# Patient Record
Sex: MALE | Race: WHITE | NOT HISPANIC OR LATINO | Employment: OTHER | URBAN - METROPOLITAN AREA
[De-identification: names, ages, dates, MRNs, and addresses within clinical notes are randomized per-mention and may not be internally consistent; named-entity substitution may affect disease eponyms.]

---

## 2017-07-20 ENCOUNTER — TRANSCRIBE ORDERS (OUTPATIENT)
Dept: ADMINISTRATIVE | Facility: HOSPITAL | Age: 62
End: 2017-07-20

## 2017-07-20 DIAGNOSIS — M54.5 LOW BACK PAIN, UNSPECIFIED BACK PAIN LATERALITY, UNSPECIFIED CHRONICITY, WITH SCIATICA PRESENCE UNSPECIFIED: Primary | ICD-10-CM

## 2017-07-21 ENCOUNTER — HOSPITAL ENCOUNTER (OUTPATIENT)
Dept: RADIOLOGY | Age: 62
Discharge: HOME | End: 2017-07-21
Payer: MEDICARE

## 2017-07-21 DIAGNOSIS — M54.5 LOW BACK PAIN, UNSPECIFIED BACK PAIN LATERALITY, UNSPECIFIED CHRONICITY, WITH SCIATICA PRESENCE UNSPECIFIED: ICD-10-CM

## 2017-07-21 PROCEDURE — 72148 MRI LUMBAR SPINE W/O DYE: CPT

## 2017-08-25 ENCOUNTER — APPOINTMENT (OUTPATIENT)
Dept: LAB | Age: 62
End: 2017-08-25
Payer: MEDICARE

## 2017-08-25 ENCOUNTER — TRANSCRIBE ORDERS (OUTPATIENT)
Dept: ADMINISTRATIVE | Age: 62
End: 2017-08-25

## 2017-08-25 DIAGNOSIS — I10 ESSENTIAL HYPERTENSION, MALIGNANT: ICD-10-CM

## 2017-08-25 DIAGNOSIS — I25.119 ATHEROSCLEROSIS OF NATIVE CORONARY ARTERY WITH ANGINA PECTORIS, UNSPECIFIED WHETHER NATIVE OR TRANSPLANTED HEART (HCC): Primary | ICD-10-CM

## 2017-08-25 DIAGNOSIS — E78.2 MIXED HYPERLIPIDEMIA: ICD-10-CM

## 2017-08-25 DIAGNOSIS — I73.9 PERIPHERAL VASCULAR DISEASE, UNSPECIFIED (HCC): ICD-10-CM

## 2017-08-25 DIAGNOSIS — I25.119 ATHEROSCLEROSIS OF NATIVE CORONARY ARTERY WITH ANGINA PECTORIS, UNSPECIFIED WHETHER NATIVE OR TRANSPLANTED HEART (HCC): ICD-10-CM

## 2017-08-25 LAB
CHOLEST SERPL-MCNC: 152 MG/DL (ref 50–200)
HDLC SERPL-MCNC: 49 MG/DL (ref 40–60)
LDLC SERPL CALC-MCNC: 88 MG/DL (ref 0–100)
TRIGL SERPL-MCNC: 77 MG/DL

## 2017-08-25 PROCEDURE — 36415 COLL VENOUS BLD VENIPUNCTURE: CPT

## 2017-08-25 PROCEDURE — 80061 LIPID PANEL: CPT

## 2017-10-05 LAB — HCV AB SER-ACNC: NON REACTIVE

## 2018-03-01 ENCOUNTER — APPOINTMENT (OUTPATIENT)
Dept: LAB | Age: 63
End: 2018-03-01
Payer: MEDICARE

## 2018-03-01 ENCOUNTER — TRANSCRIBE ORDERS (OUTPATIENT)
Dept: ADMINISTRATIVE | Age: 63
End: 2018-03-01

## 2018-03-01 DIAGNOSIS — E03.9 MYXEDEMA HEART DISEASE: ICD-10-CM

## 2018-03-01 DIAGNOSIS — I51.9 MYXEDEMA HEART DISEASE: ICD-10-CM

## 2018-03-01 DIAGNOSIS — E78.5 HYPERLIPIDEMIA, UNSPECIFIED HYPERLIPIDEMIA TYPE: Primary | ICD-10-CM

## 2018-03-01 DIAGNOSIS — E78.5 HYPERLIPIDEMIA, UNSPECIFIED HYPERLIPIDEMIA TYPE: ICD-10-CM

## 2018-03-01 LAB
ALBUMIN SERPL BCP-MCNC: 3.9 G/DL (ref 3.5–5)
ALP SERPL-CCNC: 102 U/L (ref 46–116)
ALT SERPL W P-5'-P-CCNC: 20 U/L (ref 12–78)
ANION GAP SERPL CALCULATED.3IONS-SCNC: 9 MMOL/L (ref 4–13)
AST SERPL W P-5'-P-CCNC: 18 U/L (ref 5–45)
BILIRUB SERPL-MCNC: 0.64 MG/DL (ref 0.2–1)
BUN SERPL-MCNC: 16 MG/DL (ref 5–25)
CALCIUM SERPL-MCNC: 9.2 MG/DL (ref 8.3–10.1)
CHLORIDE SERPL-SCNC: 104 MMOL/L (ref 100–108)
CHOLEST SERPL-MCNC: 142 MG/DL (ref 50–200)
CO2 SERPL-SCNC: 27 MMOL/L (ref 21–32)
CREAT SERPL-MCNC: 1.14 MG/DL (ref 0.6–1.3)
GFR SERPL CREATININE-BSD FRML MDRD: 70 ML/MIN/1.73SQ M
GLUCOSE P FAST SERPL-MCNC: 108 MG/DL (ref 65–99)
HDLC SERPL-MCNC: 42 MG/DL (ref 40–60)
LDLC SERPL CALC-MCNC: 69 MG/DL (ref 0–100)
POTASSIUM SERPL-SCNC: 4.1 MMOL/L (ref 3.5–5.3)
PROT SERPL-MCNC: 7.7 G/DL (ref 6.4–8.2)
SODIUM SERPL-SCNC: 140 MMOL/L (ref 136–145)
TRIGL SERPL-MCNC: 157 MG/DL
TSH SERPL DL<=0.05 MIU/L-ACNC: 3.53 UIU/ML (ref 0.36–3.74)

## 2018-03-01 PROCEDURE — 36415 COLL VENOUS BLD VENIPUNCTURE: CPT

## 2018-03-01 PROCEDURE — 80053 COMPREHEN METABOLIC PANEL: CPT

## 2018-03-01 PROCEDURE — 80061 LIPID PANEL: CPT

## 2018-03-01 PROCEDURE — 84443 ASSAY THYROID STIM HORMONE: CPT

## 2018-10-21 PROBLEM — R07.9 CHEST PAIN: Status: ACTIVE | Noted: 2018-07-17

## 2018-10-21 PROBLEM — M79.18 MYOFASCIAL PAIN SYNDROME: Status: ACTIVE | Noted: 2017-08-01

## 2018-10-21 PROBLEM — R60.0 BILATERAL LOWER EXTREMITY EDEMA: Status: ACTIVE | Noted: 2018-05-10

## 2018-10-21 PROBLEM — I73.9 CLAUDICATION (HCC): Status: ACTIVE | Noted: 2017-08-21

## 2018-10-21 PROBLEM — M54.16 RADICULOPATHY, LUMBAR REGION: Status: ACTIVE | Noted: 2017-08-01

## 2018-10-21 PROBLEM — E78.2 MIXED HYPERLIPIDEMIA: Status: ACTIVE | Noted: 2017-08-21

## 2018-10-21 PROBLEM — I25.10 CAD (CORONARY ARTERY DISEASE): Status: ACTIVE | Noted: 2018-07-17

## 2018-10-21 NOTE — PROGRESS NOTES
Consultation - Cardiology Office  [facility]  Russell Todd 66 y o  male MRN: [id number]  : 1955  Unit/Bed#:  Encounter: [id number]      Assessment:     1  Coronary artery disease involving native coronary artery of native heart with angina pectoris ([address] )    2  Essential hypertension    3  Chest pain, unspecified type    4  Mixed hyperlipidemia    5  Bilateral lower extremity edema    6  Palpitation    7  History of CVA (cerebrovascular accident)        Discussion summary and Plan:  1  Coronary artery disease with multiple vessel stenting  Patient still have lot of nonspecific complaints  Patient is overall very unhappy with care with multiple physicians  Patient's cardiac catheterization report reviewed  He has stents done in RCA LAD circumflex  Last stenting was in 8823 and it was complicated with possible air embolism and patient developing transit hypotension  He had a normal stress test in 2017 and cardiac catheterization report reviewed  He continues to have nonspecific complaints  2   Benign essential hypertension  Blood pressure is acceptable    3  Dyslipidemia continue to her was statin cholesterol profile acceptable    4  Palpitations  Check Holter monitor  EKG shows no evidence of significant arrhythmias    5  Bilateral lower extremity edema  Currently no edema is noted  Patient already stop Lasix and potassium supplementation    6  History of anxiety and sleep problem advised to see PMD    7  History of CVA  Patient already on statins and aspirin and Plavix  Plan  Continue current Rx  If continues to have symptoms of nonspecific chest pain may need nuclear stress test  Holter monitor  Continue same medication  Patient's cath report, echo and Doppler study is reviewed with him  Counseling :  A description of the counseling  Goals and Barriers    Patient's ability to self care: Yes  Medication side effect reviewed with patient in detail and all their questions answered to their satisfaction  Physician Requesting Consult: Geneva Simon MD    Reason for Consult / Principal Problem:   Extensive cardiac history    Consults    HPI :     Singh Dorantes is a 66y o  year old male who was referred by primary care doctor for his coronary artery disease  Patient just had a last cardiac catheterization done in July of 2018 at [facility]   He was electively admitted for cardiac catheterization  Has past medical history significant for with coronary artery disease status post multivessel stenting in calcified arteries, chronic low back pain, hypertension, history of CVA, hypothyroidism  As per the note patient has numerous stent and PCI and he was offered CABG however he opted for multivessel PCI  In 2017 he had PCI of his LAD with drug-eluting stent and PCI of circumflex and OM in April 2017  In July 2017 he was electively brought in for cardiac catheterization and was found to have normal left main, lad 40% post D2, 99% stenosis distally and circumflex X 60% proximally in stent stenosis and RCA normal with mid and distal stents  Patient had PCI of diagonal which was 99% stenosis and a drug-eluting stent was placed  Course was complicated by ruptured balloon and air emboli where patient were resuscitated  Patient has longstanding history of multiple stents  There may be a stent placed by me in 2007  Most of his stent but paced by Dr Libra Quach in in 2017  In 2017 Dr Libra Quach also did a rotablator of an artery  We do not have the details of it    In 2018 he should take his care to [facility] and he was seen by Dr Vinnie Pascual and Dr Maria M Ruano did his last cardiac catheterization whose report is in the chart  Review of Systems   Constitutional: Positive for fatigue  Cardiovascular: Positive for leg swelling          Minimal  History of multivessel stenting   Neurological:        History of stroke Psychiatric/Behavioral: Positive for sleep disturbance  The patient is nervous/anxious  All other systems reviewed and are negative  Historical Information   Past Medical History:   Diagnosis Date    CAD (coronary artery disease)     Heart disease     Hypertension     Hypothyroidism     Stroke [facility])      Past Surgical History:   Procedure Laterality Date    CORONARY STENT PLACEMENT      HERNIA REPAIR       History   Alcohol Use No     History   Drug Use No     History   Smoking Status    Never Smoker   Smokeless Tobacco    Never Used     Family History:   Family History   Problem Relation Age of Onset    Cancer Mother     Heart attack Father     Heart attack Sister     Cancer Brother        Meds/Allergies     No Known Allergies    Current Outpatient Prescriptions:     aspirin 81 MG tablet, Take 81 mg by mouth, Disp: , Rfl:     atorvastatin (LIPITOR) 40 mg tablet, Take 80 mg by mouth, Disp: , Rfl:     Cholecalciferol 1000 units capsule, Take 1,000 Units by mouth, Disp: , Rfl:     clonazePAM (KlonoPIN) 2 mg tablet, Take 4 mg by mouth, Disp: , Rfl:     clopidogrel (PLAVIX) 75 mg tablet, Take 75 mg by mouth, Disp: , Rfl:     levothyroxine 100 mcg tablet, Take 100 mcg by mouth, Disp: , Rfl:     metoprolol succinate (TOPROL-XL) 100 mg 24 hr tablet, Take 100 mg by mouth, Disp: , Rfl:     TiZANidine (ZANAFLEX) 2 MG capsule, Take 2 mg by mouth Three times a day, Disp: , Rfl:     zolpidem (AMBIEN) 10 mg tablet, Take 20 mg by mouth, Disp: , Rfl:     Vitals: Blood pressure 140/88, pulse 68, height 5' 10" (1 778 m), weight 108 kg (238 lb), SpO2 96 %  Body mass index is 34 15 kg/m²    Vitals:    10/25/18 1110   Weight: 108 kg (238 lb)     BP Readings from Last 3 Encounters:   10/25/18 140/88   08/22/12 (!) 160/102         Physical Exam    Neurologic:  Alert & oriented x 3, no new focal deficits, Not in any acute distress,  Constitutional:  Well developed, well nourished, non-toxic appearance Eyes:  Pupil equal and reacting to light, conjunctiva normal, No JVP, No LNP   HENT:  Atraumatic, oropharynx moist, Neck- normal range of motion, no tenderness,  Neck supple   Respiratory:  Bilateral air entry, mostly clear to auscultation  Cardiovascular: S1-S2 regular with a 2/6 ejection systolic murmur and S4 is present  GI:  Soft, nondistended, normal bowel sounds, nontender, no hepatosplenomegaly appreciated  Musculoskeletal:  No edema, no tenderness, no deformities  Skin:  Well hydrated, no rash   Lymphatic:  No lymphadenopathy noted   Extremities:  No edema and distal pulses are present    Diagnostic Studies Review Cardio:      Echo Doppler  Echo Doppler done at [facility] in May of 2018 shows normal LV systolic function  No significant valvular disease  It is reported in Care everywhere  Echo 5/15/18  Normal left ventricular systolic function  Normal regional wall motion  Moderate concentric left ventricular hypertrophy  Mildly dilated ascending aorta  Trace tricuspid regurgitation  Mild diastolic dysfunction with normal filling pressures  Visually Estimated LV Ejection Fraction is:60%     LE Ultrasound 8/31/17  There is no hemodynamically stenosis of the bilateral lower extremities  Normal doppler waveforms of the bilateral lower extremities           Cath 7/17/18  Left Main: Normal  LAD: 40% post D2, 99% proximal D2  CLX: 60% proximal in stent  RCA: Normal mid and distal stents  Vessel intervened on was the diagonal D2  Diagonal 2 Pre stenosis = 99%, Post stenosis = 0%, Stents 2 0X8 [place]    Lab Review   Lab Results   Component Value Date    07/24/2018   K 3 9 07/24/2018    07/24/2018   CO2 28 07/24/2018   BUN 16 07/24/2018   CREATININE 1 21 07/24/2018   GLUCOSE 150 (H) 07/24/2018   CALCIUM 8 8 07/24/2018   INR 1 0 07/17/2018     Lab Results   Component Value Date   CHOL 152 08/25/2017   TRIG 77 08/25/2017   HDL 49 08/25/2017      Nuclear stress test   Nuclear stress test done in August of 2017 shows no ischemia EF was 63%  Study done at [facility]     PERFUSION:     SPECT images demonstrate homogeneous tracer distribution throughout                           the myocardium                             Resting images showed no change in the pattern of perfusion                           compared to stress                            Resting images showed no change in the pattern of perfusion                           compared to stress                            Stress and rest SPECT images are normal                            No abnormalities                             No evidence of ischemia        FUNCTIONAL RESULTS (calculated via Gated SPECT)     Post Stress Image LV EF:    71     %      Stress EDV: 107   ml         EDVI:  63    ml/m²      TID:  1 05      Stress ESV: 31    ml         ESVI:  18    ml/m²    Normal left ventricular size and ejection fraction      Normal left ventricular wall motion and thickness       Last cardiac catheterization report from [facility]:  Operative Procedures Performed  Cardiac catheterization 7/17/18  Coronary Angiography  Left Main: Normal  LAD: 40% post D2, 99% proximal D2  CLX: 60% proximal in stent  RCA: Normal mid and distal stents  Patient has multiple stents in his RCA, mid LAD, and proximal circumflex      Coronary Intervention:   Vessel intervened on was the diagonal 2  Diagonal 2 Pre stenosis = 99%, Post stenosis = 0%, Stents 2 0X 8 [place]      EKG:  Twelve lead EKG done in our office on 10/25/2018 shows normal sinus rhythm heart rate 61 beats per minute         Lab Review   Lab Results   Component Value Date    WBC 6 22 10/10/2016    HGB 14 9 10/10/2016    HCT 43 9 10/10/2016    MCV 96 10/10/2016     10/10/2016     Lab Results   Component Value Date     03/01/2018    K 4 1 03/01/2018     03/01/2018    CO2 27 03/01/2018    BUN 16 03/01/2018    CREATININE 1 14 03/01/2018    GLUF 108 (H) 03/01/2018    CALCIUM 9 2 03/01/2018    AST 18 03/01/2018    ALT 20 03/01/2018    ALKPHOS 102 03/01/2018    EGFR 70 03/01/2018     Lab Results   Component Value Date    CALCIUM 9 2 03/01/2018     03/01/2018    K 4 1 03/01/2018    CO2 27 03/01/2018     03/01/2018    BUN 16 03/01/2018    CREATININE 1 14 03/01/2018       Lipid Profile:   Lab Results   Component Value Date    CHOLESTEROL 142 03/01/2018    HDL 42 03/01/2018    LDLCALC 69 03/01/2018    TRIG 157 (H) 03/01/2018     Lab Results   Component Value Date    CHOLESTEROL 142 03/01/2018    CHOLESTEROL 152 08/25/2017     Dr Tana Vásquez MD [facility] - Echo      "This note has been constructed using a voice recognition system  Therefore there may be syntax, spelling, and/or grammatical errors   Please call if you have any questions  "

## 2018-10-25 ENCOUNTER — OFFICE VISIT (OUTPATIENT)
Dept: CARDIOLOGY CLINIC | Facility: CLINIC | Age: 63
End: 2018-10-25
Payer: MEDICARE

## 2018-10-25 VITALS
BODY MASS INDEX: 34.07 KG/M2 | SYSTOLIC BLOOD PRESSURE: 140 MMHG | HEIGHT: 70 IN | OXYGEN SATURATION: 96 % | WEIGHT: 238 LBS | HEART RATE: 68 BPM | DIASTOLIC BLOOD PRESSURE: 88 MMHG

## 2018-10-25 DIAGNOSIS — I10 ESSENTIAL HYPERTENSION: ICD-10-CM

## 2018-10-25 DIAGNOSIS — I25.119 CORONARY ARTERY DISEASE INVOLVING NATIVE CORONARY ARTERY OF NATIVE HEART WITH ANGINA PECTORIS (HCC): ICD-10-CM

## 2018-10-25 DIAGNOSIS — Z86.73 HISTORY OF CVA (CEREBROVASCULAR ACCIDENT): ICD-10-CM

## 2018-10-25 DIAGNOSIS — R07.9 CHEST PAIN, UNSPECIFIED TYPE: ICD-10-CM

## 2018-10-25 DIAGNOSIS — R00.2 PALPITATION: ICD-10-CM

## 2018-10-25 DIAGNOSIS — E78.2 MIXED HYPERLIPIDEMIA: ICD-10-CM

## 2018-10-25 DIAGNOSIS — R60.0 BILATERAL LOWER EXTREMITY EDEMA: ICD-10-CM

## 2018-10-25 PROCEDURE — 93000 ELECTROCARDIOGRAM COMPLETE: CPT | Performed by: INTERNAL MEDICINE

## 2018-10-25 PROCEDURE — 99205 OFFICE O/P NEW HI 60 MIN: CPT | Performed by: INTERNAL MEDICINE

## 2018-10-25 RX ORDER — TIZANIDINE HYDROCHLORIDE 2 MG/1
2 CAPSULE, GELATIN COATED ORAL 3 TIMES DAILY
COMMUNITY
End: 2020-11-05 | Stop reason: SDUPTHER

## 2018-10-25 RX ORDER — ZOLPIDEM TARTRATE 10 MG/1
20 TABLET ORAL
COMMUNITY
End: 2020-04-20 | Stop reason: SDUPTHER

## 2018-10-25 RX ORDER — FUROSEMIDE 20 MG/1
20 TABLET ORAL
COMMUNITY
Start: 2018-07-27 | End: 2018-10-25

## 2018-10-25 RX ORDER — ISOSORBIDE MONONITRATE 30 MG/1
30 TABLET, EXTENDED RELEASE ORAL
COMMUNITY
Start: 2018-07-13 | End: 2018-10-25

## 2018-10-25 RX ORDER — TIZANIDINE 2 MG/1
TABLET ORAL
COMMUNITY
Start: 2018-08-27 | End: 2018-10-25

## 2018-10-25 RX ORDER — CLONAZEPAM 2 MG/1
4 TABLET ORAL
COMMUNITY
End: 2020-04-20 | Stop reason: SDUPTHER

## 2018-10-25 RX ORDER — CLOPIDOGREL BISULFATE 75 MG/1
75 TABLET ORAL
COMMUNITY
Start: 2018-06-25 | End: 2020-07-01

## 2018-10-25 RX ORDER — LEVOTHYROXINE SODIUM 0.1 MG/1
100 TABLET ORAL
COMMUNITY
End: 2020-07-27

## 2018-10-25 RX ORDER — ATORVASTATIN CALCIUM 40 MG/1
80 TABLET, FILM COATED ORAL
COMMUNITY
Start: 2018-07-19 | End: 2020-07-18

## 2018-10-25 RX ORDER — POTASSIUM CHLORIDE 750 MG/1
10 TABLET, EXTENDED RELEASE ORAL
COMMUNITY
Start: 2018-07-27 | End: 2018-10-25

## 2018-10-25 RX ORDER — METOPROLOL SUCCINATE 100 MG/1
100 TABLET, EXTENDED RELEASE ORAL
COMMUNITY
End: 2020-07-27 | Stop reason: SDUPTHER

## 2018-11-16 ENCOUNTER — HOSPITAL ENCOUNTER (OUTPATIENT)
Dept: NON INVASIVE DIAGNOSTICS | Facility: HOSPITAL | Age: 63
Discharge: HOME | End: 2018-11-16
Attending: INTERNAL MEDICINE
Payer: MEDICARE

## 2018-11-16 ENCOUNTER — TRANSCRIBE ORDERS (OUTPATIENT)
Dept: ADMINISTRATIVE | Facility: HOSPITAL | Age: 63
End: 2018-11-16

## 2018-11-16 ENCOUNTER — TELEPHONE (OUTPATIENT)
Dept: CARDIOLOGY CLINIC | Facility: CLINIC | Age: 63
End: 2018-11-16

## 2018-11-16 ENCOUNTER — LAB (OUTPATIENT)
Dept: LAB | Facility: HOSPITAL | Age: 63
End: 2018-11-16
Payer: MEDICARE

## 2018-11-16 DIAGNOSIS — E78.5 HYPERLIPIDEMIA, UNSPECIFIED HYPERLIPIDEMIA TYPE: ICD-10-CM

## 2018-11-16 DIAGNOSIS — R00.2 PALPITATION: ICD-10-CM

## 2018-11-16 DIAGNOSIS — I25.119 CORONARY ARTERY DISEASE INVOLVING NATIVE CORONARY ARTERY OF NATIVE HEART WITH ANGINA PECTORIS (HCC): ICD-10-CM

## 2018-11-16 DIAGNOSIS — E78.5 HYPERLIPIDEMIA, UNSPECIFIED HYPERLIPIDEMIA TYPE: Primary | ICD-10-CM

## 2018-11-16 DIAGNOSIS — E03.9 HYPOTHYROIDISM, UNSPECIFIED TYPE: ICD-10-CM

## 2018-11-16 LAB
ALBUMIN SERPL BCP-MCNC: 3.8 G/DL (ref 3.5–5)
ALP SERPL-CCNC: 110 U/L (ref 46–116)
ALT SERPL W P-5'-P-CCNC: 23 U/L (ref 12–78)
ANION GAP SERPL CALCULATED.3IONS-SCNC: 7 MMOL/L (ref 4–13)
AST SERPL W P-5'-P-CCNC: 16 U/L (ref 5–45)
BILIRUB SERPL-MCNC: 1.1 MG/DL (ref 0.2–1)
BUN SERPL-MCNC: 12 MG/DL (ref 5–25)
CALCIUM SERPL-MCNC: 9.5 MG/DL (ref 8.3–10.1)
CHLORIDE SERPL-SCNC: 102 MMOL/L (ref 100–108)
CHOLEST SERPL-MCNC: 130 MG/DL (ref 50–200)
CO2 SERPL-SCNC: 30 MMOL/L (ref 21–32)
CREAT SERPL-MCNC: 1.2 MG/DL (ref 0.6–1.3)
GFR SERPL CREATININE-BSD FRML MDRD: 65 ML/MIN/1.73SQ M
GLUCOSE P FAST SERPL-MCNC: 98 MG/DL (ref 65–99)
HDLC SERPL-MCNC: 38 MG/DL (ref 40–60)
LDLC SERPL CALC-MCNC: 72 MG/DL (ref 0–100)
NONHDLC SERPL-MCNC: 92 MG/DL
POTASSIUM SERPL-SCNC: 3.8 MMOL/L (ref 3.5–5.3)
PROT SERPL-MCNC: 7.5 G/DL (ref 6.4–8.2)
SODIUM SERPL-SCNC: 139 MMOL/L (ref 136–145)
T4 FREE SERPL-MCNC: 1.01 NG/DL (ref 0.76–1.46)
TRIGL SERPL-MCNC: 99 MG/DL
TSH SERPL DL<=0.05 MIU/L-ACNC: 1.03 UIU/ML (ref 0.36–3.74)

## 2018-11-16 PROCEDURE — 80061 LIPID PANEL: CPT

## 2018-11-16 PROCEDURE — 93226 XTRNL ECG REC<48 HR SCAN A/R: CPT

## 2018-11-16 PROCEDURE — 93225 XTRNL ECG REC<48 HRS REC: CPT

## 2018-11-16 PROCEDURE — 36415 COLL VENOUS BLD VENIPUNCTURE: CPT

## 2018-11-16 PROCEDURE — 84439 ASSAY OF FREE THYROXINE: CPT

## 2018-11-16 PROCEDURE — 84443 ASSAY THYROID STIM HORMONE: CPT

## 2018-11-16 PROCEDURE — 80053 COMPREHEN METABOLIC PANEL: CPT

## 2018-11-16 NOTE — TELEPHONE ENCOUNTER
----- Message from Gerardo Hillman MD sent at 11/16/2018  1:48 PM EST -----  Patient labs are acceptable  Continue same medications  Patient is advised to follow up  appointment regularly  Please call patient about the  about results

## 2018-11-20 ENCOUNTER — TELEPHONE (OUTPATIENT)
Dept: CARDIOLOGY CLINIC | Facility: CLINIC | Age: 63
End: 2018-11-20

## 2018-11-20 NOTE — TELEPHONE ENCOUNTER
Dr Alex Mathis is reading the holters Mallory Skipper He may be aware  Please ask him when you see him in the office  I will also try to find out

## 2018-11-21 PROCEDURE — 93227 XTRNL ECG REC<48 HR R&I: CPT | Performed by: INTERNAL MEDICINE

## 2019-06-19 ENCOUNTER — OFFICE VISIT (OUTPATIENT)
Dept: CARDIOLOGY CLINIC | Facility: CLINIC | Age: 64
End: 2019-06-19
Payer: MEDICARE

## 2019-06-19 VITALS
HEART RATE: 70 BPM | BODY MASS INDEX: 33.64 KG/M2 | HEIGHT: 70 IN | DIASTOLIC BLOOD PRESSURE: 80 MMHG | SYSTOLIC BLOOD PRESSURE: 122 MMHG | WEIGHT: 235 LBS | OXYGEN SATURATION: 97 %

## 2019-06-19 DIAGNOSIS — I25.119 CORONARY ARTERY DISEASE INVOLVING NATIVE CORONARY ARTERY OF NATIVE HEART WITH ANGINA PECTORIS (HCC): ICD-10-CM

## 2019-06-19 DIAGNOSIS — E78.2 MIXED HYPERLIPIDEMIA: ICD-10-CM

## 2019-06-19 DIAGNOSIS — Z86.73 HISTORY OF CVA (CEREBROVASCULAR ACCIDENT): ICD-10-CM

## 2019-06-19 DIAGNOSIS — Z98.61 HISTORY OF PERCUTANEOUS CORONARY INTERVENTION: ICD-10-CM

## 2019-06-19 DIAGNOSIS — R07.9 CHEST PAIN, UNSPECIFIED TYPE: ICD-10-CM

## 2019-06-19 DIAGNOSIS — R60.0 BILATERAL LOWER EXTREMITY EDEMA: ICD-10-CM

## 2019-06-19 DIAGNOSIS — M54.16 RADICULOPATHY, LUMBAR REGION: ICD-10-CM

## 2019-06-19 DIAGNOSIS — R00.2 PALPITATION: ICD-10-CM

## 2019-06-19 DIAGNOSIS — I10 ESSENTIAL HYPERTENSION: ICD-10-CM

## 2019-06-19 PROCEDURE — 99214 OFFICE O/P EST MOD 30 MIN: CPT | Performed by: INTERNAL MEDICINE

## 2019-06-19 PROCEDURE — 93000 ELECTROCARDIOGRAM COMPLETE: CPT | Performed by: INTERNAL MEDICINE

## 2019-07-03 ENCOUNTER — HOSPITAL ENCOUNTER (OUTPATIENT)
Dept: NON INVASIVE DIAGNOSTICS | Facility: CLINIC | Age: 64
Discharge: HOME | End: 2019-07-03
Payer: MEDICARE

## 2019-07-03 DIAGNOSIS — I10 ESSENTIAL HYPERTENSION: ICD-10-CM

## 2019-07-03 DIAGNOSIS — R07.9 CHEST PAIN, UNSPECIFIED TYPE: ICD-10-CM

## 2019-07-03 DIAGNOSIS — I25.119 CORONARY ARTERY DISEASE INVOLVING NATIVE CORONARY ARTERY OF NATIVE HEART WITH ANGINA PECTORIS (HCC): ICD-10-CM

## 2019-07-03 PROCEDURE — 93016 CV STRESS TEST SUPVJ ONLY: CPT | Performed by: INTERNAL MEDICINE

## 2019-07-03 PROCEDURE — 78452 HT MUSCLE IMAGE SPECT MULT: CPT | Performed by: INTERNAL MEDICINE

## 2019-07-03 PROCEDURE — 78452 HT MUSCLE IMAGE SPECT MULT: CPT

## 2019-07-03 PROCEDURE — 93018 CV STRESS TEST I&R ONLY: CPT | Performed by: INTERNAL MEDICINE

## 2019-07-03 PROCEDURE — 93017 CV STRESS TEST TRACING ONLY: CPT

## 2019-07-03 PROCEDURE — A9502 TC99M TETROFOSMIN: HCPCS

## 2019-07-03 RX ADMIN — REGADENOSON 0.4 MG: 0.08 INJECTION, SOLUTION INTRAVENOUS at 14:01

## 2019-07-05 ENCOUNTER — TELEPHONE (OUTPATIENT)
Dept: CARDIOLOGY CLINIC | Facility: CLINIC | Age: 64
End: 2019-07-05

## 2019-07-05 LAB
CHEST PAIN STATEMENT: NORMAL
MAX DIASTOLIC BP: 90 MMHG
MAX HEART RATE: 87 BPM
MAX PREDICTED HEART RATE: 158 BPM
MAX. SYSTOLIC BP: 150 MMHG
PROTOCOL NAME: NORMAL
REASON FOR TERMINATION: NORMAL
TARGET HR FORMULA: NORMAL
TEST INDICATION: NORMAL
TIME IN EXERCISE PHASE: NORMAL

## 2019-07-05 NOTE — TELEPHONE ENCOUNTER
He called in regards to his test results  He asked that he gets a call back today even if the results aren't read yet

## 2019-07-05 NOTE — TELEPHONE ENCOUNTER
Please let him know it was normal   Dr Arabella Quiroz can speak to him further when he gets back if he would like

## 2019-07-10 ENCOUNTER — TELEPHONE (OUTPATIENT)
Dept: CARDIOLOGY CLINIC | Facility: CLINIC | Age: 64
End: 2019-07-10

## 2019-07-10 NOTE — TELEPHONE ENCOUNTER
----- Message from Ever Howe MD sent at 7/10/2019  8:06 AM EDT -----  Pt's Patient's stress test is normal    Patient can keep regular appointment  Please call patient with the result

## 2019-11-29 ENCOUNTER — APPOINTMENT (OUTPATIENT)
Dept: LAB | Facility: CLINIC | Age: 64
End: 2019-11-29
Payer: MEDICARE

## 2019-11-29 ENCOUNTER — TRANSCRIBE ORDERS (OUTPATIENT)
Dept: LAB | Facility: CLINIC | Age: 64
End: 2019-11-29

## 2019-11-29 DIAGNOSIS — Z12.5 SPECIAL SCREENING FOR MALIGNANT NEOPLASM OF PROSTATE: ICD-10-CM

## 2019-11-29 DIAGNOSIS — Z13.220 SCREENING FOR LIPOID DISORDERS: ICD-10-CM

## 2019-11-29 DIAGNOSIS — R53.83 OTHER FATIGUE: Primary | ICD-10-CM

## 2019-11-29 DIAGNOSIS — R53.83 OTHER FATIGUE: ICD-10-CM

## 2019-11-29 LAB
ALBUMIN SERPL BCP-MCNC: 3.8 G/DL (ref 3.5–5)
ALP SERPL-CCNC: 98 U/L (ref 46–116)
ALT SERPL W P-5'-P-CCNC: 24 U/L (ref 12–78)
ANION GAP SERPL CALCULATED.3IONS-SCNC: 2 MMOL/L (ref 4–13)
AST SERPL W P-5'-P-CCNC: 20 U/L (ref 5–45)
BASOPHILS # BLD AUTO: 0.04 THOUSANDS/ΜL (ref 0–0.1)
BASOPHILS NFR BLD AUTO: 1 % (ref 0–1)
BILIRUB SERPL-MCNC: 1.23 MG/DL (ref 0.2–1)
BILIRUB UR QL STRIP: NEGATIVE
BUN SERPL-MCNC: 13 MG/DL (ref 5–25)
CALCIUM SERPL-MCNC: 9.4 MG/DL (ref 8.3–10.1)
CHLORIDE SERPL-SCNC: 105 MMOL/L (ref 100–108)
CHOLEST SERPL-MCNC: 138 MG/DL (ref 50–200)
CLARITY UR: CLEAR
CO2 SERPL-SCNC: 30 MMOL/L (ref 21–32)
COLOR UR: YELLOW
CREAT SERPL-MCNC: 1.27 MG/DL (ref 0.6–1.3)
EOSINOPHIL # BLD AUTO: 0.14 THOUSAND/ΜL (ref 0–0.61)
EOSINOPHIL NFR BLD AUTO: 2 % (ref 0–6)
ERYTHROCYTE [DISTWIDTH] IN BLOOD BY AUTOMATED COUNT: 13.4 % (ref 11.6–15.1)
GFR SERPL CREATININE-BSD FRML MDRD: 60 ML/MIN/1.73SQ M
GLUCOSE P FAST SERPL-MCNC: 110 MG/DL (ref 65–99)
GLUCOSE UR STRIP-MCNC: NEGATIVE MG/DL
HCT VFR BLD AUTO: 44.9 % (ref 36.5–49.3)
HDLC SERPL-MCNC: 40 MG/DL
HGB BLD-MCNC: 14.7 G/DL (ref 12–17)
HGB UR QL STRIP.AUTO: NEGATIVE
IMM GRANULOCYTES # BLD AUTO: 0.02 THOUSAND/UL (ref 0–0.2)
IMM GRANULOCYTES NFR BLD AUTO: 0 % (ref 0–2)
KETONES UR STRIP-MCNC: NEGATIVE MG/DL
LDLC SERPL CALC-MCNC: 74 MG/DL (ref 0–100)
LEUKOCYTE ESTERASE UR QL STRIP: NEGATIVE
LYMPHOCYTES # BLD AUTO: 0.95 THOUSANDS/ΜL (ref 0.6–4.47)
LYMPHOCYTES NFR BLD AUTO: 15 % (ref 14–44)
MAGNESIUM SERPL-MCNC: 2 MG/DL (ref 1.6–2.6)
MCH RBC QN AUTO: 33 PG (ref 26.8–34.3)
MCHC RBC AUTO-ENTMCNC: 32.7 G/DL (ref 31.4–37.4)
MCV RBC AUTO: 101 FL (ref 82–98)
MONOCYTES # BLD AUTO: 0.52 THOUSAND/ΜL (ref 0.17–1.22)
MONOCYTES NFR BLD AUTO: 8 % (ref 4–12)
NEUTROPHILS # BLD AUTO: 4.59 THOUSANDS/ΜL (ref 1.85–7.62)
NEUTS SEG NFR BLD AUTO: 74 % (ref 43–75)
NITRITE UR QL STRIP: NEGATIVE
NONHDLC SERPL-MCNC: 98 MG/DL
NRBC BLD AUTO-RTO: 0 /100 WBCS
PH UR STRIP.AUTO: 6 [PH]
PLATELET # BLD AUTO: 167 THOUSANDS/UL (ref 149–390)
PMV BLD AUTO: 10 FL (ref 8.9–12.7)
POTASSIUM SERPL-SCNC: 4.2 MMOL/L (ref 3.5–5.3)
PROT SERPL-MCNC: 7.6 G/DL (ref 6.4–8.2)
PROT UR STRIP-MCNC: NEGATIVE MG/DL
PSA SERPL-MCNC: 0.5 NG/ML (ref 0–4)
RBC # BLD AUTO: 4.46 MILLION/UL (ref 3.88–5.62)
SODIUM SERPL-SCNC: 137 MMOL/L (ref 136–145)
SP GR UR STRIP.AUTO: 1.02 (ref 1–1.03)
T4 FREE SERPL-MCNC: 1.03 NG/DL (ref 0.76–1.46)
TRIGL SERPL-MCNC: 121 MG/DL
TSH SERPL DL<=0.05 MIU/L-ACNC: 2.12 UIU/ML (ref 0.36–3.74)
URATE SERPL-MCNC: 6.5 MG/DL (ref 4.2–8)
UROBILINOGEN UR QL STRIP.AUTO: 0.2 E.U./DL
WBC # BLD AUTO: 6.26 THOUSAND/UL (ref 4.31–10.16)

## 2019-11-29 PROCEDURE — 36415 COLL VENOUS BLD VENIPUNCTURE: CPT

## 2019-11-29 PROCEDURE — G0103 PSA SCREENING: HCPCS

## 2019-11-29 PROCEDURE — 84443 ASSAY THYROID STIM HORMONE: CPT

## 2019-11-29 PROCEDURE — 83735 ASSAY OF MAGNESIUM: CPT

## 2019-11-29 PROCEDURE — 84439 ASSAY OF FREE THYROXINE: CPT

## 2019-11-29 PROCEDURE — 84550 ASSAY OF BLOOD/URIC ACID: CPT

## 2019-11-29 PROCEDURE — 80053 COMPREHEN METABOLIC PANEL: CPT

## 2019-11-29 PROCEDURE — 85025 COMPLETE CBC W/AUTO DIFF WBC: CPT

## 2019-11-29 PROCEDURE — 80061 LIPID PANEL: CPT

## 2019-11-29 PROCEDURE — 81003 URINALYSIS AUTO W/O SCOPE: CPT

## 2020-04-13 DIAGNOSIS — K04.7 DENTAL INFECTION: Primary | ICD-10-CM

## 2020-04-13 RX ORDER — PENICILLIN V POTASSIUM 500 MG/1
500 TABLET ORAL EVERY 6 HOURS SCHEDULED
Qty: 40 TABLET | Refills: 0 | Status: SHIPPED | OUTPATIENT
Start: 2020-04-13 | End: 2020-04-23

## 2020-04-13 RX ORDER — PENICILLIN V POTASSIUM 500 MG/1
500 TABLET ORAL EVERY 6 HOURS SCHEDULED
COMMUNITY
End: 2020-04-13 | Stop reason: SDUPTHER

## 2020-04-16 DIAGNOSIS — I10 ESSENTIAL HYPERTENSION: Primary | ICD-10-CM

## 2020-04-16 RX ORDER — METOPROLOL SUCCINATE 25 MG/1
25 TABLET, EXTENDED RELEASE ORAL DAILY
Qty: 90 TABLET | Refills: 3 | Status: SHIPPED | OUTPATIENT
Start: 2020-04-16 | End: 2021-02-05 | Stop reason: DRUGHIGH

## 2020-04-20 DIAGNOSIS — G47.00 INSOMNIA, UNSPECIFIED TYPE: Primary | ICD-10-CM

## 2020-04-20 DIAGNOSIS — F41.9 ANXIETY: ICD-10-CM

## 2020-04-20 RX ORDER — CLONAZEPAM 2 MG/1
4 TABLET ORAL 2 TIMES DAILY PRN
Qty: 180 TABLET | Refills: 1 | Status: SHIPPED | OUTPATIENT
Start: 2020-04-20 | End: 2020-07-30 | Stop reason: SDUPTHER

## 2020-04-20 RX ORDER — ZOLPIDEM TARTRATE 10 MG/1
TABLET ORAL
Qty: 180 TABLET | Refills: 1 | Status: SHIPPED | OUTPATIENT
Start: 2020-04-20 | End: 2020-09-03 | Stop reason: SDUPTHER

## 2020-05-11 ENCOUNTER — TELEPHONE (OUTPATIENT)
Dept: FAMILY MEDICINE CLINIC | Facility: CLINIC | Age: 65
End: 2020-05-11

## 2020-05-11 DIAGNOSIS — N52.9 ERECTILE DYSFUNCTION, UNSPECIFIED ERECTILE DYSFUNCTION TYPE: Primary | ICD-10-CM

## 2020-05-11 RX ORDER — SILDENAFIL 100 MG/1
100 TABLET, FILM COATED ORAL DAILY PRN
Qty: 90 TABLET | Refills: 3 | Status: SHIPPED | OUTPATIENT
Start: 2020-05-11 | End: 2020-10-05 | Stop reason: SDUPTHER

## 2020-06-11 DIAGNOSIS — M54.50 LOW BACK PAIN WITHOUT SCIATICA, UNSPECIFIED BACK PAIN LATERALITY, UNSPECIFIED CHRONICITY: Primary | ICD-10-CM

## 2020-06-12 RX ORDER — OXYCODONE HYDROCHLORIDE 5 MG/1
5 TABLET ORAL EVERY 4 HOURS PRN
Qty: 60 TABLET | Refills: 0 | Status: SHIPPED | OUTPATIENT
Start: 2020-06-12 | End: 2020-09-08

## 2020-06-17 DIAGNOSIS — M54.50 LOW BACK PAIN WITHOUT SCIATICA, UNSPECIFIED BACK PAIN LATERALITY, UNSPECIFIED CHRONICITY: Primary | ICD-10-CM

## 2020-06-17 RX ORDER — TIZANIDINE 2 MG/1
TABLET ORAL
Qty: 180 TABLET | Refills: 1 | Status: SHIPPED | OUTPATIENT
Start: 2020-06-17 | End: 2020-08-28 | Stop reason: SDUPTHER

## 2020-07-01 DIAGNOSIS — I63.9 CEREBROVASCULAR ACCIDENT (CVA), UNSPECIFIED MECHANISM (HCC): Primary | ICD-10-CM

## 2020-07-01 RX ORDER — CLOPIDOGREL BISULFATE 75 MG/1
75 TABLET ORAL DAILY
Qty: 1 TABLET | Refills: 0 | Status: CANCELLED | OUTPATIENT
Start: 2020-07-01

## 2020-07-01 RX ORDER — CLOPIDOGREL BISULFATE 75 MG/1
TABLET ORAL
Qty: 90 TABLET | Refills: 1 | Status: SHIPPED | OUTPATIENT
Start: 2020-07-01 | End: 2021-01-08

## 2020-07-17 DIAGNOSIS — E78.2 MIXED HYPERLIPIDEMIA: Primary | ICD-10-CM

## 2020-07-18 RX ORDER — ATORVASTATIN CALCIUM 40 MG/1
TABLET, FILM COATED ORAL
Qty: 180 TABLET | Refills: 1 | Status: SHIPPED | OUTPATIENT
Start: 2020-07-18 | End: 2021-01-22

## 2020-07-21 ENCOUNTER — TELEPHONE (OUTPATIENT)
Dept: FAMILY MEDICINE CLINIC | Facility: CLINIC | Age: 65
End: 2020-07-21

## 2020-07-21 NOTE — TELEPHONE ENCOUNTER
Patient called wanted to know if you could order lab script to test his testosterone levels and anything else that he may be due for at this time   Patient wanted to know if this can be done and he would like call once in system so he can go and have them drawn   Thank you [address]

## 2020-07-23 DIAGNOSIS — Z12.5 SCREENING FOR MALIGNANT NEOPLASM OF PROSTATE: ICD-10-CM

## 2020-07-23 DIAGNOSIS — E29.1 HYPOGONADISM IN MALE: Primary | ICD-10-CM

## 2020-07-23 DIAGNOSIS — R53.83 OTHER FATIGUE: ICD-10-CM

## 2020-07-23 DIAGNOSIS — N40.1 BENIGN PROSTATIC HYPERPLASIA WITH LOWER URINARY TRACT SYMPTOMS, SYMPTOM DETAILS UNSPECIFIED: ICD-10-CM

## 2020-07-23 DIAGNOSIS — Z13.220 SCREENING CHOLESTEROL LEVEL: ICD-10-CM

## 2020-07-23 NOTE — TELEPHONE ENCOUNTER
Called pt he will go to [facility]--no need to mail script per my conversation with him advised 10-12 hr fast [address]

## 2020-07-24 ENCOUNTER — APPOINTMENT (OUTPATIENT)
Dept: LAB | Facility: MEDICAL CENTER | Age: 65
End: 2020-07-24
Payer: MEDICARE

## 2020-07-24 DIAGNOSIS — Z13.220 SCREENING CHOLESTEROL LEVEL: ICD-10-CM

## 2020-07-24 DIAGNOSIS — E29.1 HYPOGONADISM IN MALE: ICD-10-CM

## 2020-07-24 DIAGNOSIS — R53.83 OTHER FATIGUE: ICD-10-CM

## 2020-07-24 DIAGNOSIS — N40.1 BENIGN PROSTATIC HYPERPLASIA WITH LOWER URINARY TRACT SYMPTOMS, SYMPTOM DETAILS UNSPECIFIED: ICD-10-CM

## 2020-07-24 DIAGNOSIS — Z12.5 SCREENING FOR MALIGNANT NEOPLASM OF PROSTATE: ICD-10-CM

## 2020-07-24 LAB
ALBUMIN SERPL BCP-MCNC: 3.8 G/DL (ref 3.5–5)
ALP SERPL-CCNC: 95 U/L (ref 46–116)
ALT SERPL W P-5'-P-CCNC: 21 U/L (ref 12–78)
ANION GAP SERPL CALCULATED.3IONS-SCNC: 6 MMOL/L (ref 4–13)
AST SERPL W P-5'-P-CCNC: 18 U/L (ref 5–45)
BASOPHILS # BLD AUTO: 0.03 THOUSANDS/ΜL (ref 0–0.1)
BASOPHILS NFR BLD AUTO: 1 % (ref 0–1)
BILIRUB SERPL-MCNC: 0.75 MG/DL (ref 0.2–1)
BUN SERPL-MCNC: 13 MG/DL (ref 5–25)
CALCIUM SERPL-MCNC: 9.7 MG/DL (ref 8.3–10.1)
CHLORIDE SERPL-SCNC: 106 MMOL/L (ref 100–108)
CHOLEST SERPL-MCNC: 131 MG/DL (ref 50–200)
CO2 SERPL-SCNC: 29 MMOL/L (ref 21–32)
CREAT SERPL-MCNC: 1.2 MG/DL (ref 0.6–1.3)
EOSINOPHIL # BLD AUTO: 0.16 THOUSAND/ΜL (ref 0–0.61)
EOSINOPHIL NFR BLD AUTO: 3 % (ref 0–6)
ERYTHROCYTE [DISTWIDTH] IN BLOOD BY AUTOMATED COUNT: 13.2 % (ref 11.6–15.1)
GFR SERPL CREATININE-BSD FRML MDRD: 64 ML/MIN/1.73SQ M
GLUCOSE P FAST SERPL-MCNC: 107 MG/DL (ref 65–99)
HCT VFR BLD AUTO: 43.3 % (ref 36.5–49.3)
HDLC SERPL-MCNC: 39 MG/DL
HGB BLD-MCNC: 14.5 G/DL (ref 12–17)
IMM GRANULOCYTES # BLD AUTO: 0.02 THOUSAND/UL (ref 0–0.2)
IMM GRANULOCYTES NFR BLD AUTO: 0 % (ref 0–2)
LDLC SERPL CALC-MCNC: 69 MG/DL (ref 0–100)
LYMPHOCYTES # BLD AUTO: 1.37 THOUSANDS/ΜL (ref 0.6–4.47)
LYMPHOCYTES NFR BLD AUTO: 23 % (ref 14–44)
MAGNESIUM SERPL-MCNC: 2.2 MG/DL (ref 1.6–2.6)
MCH RBC QN AUTO: 34 PG (ref 26.8–34.3)
MCHC RBC AUTO-ENTMCNC: 33.5 G/DL (ref 31.4–37.4)
MCV RBC AUTO: 102 FL (ref 82–98)
MONOCYTES # BLD AUTO: 0.48 THOUSAND/ΜL (ref 0.17–1.22)
MONOCYTES NFR BLD AUTO: 8 % (ref 4–12)
NEUTROPHILS # BLD AUTO: 3.86 THOUSANDS/ΜL (ref 1.85–7.62)
NEUTS SEG NFR BLD AUTO: 65 % (ref 43–75)
NRBC BLD AUTO-RTO: 0 /100 WBCS
PLATELET # BLD AUTO: 179 THOUSANDS/UL (ref 149–390)
PMV BLD AUTO: 10 FL (ref 8.9–12.7)
POTASSIUM SERPL-SCNC: 3.8 MMOL/L (ref 3.5–5.3)
PROT SERPL-MCNC: 7.3 G/DL (ref 6.4–8.2)
PSA SERPL-MCNC: 1 NG/ML (ref 0–4)
RBC # BLD AUTO: 4.26 MILLION/UL (ref 3.88–5.62)
SODIUM SERPL-SCNC: 141 MMOL/L (ref 136–145)
TRIGL SERPL-MCNC: 116 MG/DL
TSH SERPL DL<=0.05 MIU/L-ACNC: 0.95 UIU/ML (ref 0.36–3.74)
URATE SERPL-MCNC: 6.2 MG/DL (ref 4.2–8)
WBC # BLD AUTO: 5.92 THOUSAND/UL (ref 4.31–10.16)

## 2020-07-24 PROCEDURE — 85025 COMPLETE CBC W/AUTO DIFF WBC: CPT

## 2020-07-24 PROCEDURE — 36415 COLL VENOUS BLD VENIPUNCTURE: CPT

## 2020-07-24 PROCEDURE — 80053 COMPREHEN METABOLIC PANEL: CPT

## 2020-07-24 PROCEDURE — 84550 ASSAY OF BLOOD/URIC ACID: CPT

## 2020-07-24 PROCEDURE — 83735 ASSAY OF MAGNESIUM: CPT

## 2020-07-24 PROCEDURE — 80061 LIPID PANEL: CPT

## 2020-07-24 PROCEDURE — 84403 ASSAY OF TOTAL TESTOSTERONE: CPT

## 2020-07-24 PROCEDURE — 84443 ASSAY THYROID STIM HORMONE: CPT

## 2020-07-24 PROCEDURE — 84153 ASSAY OF PSA TOTAL: CPT

## 2020-07-24 PROCEDURE — 84402 ASSAY OF FREE TESTOSTERONE: CPT

## 2020-07-26 LAB
TESTOST FREE SERPL-MCNC: 8.7 PG/ML (ref 6.6–18.1)
TESTOST SERPL-MCNC: 508 NG/DL (ref 264–916)

## 2020-07-27 DIAGNOSIS — E03.9 HYPOTHYROIDISM, UNSPECIFIED TYPE: Primary | ICD-10-CM

## 2020-07-27 DIAGNOSIS — R00.2 PALPITATION: ICD-10-CM

## 2020-07-27 DIAGNOSIS — I10 ESSENTIAL HYPERTENSION: Primary | ICD-10-CM

## 2020-07-27 RX ORDER — METOPROLOL SUCCINATE 100 MG/1
100 TABLET, EXTENDED RELEASE ORAL DAILY
Qty: 90 TABLET | Refills: 0 | Status: SHIPPED | OUTPATIENT
Start: 2020-07-27 | End: 2020-10-21

## 2020-07-27 RX ORDER — LEVOTHYROXINE SODIUM 0.1 MG/1
TABLET ORAL
Qty: 90 TABLET | Refills: 1 | Status: SHIPPED | OUTPATIENT
Start: 2020-07-27 | End: 2021-01-22

## 2020-07-28 ENCOUNTER — TELEPHONE (OUTPATIENT)
Dept: CARDIOLOGY CLINIC | Facility: CLINIC | Age: 65
End: 2020-07-28

## 2020-07-30 DIAGNOSIS — F41.9 ANXIETY: ICD-10-CM

## 2020-07-30 RX ORDER — CLONAZEPAM 2 MG/1
4 TABLET ORAL 2 TIMES DAILY PRN
Qty: 180 TABLET | Refills: 1 | Status: SHIPPED | OUTPATIENT
Start: 2020-07-30 | End: 2020-11-18 | Stop reason: SDUPTHER

## 2020-08-19 ENCOUNTER — TELEPHONE (OUTPATIENT)
Dept: FAMILY MEDICINE CLINIC | Facility: CLINIC | Age: 65
End: 2020-08-19

## 2020-08-19 NOTE — TELEPHONE ENCOUNTER
Patient had his top teeth removed  [facility] told him to take tylenol or motrin  He states that is not helping and asking if you could send oxycodone to [facility] mail order pharmacy for him  I advised that this may not be possible as he has not been seen since 12/4/19

## 2020-08-20 DIAGNOSIS — G89.4 CHRONIC PAIN SYNDROME: Primary | ICD-10-CM

## 2020-08-20 DIAGNOSIS — M54.50 LOW BACK PAIN WITHOUT SCIATICA, UNSPECIFIED BACK PAIN LATERALITY, UNSPECIFIED CHRONICITY: ICD-10-CM

## 2020-08-20 RX ORDER — OXYCODONE HYDROCHLORIDE AND ACETAMINOPHEN 5; 325 MG/1; MG/1
1 TABLET ORAL EVERY 8 HOURS PRN
Qty: 90 TABLET | Refills: 0 | Status: ON HOLD | OUTPATIENT
Start: 2020-08-20 | End: 2021-03-28 | Stop reason: SDUPTHER

## 2020-08-20 NOTE — TELEPHONE ENCOUNTER
Call pt and ask him which pharmacy I should use  [facility] would take a few days and a local pharmacy would be quicker  By the time [facility] the med to him the pain should be resolved

## 2020-08-21 ENCOUNTER — TELEPHONE (OUTPATIENT)
Dept: FAMILY MEDICINE CLINIC | Facility: CLINIC | Age: 65
End: 2020-08-21

## 2020-08-21 NOTE — TELEPHONE ENCOUNTER
[phone] called, they want to know if it's ok for Yoel Or to get the Percocet since he is also on Clonazapam? [facility] [phone]), I will let them know decision

## 2020-08-28 ENCOUNTER — OFFICE VISIT (OUTPATIENT)
Dept: FAMILY MEDICINE CLINIC | Facility: CLINIC | Age: 65
End: 2020-08-28
Payer: MEDICARE

## 2020-08-28 VITALS
OXYGEN SATURATION: 96 % | WEIGHT: 238 LBS | TEMPERATURE: 98.1 F | RESPIRATION RATE: 18 BRPM | HEIGHT: 69 IN | HEART RATE: 71 BPM | DIASTOLIC BLOOD PRESSURE: 70 MMHG | BODY MASS INDEX: 35.25 KG/M2 | SYSTOLIC BLOOD PRESSURE: 116 MMHG

## 2020-08-28 DIAGNOSIS — E03.9 HYPOTHYROIDISM, UNSPECIFIED TYPE: Primary | ICD-10-CM

## 2020-08-28 DIAGNOSIS — I25.119 CORONARY ARTERY DISEASE INVOLVING NATIVE CORONARY ARTERY OF NATIVE HEART WITH ANGINA PECTORIS (HCC): ICD-10-CM

## 2020-08-28 DIAGNOSIS — E78.2 MIXED HYPERLIPIDEMIA: ICD-10-CM

## 2020-08-28 DIAGNOSIS — I73.9 CLAUDICATION (HCC): ICD-10-CM

## 2020-08-28 DIAGNOSIS — I50.33 ACUTE ON CHRONIC DIASTOLIC HEART FAILURE (HCC): ICD-10-CM

## 2020-08-28 PROCEDURE — 1036F TOBACCO NON-USER: CPT | Performed by: FAMILY MEDICINE

## 2020-08-28 PROCEDURE — 99213 OFFICE O/P EST LOW 20 MIN: CPT | Performed by: FAMILY MEDICINE

## 2020-08-28 PROCEDURE — 3078F DIAST BP <80 MM HG: CPT | Performed by: FAMILY MEDICINE

## 2020-08-28 PROCEDURE — 3074F SYST BP LT 130 MM HG: CPT | Performed by: FAMILY MEDICINE

## 2020-08-28 PROCEDURE — 3008F BODY MASS INDEX DOCD: CPT | Performed by: FAMILY MEDICINE

## 2020-08-28 NOTE — PROGRESS NOTES
BMI Counseling: Body mass index is 35 15 kg/m²  The BMI is above normal  Nutrition recommendations include decreasing portion sizes, encouraging healthy choices of fruits and vegetables, limiting drinks that contain sugar, moderation in carbohydrate intake, increasing intake of lean protein, reducing intake of saturated and trans fat and reducing intake of cholesterol  Exercise recommendations include exercising 3-5 times per week  No pharmacotherapy was ordered  Patient referred to PCP due to patient being overweight  Assessment/Plan:      There are no diagnoses linked to this encounter  Subjective:     Patient ID: Corie Valencia is a 63 y o  male  Pt here for checkup on CVA, HTN, CAD, insomnia, ED and  Is doing well  Pt recently had  Dental work and will need need an uper plate  Soon   Review of Systems   Constitutional: Positive for fever  Negative for activity change, appetite change, chills, fatigue and unexpected weight change  HENT: Positive for dental problem  Negative for congestion, ear pain, hearing loss, mouth sores, postnasal drip, sinus pressure, sinus pain, sneezing and sore throat  Eyes: Negative for pain, discharge, redness and itching  Respiratory: Negative for apnea, cough, shortness of breath and wheezing  Cardiovascular: Negative for chest pain, palpitations and leg swelling  Gastrointestinal: Negative for abdominal pain, constipation, diarrhea, nausea and vomiting  Endocrine: Negative for cold intolerance and heat intolerance  Genitourinary: Negative for dysuria, enuresis, flank pain and frequency  Musculoskeletal: Positive for gait problem  Negative for arthralgias, back pain and neck pain  Skin: Negative for rash  Allergic/Immunologic: Negative for environmental allergies  Neurological: Negative for dizziness, weakness and numbness  Hematological: Does not bruise/bleed easily     Psychiatric/Behavioral: Negative for agitation, behavioral problems, confusion, hallucinations and sleep disturbance  The patient is not nervous/anxious  Objective:     Physical Exam  Vitals signs and nursing note reviewed  Constitutional:       Appearance: Normal appearance  He is well-developed  HENT:      Head: Normocephalic and atraumatic  Right Ear: Tympanic membrane normal       Left Ear: Tympanic membrane normal       Nose: Nose normal       Mouth/Throat:      Mouth: Mucous membranes are moist       Comments: No upper teeth  Eyes:      General: No scleral icterus  Conjunctiva/sclera: Conjunctivae normal       Pupils: Pupils are equal, round, and reactive to light  Neck:      Musculoskeletal: Normal range of motion and neck supple  Thyroid: No thyromegaly  Cardiovascular:      Rate and Rhythm: Normal rate and regular rhythm  Heart sounds: Normal heart sounds  Pulmonary:      Effort: Pulmonary effort is normal  No respiratory distress  Breath sounds: Normal breath sounds  No wheezing  Abdominal:      General: Bowel sounds are normal       Palpations: Abdomen is soft  Tenderness: There is no abdominal tenderness  There is no guarding or rebound  Musculoskeletal: Normal range of motion  Skin:     General: Skin is warm and dry  Findings: No rash  Neurological:      Mental Status: He is alert and oriented to person, place, and time  Sensory: No sensory deficit  Motor: Weakness present        Coordination: Coordination normal       Gait: Gait abnormal    Psychiatric:         Behavior: Behavior normal

## 2020-09-03 DIAGNOSIS — G47.00 INSOMNIA, UNSPECIFIED TYPE: ICD-10-CM

## 2020-09-04 RX ORDER — ZOLPIDEM TARTRATE 10 MG/1
TABLET ORAL
Qty: 180 TABLET | Refills: 0 | Status: SHIPPED | OUTPATIENT
Start: 2020-09-04 | End: 2020-11-12 | Stop reason: SDUPTHER

## 2020-09-06 PROBLEM — I50.32 CHRONIC DIASTOLIC HEART FAILURE (HCC): Status: ACTIVE | Noted: 2020-08-28

## 2020-09-06 NOTE — PROGRESS NOTES
Progress Note - Cardiology Office   [facility]  Corie Valencia 63 y o  male MRN: [id number]  : 1955  Unit/Bed#:  Encounter: [id number]      Assessment:     1  Coronary artery disease involving native coronary artery of native heart with angina pectoris ([facility] [address] )    2  Essential hypertension    3  Hypothyroidism, unspecified type    4  Bilateral lower extremity edema    5  History of CVA (cerebrovascular accident)    6  Mixed hyperlipidemia    7  Palpitation    8  Encounter for screening colonoscopy        Discussion summary and Plan:  1  Coronary artery disease with multiple vessel stenting  Patient still have lot of nonspecific complaints  Patient is overall very unhappy with care with multiple physicians  Patient's cardiac catheterization report reviewed  He has stents done in RCA LAD circumflex  Last stenting was in 7778 and it was complicated with possible air embolism and patient developing transit hypotension  Patient keeps mentioning about complications at [facility]   Patient has a nonischemic nuclear stress test done in 2019  He continues to have nonspecific symptoms which are not changed  2   Benign essential hypertension  Blood pressure is acceptable  Patient blood pressure is acceptable with metoprolol XL  His blood pressure is acceptable    3  Dyslipidemia   Continue statins  He is on high intensity statin labs done  reviewed there were acceptable  4   Palpitations  Palpitations are much better since metoprolol increase is not complaining about it  5   Lumbar radiculopathy  Patient has multiple nonspecific pain complaint this pain is constant and radicular  Management as per medical team   Better than before    6  History of anxiety and sleep problem advised to see PMD   On clonazepam management as per medical team    7  History of CVA  Patient already on statins and aspirin and Plavix    Electrolyte labs are acceptable  Continue current Rx  Advised him to be compliant with medication and follow ups  Counseling :  A description of the counseling  Goals and Barriers  Patient's ability to self care: Yes  Medication side effect reviewed with patient in detail and all their questions answered to their satisfaction  Primary Care Physician : Juancarlos Patel MD      HPI :     Jd Aiken is a 63y o  year old male who was referred by primary care doctor for his coronary artery disease  Patient just had a last cardiac catheterization done in July of 2018 at   [facility]  He was electively admitted for cardiac catheterization  Has past medical history significant for with coronary artery disease status post multivessel stenting in calcified arteries, chronic low back pain, hypertension, history of CVA, hypothyroidism  As per the note patient has numerous stent and PCI and he was offered CABG however he opted for multivessel PCI  In 2017 he had PCI of his LAD with drug-eluting stent and PCI of circumflex and OM in April 2017  In July 2017 he was electively brought in for cardiac catheterization and was found to have normal left main, lad 40% post D2, 99% stenosis distally and circumflex X 60% proximally in stent stenosis and RCA normal with mid and distal stents  Patient had PCI of diagonal which was 99% stenosis and a drug-eluting stent was placed  Course was complicated by ruptured balloon and air emboli where patient were resuscitated  Patient has longstanding history of multiple stents  There may be a stent placed by me in 2007  Most of his stent but paced by Dr Susi Craft in in 2017  In 2017 Dr Susi Craft also did a rotablator of an artery  We do not have the details of it    In 2018 he should take his care to [facility] and he was seen by Dr Melba Herrera and Dr Janny Kumar did his last cardiac catheterization whose report is in the chart  09/08/2020  Above reviewed    Patient came for follow-up after about 10 months  He still have some nonspecific complaints particularly he was very anxious with COVID-19 situation he had history of coronary artery disease with multiple stents  He had a previous history of stroke  He says he is not as active as he used to be  His blood pressure is acceptable  He has a catheterization done at [facility] which was complicated by air embolism due to bruising rupture and he had a cardiac arrest   He had multiple stents and he was recommended coronary artery bypass surgery but he refused and he is on medical Rx  He had a nuclear which was done last year in July was nonischemic he has normal LV systolic function  Vitals has been stable so no other significant issues  Review of Systems   Constitutional: Negative for activity change, chills, diaphoresis, fever and unexpected weight change  HENT: Negative for congestion  Eyes: Negative for discharge and redness  Respiratory: Positive for shortness of breath  Negative for cough, chest tightness and wheezing  Chronic not changed   Cardiovascular: Negative  Negative for chest pain, palpitations and leg swelling  Gastrointestinal: Negative for abdominal pain, diarrhea and nausea  Endocrine: Negative  Genitourinary: Negative for decreased urine volume and urgency  Musculoskeletal: Positive for arthralgias  Negative for back pain and gait problem  Skin: Negative for rash and wound  Allergic/Immunologic: Negative  Neurological: Negative for dizziness, seizures, syncope, weakness, light-headedness and headaches  Hematological: Negative  Psychiatric/Behavioral: Negative for agitation and confusion  The patient is nervous/anxious          Historical Information   Past Medical History:   Diagnosis Date    Anxiety     CAD (coronary artery disease)     Depression     Dizziness     Eye problems     Heart disease     Hypertension     Hypothyroidism     Hypothyroidism     Migraines     Stroke ([facility] [address] )     Tremors of nervous system      Past Surgical History:   Procedure Laterality Date    COLONOSCOPY  09/01/2017    CORONARY STENT PLACEMENT      DENTAL SURGERY      entire top teeth removal    HERNIA REPAIR       Social History     Substance and Sexual Activity   Alcohol Use No     Social History     Substance and Sexual Activity   Drug Use No     Social History     Tobacco Use   Smoking Status Never Smoker   Smokeless Tobacco Never Used     Family History:   Family History   Problem Relation Age of Onset    Cancer Mother     Heart attack Father     Heart attack Sister     Cancer Brother        Meds/Allergies     Allergies   Allergen Reactions    Medical Tape      Other reaction(s): BURNS       Current Outpatient Medications:     aspirin 81 MG tablet, Take 81 mg by mouth, Disp: , Rfl:     atorvastatin (LIPITOR) 40 mg tablet, TAKE 2 TABLETS EVERY MORNING, Disp: 180 tablet, Rfl: 1    Cholecalciferol 1000 units capsule, Take 1,000 Units by mouth, Disp: , Rfl:     clonazePAM (KlonoPIN) 2 mg tablet, Take 2 tablets (4 mg total) by mouth 2 (two) times a day as needed for seizures, Disp: 180 tablet, Rfl: 1    clopidogrel (PLAVIX) 75 mg tablet, TAKE 1 TABLET EVERY DAY, Disp: 90 tablet, Rfl: 1    levothyroxine 100 mcg tablet, TAKE 1 TABLET EVERY DAY, Disp: 90 tablet, Rfl: 1    metoprolol succinate (TOPROL-XL) 100 mg 24 hr tablet, Take 1 tablet (100 mg total) by mouth daily Take 100 mg + 25 mg tablet (125 mg) total by mouth once daily  , Disp: 90 tablet, Rfl: 0    metoprolol succinate (TOPROL-XL) 25 mg 24 hr tablet, Take 1 tablet (25 mg total) by mouth daily, Disp: 90 tablet, Rfl: 3    sildenafil (VIAGRA) 100 mg tablet, Take 1 tablet (100 mg total) by mouth daily as needed for erectile dysfunction, Disp: 90 tablet, Rfl: 3    TiZANidine (ZANAFLEX) 2 MG capsule, Take 2 mg by mouth Three times a day, Disp: , Rfl:     zolpidem (AMBIEN) 10 mg tablet, 2 po  q hs, Disp: 180 tablet, Rfl: 0    oxyCODONE-acetaminophen (PERCOCET) 5-325 mg per tablet, Take 1 tablet by mouth every 8 (eight) hours as needed for moderate painMax Daily Amount: 3 tablets (Patient not taking: Reported on 8/28/2020), Disp: 90 tablet, Rfl: 0    Vitals: Blood pressure 120/70, pulse 73, temperature 98 2 °F (36 8 °C), temperature source Temporal, height 5' 9" (1 753 m), weight 102 kg (225 lb), SpO2 97 %  Body mass index is 33 23 kg/m²  Vitals:    09/08/20 0944   Weight: 102 kg (225 lb)     BP Readings from Last 3 Encounters:   09/08/20 120/70   08/28/20 116/70   06/19/19 122/80         Physical Exam   Constitutional: He is oriented to person, place, and time  He appears well-developed and well-nourished  No distress  HENT:   Head: Normocephalic and atraumatic  Eyes: Pupils are equal, round, and reactive to light  Neck: Neck supple  No JVD present  No tracheal deviation present  No thyromegaly present  Cardiovascular: Normal rate, regular rhythm, S1 normal, S2 normal and intact distal pulses  Exam reveals no gallop, no S3, no S4, no distant heart sounds and no friction rub  Murmur heard  Systolic (ejection) murmur is present with a grade of 2/6  S1-S2 regular with a 2/6 systolic murmur   Pulmonary/Chest: Effort normal and breath sounds normal  No respiratory distress  He has no wheezes  He has no rales  He exhibits no tenderness  Abdominal: Soft  Bowel sounds are normal  He exhibits no distension  There is no abdominal tenderness  Musculoskeletal:         General: No deformity or edema  Neurological: He is alert and oriented to person, place, and time  Skin: Skin is warm and dry  No rash noted  He is not diaphoretic  No pallor  Psychiatric: He has a normal mood and affect  His behavior is normal  Judgment normal            Diagnostic Studies Review Cardio:       Nuclear stress test   Nuclear stress test done July 2019 was nonischemic with normal LV systolic function        Echo Doppler  Echo Doppler done at [facility] in May of 2018 shows normal LV systolic function  No significant valvular disease  It is reported in Care everywhere  Echo 5/15/18  Normal left ventricular systolic function  Normal regional wall motion  Moderate concentric left ventricular hypertrophy  Mildly dilated ascending aorta  Trace tricuspid regurgitation  Mild diastolic dysfunction with normal filling pressures  Visually Estimated LV Ejection Fraction is:60%     LE Ultrasound 8/31/17  There is no hemodynamically stenosis of the bilateral lower extremities  Normal doppler waveforms of the bilateral lower extremities  Cath 7/17/18  Left Main: Normal  LAD: 40% post D2, 99% proximal D2  CLX: 60% proximal in stent  RCA: Normal mid and distal stents  Vessel intervened on was the diagonal D2  Diagonal 2 Pre stenosis = 99%, Post stenosis = 0%, Stents 2 0X8 Pritesh    Lab Review   Lab Results   Component Value Date    07/24/2018   K 3 9 07/24/2018    07/24/2018   CO2 28 07/24/2018   BUN 16 07/24/2018   CREATININE 1 21 07/24/2018   GLUCOSE 150 (H) 07/24/2018   CALCIUM 8 8 07/24/2018   INR 1 0 07/17/2018     Lab Results   Component Value Date   CHOL 152 08/25/2017   TRIG 77 08/25/2017   HDL 49 08/25/2017      Nuclear stress test   Nuclear stress test done in August of 2017 shows no ischemia EF was 63%  Study done at [facility]     PERFUSION:     SPECT images demonstrate homogeneous tracer distribution throughout                           the myocardium                             Resting images showed no change in the pattern of perfusion                           compared to stress                            Resting images showed no change in the pattern of perfusion                           compared to stress                            Stress and rest SPECT images are normal                            No abnormalities                             No evidence of ischemia        FUNCTIONAL RESULTS (calculated via Gated SPECT)     Post Stress Image LV EF:    71     %      Stress EDV: 107   ml         EDVI:  63    ml/m²      TID:  1 05      Stress ESV: 31    ml         ESVI:  18    ml/m²    Normal left ventricular size and ejection fraction      Normal left ventricular wall motion and thickness       Last cardiac catheterization report from [facility]:  Operative Procedures Performed  Cardiac catheterization 7/17/18  Coronary Angiography  Left Main: Normal  LAD: 40% post D2, 99% proximal D2  CLX: 60% proximal in stent  RCA: Normal mid and distal stents  Patient has multiple stents in his RCA, mid LAD, and proximal circumflex      Coronary Intervention:   Vessel intervened on was the diagonal 2  Diagonal 2 Pre stenosis = 99%, Post stenosis = 0%, Stents 2 0X 8 Pritesh      EKG:  Twelve lead EKG done in our office on 10/25/2018 shows normal sinus rhythm heart rate 61 beats per minute  Twelve lead EKG done 09/08/2020 shows normal sinus rhythm heart rate 73 beats per minute no significant ST changes no change from old EKG         Lab Review   Lab Results   Component Value Date    WBC 5 92 07/24/2020    HGB 14 5 07/24/2020    HCT 43 3 07/24/2020     (H) 07/24/2020     07/24/2020     Lab Results   Component Value Date    K 3 8 07/24/2020     07/24/2020    CO2 29 07/24/2020    BUN 13 07/24/2020    CREATININE 1 20 07/24/2020    GLUF 107 (H) 07/24/2020    CALCIUM 9 7 07/24/2020    AST 18 07/24/2020    ALT 21 07/24/2020    ALKPHOS 95 07/24/2020    EGFR 64 07/24/2020     Lab Results   Component Value Date    CALCIUM 9 7 07/24/2020    K 3 8 07/24/2020    CO2 29 07/24/2020     07/24/2020    BUN 13 07/24/2020    CREATININE 1 20 07/24/2020       Lipid Profile:   Lab Results   Component Value Date    CHOLESTEROL 131 07/24/2020    HDL 39 (L) 07/24/2020    LDLCALC 69 07/24/2020    TRIG 116 07/24/2020     Lab Results   Component Value Date    CHOLESTEROL 131 07/24/2020    CHOLESTEROL 138 11/29/2019     Dr Fausto De Guzman MD [facility]      "This note has been constructed using a voice recognition system  Therefore there may be syntax, spelling, and/or grammatical errors   Please call if you have any questions  "

## 2020-09-08 ENCOUNTER — OFFICE VISIT (OUTPATIENT)
Dept: CARDIOLOGY CLINIC | Facility: CLINIC | Age: 65
End: 2020-09-08
Payer: MEDICARE

## 2020-09-08 ENCOUNTER — TELEPHONE (OUTPATIENT)
Dept: FAMILY MEDICINE CLINIC | Facility: CLINIC | Age: 65
End: 2020-09-08

## 2020-09-08 VITALS
OXYGEN SATURATION: 97 % | BODY MASS INDEX: 33.33 KG/M2 | HEIGHT: 69 IN | WEIGHT: 225 LBS | DIASTOLIC BLOOD PRESSURE: 70 MMHG | SYSTOLIC BLOOD PRESSURE: 120 MMHG | TEMPERATURE: 98.2 F | HEART RATE: 73 BPM

## 2020-09-08 DIAGNOSIS — E78.2 MIXED HYPERLIPIDEMIA: ICD-10-CM

## 2020-09-08 DIAGNOSIS — E03.9 HYPOTHYROIDISM, UNSPECIFIED TYPE: ICD-10-CM

## 2020-09-08 DIAGNOSIS — Z12.11 ENCOUNTER FOR SCREENING COLONOSCOPY: ICD-10-CM

## 2020-09-08 DIAGNOSIS — R60.0 BILATERAL LOWER EXTREMITY EDEMA: ICD-10-CM

## 2020-09-08 DIAGNOSIS — I25.119 CORONARY ARTERY DISEASE INVOLVING NATIVE CORONARY ARTERY OF NATIVE HEART WITH ANGINA PECTORIS (HCC): ICD-10-CM

## 2020-09-08 DIAGNOSIS — Z86.73 HISTORY OF CVA (CEREBROVASCULAR ACCIDENT): ICD-10-CM

## 2020-09-08 DIAGNOSIS — I10 ESSENTIAL HYPERTENSION: ICD-10-CM

## 2020-09-08 DIAGNOSIS — R00.2 PALPITATION: ICD-10-CM

## 2020-09-08 PROCEDURE — 93000 ELECTROCARDIOGRAM COMPLETE: CPT | Performed by: INTERNAL MEDICINE

## 2020-09-08 PROCEDURE — 99214 OFFICE O/P EST MOD 30 MIN: CPT | Performed by: INTERNAL MEDICINE

## 2020-09-08 NOTE — TELEPHONE ENCOUNTER
Pt called back again, again and states that he received the information in the mail but the letter was not signed and the form for pfizer needs to be faxed to [phone]

## 2020-09-08 NOTE — TELEPHONE ENCOUNTER
Pt called back today looking for the status of the letter that you are working on for him and that you are supposed to fax it along with the forms to [employer] and states that there is an urgency to get this done due to with window closing

## 2020-09-28 ENCOUNTER — TELEPHONE (OUTPATIENT)
Dept: FAMILY MEDICINE CLINIC | Facility: CLINIC | Age: 65
End: 2020-09-28

## 2020-10-05 ENCOUNTER — TELEPHONE (OUTPATIENT)
Dept: FAMILY MEDICINE CLINIC | Facility: CLINIC | Age: 65
End: 2020-10-05

## 2020-10-05 DIAGNOSIS — N52.9 ERECTILE DYSFUNCTION, UNSPECIFIED ERECTILE DYSFUNCTION TYPE: ICD-10-CM

## 2020-10-05 RX ORDER — SILDENAFIL 100 MG/1
100 TABLET, FILM COATED ORAL DAILY PRN
Qty: 30 TABLET | Refills: 3 | Status: SHIPPED | OUTPATIENT
Start: 2020-10-05 | End: 2020-10-05 | Stop reason: SDUPTHER

## 2020-10-05 RX ORDER — SILDENAFIL 100 MG/1
100 TABLET, FILM COATED ORAL DAILY PRN
Qty: 30 TABLET | Refills: 3 | Status: SHIPPED | OUTPATIENT
Start: 2020-10-05 | End: 2021-03-28 | Stop reason: HOSPADM

## 2020-10-21 DIAGNOSIS — R00.2 PALPITATION: ICD-10-CM

## 2020-10-21 DIAGNOSIS — I10 ESSENTIAL HYPERTENSION: ICD-10-CM

## 2020-10-21 RX ORDER — METOPROLOL SUCCINATE 100 MG/1
TABLET, EXTENDED RELEASE ORAL
Qty: 90 TABLET | Refills: 0 | Status: SHIPPED | OUTPATIENT
Start: 2020-10-21 | End: 2021-01-26 | Stop reason: SDUPTHER

## 2020-11-05 ENCOUNTER — TELEPHONE (OUTPATIENT)
Dept: FAMILY MEDICINE CLINIC | Facility: CLINIC | Age: 65
End: 2020-11-05

## 2020-11-05 DIAGNOSIS — K08.89 TOOTH PAIN: Primary | ICD-10-CM

## 2020-11-05 DIAGNOSIS — M54.50 LOW BACK PAIN WITHOUT SCIATICA, UNSPECIFIED BACK PAIN LATERALITY, UNSPECIFIED CHRONICITY: Primary | ICD-10-CM

## 2020-11-05 RX ORDER — TIZANIDINE HYDROCHLORIDE 2 MG/1
2 CAPSULE, GELATIN COATED ORAL 3 TIMES DAILY
Qty: 270 CAPSULE | Refills: 1 | Status: SHIPPED | OUTPATIENT
Start: 2020-11-05 | End: 2020-11-09

## 2020-11-09 DIAGNOSIS — M54.50 LOW BACK PAIN WITHOUT SCIATICA, UNSPECIFIED BACK PAIN LATERALITY, UNSPECIFIED CHRONICITY: ICD-10-CM

## 2020-11-09 RX ORDER — TIZANIDINE 2 MG/1
2 TABLET ORAL EVERY 8 HOURS PRN
Qty: 270 TABLET | Refills: 1 | Status: SHIPPED | OUTPATIENT
Start: 2020-11-09 | End: 2021-06-11 | Stop reason: SDUPTHER

## 2020-11-12 DIAGNOSIS — G47.00 INSOMNIA, UNSPECIFIED TYPE: ICD-10-CM

## 2020-11-12 RX ORDER — ZOLPIDEM TARTRATE 10 MG/1
TABLET ORAL
Qty: 180 TABLET | Refills: 0 | Status: SHIPPED | OUTPATIENT
Start: 2020-11-12 | End: 2021-02-05 | Stop reason: SDUPTHER

## 2020-11-18 DIAGNOSIS — F41.9 ANXIETY: ICD-10-CM

## 2020-11-19 RX ORDER — CLONAZEPAM 2 MG/1
4 TABLET ORAL 2 TIMES DAILY PRN
Qty: 180 TABLET | Refills: 0 | Status: SHIPPED | OUTPATIENT
Start: 2020-11-19 | End: 2021-01-26 | Stop reason: SDUPTHER

## 2020-12-18 ENCOUNTER — TELEPHONE (OUTPATIENT)
Dept: FAMILY MEDICINE CLINIC | Facility: CLINIC | Age: 65
End: 2020-12-18

## 2020-12-18 NOTE — TELEPHONE ENCOUNTER
Lidia called to let you know that patient has received duplicate prescriptions  His order for zolpidem and clonazepam were originally lost in transit so they shipped out another order  But the original order ended up arriving and patient has chosen to keep both of them  They wanted to let you know

## 2021-01-08 ENCOUNTER — LAB (OUTPATIENT)
Dept: LAB | Facility: CLINIC | Age: 66
End: 2021-01-08
Payer: MEDICARE

## 2021-01-08 DIAGNOSIS — E78.2 MIXED HYPERLIPIDEMIA: ICD-10-CM

## 2021-01-08 DIAGNOSIS — I63.9 CEREBROVASCULAR ACCIDENT (CVA), UNSPECIFIED MECHANISM (HCC): ICD-10-CM

## 2021-01-08 LAB
ALBUMIN SERPL BCP-MCNC: 4 G/DL (ref 3.5–5)
ALP SERPL-CCNC: 112 U/L (ref 46–116)
ALT SERPL W P-5'-P-CCNC: 28 U/L (ref 12–78)
ANION GAP SERPL CALCULATED.3IONS-SCNC: 2 MMOL/L (ref 4–13)
AST SERPL W P-5'-P-CCNC: 27 U/L (ref 5–45)
BILIRUB SERPL-MCNC: 0.61 MG/DL (ref 0.2–1)
BILIRUB UR QL STRIP: NEGATIVE
BUN SERPL-MCNC: 10 MG/DL (ref 5–25)
CALCIUM SERPL-MCNC: 9.4 MG/DL (ref 8.3–10.1)
CHLORIDE SERPL-SCNC: 102 MMOL/L (ref 100–108)
CHOLEST SERPL-MCNC: 138 MG/DL (ref 50–200)
CLARITY UR: CLEAR
CO2 SERPL-SCNC: 33 MMOL/L (ref 21–32)
COLOR UR: YELLOW
CREAT SERPL-MCNC: 1.2 MG/DL (ref 0.6–1.3)
GFR SERPL CREATININE-BSD FRML MDRD: 64 ML/MIN/1.73SQ M
GLUCOSE P FAST SERPL-MCNC: 95 MG/DL (ref 65–99)
GLUCOSE UR STRIP-MCNC: NEGATIVE MG/DL
HDLC SERPL-MCNC: 42 MG/DL
HGB UR QL STRIP.AUTO: NEGATIVE
KETONES UR STRIP-MCNC: NEGATIVE MG/DL
LDLC SERPL CALC-MCNC: 77 MG/DL (ref 0–100)
LEUKOCYTE ESTERASE UR QL STRIP: NEGATIVE
NITRITE UR QL STRIP: NEGATIVE
PH UR STRIP.AUTO: 6 [PH]
POTASSIUM SERPL-SCNC: 3.8 MMOL/L (ref 3.5–5.3)
PROT SERPL-MCNC: 7.8 G/DL (ref 6.4–8.2)
PROT UR STRIP-MCNC: NEGATIVE MG/DL
SODIUM SERPL-SCNC: 137 MMOL/L (ref 136–145)
SP GR UR STRIP.AUTO: 1.02 (ref 1–1.03)
TRIGL SERPL-MCNC: 93 MG/DL
UROBILINOGEN UR QL STRIP.AUTO: 1 E.U./DL

## 2021-01-08 PROCEDURE — 80053 COMPREHEN METABOLIC PANEL: CPT

## 2021-01-08 PROCEDURE — 36415 COLL VENOUS BLD VENIPUNCTURE: CPT

## 2021-01-08 PROCEDURE — 81003 URINALYSIS AUTO W/O SCOPE: CPT

## 2021-01-08 PROCEDURE — 80061 LIPID PANEL: CPT

## 2021-01-08 RX ORDER — CLOPIDOGREL BISULFATE 75 MG/1
TABLET ORAL
Qty: 90 TABLET | Refills: 1 | Status: SHIPPED | OUTPATIENT
Start: 2021-01-08 | End: 2021-03-08 | Stop reason: HOSPADM

## 2021-01-15 ENCOUNTER — OFFICE VISIT (OUTPATIENT)
Dept: FAMILY MEDICINE CLINIC | Facility: CLINIC | Age: 66
End: 2021-01-15
Payer: MEDICARE

## 2021-01-15 VITALS
DIASTOLIC BLOOD PRESSURE: 82 MMHG | WEIGHT: 230 LBS | TEMPERATURE: 97.7 F | OXYGEN SATURATION: 95 % | BODY MASS INDEX: 34.07 KG/M2 | SYSTOLIC BLOOD PRESSURE: 128 MMHG | HEART RATE: 60 BPM | HEIGHT: 69 IN

## 2021-01-15 DIAGNOSIS — E03.9 HYPOTHYROIDISM, UNSPECIFIED TYPE: ICD-10-CM

## 2021-01-15 DIAGNOSIS — Z00.00 WELL ADULT EXAM: Primary | ICD-10-CM

## 2021-01-15 DIAGNOSIS — I50.33 ACUTE ON CHRONIC DIASTOLIC HEART FAILURE (HCC): ICD-10-CM

## 2021-01-15 DIAGNOSIS — E78.2 MIXED HYPERLIPIDEMIA: ICD-10-CM

## 2021-01-15 DIAGNOSIS — I73.9 CLAUDICATION (HCC): ICD-10-CM

## 2021-01-15 DIAGNOSIS — Z11.4 SCREENING FOR HIV (HUMAN IMMUNODEFICIENCY VIRUS): ICD-10-CM

## 2021-01-15 DIAGNOSIS — I25.119 CORONARY ARTERY DISEASE INVOLVING NATIVE CORONARY ARTERY OF NATIVE HEART WITH ANGINA PECTORIS (HCC): ICD-10-CM

## 2021-01-15 DIAGNOSIS — Z98.61 HISTORY OF PERCUTANEOUS CORONARY INTERVENTION: ICD-10-CM

## 2021-01-15 DIAGNOSIS — I10 ESSENTIAL HYPERTENSION: ICD-10-CM

## 2021-01-15 DIAGNOSIS — Z23 ENCOUNTER FOR IMMUNIZATION: ICD-10-CM

## 2021-01-15 DIAGNOSIS — Z86.73 HISTORY OF CVA (CEREBROVASCULAR ACCIDENT): ICD-10-CM

## 2021-01-15 PROCEDURE — G0439 PPPS, SUBSEQ VISIT: HCPCS | Performed by: FAMILY MEDICINE

## 2021-01-15 PROCEDURE — 99213 OFFICE O/P EST LOW 20 MIN: CPT | Performed by: FAMILY MEDICINE

## 2021-01-15 NOTE — PROGRESS NOTES
BMI Counseling: Body mass index is 33 97 kg/m²  The BMI is above normal  Nutrition recommendations include decreasing portion sizes, encouraging healthy choices of fruits and vegetables, consuming healthier snacks, limiting drinks that contain sugar, moderation in carbohydrate intake, increasing intake of lean protein, reducing intake of saturated and trans fat and reducing intake of cholesterol  Exercise recommendations include exercising 3-5 times per week  No pharmacotherapy was ordered  Patient referred to PCP due to patient being overweight  Assessment/Plan:         Problem List Items Addressed This Visit        Cardiovascular and Mediastinum    CAD (coronary artery disease)    Essential hypertension       Other    History of CVA (cerebrovascular accident)    History of percutaneous coronary intervention      Other Visit Diagnoses     Well adult exam    -  Primary    Encounter for immunization        Screening for HIV (human immunodeficiency virus)                Subjective:      Patient ID: Jaylen Cristina is a 63 y o  male  Pt here for checkup on HTN, insomnia, CAD lipids  And  Hypothyroidism  Pt also with ED and  Has used viagra for this  The following portions of the patient's history were reviewed and updated as appropriate:   Past Medical History:  He has a past medical history of Anxiety, CAD (coronary artery disease), Depression, Dizziness, Eye problems, Heart disease, Hypertension, Hypothyroidism, Hypothyroidism, Migraines, Stroke ([address] ), and Tremors of nervous system  ,  _______________________________________________________________________  Medical Problems:  does not have any pertinent problems on file ,  _______________________________________________________________________  Past Surgical History:   has a past surgical history that includes Coronary stent placement;  Hernia repair; Colonoscopy (09/01/2017); and Dental surgery  ,  _______________________________________________________________________  Family History:  family history includes Cancer in his brother and mother; Heart attack in his father and sister  ,  _______________________________________________________________________  Social History:   reports that he has never smoked  He has never used smokeless tobacco  He reports that he does not drink alcohol or use drugs  ,  _______________________________________________________________________  Allergies:  is allergic to medical tape     _______________________________________________________________________  Current Outpatient Medications   Medication Sig Dispense Refill    aspirin 81 MG tablet Take 81 mg by mouth      atorvastatin (LIPITOR) 40 mg tablet TAKE 2 TABLETS EVERY MORNING 180 tablet 1    clonazePAM (KlonoPIN) 2 mg tablet Take 2 tablets (4 mg total) by mouth 2 (two) times a day as needed for seizures 180 tablet 0    clopidogrel (PLAVIX) 75 mg tablet TAKE 1 TABLET EVERY DAY 90 tablet 1    levothyroxine 100 mcg tablet TAKE 1 TABLET EVERY DAY 90 tablet 1    metoprolol succinate (TOPROL-XL) 100 mg 24 hr tablet TAKE 1 TABLET EVERY DAY  ALONG  WITH  25MG  TABLET 90 tablet 0    sildenafil (VIAGRA) 100 mg tablet Take 1 tablet (100 mg total) by mouth daily as needed for erectile dysfunction Pt request generic brand [employer] to offset costs 30 tablet 3    tiZANidine (ZANAFLEX) 2 mg tablet Take 1 tablet (2 mg total) by mouth every 8 (eight) hours as needed for muscle spasms 270 tablet 1    zolpidem (AMBIEN) 10 mg tablet 2 po  q hs 180 tablet 0    Cholecalciferol 1000 units capsule Take 1,000 Units by mouth      metoprolol succinate (TOPROL-XL) 25 mg 24 hr tablet Take 1 tablet (25 mg total) by mouth daily 90 tablet 3    oxyCODONE-acetaminophen (PERCOCET) 5-325 mg per tablet Take 1 tablet by mouth every 8 (eight) hours as needed for moderate painMax Daily Amount: 3 tablets (Patient not taking: Reported on 8/28/2020) 90 tablet 0     No current facility-administered medications for this visit       _______________________________________________________________________  Review of Systems   Constitutional: Negative for activity change, appetite change, chills, fatigue, fever and unexpected weight change  HENT: Negative for congestion, ear pain, hearing loss, mouth sores, postnasal drip, sinus pressure, sinus pain, sneezing and sore throat  Respiratory: Negative for apnea, cough, shortness of breath and wheezing  Cardiovascular: Negative for chest pain, palpitations and leg swelling  Gastrointestinal: Negative for abdominal pain, constipation, diarrhea, nausea and vomiting  Endocrine: Negative for cold intolerance and heat intolerance  Genitourinary: Negative for dysuria, frequency and hematuria  Musculoskeletal: Negative for arthralgias, back pain, gait problem, joint swelling and neck pain  Skin: Negative for rash  Neurological: Negative for dizziness, weakness and numbness  Hematological: Does not bruise/bleed easily  Psychiatric/Behavioral: Negative for agitation, behavioral problems, confusion, hallucinations and sleep disturbance  The patient is not nervous/anxious  Objective:  Vitals:    01/15/21 1357   BP: 128/82   BP Location: Left arm   Patient Position: Sitting   Cuff Size: Standard   Pulse: 60   Temp: 97 7 °F (36 5 °C)   TempSrc: Temporal   SpO2: 95%   Weight: 104 kg (230 lb)   Height: 5' 9" (1 753 m)     Body mass index is 33 97 kg/m²  Physical Exam  Vitals signs and nursing note reviewed  Constitutional:       Appearance: He is well-developed  HENT:      Head: Normocephalic and atraumatic  Nose: Nose normal    Eyes:      General: No scleral icterus  Conjunctiva/sclera: Conjunctivae normal       Pupils: Pupils are equal, round, and reactive to light  Neck:      Musculoskeletal: Normal range of motion and neck supple  Thyroid: No thyromegaly  Cardiovascular:      Rate and Rhythm: Normal rate and regular rhythm  Heart sounds: Normal heart sounds  Pulmonary:      Effort: Pulmonary effort is normal  No respiratory distress  Breath sounds: Normal breath sounds  No wheezing  Abdominal:      General: Bowel sounds are normal       Palpations: Abdomen is soft  Tenderness: There is no abdominal tenderness  There is no guarding or rebound  Musculoskeletal: Normal range of motion  Skin:     General: Skin is warm and dry  Findings: No rash  Neurological:      Mental Status: He is alert and oriented to person, place, and time  Motor: Weakness present  Gait: Gait abnormal    Psychiatric:         Mood and Affect: Mood normal          Behavior: Behavior normal          Thought Content:  Thought content normal          Judgment: Judgment normal

## 2021-01-15 NOTE — PATIENT INSTRUCTIONS

## 2021-01-15 NOTE — PROGRESS NOTES
Assessment and Plan:     Problem List Items Addressed This Visit     None      Visit Diagnoses     Encounter for immunization        Screening for HIV (human immunodeficiency virus)               Preventive health issues were discussed with patient, and age appropriate screening tests were ordered as noted in patient's After Visit Summary  Personalized health advice and appropriate referrals for health education or preventive services given if needed, as noted in patient's After Visit Summary       History of Present Illness:     Patient presents for Medicare Annual Wellness visit    Patient Care Team:  Nestor Dutta MD as PCP - General (Family Medicine)     Problem List:     Patient Active Problem List   Diagnosis    CAD (coronary artery disease)    Chest pain    Claudication [facility])    Essential hypertension    Mixed hyperlipidemia    Hypothyroidism    Myofascial pain syndrome    Radiculopathy, lumbar region    Bilateral lower extremity edema    Palpitation    History of CVA (cerebrovascular accident)    History of percutaneous coronary intervention    [facility]    Encounter for screening colonoscopy      Past Medical and Surgical History:     Past Medical History:   Diagnosis Date    Anxiety     CAD (coronary artery disease)     Depression     Dizziness     Eye problems     Heart disease     Hypertension     Hypothyroidism     Hypothyroidism     Migraines     St[facility] )     Tremors of nervous system      Past Surgical History:   Procedure Laterality Date    COLONOSCOPY  09/01/2017    CORONARY STENT PLACEMENT      DENTAL SURGERY      entire top teeth removal    HERNIA REPAIR        Family History:     Family History   Problem Relation Age of Onset    Cancer Mother     Heart attack Father     Heart attack Sister     Cancer Brother       Social History:     E-Cigarette/Vaping    E-Cigarette Use Never User      E-Cigarette/Vaping Substances    Nicotine No     THC No     CBD No     Flavoring No     Other No     Unknown No      Social History     Socioeconomic History    Marital status: /Civil Union     Spouse name: Not on file    Number of children: Not on file    Years of education: Not on file    Highest education level: Not on file   Occupational History    Not on file   Social Needs    Financial resource strain: Not on file    Food insecurity     Worry: Not on file     Inability: Not on file   [employer] needs     Medical: Not on file     Non-medical: Not on file   Tobacco Use    Smoking status: Never Smoker    Smokeless tobacco: Never Used   Substance and Sexual Activity    Alcohol use: No    Drug use: No    Sexual activity: Not on file   Lifestyle    Physical activity     Days per week: Not on file     Minutes per session: Not on file    Stress: Not on file   Relationships    Social connections     Talks on phone: Not on file     Gets together: Not on file     Attends Jewish service: Not on file     Active member of club or organization: Not on file     Attends meetings of clubs or organizations: Not on file     Relationship status: Not on file    Intimate partner violence     Fear of current or ex partner: Not on file     Emotionally abused: Not on file     Physically abused: Not on file     Forced sexual activity: Not on file   Other Topics Concern    Not on file   Social History Narrative    Most recent tobacco use screenin2019      Do you currently or have you served in the [employer]:   No      Were you activated, into active duty, as a member of the [employer] or as a Reservist:   No      Occupation:   retired      Education:   15      Marital status:         Sexual orientation:   Heterosexual      Exercise level:   Occasional      Diet:   Regular      General stress level:   Medium      Alcohol intake:   Occasional      Caffeine intake:   Occasional      Chewing tobacco:   none Illicit drugs:   none      Guns present in home:   No      Seat belts used routinely:   Yes      Smoke alarm in home:   Yes      Advance directive:   No      Live alone or with others:   with others      Are there stairs in your home:   Yes  only in front of house     Pets:   No       Medications and Allergies:     Current Outpatient Medications   Medication Sig Dispense Refill    aspirin 81 MG tablet Take 81 mg by mouth      atorvastatin (LIPITOR) 40 mg tablet TAKE 2 TABLETS EVERY MORNING 180 tablet 1    Cholecalciferol 1000 units capsule Take 1,000 Units by mouth      clonazePAM (KlonoPIN) 2 mg tablet Take 2 tablets (4 mg total) by mouth 2 (two) times a day as needed for seizures 180 tablet 0    clopidogrel (PLAVIX) 75 mg tablet TAKE 1 TABLET EVERY DAY 90 tablet 1    levothyroxine 100 mcg tablet TAKE 1 TABLET EVERY DAY 90 tablet 1    metoprolol succinate (TOPROL-XL) 100 mg 24 hr tablet TAKE 1 TABLET EVERY DAY  ALONG  WITH  25MG  TABLET 90 tablet 0    metoprolol succinate (TOPROL-XL) 25 mg 24 hr tablet Take 1 tablet (25 mg total) by mouth daily 90 tablet 3    oxyCODONE-acetaminophen (PERCOCET) 5-325 mg per tablet Take 1 tablet by mouth every 8 (eight) hours as needed for moderate painMax Daily Amount: 3 tablets (Patient not taking: Reported on 8/28/2020) 90 tablet 0    sildenafil (VIAGRA) 100 mg tablet Take 1 tablet (100 mg total) by mouth daily as needed for erectile dysfunction Pt request generic brand Tania to offset costs 30 tablet 3    tiZANidine (ZANAFLEX) 2 mg tablet Take 1 tablet (2 mg total) by mouth every 8 (eight) hours as needed for muscle spasms 270 tablet 1    zolpidem (AMBIEN) 10 mg tablet 2 po  q hs 180 tablet 0     No current facility-administered medications for this visit        Allergies   Allergen Reactions    Medical Tape      Other reaction(s): BURNS      Immunizations:     Immunization History   Administered Date(s) Administered    Tuberculin Skin Test-PPD Intradermal 01/04/2001      Health Maintenance:         Topic Date Due    Hepatitis C Screening  1955    HIV Screening  06/30/1972    Colorectal Cancer Screening  01/15/2031         Topic Date Due    Pneumococcal Vaccine: Pediatrics (0 to 5 Years) and At-Risk Patients (6 to 59 Years) (1 of 1 - PPSV23) 06/30/1963      Medicare Health Risk Assessment:     Ht 5' 9" (1 753 m)   Wt 104 kg (230 lb)   BMI 33 97 kg/m²      Cas Bob is here for his Subsequent Wellness visit  Health Risk Assessment:   Patient rates overall health as fair  Patient feels that their physical health rating is slightly worse  Eyesight was rated as slightly worse  Hearing was rated as same  Patient feels that their emotional and mental health rating is slightly worse  Pain experienced in the last 7 days has been none  Patient states that he has experienced weight loss or gain in last 6 months  Weight is fluctuating`    Depression Screening:   PHQ-2 Score: 2      Fall Risk Screening: In the past year, patient has experienced: history of falling in past year    Number of falls: 2 or more  Injured during fall?: No    Feels unsteady when standing or walking?: Yes    Worried about falling?: Yes      Home Safety:  Patient has trouble with stairs inside or outside of their home  Patient has working smoke alarms and has working carbon monoxide detector  Home safety hazards include: none  Nutrition:   Current diet is Regular  Medications:   Patient is not currently taking any over-the-counter supplements  Patient is able to manage medications  Activities of Daily Living (ADLs)/Instrumental Activities of Daily Living (IADLs):   Walk and transfer into and out of bed and chair?: No  Dress and groom yourself?: Yes    Bathe or shower yourself?: Yes    Feed yourself?  Yes  Do your laundry/housekeeping?: Yes  Manage your money, pay your bills and track your expenses?: Yes  Make your own meals?: Yes    Do your own shopping?: Yes    Previous Hospitalizations:   Any hospitalizations or ED visits within the last 12 months?: No      Advance Care Planning:   Living will: Yes    Durable POA for healthcare:  Yes    Advanced directive: Yes      Cognitive Screening:   Provider or family/friend/caregiver concerned regarding cognition?: No    PREVENTIVE SCREENINGS      Cardiovascular Screening:    General: Screening Not Indicated and History Lipid Disorder      Diabetes Screening:     General: Screening Current      Colorectal Cancer Screening:     General: Screening Current      Prostate Cancer Screening:    General: Screening Current      Lung Cancer Screening:     General: Screening Not Indicated      Sarai Encarnacion MD

## 2021-01-21 DIAGNOSIS — E78.2 MIXED HYPERLIPIDEMIA: ICD-10-CM

## 2021-01-21 DIAGNOSIS — E03.9 HYPOTHYROIDISM, UNSPECIFIED TYPE: ICD-10-CM

## 2021-01-22 RX ORDER — ATORVASTATIN CALCIUM 40 MG/1
TABLET, FILM COATED ORAL
Qty: 180 TABLET | Refills: 1 | Status: SHIPPED | OUTPATIENT
Start: 2021-01-22 | End: 2021-03-28 | Stop reason: HOSPADM

## 2021-01-22 RX ORDER — LEVOTHYROXINE SODIUM 0.1 MG/1
TABLET ORAL
Qty: 90 TABLET | Refills: 1 | Status: SHIPPED | OUTPATIENT
Start: 2021-01-22 | End: 2021-08-21 | Stop reason: SDUPTHER

## 2021-01-24 DIAGNOSIS — I10 ESSENTIAL HYPERTENSION: ICD-10-CM

## 2021-01-24 DIAGNOSIS — R00.2 PALPITATION: ICD-10-CM

## 2021-01-26 DIAGNOSIS — F41.9 ANXIETY: ICD-10-CM

## 2021-01-26 DIAGNOSIS — I10 ESSENTIAL HYPERTENSION: ICD-10-CM

## 2021-01-26 DIAGNOSIS — R00.2 PALPITATION: ICD-10-CM

## 2021-01-26 RX ORDER — METOPROLOL SUCCINATE 100 MG/1
100 TABLET, EXTENDED RELEASE ORAL DAILY
Qty: 90 TABLET | Refills: 1 | Status: SHIPPED | OUTPATIENT
Start: 2021-01-26 | End: 2021-03-28 | Stop reason: HOSPADM

## 2021-01-26 RX ORDER — CLONAZEPAM 2 MG/1
4 TABLET ORAL 2 TIMES DAILY PRN
Qty: 180 TABLET | Refills: 1 | Status: SHIPPED | OUTPATIENT
Start: 2021-01-26 | End: 2021-04-24 | Stop reason: SDUPTHER

## 2021-01-26 RX ORDER — METOPROLOL SUCCINATE 100 MG/1
100 TABLET, EXTENDED RELEASE ORAL DAILY
Qty: 90 TABLET | Refills: 3 | Status: SHIPPED | OUTPATIENT
Start: 2021-01-26 | End: 2021-02-05 | Stop reason: SDUPTHER

## 2021-02-05 DIAGNOSIS — R00.2 PALPITATION: ICD-10-CM

## 2021-02-05 DIAGNOSIS — G47.00 INSOMNIA, UNSPECIFIED TYPE: ICD-10-CM

## 2021-02-05 DIAGNOSIS — I10 ESSENTIAL HYPERTENSION: ICD-10-CM

## 2021-02-07 RX ORDER — ZOLPIDEM TARTRATE 10 MG/1
TABLET ORAL
Qty: 180 TABLET | Refills: 0 | Status: SHIPPED | OUTPATIENT
Start: 2021-02-07 | End: 2021-05-07 | Stop reason: SDUPTHER

## 2021-02-07 RX ORDER — METOPROLOL SUCCINATE 100 MG/1
100 TABLET, EXTENDED RELEASE ORAL DAILY
Qty: 90 TABLET | Refills: 3 | Status: ON HOLD | OUTPATIENT
Start: 2021-02-07 | End: 2021-03-24 | Stop reason: SDUPTHER

## 2021-02-22 ENCOUNTER — TELEPHONE (OUTPATIENT)
Dept: FAMILY MEDICINE CLINIC | Facility: CLINIC | Age: 66
End: 2021-02-22

## 2021-02-22 NOTE — TELEPHONE ENCOUNTER
Received a call from [address] asking for permission to reship his zolpidem (AMBIEN) 10 mg tablet  They originally shipped to patient on 2/13 and has not arrived for the patient and the post office has not provided an update with the tracking since 2/13  They do not need a new script just permission to reship  They can be reached at [phone] X [phone]

## 2021-02-26 ENCOUNTER — OFFICE VISIT (OUTPATIENT)
Dept: CARDIOLOGY CLINIC | Facility: CLINIC | Age: 66
End: 2021-02-26
Payer: MEDICARE

## 2021-02-26 ENCOUNTER — TELEPHONE (OUTPATIENT)
Dept: FAMILY MEDICINE CLINIC | Facility: CLINIC | Age: 66
End: 2021-02-26

## 2021-02-26 VITALS
BODY MASS INDEX: 34.8 KG/M2 | SYSTOLIC BLOOD PRESSURE: 186 MMHG | WEIGHT: 235 LBS | HEIGHT: 69 IN | HEART RATE: 60 BPM | DIASTOLIC BLOOD PRESSURE: 82 MMHG

## 2021-02-26 DIAGNOSIS — I10 ESSENTIAL HYPERTENSION: ICD-10-CM

## 2021-02-26 DIAGNOSIS — R60.9 EDEMA, UNSPECIFIED TYPE: ICD-10-CM

## 2021-02-26 DIAGNOSIS — I25.119 CORONARY ARTERY DISEASE INVOLVING NATIVE CORONARY ARTERY OF NATIVE HEART WITH ANGINA PECTORIS (HCC): Primary | ICD-10-CM

## 2021-02-26 PROCEDURE — 99214 OFFICE O/P EST MOD 30 MIN: CPT | Performed by: INTERNAL MEDICINE

## 2021-02-26 NOTE — PROGRESS NOTES
Cardiology Consultation      Paul Yunapril  1955  [id number]  [facility] [address]  [facility]  [address] [phone]    1  Coronary artery disease involving native coronary artery of native heart with angina pectoris [facility])  Cardiac catheterization   2  Essential hypertension     3  Edema, unspecified type  Cardiac catheterization          Discussion/Summary    1  Longstanding history of coronary artery disease come back to 2004,  Multiple stent procedures [facility], patient overall unhappy with the last  several years of care, per wife continues with heavy  Breathing, no specific chest pain, patient does ambulate with a cane     plan: Will plan on right  left heart catheterization  Patient deconditioned due to his musculoskeletal issues walks with a cane  Will monitor blood pressure and assess blood pressure during catheterization as well  May need to improve antihypertensive regimen by adding  Valsartan/amlodipine    History:      57-year-old male here for 2nd opinion consultation     2004 index PCI he is in hospital   Subsequent to that has had multiple stent procedures spanning the last 16+ years    Overall unhappy with his care last several years  Unsure why as 11 stents  States has problems with anesthesia  Nonetheless, patient ambulates with a cane  He  Has back issues  He  Denies any specific chest discomfort  Noted blood pressure increased today on intake  States was talking on the phone and was aggravated earlier about blood work        Patient Active Problem List   Diagnosis    CAD (coronary artery disease)    Chest pain    Claudication [facility])    Essential hypertension    Mixed hyperlipidemia    Hypothyroidism    Myofascial pain syndrome    Radiculopathy, lumbar region    Bilateral lower extremity edema    Palpitation    History of CVA (cerebrovascular accident)  History of percutaneous coronary intervention    [facility] failure ([facility] )    Encounter for screening colonoscopy     Past Medical History:   Diagnosis Date    Anxiety     CAD (coronary artery disease)     Depression     Dizziness     Eye problems     Heart disease     Hypertension     Hypothyroidism     Hypothyroidism     Migraines     Stroke ([facility] )     Tremors of nervous system      Social History     Socioeconomic History    Marital status: /Civil Union     Spouse name: Not on file    Number of children: Not on file    Years of education: Not on file    Highest education level: Not on file   Occupational History    Not on file   Social Needs    Financial resource strain: Not on file    Food insecurity     Worry: Not on file     Inability: Not on file   [employer] needs     Medical: Not on file     Non-medical: Not on file   Tobacco Use    Smoking status: Never Smoker    Smokeless tobacco: Never Used   Substance and Sexual Activity    Alcohol use: No    Drug use: No    Sexual activity: Not on file   Lifestyle    Physical activity     Days per week: Not on file     Minutes per session: Not on file    Stress: Not on file   Relationships    Social connections     Talks on phone: Not on file     Gets together: Not on file     Attends Judaism service: Not on file     Active member of club or organization: Not on file     Attends meetings of clubs or organizations: Not on file     Relationship status: Not on file    Intimate partner violence     Fear of current or ex partner: Not on file     Emotionally abused: Not on file     Physically abused: Not on file     Forced sexual activity: Not on file   Other Topics Concern    Not on file   Social History Narrative    Most recent tobacco use screenin2019      Do you currently or have you served in the [employer]:   No      Were you activated, into active duty, as a member of the [employer] or as a Reservist:   No      Occupation:   retired      Education:   15      Marital status:         Sexual orientation:   Heterosexual      Exercise level:   Occasional      Diet:   Regular      General stress level:   Medium      Alcohol intake:   Occasional      Caffeine intake:   Occasional      Chewing tobacco:   none      Illicit drugs:   none      Guns present in home:   No      Seat belts used routinely:   Yes      Smoke alarm in home:   Yes      Advance directive:   No      Live alone or with others:   with others      Are there stairs in your home:   Yes  only in front of house     Pets:   No       Family History   Problem Relation Age of Onset    Cancer Mother     Heart attack Father     Heart attack Sister     Cancer Brother      Past Surgical History:   Procedure Laterality Date    COLONOSCOPY  09/01/2017    CORONARY STENT PLACEMENT      DENTAL SURGERY      entire top teeth removal    HERNIA REPAIR         Current Outpatient Medications:     aspirin 81 MG tablet, Take 81 mg by mouth, Disp: , Rfl:     atorvastatin (LIPITOR) 40 mg tablet, TAKE 2 TABLETS EVERY MORNING, Disp: 180 tablet, Rfl: 1    clonazePAM (KlonoPIN) 2 mg tablet, Take 2 tablets (4 mg total) by mouth 2 (two) times a day as needed for seizures, Disp: 180 tablet, Rfl: 1    clopidogrel (PLAVIX) 75 mg tablet, TAKE 1 TABLET EVERY DAY, Disp: 90 tablet, Rfl: 1    levothyroxine 100 mcg tablet, TAKE 1 TABLET EVERY DAY, Disp: 90 tablet, Rfl: 1    metoprolol succinate (TOPROL-XL) 100 mg 24 hr tablet, Take 1 tablet (100 mg total) by mouth daily, Disp: 90 tablet, Rfl: 3    sildenafil (VIAGRA) 100 mg tablet, Take 1 tablet (100 mg total) by mouth daily as needed for erectile dysfunction Pt request generic brand Tania to offset costs, Disp: 30 tablet, Rfl: 3    tiZANidine (ZANAFLEX) 2 mg tablet, Take 1 tablet (2 mg total) by mouth every 8 (eight) hours as needed for muscle spasms, Disp: 270 tablet, Rfl: 1    zolpidem (AMBIEN) 10 mg tablet, 2 po  q hs, Disp: 180 tablet, Rfl: 0    metoprolol succinate (TOPROL-XL) 100 mg 24 hr tablet, Take 1 tablet (100 mg total) by mouth daily, Disp: 90 tablet, Rfl: 1    oxyCODONE-acetaminophen (PERCOCET) 5-325 mg per tablet, Take 1 tablet by mouth every 8 (eight) hours as needed for moderate painMax Daily Amount: 3 tablets (Patient not taking: Reported on 8/28/2020), Disp: 90 tablet, Rfl: 0  Allergies   Allergen Reactions    Medical Tape      Other reaction(s): BURNS       Social, Family and medication history as listed, reviewed and updated as necessary    Labs:   Lab Results   Component Value Date    K 3 8 01/08/2021     01/08/2021    CO2 33 (H) 01/08/2021    BUN 10 01/08/2021    CREATININE 1 20 01/08/2021    CREATININE 1 20 07/24/2020    CALCIUM 9 4 01/08/2021       Lab Results   Component Value Date    WBC 5 92 07/24/2020    HGB 14 5 07/24/2020    HGB 14 7 11/29/2019    HCT 43 3 07/24/2020    HCT 44 9 11/29/2019     07/24/2020     11/29/2019       No results found for: CHOL  Lab Results   Component Value Date    HDL 42 01/08/2021    HDL 39 (L) 07/24/2020     Lab Results   Component Value Date    LDLCALC 77 01/08/2021    LDLCALC 69 07/24/2020     Lab Results   Component Value Date    TRIG 93 01/08/2021    TRIG 116 07/24/2020     No results found for: LDLDIRECT    Lab Results   Component Value Date    ALT 28 01/08/2021    ALT 21 07/24/2020    AST 27 01/08/2021    AST 18 07/24/2020             No results found for: NTBNP    No results found for: HGBA1C    Imaging: Reviewed in epic      Review of Systems:  14 systems reviewed and negative with exception of the above       PHYSICAL EXAM:        Vitals:    02/26/21 1543   BP: (!) 186/82   Pulse: 60     Body mass index is 34 7 kg/m²    Weight (last 2 days)     Date/Time   Weight    02/26/21 1543   107 (235)                 Gen: No acute distress  HEENT: anicteric, mucous membranes moist  Neck: supple, no jugular venous distention, or carotid bruit  Heart: regular, normal s1 and s2, no murmur/rub or gallop  Lungs :clear to auscultation bilaterally, no rales/rhonchi or wheeze  Abdomen: soft nontender, normoactive bowel sounds, no organomegaly  Ext: warm and perfused, normal femoral pulses, no edema, or clubbing  Skin: warm, no rashes  Neuro: AAO x 3, no focal findings  Psychiatric: normal affect  Musculoskeletal: no obvious joint deformities

## 2021-02-26 NOTE — H&P (VIEW-ONLY)
Cardiology Consultation      Gerry Plant  1955  [id number]  [facility] [address]  [address]  [address]  [facility] [address]    1  Coronary artery disease involving native coronary artery of native heart with angina pectoris [facility])  Cardiac catheterization   2  Essential hypertension     3  Edema, unspecified type  Cardiac catheterization          Discussion/Summary    1  Longstanding history of coronary artery disease come back to 2004,  Multiple stent procedures [facility], patient overall unhappy with the last  several years of care, per wife continues with heavy  Breathing, no specific chest pain, patient does ambulate with a cane     plan: Will plan on right  left heart catheterization  Patient deconditioned due to his musculoskeletal issues walks with a cane  Will monitor blood pressure and assess blood pressure during catheterization as well  May need to improve antihypertensive regimen by adding  Valsartan/amlodipine    History:      61-year-old male here for 2nd opinion consultation     2004 index PCI he is in hospital   Subsequent to that has had multiple stent procedures spanning the last 16+ years    Overall unhappy with his care last several years  Unsure why as 11 stents  States has problems with anesthesia  Nonetheless, patient ambulates with a cane  He  Has back issues  He  Denies any specific chest discomfort  Noted blood pressure increased today on intake  States was talking on the phone and was aggravated earlier about blood work        Patient Active Problem List   Diagnosis    CAD (coronary artery disease)    Chest pain    Claudication [facility]    Essential hypertension    Mixed hyperlipidemia    Hypothyroidism    Myofascial pain syndrome    Radiculopathy, lumbar region    Bilateral lower extremity edema    Palpitation    History of CVA (cerebrovascular accident)  History of percutaneous coronary intervention    [facility] diastolic heart failure ([facility] )    Encounter for screening colonoscopy     Past Medical History:   Diagnosis Date    Anxiety     CAD (coronary artery disease)     Depression     Dizziness     Eye problems     Heart disease     Hypertension     Hypothyroidism     Hypothyroidism     Migraines     Stroke ([facility] )     Tremors of nervous system      Social History     Socioeconomic History    Marital status: /Civil Union     Spouse name: Not on file    Number of children: Not on file    Years of education: Not on file    Highest education level: Not on file   Occupational History    Not on file   Social Needs    Financial resource strain: Not on file    Food insecurity     Worry: Not on file     Inability: Not on file   [employer] needs     Medical: Not on file     Non-medical: Not on file   Tobacco Use    Smoking status: Never Smoker    Smokeless tobacco: Never Used   Substance and Sexual Activity    Alcohol use: No    Drug use: No    Sexual activity: Not on file   Lifestyle    Physical activity     Days per week: Not on file     Minutes per session: Not on file    Stress: Not on file   Relationships    Social connections     Talks on phone: Not on file     Gets together: Not on file     Attends Mosque service: Not on file     Active member of club or organization: Not on file     Attends meetings of clubs or organizations: Not on file     Relationship status: Not on file    Intimate partner violence     Fear of current or ex partner: Not on file     Emotionally abused: Not on file     Physically abused: Not on file     Forced sexual activity: Not on file   Other Topics Concern    Not on file   Social History Narrative    Most recent tobacco use screenin2019      Do you currently or have you served in the [facility] [employer]:   No      Were you activated, into active duty, as a member of the [employer] or as a Reservist:   No      Occupation:   retired      Education:   15      Marital status:         Sexual orientation:   Heterosexual      Exercise level:   Occasional      Diet:   Regular      General stress level:   Medium      Alcohol intake:   Occasional      Caffeine intake:   Occasional      Chewing tobacco:   none      Illicit drugs:   none      Guns present in home:   No      Seat belts used routinely:   Yes      Smoke alarm in home:   Yes      Advance directive:   No      Live alone or with others:   with others      Are there stairs in your home:   Yes  only in front of house     Pets:   No       Family History   Problem Relation Age of Onset    Cancer Mother     Heart attack Father     Heart attack Sister     Cancer Brother      Past Surgical History:   Procedure Laterality Date    COLONOSCOPY  09/01/2017    CORONARY STENT PLACEMENT      DENTAL SURGERY      entire top teeth removal    HERNIA REPAIR         Current Outpatient Medications:     aspirin 81 MG tablet, Take 81 mg by mouth, Disp: , Rfl:     atorvastatin (LIPITOR) 40 mg tablet, TAKE 2 TABLETS EVERY MORNING, Disp: 180 tablet, Rfl: 1    clonazePAM (KlonoPIN) 2 mg tablet, Take 2 tablets (4 mg total) by mouth 2 (two) times a day as needed for seizures, Disp: 180 tablet, Rfl: 1    clopidogrel (PLAVIX) 75 mg tablet, TAKE 1 TABLET EVERY DAY, Disp: 90 tablet, Rfl: 1    levothyroxine 100 mcg tablet, TAKE 1 TABLET EVERY DAY, Disp: 90 tablet, Rfl: 1    metoprolol succinate (TOPROL-XL) 100 mg 24 hr tablet, Take 1 tablet (100 mg total) by mouth daily, Disp: 90 tablet, Rfl: 3    sildenafil (VIAGRA) 100 mg tablet, Take 1 tablet (100 mg total) by mouth daily as needed for erectile dysfunction Pt request generic brand Tania to offset costs, Disp: 30 tablet, Rfl: 3    tiZANidine (ZANAFLEX) 2 mg tablet, Take 1 tablet (2 mg total) by mouth every 8 (eight) hours as needed for muscle spasms, Disp: 270 tablet, Rfl: 1    zolpidem (AMBIEN) 10 mg tablet, 2 po  q hs, Disp: 180 tablet, Rfl: 0    metoprolol succinate (TOPROL-XL) 100 mg 24 hr tablet, Take 1 tablet (100 mg total) by mouth daily, Disp: 90 tablet, Rfl: 1    oxyCODONE-acetaminophen (PERCOCET) 5-325 mg per tablet, Take 1 tablet by mouth every 8 (eight) hours as needed for moderate painMax Daily Amount: 3 tablets (Patient not taking: Reported on 8/28/2020), Disp: 90 tablet, Rfl: 0  Allergies   Allergen Reactions    Medical Tape      Other reaction(s): BURNS       Social, Family and medication history as listed, reviewed and updated as necessary    Labs:   Lab Results   Component Value Date    K 3 8 01/08/2021     01/08/2021    CO2 33 (H) 01/08/2021    BUN 10 01/08/2021    CREATININE 1 20 01/08/2021    CREATININE 1 20 07/24/2020    CALCIUM 9 4 01/08/2021       Lab Results   Component Value Date    WBC 5 92 07/24/2020    HGB 14 5 07/24/2020    HGB 14 7 11/29/2019    HCT 43 3 07/24/2020    HCT 44 9 11/29/2019     07/24/2020     11/29/2019       No results found for: CHOL  Lab Results   Component Value Date    HDL 42 01/08/2021    HDL 39 (L) 07/24/2020     Lab Results   Component Value Date    LDLCALC 77 01/08/2021    LDLCALC 69 07/24/2020     Lab Results   Component Value Date    TRIG 93 01/08/2021    TRIG 116 07/24/2020     No results found for: LDLDIRECT    Lab Results   Component Value Date    ALT 28 01/08/2021    ALT 21 07/24/2020    AST 27 01/08/2021    AST 18 07/24/2020             No results found for: NTBNP    No results found for: HGBA1C    Imaging: Reviewed in epic      Review of Systems:  14 systems reviewed and negative with exception of the above       PHYSICAL EXAM:        Vitals:    02/26/21 1543   BP: (!) 186/82   Pulse: 60     Body mass index is 34 7 kg/m²    Weight (last 2 days)     Date/Time   Weight    02/26/21 1543   107 (235)                 Gen: No acute distress  HEENT: anicteric, mucous membranes moist  Neck: supple, no jugular venous distention, or carotid bruit  Heart: regular, normal s1 and s2, no murmur/rub or gallop  Lungs :clear to auscultation bilaterally, no rales/rhonchi or wheeze  Abdomen: soft nontender, normoactive bowel sounds, no organomegaly  Ext: warm and perfused, normal femoral pulses, no edema, or clubbing  Skin: warm, no rashes  Neuro: AAO x 3, no focal findings  Psychiatric: normal affect  Musculoskeletal: no obvious joint deformities

## 2021-02-26 NOTE — TELEPHONE ENCOUNTER
Mr Graves Lennymaria r called complaining that he was billed for his Medicare Annual Wellness visit back on Jan 15th of this year  He states that the original appointment with you had been scheduled for December but his appointment was cancelled by us and rescheduled into the new year  You billed him for a [phone] along with his wellness visit and now he got a bill for his deductible  He feels that the charge should be dropped  I told him that I would discuss it with you and get back to him on it  Thanks!

## 2021-03-01 ENCOUNTER — TELEPHONE (OUTPATIENT)
Dept: CARDIOLOGY CLINIC | Facility: CLINIC | Age: 66
End: 2021-03-01

## 2021-03-01 DIAGNOSIS — I25.119 CORONARY ARTERY DISEASE INVOLVING NATIVE CORONARY ARTERY OF NATIVE HEART WITH ANGINA PECTORIS (HCC): Primary | ICD-10-CM

## 2021-03-01 NOTE — TELEPHONE ENCOUNTER
COVID Pre-Visit Screening     1  Is this a family member screening? Yes  2  Have you traveled outside of your state in the past 2 weeks? No  3  Do you presently have a fever or flu-like symptoms? No  4  Do you have symptoms of an upper respiratory infection like runny nose, sore throat, or cough? No  5  Are you suffering from new headache that you have not had in the past?  No  6  Do you have/have you experienced any new shortness of breath recently? No  7  Do you have any new diarrhea, nausea or vomiting? No  8  Have you been in contact with anyone who has been sick or diagnosed with COVID-19? No  9  Do you have any new loss of taste or smell? No  10  Are you able to wear a mask without a valve for the entire visit? Yes     Patient schedule for R+LC at [facility] on 3/8/21 with DR Chappell  Patient aware of general instructions and blood test require  patient cover under medicare

## 2021-03-02 ENCOUNTER — LAB (OUTPATIENT)
Dept: LAB | Facility: CLINIC | Age: 66
End: 2021-03-02
Payer: MEDICARE

## 2021-03-02 LAB
ALBUMIN SERPL BCP-MCNC: 3.9 G/DL (ref 3.5–5)
ALP SERPL-CCNC: 87 U/L (ref 46–116)
ALT SERPL W P-5'-P-CCNC: 25 U/L (ref 12–78)
ANION GAP SERPL CALCULATED.3IONS-SCNC: 2 MMOL/L (ref 4–13)
AST SERPL W P-5'-P-CCNC: 20 U/L (ref 5–45)
BASOPHILS # BLD AUTO: 0.05 THOUSANDS/ΜL (ref 0–0.1)
BASOPHILS NFR BLD AUTO: 1 % (ref 0–1)
BILIRUB SERPL-MCNC: 0.58 MG/DL (ref 0.2–1)
BUN SERPL-MCNC: 16 MG/DL (ref 5–25)
CALCIUM SERPL-MCNC: 9.3 MG/DL (ref 8.3–10.1)
CHLORIDE SERPL-SCNC: 103 MMOL/L (ref 100–108)
CO2 SERPL-SCNC: 34 MMOL/L (ref 21–32)
CREAT SERPL-MCNC: 1.27 MG/DL (ref 0.6–1.3)
EOSINOPHIL # BLD AUTO: 0.14 THOUSAND/ΜL (ref 0–0.61)
EOSINOPHIL NFR BLD AUTO: 2 % (ref 0–6)
ERYTHROCYTE [DISTWIDTH] IN BLOOD BY AUTOMATED COUNT: 14 % (ref 11.6–15.1)
GFR SERPL CREATININE-BSD FRML MDRD: 60 ML/MIN/1.73SQ M
GLUCOSE P FAST SERPL-MCNC: 102 MG/DL (ref 65–99)
HCT VFR BLD AUTO: 44.7 % (ref 36.5–49.3)
HGB BLD-MCNC: 15.2 G/DL (ref 12–17)
IMM GRANULOCYTES # BLD AUTO: 0.03 THOUSAND/UL (ref 0–0.2)
IMM GRANULOCYTES NFR BLD AUTO: 1 % (ref 0–2)
LYMPHOCYTES # BLD AUTO: 1.61 THOUSANDS/ΜL (ref 0.6–4.47)
LYMPHOCYTES NFR BLD AUTO: 24 % (ref 14–44)
MCH RBC QN AUTO: 33.7 PG (ref 26.8–34.3)
MCHC RBC AUTO-ENTMCNC: 34 G/DL (ref 31.4–37.4)
MCV RBC AUTO: 99 FL (ref 82–98)
MONOCYTES # BLD AUTO: 0.62 THOUSAND/ΜL (ref 0.17–1.22)
MONOCYTES NFR BLD AUTO: 9 % (ref 4–12)
NEUTROPHILS # BLD AUTO: 4.18 THOUSANDS/ΜL (ref 1.85–7.62)
NEUTS SEG NFR BLD AUTO: 63 % (ref 43–75)
NRBC BLD AUTO-RTO: 0 /100 WBCS
PLATELET # BLD AUTO: 178 THOUSANDS/UL (ref 149–390)
PMV BLD AUTO: 9.7 FL (ref 8.9–12.7)
POTASSIUM SERPL-SCNC: 4.1 MMOL/L (ref 3.5–5.3)
PROT SERPL-MCNC: 7.5 G/DL (ref 6.4–8.2)
RBC # BLD AUTO: 4.51 MILLION/UL (ref 3.88–5.62)
SODIUM SERPL-SCNC: 139 MMOL/L (ref 136–145)
WBC # BLD AUTO: 6.63 THOUSAND/UL (ref 4.31–10.16)

## 2021-03-02 PROCEDURE — 85025 COMPLETE CBC W/AUTO DIFF WBC: CPT

## 2021-03-02 PROCEDURE — 80053 COMPREHEN METABOLIC PANEL: CPT

## 2021-03-02 PROCEDURE — 36415 COLL VENOUS BLD VENIPUNCTURE: CPT

## 2021-03-05 ENCOUNTER — TELEPHONE (OUTPATIENT)
Dept: SURGERY | Facility: HOSPITAL | Age: 66
End: 2021-03-05

## 2021-03-05 RX ORDER — SODIUM CHLORIDE 9 MG/ML
125 INJECTION, SOLUTION INTRAVENOUS CONTINUOUS
Status: CANCELLED | OUTPATIENT
Start: 2021-03-05

## 2021-03-05 RX ORDER — ASPIRIN 81 MG/1
324 TABLET, CHEWABLE ORAL ONCE
Status: CANCELLED | OUTPATIENT
Start: 2021-03-05 | End: 2021-03-05

## 2021-03-08 ENCOUNTER — HOSPITAL ENCOUNTER (OUTPATIENT)
Dept: NON INVASIVE DIAGNOSTICS | Facility: HOSPITAL | Age: 66
Discharge: HOME | End: 2021-03-08
Attending: INTERNAL MEDICINE | Admitting: INTERNAL MEDICINE
Payer: MEDICARE

## 2021-03-08 VITALS
OXYGEN SATURATION: 95 % | DIASTOLIC BLOOD PRESSURE: 82 MMHG | WEIGHT: 235 LBS | TEMPERATURE: 97.7 F | HEIGHT: 69 IN | HEART RATE: 59 BPM | BODY MASS INDEX: 34.8 KG/M2 | SYSTOLIC BLOOD PRESSURE: 160 MMHG | RESPIRATION RATE: 16 BRPM

## 2021-03-08 DIAGNOSIS — R60.9 EDEMA, UNSPECIFIED TYPE: ICD-10-CM

## 2021-03-08 DIAGNOSIS — I25.119 CORONARY ARTERY DISEASE INVOLVING NATIVE CORONARY ARTERY OF NATIVE HEART WITH ANGINA PECTORIS (HCC): ICD-10-CM

## 2021-03-08 LAB
ATRIAL RATE: 65 BPM
P AXIS: 57 DEGREES
PR INTERVAL: 178 MS
QRS AXIS: -4 DEGREES
QRSD INTERVAL: 110 MS
QT INTERVAL: 406 MS
QTC INTERVAL: 422 MS
T WAVE AXIS: 45 DEGREES
VENTRICULAR RATE: 65 BPM

## 2021-03-08 PROCEDURE — C1769 GUIDE WIRE: HCPCS | Performed by: INTERNAL MEDICINE

## 2021-03-08 PROCEDURE — 93010 ELECTROCARDIOGRAM REPORT: CPT | Performed by: INTERNAL MEDICINE

## 2021-03-08 PROCEDURE — 93459 L HRT ART/GRFT ANGIO: CPT | Performed by: INTERNAL MEDICINE

## 2021-03-08 PROCEDURE — 93460 R&L HRT ART/VENTRICLE ANGIO: CPT | Performed by: INTERNAL MEDICINE

## 2021-03-08 PROCEDURE — C1894 INTRO/SHEATH, NON-LASER: HCPCS | Performed by: INTERNAL MEDICINE

## 2021-03-08 PROCEDURE — 82810 BLOOD GASES O2 SAT ONLY: CPT | Performed by: INTERNAL MEDICINE

## 2021-03-08 PROCEDURE — 93005 ELECTROCARDIOGRAM TRACING: CPT

## 2021-03-08 PROCEDURE — 99152 MOD SED SAME PHYS/QHP 5/>YRS: CPT | Performed by: INTERNAL MEDICINE

## 2021-03-08 PROCEDURE — C1760 CLOSURE DEV, VASC: HCPCS | Performed by: INTERNAL MEDICINE

## 2021-03-08 RX ORDER — ONDANSETRON 2 MG/ML
4 INJECTION INTRAMUSCULAR; INTRAVENOUS EVERY 6 HOURS PRN
Status: DISCONTINUED | OUTPATIENT
Start: 2021-03-08 | End: 2021-03-08 | Stop reason: HOSPADM

## 2021-03-08 RX ORDER — MIDAZOLAM HYDROCHLORIDE 2 MG/2ML
INJECTION, SOLUTION INTRAMUSCULAR; INTRAVENOUS CODE/TRAUMA/SEDATION MEDICATION
Status: COMPLETED | OUTPATIENT
Start: 2021-03-08 | End: 2021-03-08

## 2021-03-08 RX ORDER — LIDOCAINE HYDROCHLORIDE 10 MG/ML
INJECTION, SOLUTION EPIDURAL; INFILTRATION; INTRACAUDAL; PERINEURAL CODE/TRAUMA/SEDATION MEDICATION
Status: COMPLETED | OUTPATIENT
Start: 2021-03-08 | End: 2021-03-08

## 2021-03-08 RX ORDER — ASPIRIN 81 MG/1
324 TABLET, CHEWABLE ORAL ONCE
Status: COMPLETED | OUTPATIENT
Start: 2021-03-08 | End: 2021-03-08

## 2021-03-08 RX ORDER — CLOPIDOGREL BISULFATE 75 MG/1
TABLET ORAL CODE/TRAUMA/SEDATION MEDICATION
Status: COMPLETED | OUTPATIENT
Start: 2021-03-08 | End: 2021-03-08

## 2021-03-08 RX ORDER — SODIUM CHLORIDE 9 MG/ML
150 INJECTION, SOLUTION INTRAVENOUS CONTINUOUS
Status: DISCONTINUED | OUTPATIENT
Start: 2021-03-08 | End: 2021-03-08 | Stop reason: HOSPADM

## 2021-03-08 RX ORDER — ACETAMINOPHEN 325 MG/1
650 TABLET ORAL EVERY 4 HOURS PRN
Status: DISCONTINUED | OUTPATIENT
Start: 2021-03-08 | End: 2021-03-08 | Stop reason: HOSPADM

## 2021-03-08 RX ORDER — SODIUM CHLORIDE 9 MG/ML
125 INJECTION, SOLUTION INTRAVENOUS CONTINUOUS
Status: DISCONTINUED | OUTPATIENT
Start: 2021-03-08 | End: 2021-03-08

## 2021-03-08 RX ORDER — FENTANYL CITRATE 50 UG/ML
INJECTION, SOLUTION INTRAMUSCULAR; INTRAVENOUS CODE/TRAUMA/SEDATION MEDICATION
Status: COMPLETED | OUTPATIENT
Start: 2021-03-08 | End: 2021-03-08

## 2021-03-08 RX ADMIN — FENTANYL CITRATE 25 MCG: 50 INJECTION INTRAMUSCULAR; INTRAVENOUS at 08:39

## 2021-03-08 RX ADMIN — ASPIRIN 324 MG: 81 TABLET, CHEWABLE ORAL at 07:27

## 2021-03-08 RX ADMIN — MIDAZOLAM 1 MG: 1 INJECTION INTRAMUSCULAR; INTRAVENOUS at 08:39

## 2021-03-08 RX ADMIN — SODIUM CHLORIDE 125 ML/HR: 0.9 INJECTION, SOLUTION INTRAVENOUS at 07:20

## 2021-03-08 RX ADMIN — MIDAZOLAM 2 MG: 1 INJECTION INTRAMUSCULAR; INTRAVENOUS at 08:32

## 2021-03-08 RX ADMIN — LIDOCAINE HYDROCHLORIDE 10 ML: 10 INJECTION, SOLUTION EPIDURAL; INFILTRATION; INTRACAUDAL; PERINEURAL at 08:42

## 2021-03-08 RX ADMIN — IOHEXOL 96 ML: 350 INJECTION, SOLUTION INTRAVENOUS at 09:06

## 2021-03-08 RX ADMIN — FENTANYL CITRATE 50 MCG: 50 INJECTION INTRAMUSCULAR; INTRAVENOUS at 08:31

## 2021-03-08 RX ADMIN — CLOPIDOGREL BISULFATE 75 MG: 75 TABLET ORAL at 08:02

## 2021-03-08 NOTE — NURSING NOTE
Unable to complete EKG prior to procedure in [facility], no machine available, Arthur in Cath lab made aware by Brendon Harris

## 2021-03-08 NOTE — QUICK NOTE
Dressing changed at RN request due to a small amount of blood under tegaderm  Right femoral site cleaned, 4x4 and tegaderm applied to site  RN made aware of dressing change and that there was no active bleeding or hematoma at site

## 2021-03-08 NOTE — DISCHARGE INSTRUCTIONS
1  Please see the post cardiac catheterization dishcarge instructions  No heavy lifting, greater than 10 lbs  or strenuous  activity for 48 hrs  2 Remove band aid tomorrow  Shower and wash area- groin gently with soap and water- beginning tomorrow  Rinse and pat dry  Apply new water seal band aid  Repeat this process for 5 days  No powders, creams lotions or antibiotic ointments  for 5 days  No tub baths, hot tubs or swimming for 5 days  3  Please call our office ([phone]) if you have any fever, redness, swelling, discharge from your groin access site  4 No driving for 1 day    5  Perclose Booklet     ================================================================================================================================================================    Left Heart Catheterization   WHAT YOU NEED TO KNOW:   A left heart catheterization is a procedure to look at your heart and its arteries  You may need this procedure if you have chest pain, heart disease, or your heart is not working as it should  DISCHARGE INSTRUCTIONS:   Call 911 for any of the following:   · You have any of the following signs of a heart attack:      ? Squeezing, pressure, or pain in your chest     ? and  any of the following:     ? Discomfort or pain in your back, neck, jaw, stomach, or arm     ? Shortness of breath    ? Nausea or vomiting    ? Lightheadedness or a sudden cold sweat    · You have any of the following signs of a stroke:      ? Numbness or drooping on one side of your face     ? Weakness in an arm or leg    ? Confusion or difficulty speaking    ? Dizziness, a severe headache, or vision loss    Seek care immediately if:   · Your arm or leg feels warm, tender, and painful  It may look swollen and red  · The leg or arm used for your angiogram is numb, painful, or changes color  · The bruise at your catheter site gets bigger or becomes swollen       · Your wound does not stop bleeding even after you apply firm pressure for 15 minutes  · You have weakness in an arm or leg  · You become confused or have difficulty speaking  · You have dizziness, a severe headache, or vision loss  Contact your healthcare provider if:   · You have a fever  · Your catheter site is red, leaks pus, or smells bad  · You have increasing pain at your catheter site  · You have questions or concerns about your condition or care  Limit activity as directed:   · Avoid unnecessary stair climbing for 48 hours, if a catheter was put in your groin  · Do not place pressure on your arm, hand, or wrist, if the catheter was placed in your wrist  Avoid pushing, pulling, or heavy lifting with that arm  · If you need to cough, support the area where the catheter was inserted with your hand  · Ask your healthcare provider how long you need to limit movement and avoid certain activities  · You may feel like resting more after your procedure  Slowly start to do more each day  Rest when you feel it is needed  Keep your bandage clean and dry:  Ask your healthcare provider when you can bathe and shower  Do not take baths or go in pools or hot tubs  Check your site every day for signs of infection such as swelling, redness, or pus  Drink liquids as directed:  Liquids help flush the dye used for your procedure out of your body  Ask your healthcare provider how much liquid to drink each day, and which liquids to drink  Some foods, such as soup and fruit, also provide liquid  Limit alcohol:  Do not drink alcohol for 24 hours after your procedure  Then limit alcohol  Women should limit alcohol to 1 drink a day  Men should limit alcohol to 2 drinks a day  A drink of alcohol is 12 ounces of beer, 5 ounces of wine, or 1½ ounces of liquor  Do not smoke:  Nicotine and other chemicals in cigarettes and cigars can damage your blood vessels   Ask your healthcare provider for information if you currently smoke and need help to quit  E-cigarettes or smokeless tobacco still contain nicotine  Talk to your healthcare provider before you use these products  Follow up with your healthcare provider as directed:  Write down your questions so you remember to ask them during your visits  © Copyright [employer] 2018 Information is for End User's use only and may not be sold, redistributed or otherwise used for commercial purposes  All illustrations and images included in CareNotes® are the copyrighted property of A [employer] A M , Inc  or [facility] Rafi Seymour   The above information is an  only  It is not intended as medical advice for individual conditions or treatments  Talk to your doctor, nurse or pharmacist before following any medical regimen to see if it is safe and effective for you

## 2021-03-08 NOTE — INTERVAL H&P NOTE
H&P reviewed  After examining the patient, I find no changed to the H&P since it had been written  BP (!) 177/93 (BP Location: Right arm)   Pulse 63   Temp 97 7 °F (36 5 °C) (Oral)   Resp 18   Ht 5' 9" (1 753 m)   Wt 107 kg (235 lb)   SpO2 97%   BMI 34 70 kg/m²     Patient re-evaluated   Accept as history and physical     Hira Lua MD

## 2021-03-10 DIAGNOSIS — Z23 ENCOUNTER FOR IMMUNIZATION: ICD-10-CM

## 2021-03-12 ENCOUNTER — OFFICE VISIT (OUTPATIENT)
Dept: CARDIAC SURGERY | Facility: CLINIC | Age: 66
End: 2021-03-12
Payer: MEDICARE

## 2021-03-12 VITALS
BODY MASS INDEX: 34.93 KG/M2 | TEMPERATURE: 97.2 F | OXYGEN SATURATION: 97 % | WEIGHT: 235.8 LBS | RESPIRATION RATE: 18 BRPM | HEART RATE: 70 BPM | DIASTOLIC BLOOD PRESSURE: 100 MMHG | HEIGHT: 69 IN | SYSTOLIC BLOOD PRESSURE: 160 MMHG

## 2021-03-12 DIAGNOSIS — Z11.59 SCREENING FOR VIRAL DISEASE: ICD-10-CM

## 2021-03-12 DIAGNOSIS — Z01.89 ENCOUNTER FOR IMAGING OF BILATERAL GREATER SAPHENOUS VEINS: ICD-10-CM

## 2021-03-12 DIAGNOSIS — Z01.810 PRE-OPERATIVE CARDIOVASCULAR EXAMINATION: ICD-10-CM

## 2021-03-12 DIAGNOSIS — I25.119 CORONARY ARTERY DISEASE INVOLVING NATIVE CORONARY ARTERY OF NATIVE HEART WITH ANGINA PECTORIS (HCC): Primary | ICD-10-CM

## 2021-03-12 DIAGNOSIS — I21.9 ACUTE MYOCARDIAL INFARCTION, UNSPECIFIED MI TYPE, UNSPECIFIED ARTERY (HCC): ICD-10-CM

## 2021-03-12 DIAGNOSIS — I73.9 CLAUDICATION (HCC): ICD-10-CM

## 2021-03-12 DIAGNOSIS — I63.10 CEREBROVASCULAR ACCIDENT (CVA) DUE TO EMBOLISM OF PRECEREBRAL ARTERY (HCC): ICD-10-CM

## 2021-03-12 PROCEDURE — 99205 OFFICE O/P NEW HI 60 MIN: CPT | Performed by: PHYSICIAN ASSISTANT

## 2021-03-12 RX ORDER — CEFAZOLIN SODIUM 2 G/50ML
2000 SOLUTION INTRAVENOUS ONCE
Status: CANCELLED | OUTPATIENT
Start: 2021-03-12 | End: 2021-03-12

## 2021-03-12 RX ORDER — CHLORHEXIDINE GLUCONATE 0.12 MG/ML
15 RINSE ORAL EVERY 12 HOURS SCHEDULED
Status: CANCELLED | OUTPATIENT
Start: 2021-03-12

## 2021-03-12 NOTE — H&P (VIEW-ONLY)
Consultation - Cardiothoracic Surgery   Dwaine Carnes 63 y o  male MRN: [id number]    Physician Requesting Consult: Gardenia Bullock    Reason for Consult / Principal Problem: Coronary artery disease    History of Present Illness: Dwaine Carnes is a 63y o  year old male with a complex cardiovascular medical history significant for obstructive coronary artery disease  This diagnosis was 1st established approximately 16 years ago  At that time he underwent his initial cardiac catheterization  Since that time has had multiple cardiac catheterization during which time multiple stents have been implanted  By report the coronary stent total is standing at 11 currently  More recently he presented to Dr Gardenia Bullock for 2nd opinion of his coronary disease  Workup included repeat cardiac catheterization performed earlier this week  At that time severe 3 vessel coronary disease was identified  He has no subsequently referred to our office for surgical discussion  During interview today dated admits to exertional angina, palpitations, exertional dyspnea, and proggressive fatiguability  His past medical history is otherwise significant for coronary artery disease, hypertension, claudication, hyperlipidemia, hypothyroidism, lumbar radiculopathy, history of ischemic CVA in the mikhail distribution in 2014 with residual right-sided weakness, chronic diastolic heart failure, myofascial pain syndrome, anxiety, depression, and chronic lower extremity edema  The history portion of this consultation also obtained a significant information from Dr Burleigh Seip office consultation on February 26  In addition, his wife was able to confirm details of his past medical history and provide further clarification regarding his symptomatology      Past Medical History:  Past Medical History:   Diagnosis Date    Anxiety     CAD (coronary artery disease)     Depression     Dizziness     Eye problems     Heart disease     Hypertension     Hypothyroidism     Hypothyroidism     Migraines     Stroke ([facility] [address] )     Tremors of nervous system        Past Surgical History:   Past Surgical History:   Procedure Laterality Date    COLONOSCOPY  09/01/2017    CORONARY STENT PLACEMENT      DENTAL SURGERY      entire top teeth removal    HERNIA REPAIR         Family History:  Family History   Problem Relation Age of Onset    Cancer Mother     Heart attack Father     Heart attack Sister     Cancer Brother        Social History:    He is lifelong nonsmoker  He denies significant alcohol consumption  Is  and lives with his wife  She presents for his consultation today  Social History     Substance and Sexual Activity   Alcohol Use No     Social History     Substance and Sexual Activity   Drug Use No     Social History     Tobacco Use   Smoking Status Never Smoker   Smokeless Tobacco Never Used       Home Medications:   Prior to Admission medications    Medication Sig Start Date End Date Taking?  Authorizing Provider   aspirin 81 MG tablet Take 81 mg by mouth   Yes Historical Provider, MD   atorvastatin (LIPITOR) 40 mg tablet TAKE 2 TABLETS EVERY MORNING 1/22/21  Yes Ras Villeda MD   clonazePAM (KlonoPIN) 2 mg tablet Take 2 tablets (4 mg total) by mouth 2 (two) times a day as needed for seizures 1/26/21  Yes Ras Villeda MD   levothyroxine 100 mcg tablet TAKE 1 TABLET EVERY DAY 1/22/21  Yes Ras Villeda MD   metoprolol succinate (TOPROL-XL) 100 mg 24 hr tablet Take 1 tablet (100 mg total) by mouth daily 1/26/21  Yes Ras Villeda MD   metoprolol succinate (TOPROL-XL) 100 mg 24 hr tablet Take 1 tablet (100 mg total) by mouth daily 2/7/21  Yes Ras Villeda MD   sildenafil (VIAGRA) 100 mg tablet Take 1 tablet (100 mg total) by mouth daily as needed for erectile dysfunction Pt request generic brand Tania to offset costs 10/5/20  Yes Ras Villeda MD   tiZANidine (ZANAFLEX) 2 mg tablet Take 1 tablet (2 mg total) by mouth every 8 (eight) hours as needed for muscle spasms 11/9/20  Yes Christ Rojo MD   zolpidem (AMBIEN) 10 mg tablet 2 po  q hs 2/7/21  Yes Christ Rojo MD   [facility]) 2 % ointment Apply 1 application topically 2 (two) times a day for 5 days Apply to each nostril twice daily for five days before your operation  3/12/21 3/17/21  Skyler Reaves PA-C   oxyCODONE-acetaminophen (PERCOCET) 5-325 mg per tablet Take 1 tablet by mouth every 8 (eight) hours as needed for moderate painMax Daily Amount: 3 tablets  Patient not taking: Reported on 8/28/2020 8/20/20   Christ Rojo MD       Allergies: Allergies   Allergen Reactions    Medical Tape      Other reaction(s): BURNS       Review of Systems:     Review of Systems   Constitutional: Positive for fatigue  HENT: Negative  Eyes: Negative  Respiratory: Positive for chest tightness and shortness of breath  Cardiovascular: Positive for chest pain, palpitations and leg swelling  Endocrine: Negative  Genitourinary: Negative  Musculoskeletal: Positive for arthralgias  Skin: Negative  Neurological: Positive for weakness  Psychiatric/Behavioral: Negative  Vital Signs:     Vitals:    03/12/21 1425 03/12/21 1431   BP: 150/90 160/100   BP Location: Left arm Right arm   Patient Position: Sitting Sitting   Cuff Size: Adult Adult   Pulse: 70    Resp: 18    Temp: (!) 97 2 °F (36 2 °C)    TempSrc: Tympanic    SpO2: 97%    Weight: 107 kg (235 lb 12 8 oz)    Height: 5' 9" (1 753 m)        Physical Exam:     Physical Exam  Constitutional:       Appearance: Normal appearance  He is well-developed  HENT:      Head: Normocephalic and atraumatic  Eyes:      Conjunctiva/sclera: Conjunctivae normal    Neck:      Musculoskeletal: Full passive range of motion without pain and normal range of motion  Thyroid: No thyromegaly  Vascular: No carotid bruit or JVD  Trachea: No tracheal deviation     Cardiovascular:      Rate and Rhythm: Normal rate and regular rhythm  Pulses:           Carotid pulses are 2+ on the right side and 2+ on the left side  Dorsalis pedis pulses are 2+ on the right side and 2+ on the left side  Posterior tibial pulses are 2+ on the right side and 2+ on the left side  Heart sounds: S1 normal and S2 normal    Pulmonary:      Effort: No accessory muscle usage or respiratory distress  Breath sounds: No wheezing or rales  Chest:      Chest wall: No tenderness  Abdominal:      General: Bowel sounds are normal       Palpations: Abdomen is soft  Tenderness: There is no abdominal tenderness  Musculoskeletal: Normal range of motion  Skin:     General: Skin is warm and dry  Neurological:      Mental Status: He is alert and oriented to person, place, and time  Cranial Nerves: No cranial nerve deficit  Sensory: No sensory deficit  Psychiatric:         Speech: Speech normal          Behavior: Behavior normal          Lab Results:               Invalid input(s): LABGLOM      No results found for: HGBA1C  No results found for: CKTOTAL, CKMB, CKMBINDEX, TROPONINI    Imaging Studies:     Cardiac Catheterization:  Mid LAD 80% stenosis  Proximal circumflex 85% stenosis  Ostial ramus 70% stenosis  Proximal RCA 60% stenosis  I have personally reviewed pertinent reports     and I have personally reviewed pertinent films in PACS    Assessment:  Patient Active Problem List    Diagnosis Date Noted    Encounter for screening colonoscopy 09/08/2020    Chronic diastolic heart failure ([address] ) 08/28/2020    History of percutaneous coronary intervention 06/19/2019    Palpitation 10/25/2018    History of CVA (cerebrovascular accident) 10/25/2018    CAD (coronary artery disease) 07/17/2018    Chest pain 07/17/2018    Bilateral lower extremity edema 05/10/2018    Claudication ([address] ) 08/21/2017    Mixed hyperlipidemia 08/21/2017    Myofascial pain syndrome 08/01/2017    Radiculopathy, lumbar region 08/01/2017    Essential hypertension 04/26/2011    Hypothyroidism 04/26/2011     Severe coronary artery disease; Ongoing CABG workup    Plan:  Risks and benefits of coronary artery bypass grafting were discussed in detail today with the patient  They understand and wish to proceed with further workup and ultimately surgical intervention  We have ordered routine preoperative laboratory and vascular studies  Pending the results of these tests, they will be scheduled for surgery  with LARISSA Baze was comfortable with our recommendations, and their questions were answered to their satisfaction  Thank you for allowing us to participate in the care of this patient  SIGNATURE: Marquita Barton PA-C  DATE: March 12, 2021  TIME: 3:11 PM    * This note was completed in part utilizing [employer] direct voice recognition software  Grammatical errors, random word insertion, spelling mistakes, and incomplete sentences may be an occasional consequence of the system secondary to software limitations, ambient noise and hardware issues  At the time of dictation, efforts were made to edit, clarify and /or correct errors  Please read the chart carefully and recognize, using context, where substitutions have occurred  If you have any questions or concerns about the context, text or information contained within the body of this dictation, please contact myself, the provider, for further clarification

## 2021-03-12 NOTE — PATIENT INSTRUCTIONS
Preoperative instructions:  1  You will receive a phone call from the hospital between 2:00 PM and 8:00 PM the day prior to surgery to confirm arrival time and location  For surgery on Mondays, you will receive a call on Friday  2  Do not drink or eat anything after midnight the night before surgery  That includes no water, candy, gum, lozenges, Lifesavers, etc  We recommend you not eat any "junk" food, consume alcohol or smoke the night before surgery  3  Continue taking aspirin but only 81 mg daily  4  If you are diabetic, do not take any of your diabetic pills the morning of surgery  If you take Lantus insulin, you may take it at your regularly scheduled time the day before surgery  Do not take any other insulins the morning of surgery  5  The 2 nights before surgery, take a shower each night using the special antiseptic soap or soap sponges you received from the office or hospital  Tania Rosanna your hair with regular shampoo and rinse completely before using the antiseptic sponges  Use the sponge to wash from your neck down, with special attention to the armpits and groin area  Do not use any other soap or cleanser on your skin  Do not use lotions, powder, deodorant or perfume of any kind on your skin after you shower  Use clean bed linens and wear clean pajamas after your shower  6  You will be prescribed Mupirocin nasal ointment  Apply to both nostrils twice a day for 5 days prior to surgery  7  Do not take a shower the morning of her surgery; you'll be given a special" bath" at the hospital   8  Notify the CT surgery office if you develop a cold, sore throat, cough, fever or other health issues before your surgery    9  Other medication changes included the following:  Plavix for 5 days

## 2021-03-12 NOTE — H&P
History and Physical- Cardiothoracic Surgery   Nguyễn Mcgovern 63 y o  male MRN: [id number]    Physician Requesting Consult: Liliana Padilla    Reason for Consult / Principal Problem: Coronary artery disease    History of Present Illness: Nguyễn Mcgovern is a 63y o  year old male with a complex cardiovascular medical history significant for obstructive coronary artery disease  This diagnosis was 1st established approximately 16 years ago  At that time he underwent his initial cardiac catheterization  Since that time has had multiple cardiac catheterization during which time multiple stents have been implanted  By report the coronary stent total is standing at 11 currently  More recently he presented to Dr Liliana Padilla for 2nd opinion of his coronary disease  Workup included repeat cardiac catheterization performed earlier this week  At that time severe 3 vessel coronary disease was identified  He has no subsequently referred to our office for surgical discussion  During interview today dated admits to exertional angina, palpitations, exertional dyspnea, and proggressive fatiguability  His past medical history is otherwise significant for coronary artery disease, hypertension, claudication, hyperlipidemia, hypothyroidism, lumbar radiculopathy, history of ischemic CVA in the mikhail distribution in 2014 with residual right-sided weakness, chronic diastolic heart failure, myofascial pain syndrome, anxiety, depression, and chronic lower extremity edema  The history portion of this consultation also obtained a significant information from Dr Nereida Mora office consultation on February 26  In addition, his wife was able to confirm details of his past medical history and provide further clarification regarding his symptomatology      Past Medical History:  Past Medical History:   Diagnosis Date    Anxiety     CAD (coronary artery disease)     Depression     Dizziness     Eye problems     Heart disease     Hypertension     Hypothyroidism     Hypothyroidism     Migraines     Stroke ([facility] [address] )     Tremors of nervous system        Past Surgical History:   Past Surgical History:   Procedure Laterality Date    COLONOSCOPY  09/01/2017    CORONARY STENT PLACEMENT      DENTAL SURGERY      entire top teeth removal    HERNIA REPAIR         Family History:  Family History   Problem Relation Age of Onset    Cancer Mother     Heart attack Father     Heart attack Sister     Cancer Brother        Social History:    He is lifelong nonsmoker  He denies significant alcohol consumption  Is  and lives with his wife  She presents for his consultation today  Social History     Substance and Sexual Activity   Alcohol Use No     Social History     Substance and Sexual Activity   Drug Use No     Social History     Tobacco Use   Smoking Status Never Smoker   Smokeless Tobacco Never Used       Home Medications:   Prior to Admission medications    Medication Sig Start Date End Date Taking?  Authorizing Provider   aspirin 81 MG tablet Take 81 mg by mouth   Yes Historical Provider, MD   atorvastatin (LIPITOR) 40 mg tablet TAKE 2 TABLETS EVERY MORNING 1/22/21  Yes Alexandr Turcios MD   clonazePAM (KlonoPIN) 2 mg tablet Take 2 tablets (4 mg total) by mouth 2 (two) times a day as needed for seizures 1/26/21  Yes Alexandr Turcios MD   levothyroxine 100 mcg tablet TAKE 1 TABLET EVERY DAY 1/22/21  Yes Alexandr Turcios MD   metoprolol succinate (TOPROL-XL) 100 mg 24 hr tablet Take 1 tablet (100 mg total) by mouth daily 1/26/21  Yes Alexandr Turcios MD   metoprolol succinate (TOPROL-XL) 100 mg 24 hr tablet Take 1 tablet (100 mg total) by mouth daily 2/7/21  Yes Alexandr Turcios MD   sildenafil (VIAGRA) 100 mg tablet Take 1 tablet (100 mg total) by mouth daily as needed for erectile dysfunction Pt request generic brand Tania to offset costs 10/5/20  Yes Alexandr Turcios MD   tiZANidine (ZANAFLEX) 2 mg tablet Take 1 tablet (2 mg total) by mouth every 8 (eight) hours as needed for muscle spasms 11/9/20  Yes Stephanie Patrick MD   zolpidem (AMBIEN) 10 mg tablet 2 po  q hs 2/7/21  Yes Stephanie Patrick MD   mupirocin [facility]) 2 % ointment Apply 1 application topically 2 (two) times a day for 5 days Apply to each nostril twice daily for five days before your operation  3/12/21 3/17/21  Santo Hansen PA-C   oxyCODONE-acetaminophen (PERCOCET) 5-325 mg per tablet Take 1 tablet by mouth every 8 (eight) hours as needed for moderate painMax Daily Amount: 3 tablets  Patient not taking: Reported on 8/28/2020 8/20/20   Stephanie Patrick MD       Allergies: Allergies   Allergen Reactions    Medical Tape      Other reaction(s): BURNS       Review of Systems:     Review of Systems   Constitutional: Positive for fatigue  HENT: Negative  Eyes: Negative  Respiratory: Positive for chest tightness and shortness of breath  Cardiovascular: Positive for chest pain, palpitations and leg swelling  Endocrine: Negative  Genitourinary: Negative  Musculoskeletal: Positive for arthralgias  Skin: Negative  Neurological: Positive for weakness  Psychiatric/Behavioral: Negative  Vital Signs:     Vitals:    03/12/21 1425 03/12/21 1431   BP: 150/90 160/100   BP Location: Left arm Right arm   Patient Position: Sitting Sitting   Cuff Size: Adult Adult   Pulse: 70    Resp: 18    Temp: (!) 97 2 °F (36 2 °C)    TempSrc: Tympanic    SpO2: 97%    Weight: 107 kg (235 lb 12 8 oz)    Height: 5' 9" (1 753 m)        Physical Exam:     Physical Exam  Constitutional:       Appearance: Normal appearance  He is well-developed  HENT:      Head: Normocephalic and atraumatic  Eyes:      Conjunctiva/sclera: Conjunctivae normal    Neck:      Musculoskeletal: Full passive range of motion without pain and normal range of motion  Thyroid: No thyromegaly  Vascular: No carotid bruit or JVD  Trachea: No tracheal deviation     Cardiovascular:      Rate and Rhythm: Normal rate and regular rhythm  Pulses:           Carotid pulses are 2+ on the right side and 2+ on the left side  Dorsalis pedis pulses are 2+ on the right side and 2+ on the left side  Posterior tibial pulses are 2+ on the right side and 2+ on the left side  Heart sounds: S1 normal and S2 normal    Pulmonary:      Effort: No accessory muscle usage or respiratory distress  Breath sounds: No wheezing or rales  Chest:      Chest wall: No tenderness  Abdominal:      General: Bowel sounds are normal       Palpations: Abdomen is soft  Tenderness: There is no abdominal tenderness  Musculoskeletal: Normal range of motion  Skin:     General: Skin is warm and dry  Neurological:      Mental Status: He is alert and oriented to person, place, and time  Cranial Nerves: No cranial nerve deficit  Sensory: No sensory deficit  Psychiatric:         Speech: Speech normal          Behavior: Behavior normal          Lab Results:               Invalid input(s): LABGLOM      No results found for: HGBA1C  No results found for: CKTOTAL, CKMB, CKMBINDEX, TROPONINI    Imaging Studies:     Cardiac Catheterization:  Mid LAD 80% stenosis  Proximal circumflex 85% stenosis  Ostial ramus 70% stenosis  Proximal RCA 60% stenosis  I have personally reviewed pertinent reports     and I have personally reviewed pertinent films in PACS    Assessment:  Patient Active Problem List    Diagnosis Date Noted    Encounter for screening colonoscopy 09/08/2020    Chronic diastolic heart failure ([address] ) 08/28/2020    History of percutaneous coronary intervention 06/19/2019    Palpitation 10/25/2018    History of CVA (cerebrovascular accident) 10/25/2018    CAD (coronary artery disease) 07/17/2018    Chest pain 07/17/2018    Bilateral lower extremity edema 05/10/2018    Claudication ([address] ) 08/21/2017    Mixed hyperlipidemia 08/21/2017    Myofascial pain syndrome 08/01/2017    Radiculopathy, lumbar region 08/01/2017    Essential hypertension 04/26/2011    Hypothyroidism 04/26/2011     Severe coronary artery disease; Ongoing CABG workup    Plan:  Risks and benefits of coronary artery bypass grafting were discussed in detail today with the patient  They understand and wish to proceed with further workup and ultimately surgical intervention  We have ordered routine preoperative laboratory and vascular studies  Pending the results of these tests, they will be scheduled for surgery  with LARISSA Watsonemelia Cook was comfortable with our recommendations, and their questions were answered to their satisfaction  Thank you for allowing us to participate in the care of this patient  SIGNATURE: Nandini Kahn PA-C  DATE: March 12, 2021  TIME: 3:11 PM    * This note was completed in part utilizing [employer] direct voice recognition software  Grammatical errors, random word insertion, spelling mistakes, and incomplete sentences may be an occasional consequence of the system secondary to software limitations, ambient noise and hardware issues  At the time of dictation, efforts were made to edit, clarify and /or correct errors  Please read the chart carefully and recognize, using context, where substitutions have occurred  If you have any questions or concerns about the context, text or information contained within the body of this dictation, please contact myself, the provider, for further clarification

## 2021-03-12 NOTE — LETTER
March 12, 2021     Yara OrdazJohnson [address]  [address]    Patient: Charmaine Ruiz   YOB: 1955   Date of Visit: 3/12/2021       Dear Dr Jess Regalado:    Thank you for referring Kath Blandon to me for evaluation  Below are my notes for this consultation  If you have questions, please do not hesitate to call me  I look forward to following your patient along with you  Sincerely,        Codie Morelos MD        CC: MD Joo Benitez PA-C  3/12/2021  3:24 PM  Attested  Consultation - Cardiothoracic Surgery   Charmaine Ruiz 63 y o  male MRN: [id number]    Physician Requesting Consult: Jess Regalado    Reason for Consult / Principal Problem: Coronary artery disease    History of Present Illness: Charmaine Ruiz is a 63y o  year old male with a complex cardiovascular medical history significant for obstructive coronary artery disease  This diagnosis was 1st established approximately 16 years ago  At that time he underwent his initial cardiac catheterization  Since that time has had multiple cardiac catheterization during which time multiple stents have been implanted  By report the coronary stent total is standing at 11 currently  More recently he presented to Dr Jess Regalado for 2nd opinion of his coronary disease  Workup included repeat cardiac catheterization performed earlier this week  At that time severe 3 vessel coronary disease was identified  He has no subsequently referred to our office for surgical discussion  During interview today dated admits to exertional angina, palpitations, exertional dyspnea, and proggressive fatiguability        His past medical history is otherwise significant for coronary artery disease, hypertension, claudication, hyperlipidemia, hypothyroidism, lumbar radiculopathy, history of ischemic CVA in the mikhail distribution in 2014 with residual right-sided weakness, chronic diastolic heart failure, myofascial pain syndrome, anxiety, depression, and chronic lower extremity edema  The history portion of this consultation also obtained a significant information from Dr Cherelle Britton office consultation on February 26  In addition, his wife was able to confirm details of his past medical history and provide further clarification regarding his symptomatology  Past Medical History:  Past Medical History:   Diagnosis Date    Anxiety     CAD (coronary artery disease)     Depression     Dizziness     Eye problems     Heart disease     Hypertension     Hypothyroidism     Hypothyroidism     Migraines     Stroke ([address] )     Tremors of nervous system        Past Surgical History:   Past Surgical History:   Procedure Laterality Date    COLONOSCOPY  09/01/2017    CORONARY STENT PLACEMENT      DENTAL SURGERY      entire top teeth removal    HERNIA REPAIR         Family History:  Family History   Problem Relation Age of Onset    Cancer Mother     Heart attack Father     Heart attack Sister     Cancer Brother        Social History:    He is lifelong nonsmoker  He denies significant alcohol consumption  Is  and lives with his wife  She presents for his consultation today  Social History     Substance and Sexual Activity   Alcohol Use No     Social History     Substance and Sexual Activity   Drug Use No     Social History     Tobacco Use   Smoking Status Never Smoker   Smokeless Tobacco Never Used       Home Medications:   Prior to Admission medications    Medication Sig Start Date End Date Taking?  Authorizing Provider   aspirin 81 MG tablet Take 81 mg by mouth   Yes Historical Provider, MD   atorvastatin (LIPITOR) 40 mg tablet TAKE 2 TABLETS EVERY MORNING 1/22/21  Yes Christ Rojo MD   clonazePAM (KlonoPIN) 2 mg tablet Take 2 tablets (4 mg total) by mouth 2 (two) times a day as needed for seizures 1/26/21  Yes Christ Rojo MD   levothyroxine 100 mcg tablet TAKE 1 TABLET EVERY DAY 1/22/21  Yes Popeye Morrison MD Petra   metoprolol succinate (TOPROL-XL) 100 mg 24 hr tablet Take 1 tablet (100 mg total) by mouth daily 1/26/21  Yes Anjali Smith MD   metoprolol succinate (TOPROL-XL) 100 mg 24 hr tablet Take 1 tablet (100 mg total) by mouth daily 2/7/21  Yes Anjali Smith MD   sildenafil (VIAGRA) 100 mg tablet Take 1 tablet (100 mg total) by mouth daily as needed for erectile dysfunction Pt request generic brand Tania to offset costs 10/5/20  Yes Anjlai Smith MD   tiZANidine (ZANAFLEX) 2 mg tablet Take 1 tablet (2 mg total) by mouth every 8 (eight) hours as needed for muscle spasms 11/9/20  Yes Anjali Smith MD   zolpidem (AMBIEN) 10 mg tablet 2 po  q hs 2/7/21  Yes Anjali Smith MD   mupirocin (BACTROBAN) 2 % ointment Apply 1 application topically 2 (two) times a day for 5 days Apply to each nostril twice daily for five days before your operation  3/12/21 3/17/21  Rachelle Mosqueda PA-C   oxyCODONE-acetaminophen (PERCOCET) 5-325 mg per tablet Take 1 tablet by mouth every 8 (eight) hours as needed for moderate painMax Daily Amount: 3 tablets  Patient not taking: Reported on 8/28/2020 8/20/20   Anjali Smith MD       Allergies: Allergies   Allergen Reactions    Medical Tape      Other reaction(s): BURNS       Review of Systems:     Review of Systems   Constitutional: Positive for fatigue  HENT: Negative  Eyes: Negative  Respiratory: Positive for chest tightness and shortness of breath  Cardiovascular: Positive for chest pain, palpitations and leg swelling  Endocrine: Negative  Genitourinary: Negative  Musculoskeletal: Positive for arthralgias  Skin: Negative  Neurological: Positive for weakness  Psychiatric/Behavioral: Negative          Vital Signs:     Vitals:    03/12/21 1425 03/12/21 1431   BP: 150/90 160/100   BP Location: Left arm Right arm   Patient Position: Sitting Sitting   Cuff Size: Adult Adult   Pulse: 70    Resp: 18    Temp: (!) 97 2 °F (36 2 °C)    TempSrc: Tympanic SpO2: 97%    Weight: 107 kg (235 lb 12 8 oz)    Height: 5' 9" (1 753 m)        Physical Exam:     Physical Exam  Constitutional:       Appearance: Normal appearance  He is well-developed  HENT:      Head: Normocephalic and atraumatic  Eyes:      Conjunctiva/sclera: Conjunctivae normal    Neck:      Musculoskeletal: Full passive range of motion without pain and normal range of motion  Thyroid: No thyromegaly  Vascular: No carotid bruit or JVD  Trachea: No tracheal deviation  Cardiovascular:      Rate and Rhythm: Normal rate and regular rhythm  Pulses:           Carotid pulses are 2+ on the right side and 2+ on the left side  Dorsalis pedis pulses are 2+ on the right side and 2+ on the left side  Posterior tibial pulses are 2+ on the right side and 2+ on the left side  Heart sounds: S1 normal and S2 normal    Pulmonary:      Effort: No accessory muscle usage or respiratory distress  Breath sounds: No wheezing or rales  Chest:      Chest wall: No tenderness  Abdominal:      General: Bowel sounds are normal       Palpations: Abdomen is soft  Tenderness: There is no abdominal tenderness  Musculoskeletal: Normal range of motion  Skin:     General: Skin is warm and dry  Neurological:      Mental Status: He is alert and oriented to person, place, and time  Cranial Nerves: No cranial nerve deficit  Sensory: No sensory deficit  Psychiatric:         Speech: Speech normal          Behavior: Behavior normal          Lab Results:               Invalid input(s): LABGLOM      No results found for: HGBA1C  No results found for: CKTOTAL, CKMB, CKMBINDEX, TROPONINI    Imaging Studies:     Cardiac Catheterization:  Mid LAD 80% stenosis  Proximal circumflex 85% stenosis  Ostial ramus 70% stenosis  Proximal RCA 60% stenosis  I have personally reviewed pertinent reports     and I have personally reviewed pertinent films in PACS    Assessment:  Patient Active Problem List    Diagnosis Date Noted    Encounter for screening colonoscopy 09/08/2020    Chronic diastolic heart failure ([facility] ) 08/28/2020    History of percutaneous coronary intervention 06/19/2019    Palpitation 10/25/2018    History of CVA (cerebrovascular accident) 10/25/2018    CAD (coronary artery disease) 07/17/2018    Chest pain 07/17/2018    Bilateral lower extremity edema 05/10/2018    Claudication ([facility] ) 08/21/2017    Mixed hyperlipidemia 08/21/2017    Myofascial pain syndrome 08/01/2017    Radiculopathy, lumbar region 08/01/2017    Essential hypertension 04/26/2011    Hypothyroidism 04/26/2011     Severe coronary artery disease; Ongoing CABG workup    Plan:  Risks and benefits of coronary artery bypass grafting were discussed in detail today with the patient  They understand and wish to proceed with further workup and ultimately surgical intervention  We have ordered routine preoperative laboratory and vascular studies  Pending the results of these tests, they will be scheduled for surgery  with LARISSA Arnold was comfortable with our recommendations, and their questions were answered to their satisfaction  Thank you for allowing us to participate in the care of this patient  SIGNATURE: Devonte Shafer PA-C  DATE: March 12, 2021  TIME: 3:11 PM    * This note was completed in part utilizing [employer] fluency direct voice recognition software  Grammatical errors, random word insertion, spelling mistakes, and incomplete sentences may be an occasional consequence of the system secondary to software limitations, ambient noise and hardware issues  At the time of dictation, efforts were made to edit, clarify and /or correct errors  Please read the chart carefully and recognize, using context, where substitutions have occurred    If you have any questions or concerns about the context, text or information contained within the body of this dictation, please contact myself, the provider, for further clarification  Attestation signed by Ruben Nina MD at 3/12/2021  3:30 PM:  Pt seen and examined with PA  I agree with the above assessment and plan  It was my pleasure to evaluate Cory Avina today for Coronary artery disease  He is referred for consideration of coronary artery bypass grafting  I reviewed all of the available testing and I had a lengthy discussion with the patient and wife and have recommended coronary artery bypass grafting  Routine preoperative testing has been ordered to include carotid duplex, vein mapping, transthoracic echocardiography, CXR, laboratory studies, and lower extremity arterial studies  Informed consent was obtained  Surgery is scheduled for 3/24        Orlando Kong MD  DATE: March 12, 2021  TIME: 3:29 PM

## 2021-03-15 ENCOUNTER — HOSPITAL ENCOUNTER (OUTPATIENT)
Dept: NON INVASIVE DIAGNOSTICS | Facility: CLINIC | Age: 66
Discharge: HOME | End: 2021-03-15
Payer: MEDICARE

## 2021-03-15 DIAGNOSIS — Z01.810 PRE-OPERATIVE CARDIOVASCULAR EXAMINATION: ICD-10-CM

## 2021-03-15 DIAGNOSIS — I25.119 CORONARY ARTERY DISEASE INVOLVING NATIVE CORONARY ARTERY OF NATIVE HEART WITH ANGINA PECTORIS (HCC): ICD-10-CM

## 2021-03-15 DIAGNOSIS — I63.10 CEREBROVASCULAR ACCIDENT (CVA) DUE TO EMBOLISM OF PRECEREBRAL ARTERY (HCC): ICD-10-CM

## 2021-03-15 PROCEDURE — 93880 EXTRACRANIAL BILAT STUDY: CPT | Performed by: SURGERY

## 2021-03-15 PROCEDURE — 93880 EXTRACRANIAL BILAT STUDY: CPT

## 2021-03-18 ENCOUNTER — HOSPITAL ENCOUNTER (OUTPATIENT)
Dept: NON INVASIVE DIAGNOSTICS | Facility: MEDICAL CENTER | Age: 66
Discharge: HOME | End: 2021-03-18
Payer: MEDICARE

## 2021-03-18 DIAGNOSIS — Z11.59 SCREENING FOR VIRAL DISEASE: ICD-10-CM

## 2021-03-18 DIAGNOSIS — I25.119 CORONARY ARTERY DISEASE INVOLVING NATIVE CORONARY ARTERY OF NATIVE HEART WITH ANGINA PECTORIS (HCC): ICD-10-CM

## 2021-03-18 DIAGNOSIS — Z01.810 PRE-OPERATIVE CARDIOVASCULAR EXAMINATION: ICD-10-CM

## 2021-03-18 PROCEDURE — 93306 TTE W/DOPPLER COMPLETE: CPT | Performed by: INTERNAL MEDICINE

## 2021-03-18 PROCEDURE — 93306 TTE W/DOPPLER COMPLETE: CPT

## 2021-03-18 PROCEDURE — U0005 INFEC AGEN DETEC AMPLI PROBE: HCPCS | Performed by: PHYSICIAN ASSISTANT

## 2021-03-18 PROCEDURE — U0003 INFECTIOUS AGENT DETECTION BY NUCLEIC ACID (DNA OR RNA); SEVERE ACUTE RESPIRATORY SYNDROME CORONAVIRUS 2 (SARS-COV-2) (CORONAVIRUS DISEASE [COVID-19]), AMPLIFIED PROBE TECHNIQUE, MAKING USE OF HIGH THROUGHPUT TECHNOLOGIES AS DESCRIBED BY CMS-2020-01-R: HCPCS | Performed by: PHYSICIAN ASSISTANT

## 2021-03-19 ENCOUNTER — HOSPITAL ENCOUNTER (OUTPATIENT)
Dept: VASCULAR ULTRASOUND | Facility: HOSPITAL | Age: 66
Discharge: HOME | End: 2021-03-19
Payer: MEDICARE

## 2021-03-19 ENCOUNTER — HOSPITAL ENCOUNTER (OUTPATIENT)
Dept: RADIOLOGY | Facility: HOSPITAL | Age: 66
Discharge: HOME | End: 2021-03-19
Payer: MEDICARE

## 2021-03-19 ENCOUNTER — TRANSCRIBE ORDERS (OUTPATIENT)
Dept: ADMINISTRATIVE | Facility: HOSPITAL | Age: 66
End: 2021-03-19

## 2021-03-19 ENCOUNTER — LAB REQUISITION (OUTPATIENT)
Dept: LAB | Facility: HOSPITAL | Age: 66
End: 2021-03-19
Payer: MEDICARE

## 2021-03-19 ENCOUNTER — APPOINTMENT (OUTPATIENT)
Dept: LAB | Facility: HOSPITAL | Age: 66
End: 2021-03-19
Payer: MEDICARE

## 2021-03-19 DIAGNOSIS — Z01.89 ENCOUNTER FOR IMAGING OF BILATERAL GREATER SAPHENOUS VEINS: ICD-10-CM

## 2021-03-19 DIAGNOSIS — Z01.810 PRE-OPERATIVE CARDIOVASCULAR EXAMINATION: ICD-10-CM

## 2021-03-19 DIAGNOSIS — I73.9 CLAUDICATION (HCC): ICD-10-CM

## 2021-03-19 DIAGNOSIS — I25.119 ATHEROSCLEROSIS OF NATIVE CORONARY ARTERY WITH ANGINA PECTORIS, UNSPECIFIED WHETHER NATIVE OR TRANSPLANTED HEART (HCC): Primary | ICD-10-CM

## 2021-03-19 DIAGNOSIS — I25.119 CORONARY ARTERY DISEASE INVOLVING NATIVE CORONARY ARTERY OF NATIVE HEART WITH ANGINA PECTORIS (HCC): ICD-10-CM

## 2021-03-19 DIAGNOSIS — I25.119 ATHEROSCLEROTIC HEART DISEASE OF NATIVE CORONARY ARTERY WITH UNSPECIFIED ANGINA PECTORIS (HCC): ICD-10-CM

## 2021-03-19 DIAGNOSIS — I21.9 ACUTE MYOCARDIAL INFARCTION, UNSPECIFIED MI TYPE, UNSPECIFIED ARTERY (HCC): ICD-10-CM

## 2021-03-19 DIAGNOSIS — I25.119 ATHEROSCLEROSIS OF NATIVE CORONARY ARTERY WITH ANGINA PECTORIS, UNSPECIFIED WHETHER NATIVE OR TRANSPLANTED HEART (HCC): ICD-10-CM

## 2021-03-19 LAB
BILIRUB UR QL STRIP: NEGATIVE
CLARITY UR: CLEAR
COLOR UR: YELLOW
EST. AVERAGE GLUCOSE BLD GHB EST-MCNC: 117 MG/DL
GLUCOSE UR STRIP-MCNC: NEGATIVE MG/DL
HBA1C MFR BLD: 5.7 %
HGB UR QL STRIP.AUTO: NEGATIVE
INR PPP: 0.96 (ref 0.9–1.1)
KETONES UR STRIP-MCNC: NEGATIVE MG/DL
LEUKOCYTE ESTERASE UR QL STRIP: NEGATIVE
NITRITE UR QL STRIP: NEGATIVE
PH UR STRIP.AUTO: 6 [PH]
PROT UR STRIP-MCNC: NEGATIVE MG/DL
PROTHROMBIN TIME: 10.9 SECONDS (ref 9.5–12.1)
SARS-COV-2 RNA RESP QL NAA+PROBE: NEGATIVE
SP GR UR STRIP.AUTO: 1.02 (ref 1–1.03)
UROBILINOGEN UR QL STRIP.AUTO: 0.2 E.U./DL

## 2021-03-19 PROCEDURE — 93923 UPR/LXTR ART STDY 3+ LVLS: CPT

## 2021-03-19 PROCEDURE — 81003 URINALYSIS AUTO W/O SCOPE: CPT

## 2021-03-19 PROCEDURE — 71046 X-RAY EXAM CHEST 2 VIEWS: CPT

## 2021-03-19 PROCEDURE — 93925 LOWER EXTREMITY STUDY: CPT

## 2021-03-19 PROCEDURE — 36415 COLL VENOUS BLD VENIPUNCTURE: CPT

## 2021-03-19 PROCEDURE — 93922 UPR/L XTREMITY ART 2 LEVELS: CPT | Performed by: SURGERY

## 2021-03-19 PROCEDURE — 85610 PROTHROMBIN TIME: CPT

## 2021-03-19 PROCEDURE — 93925 LOWER EXTREMITY STUDY: CPT | Performed by: SURGERY

## 2021-03-19 PROCEDURE — 93971 EXTREMITY STUDY: CPT

## 2021-03-19 PROCEDURE — 86900 BLOOD TYPING SEROLOGIC ABO: CPT | Performed by: PHYSICIAN ASSISTANT

## 2021-03-19 PROCEDURE — 86850 RBC ANTIBODY SCREEN: CPT | Performed by: PHYSICIAN ASSISTANT

## 2021-03-19 PROCEDURE — 93971 EXTREMITY STUDY: CPT | Performed by: SURGERY

## 2021-03-19 PROCEDURE — 86901 BLOOD TYPING SEROLOGIC RH(D): CPT | Performed by: PHYSICIAN ASSISTANT

## 2021-03-19 PROCEDURE — 83036 HEMOGLOBIN GLYCOSYLATED A1C: CPT

## 2021-03-22 LAB
ABO GROUP BLD: NORMAL
BLD GP AB SCN SERPL QL: NEGATIVE
RH BLD: POSITIVE
SPECIMEN EXPIRATION DATE: NORMAL

## 2021-03-22 RX ORDER — CLOPIDOGREL BISULFATE 75 MG/1
75 TABLET ORAL DAILY
Status: ON HOLD | COMMUNITY
End: 2021-03-28 | Stop reason: SDUPTHER

## 2021-03-22 NOTE — PRE-PROCEDURE INSTRUCTIONS
Pre-Surgery Instructions:   Medication Instructions    aspirin 81 MG tablet Patient was instructed by Physician and understands   atorvastatin (LIPITOR) 40 mg tablet Patient was instructed by Physician and understands   clonazePAM (KlonoPIN) 2 mg tablet Patient was instructed by Physician and understands   clopidogrel (PLAVIX) 75 mg tablet Patient was instructed by Physician and understands   levothyroxine 100 mcg tablet Patient was instructed by Physician and understands   metoprolol succinate (TOPROL-XL) 100 mg 24 hr tablet Patient was instructed by Physician and understands   mupirocin (BACTROBAN) 2 % ointment Patient was instructed by Physician and understands   oxyCODONE-acetaminophen (PERCOCET) 5-325 mg per tablet Patient was instructed by Physician and understands   sildenafil (VIAGRA) 100 mg tablet Patient was instructed by Physician and understands   tiZANidine (ZANAFLEX) 2 mg tablet Patient was instructed by Physician and understands   zolpidem (AMBIEN) 10 mg tablet Patient was instructed by Physician and understands  Pre procedure instructions reviewed  Verbalizes understanding  Wife present for phone assessment  Bathing reviewed  Pt instructed to follow all medication and bathing instruction from office  NPO after MN  No NSAIDS

## 2021-03-23 ENCOUNTER — ANESTHESIA EVENT (INPATIENT)
Dept: PERIOP | Facility: HOSPITAL | Age: 66
DRG: 235 | End: 2021-03-23
Payer: MEDICARE

## 2021-03-23 RX ORDER — HEPARIN SODIUM 1000 [USP'U]/ML
10000 INJECTION, SOLUTION INTRAVENOUS; SUBCUTANEOUS ONCE
Status: CANCELLED | OUTPATIENT
Start: 2021-03-24

## 2021-03-23 RX ORDER — HEPARIN SODIUM 1000 [USP'U]/ML
400 INJECTION, SOLUTION INTRAVENOUS; SUBCUTANEOUS ONCE
Status: CANCELLED | OUTPATIENT
Start: 2021-03-24

## 2021-03-24 ENCOUNTER — ANESTHESIA (INPATIENT)
Dept: PERIOP | Facility: HOSPITAL | Age: 66
DRG: 235 | End: 2021-03-24
Payer: MEDICARE

## 2021-03-24 ENCOUNTER — APPOINTMENT (INPATIENT)
Dept: RADIOLOGY | Facility: HOSPITAL | Age: 66
DRG: 235 | End: 2021-03-24
Payer: MEDICARE

## 2021-03-24 ENCOUNTER — APPOINTMENT (INPATIENT)
Dept: NON INVASIVE DIAGNOSTICS | Facility: HOSPITAL | Age: 66
DRG: 235 | End: 2021-03-24
Payer: MEDICARE

## 2021-03-24 ENCOUNTER — HOSPITAL ENCOUNTER (INPATIENT)
Facility: HOSPITAL | Age: 66
LOS: 4 days | Discharge: HOME HEALTH | DRG: 235 | End: 2021-03-28
Attending: THORACIC SURGERY (CARDIOTHORACIC VASCULAR SURGERY) | Admitting: THORACIC SURGERY (CARDIOTHORACIC VASCULAR SURGERY)
Payer: MEDICARE

## 2021-03-24 DIAGNOSIS — Z95.1 S/P CABG (CORONARY ARTERY BYPASS GRAFT): Primary | ICD-10-CM

## 2021-03-24 DIAGNOSIS — G89.4 CHRONIC PAIN SYNDROME: ICD-10-CM

## 2021-03-24 DIAGNOSIS — I25.10 CAD IN NATIVE ARTERY: ICD-10-CM

## 2021-03-24 PROBLEM — D69.6 THROMBOCYTOPENIA (HCC): Status: ACTIVE | Noted: 2021-03-24

## 2021-03-24 PROBLEM — E83.51 HYPOCALCEMIA: Status: ACTIVE | Noted: 2021-03-24

## 2021-03-24 PROBLEM — E87.8 HYPERCHLOREMIA: Status: ACTIVE | Noted: 2021-03-24

## 2021-03-24 LAB
ANION GAP SERPL CALCULATED.3IONS-SCNC: 8 MMOL/L (ref 4–13)
ATRIAL RATE: 75 BPM
BASE EXCESS BLDA CALC-SCNC: -1 MMOL/L (ref -2–3)
BASE EXCESS BLDA CALC-SCNC: 0 MMOL/L (ref -2–3)
BASE EXCESS BLDA CALC-SCNC: 2 MMOL/L (ref -2–3)
BASE EXCESS BLDA CALC-SCNC: 4 MMOL/L (ref -2–3)
BUN SERPL-MCNC: 11 MG/DL (ref 5–25)
CA-I BLD-SCNC: 1 MMOL/L (ref 1.12–1.32)
CA-I BLD-SCNC: 1.02 MMOL/L (ref 1.12–1.32)
CA-I BLD-SCNC: 1.06 MMOL/L (ref 1.12–1.32)
CA-I BLD-SCNC: 1.16 MMOL/L (ref 1.12–1.32)
CA-I BLD-SCNC: 1.22 MMOL/L (ref 1.12–1.32)
CA-I BLD-SCNC: 1.27 MMOL/L (ref 1.12–1.32)
CALCIUM SERPL-MCNC: 8.2 MG/DL (ref 8.3–10.1)
CHLORIDE SERPL-SCNC: 109 MMOL/L (ref 100–108)
CO2 SERPL-SCNC: 24 MMOL/L (ref 21–32)
CREAT SERPL-MCNC: 1.15 MG/DL (ref 0.6–1.3)
DS:DELIVERY SYSTEM: ABNORMAL
FIO2 GAS DIL.REBREATH: 80 L
GFR SERPL CREATININE-BSD FRML MDRD: 67 ML/MIN/1.73SQ M
GLUCOSE SERPL-MCNC: 117 MG/DL (ref 65–140)
GLUCOSE SERPL-MCNC: 119 MG/DL (ref 65–140)
GLUCOSE SERPL-MCNC: 125 MG/DL (ref 65–140)
GLUCOSE SERPL-MCNC: 132 MG/DL (ref 65–140)
GLUCOSE SERPL-MCNC: 134 MG/DL (ref 65–140)
GLUCOSE SERPL-MCNC: 135 MG/DL (ref 65–140)
GLUCOSE SERPL-MCNC: 137 MG/DL (ref 65–140)
GLUCOSE SERPL-MCNC: 137 MG/DL (ref 65–140)
GLUCOSE SERPL-MCNC: 138 MG/DL (ref 65–140)
GLUCOSE SERPL-MCNC: 147 MG/DL (ref 65–140)
GLUCOSE SERPL-MCNC: 170 MG/DL (ref 65–140)
GLUCOSE SERPL-MCNC: 175 MG/DL (ref 65–140)
GLUCOSE SERPL-MCNC: 206 MG/DL (ref 65–140)
GLUCOSE SERPL-MCNC: 94 MG/DL (ref 65–140)
HCO3 BLDA-SCNC: 23.2 MMOL/L (ref 22–28)
HCO3 BLDA-SCNC: 25.2 MMOL/L (ref 22–28)
HCO3 BLDA-SCNC: 27 MMOL/L (ref 24–30)
HCO3 BLDA-SCNC: 27.6 MMOL/L (ref 24–30)
HCO3 BLDA-SCNC: 28 MMOL/L (ref 22–28)
HCO3 BLDA-SCNC: 28.1 MMOL/L (ref 22–28)
HCO3 BLDA-SCNC: 28.4 MMOL/L (ref 22–28)
HCO3 BLDA-SCNC: 28.8 MMOL/L (ref 22–28)
HCT VFR BLD AUTO: 34.9 % (ref 36.5–49.3)
HCT VFR BLD AUTO: 38.6 % (ref 36.5–49.3)
HCT VFR BLD CALC: 26 % (ref 36.5–49.3)
HCT VFR BLD CALC: 27 % (ref 36.5–49.3)
HCT VFR BLD CALC: 28 % (ref 36.5–49.3)
HCT VFR BLD CALC: 31 % (ref 36.5–49.3)
HCT VFR BLD CALC: 35 % (ref 36.5–49.3)
HCT VFR BLD CALC: 37 % (ref 36.5–49.3)
HGB BLD-MCNC: 12 G/DL (ref 12–17)
HGB BLD-MCNC: 12.8 G/DL (ref 12–17)
HGB BLDA-MCNC: 10.5 G/DL (ref 12–17)
HGB BLDA-MCNC: 11.9 G/DL (ref 12–17)
HGB BLDA-MCNC: 12.6 G/DL (ref 12–17)
HGB BLDA-MCNC: 8.8 G/DL (ref 12–17)
HGB BLDA-MCNC: 9.2 G/DL (ref 12–17)
HGB BLDA-MCNC: 9.5 G/DL (ref 12–17)
KCT BLD-ACNC: 104 SEC (ref 89–137)
KCT BLD-ACNC: 110 SEC (ref 89–137)
KCT BLD-ACNC: 416 SEC (ref 89–137)
KCT BLD-ACNC: 520 SEC (ref 89–137)
KCT BLD-ACNC: 531 SEC (ref 89–137)
KCT BLD-ACNC: 531 SEC (ref 89–137)
KCT BLD-ACNC: 537 SEC (ref 89–137)
KCT BLD-ACNC: 571 SEC (ref 89–137)
KCT BLD-ACNC: 588 SEC (ref 89–137)
KCT BLD-ACNC: 594 SEC (ref 89–137)
KCT BLD-ACNC: 732 SEC (ref 89–137)
P AXIS: 67 DEGREES
PCO2 BLD: 24 MMOL/L (ref 21–32)
PCO2 BLD: 26 MMOL/L (ref 21–32)
PCO2 BLD: 28 MMOL/L (ref 21–32)
PCO2 BLD: 29 MMOL/L (ref 21–32)
PCO2 BLD: 30 MMOL/L (ref 21–32)
PCO2 BLD: 30 MMOL/L (ref 21–32)
PCO2 BLD: 36.2 MM HG (ref 36–44)
PCO2 BLD: 40.7 MM HG (ref 36–44)
PCO2 BLD: 40.8 MM HG (ref 36–44)
PCO2 BLD: 41.3 MM HG (ref 42–50)
PCO2 BLD: 41.6 MM HG (ref 36–44)
PCO2 BLD: 43.2 MM HG (ref 36–44)
PCO2 BLD: 47.3 MM HG (ref 36–44)
PCO2 BLD: 47.3 MM HG (ref 42–50)
PH BLD: 7.38 [PH] (ref 7.35–7.45)
PH BLD: 7.38 [PH] (ref 7.3–7.4)
PH BLD: 7.4 [PH] (ref 7.35–7.45)
PH BLD: 7.41 [PH] (ref 7.35–7.45)
PH BLD: 7.42 [PH] (ref 7.3–7.4)
PH BLD: 7.43 [PH] (ref 7.35–7.45)
PH BLD: 7.44 [PH] (ref 7.35–7.45)
PH BLD: 7.45 [PH] (ref 7.35–7.45)
PLATELET # BLD AUTO: 119 THOUSANDS/UL (ref 149–390)
PLATELET # BLD AUTO: 146 THOUSANDS/UL (ref 149–390)
PMV BLD AUTO: 10 FL (ref 8.9–12.7)
PMV BLD AUTO: 9.8 FL (ref 8.9–12.7)
PO2 BLD: 243 MM HG (ref 75–129)
PO2 BLD: 319 MM HG (ref 75–129)
PO2 BLD: 333 MM HG (ref 75–129)
PO2 BLD: 337 MM HG (ref 75–129)
PO2 BLD: 42 MM HG (ref 35–45)
PO2 BLD: 49 MM HG (ref 35–45)
PO2 BLD: 79 MM HG (ref 75–129)
PO2 BLD: >400 MM HG (ref 75–129)
POTASSIUM BLD-SCNC: 3.9 MMOL/L (ref 3.5–5.3)
POTASSIUM BLD-SCNC: 4 MMOL/L (ref 3.5–5.3)
POTASSIUM BLD-SCNC: 4.2 MMOL/L (ref 3.5–5.3)
POTASSIUM BLD-SCNC: 4.4 MMOL/L (ref 3.5–5.3)
POTASSIUM BLD-SCNC: 5.1 MMOL/L (ref 3.5–5.3)
POTASSIUM BLD-SCNC: 5.2 MMOL/L (ref 3.5–5.3)
POTASSIUM BLD-SCNC: 5.2 MMOL/L (ref 3.5–5.3)
POTASSIUM BLD-SCNC: 5.3 MMOL/L (ref 3.5–5.3)
POTASSIUM SERPL-SCNC: 4 MMOL/L (ref 3.5–5.3)
POTASSIUM SERPL-SCNC: 4.2 MMOL/L (ref 3.5–5.3)
POTASSIUM SERPL-SCNC: 4.3 MMOL/L (ref 3.5–5.3)
PR INTERVAL: 200 MS
QRS AXIS: -42 DEGREES
QRSD INTERVAL: 117 MS
QT INTERVAL: 408 MS
QTC INTERVAL: 456 MS
SAO2 % BLD FROM PO2: 100 % (ref 60–85)
SAO2 % BLD FROM PO2: 76 % (ref 60–85)
SAO2 % BLD FROM PO2: 85 % (ref 60–85)
SAO2 % BLD FROM PO2: 96 % (ref 60–85)
SODIUM BLD-SCNC: 134 MMOL/L (ref 136–145)
SODIUM BLD-SCNC: 134 MMOL/L (ref 136–145)
SODIUM BLD-SCNC: 135 MMOL/L (ref 136–145)
SODIUM BLD-SCNC: 135 MMOL/L (ref 136–145)
SODIUM BLD-SCNC: 136 MMOL/L (ref 136–145)
SODIUM BLD-SCNC: 137 MMOL/L (ref 136–145)
SODIUM BLD-SCNC: 139 MMOL/L (ref 136–145)
SODIUM BLD-SCNC: 140 MMOL/L (ref 136–145)
SODIUM SERPL-SCNC: 141 MMOL/L (ref 136–145)
SPECIMEN SOURCE: ABNORMAL
SPECIMEN SOURCE: NORMAL
T WAVE AXIS: 58 DEGREES
VENTILATION VALUE: ABNORMAL
VENTRICULAR RATE: 75 BPM

## 2021-03-24 PROCEDURE — 84132 ASSAY OF SERUM POTASSIUM: CPT

## 2021-03-24 PROCEDURE — 84295 ASSAY OF SERUM SODIUM: CPT

## 2021-03-24 PROCEDURE — 94760 N-INVAS EAR/PLS OXIMETRY 1: CPT

## 2021-03-24 PROCEDURE — 02HV33Z INSERTION OF INFUSION DEVICE INTO SUPERIOR VENA CAVA, PERCUTANEOUS APPROACH: ICD-10-PCS | Performed by: ANESTHESIOLOGY

## 2021-03-24 PROCEDURE — 85014 HEMATOCRIT: CPT

## 2021-03-24 PROCEDURE — 30233N0 TRANSFUSION OF AUTOLOGOUS RED BLOOD CELLS INTO PERIPHERAL VEIN, PERCUTANEOUS APPROACH: ICD-10-PCS | Performed by: ANESTHESIOLOGY

## 2021-03-24 PROCEDURE — 5A1213Z PERFORMANCE OF CARDIAC PACING, INTERMITTENT: ICD-10-PCS | Performed by: THORACIC SURGERY (CARDIOTHORACIC VASCULAR SURGERY)

## 2021-03-24 PROCEDURE — NC001 PR NO CHARGE: Performed by: PHYSICIAN ASSISTANT

## 2021-03-24 PROCEDURE — 85347 COAGULATION TIME ACTIVATED: CPT

## 2021-03-24 PROCEDURE — 85049 AUTOMATED PLATELET COUNT: CPT | Performed by: THORACIC SURGERY (CARDIOTHORACIC VASCULAR SURGERY)

## 2021-03-24 PROCEDURE — 5A1221Z PERFORMANCE OF CARDIAC OUTPUT, CONTINUOUS: ICD-10-PCS | Performed by: THORACIC SURGERY (CARDIOTHORACIC VASCULAR SURGERY)

## 2021-03-24 PROCEDURE — 80048 BASIC METABOLIC PNL TOTAL CA: CPT | Performed by: PHYSICIAN ASSISTANT

## 2021-03-24 PROCEDURE — 33518 CABG ARTERY-VEIN TWO: CPT | Performed by: THORACIC SURGERY (CARDIOTHORACIC VASCULAR SURGERY)

## 2021-03-24 PROCEDURE — 82948 REAGENT STRIP/BLOOD GLUCOSE: CPT

## 2021-03-24 PROCEDURE — 33533 CABG ARTERIAL SINGLE: CPT | Performed by: THORACIC SURGERY (CARDIOTHORACIC VASCULAR SURGERY)

## 2021-03-24 PROCEDURE — 85014 HEMATOCRIT: CPT | Performed by: PHYSICIAN ASSISTANT

## 2021-03-24 PROCEDURE — 93355 ECHO TRANSESOPHAGEAL (TEE): CPT

## 2021-03-24 PROCEDURE — 86920 COMPATIBILITY TEST SPIN: CPT

## 2021-03-24 PROCEDURE — 33508 ENDOSCOPIC VEIN HARVEST: CPT | Performed by: THORACIC SURGERY (CARDIOTHORACIC VASCULAR SURGERY)

## 2021-03-24 PROCEDURE — 021109W BYPASS CORONARY ARTERY, TWO ARTERIES FROM AORTA WITH AUTOLOGOUS VENOUS TISSUE, OPEN APPROACH: ICD-10-PCS | Performed by: THORACIC SURGERY (CARDIOTHORACIC VASCULAR SURGERY)

## 2021-03-24 PROCEDURE — 82330 ASSAY OF CALCIUM: CPT

## 2021-03-24 PROCEDURE — 82803 BLOOD GASES ANY COMBINATION: CPT

## 2021-03-24 PROCEDURE — 93010 ELECTROCARDIOGRAM REPORT: CPT | Performed by: INTERNAL MEDICINE

## 2021-03-24 PROCEDURE — 82947 ASSAY GLUCOSE BLOOD QUANT: CPT

## 2021-03-24 PROCEDURE — 94002 VENT MGMT INPAT INIT DAY: CPT

## 2021-03-24 PROCEDURE — 84132 ASSAY OF SERUM POTASSIUM: CPT | Performed by: PHYSICIAN ASSISTANT

## 2021-03-24 PROCEDURE — 06BP4ZZ EXCISION OF RIGHT SAPHENOUS VEIN, PERCUTANEOUS ENDOSCOPIC APPROACH: ICD-10-PCS | Performed by: THORACIC SURGERY (CARDIOTHORACIC VASCULAR SURGERY)

## 2021-03-24 PROCEDURE — 82330 ASSAY OF CALCIUM: CPT | Performed by: THORACIC SURGERY (CARDIOTHORACIC VASCULAR SURGERY)

## 2021-03-24 PROCEDURE — 02100Z9 BYPASS CORONARY ARTERY, ONE ARTERY FROM LEFT INTERNAL MAMMARY, OPEN APPROACH: ICD-10-PCS | Performed by: THORACIC SURGERY (CARDIOTHORACIC VASCULAR SURGERY)

## 2021-03-24 PROCEDURE — 93005 ELECTROCARDIOGRAM TRACING: CPT

## 2021-03-24 PROCEDURE — 71045 X-RAY EXAM CHEST 1 VIEW: CPT

## 2021-03-24 PROCEDURE — 99223 1ST HOSP IP/OBS HIGH 75: CPT | Performed by: NURSE PRACTITIONER

## 2021-03-24 PROCEDURE — 85018 HEMOGLOBIN: CPT | Performed by: PHYSICIAN ASSISTANT

## 2021-03-24 PROCEDURE — 85049 AUTOMATED PLATELET COUNT: CPT | Performed by: PHYSICIAN ASSISTANT

## 2021-03-24 DEVICE — MARKER CORONARY BYPASS VOSS GRAFT: Type: IMPLANTABLE DEVICE | Status: FUNCTIONAL

## 2021-03-24 RX ORDER — SODIUM CHLORIDE 450 MG/100ML
20 INJECTION, SOLUTION INTRAVENOUS CONTINUOUS
Status: DISCONTINUED | OUTPATIENT
Start: 2021-03-24 | End: 2021-03-25

## 2021-03-24 RX ORDER — ASPIRIN 325 MG
325 TABLET ORAL DAILY
Status: DISCONTINUED | OUTPATIENT
Start: 2021-03-24 | End: 2021-03-25

## 2021-03-24 RX ORDER — POTASSIUM CHLORIDE 14.9 MG/ML
20 INJECTION INTRAVENOUS ONCE AS NEEDED
Status: DISCONTINUED | OUTPATIENT
Start: 2021-03-24 | End: 2021-03-25

## 2021-03-24 RX ORDER — OXYCODONE HYDROCHLORIDE 5 MG/1
5 TABLET ORAL EVERY 4 HOURS PRN
Status: DISCONTINUED | OUTPATIENT
Start: 2021-03-24 | End: 2021-03-28 | Stop reason: HOSPADM

## 2021-03-24 RX ORDER — AMIODARONE HYDROCHLORIDE 200 MG/1
200 TABLET ORAL EVERY 8 HOURS SCHEDULED
Status: DISCONTINUED | OUTPATIENT
Start: 2021-03-24 | End: 2021-03-28 | Stop reason: HOSPADM

## 2021-03-24 RX ORDER — PROPOFOL 10 MG/ML
5-50 INJECTION, EMULSION INTRAVENOUS
Status: DISCONTINUED | OUTPATIENT
Start: 2021-03-24 | End: 2021-03-25

## 2021-03-24 RX ORDER — FENTANYL CITRATE 50 UG/ML
50 INJECTION, SOLUTION INTRAMUSCULAR; INTRAVENOUS
Status: DISCONTINUED | OUTPATIENT
Start: 2021-03-24 | End: 2021-03-25

## 2021-03-24 RX ORDER — CEFAZOLIN SODIUM 2 G/50ML
2000 SOLUTION INTRAVENOUS EVERY 8 HOURS
Status: DISCONTINUED | OUTPATIENT
Start: 2021-03-24 | End: 2021-03-25

## 2021-03-24 RX ORDER — HEPARIN SODIUM 1000 [USP'U]/ML
INJECTION, SOLUTION INTRAVENOUS; SUBCUTANEOUS AS NEEDED
Status: DISCONTINUED | OUTPATIENT
Start: 2021-03-24 | End: 2021-03-24

## 2021-03-24 RX ORDER — FENTANYL CITRATE 50 UG/ML
50 INJECTION, SOLUTION INTRAMUSCULAR; INTRAVENOUS ONCE
Status: COMPLETED | OUTPATIENT
Start: 2021-03-24 | End: 2021-03-24

## 2021-03-24 RX ORDER — LIDOCAINE HYDROCHLORIDE 10 MG/ML
INJECTION, SOLUTION EPIDURAL; INFILTRATION; INTRACAUDAL; PERINEURAL AS NEEDED
Status: DISCONTINUED | OUTPATIENT
Start: 2021-03-24 | End: 2021-03-24

## 2021-03-24 RX ORDER — AMINOCAPROIC ACID 250 MG/ML
INJECTION, SOLUTION INTRAVENOUS AS NEEDED
Status: DISCONTINUED | OUTPATIENT
Start: 2021-03-24 | End: 2021-03-24

## 2021-03-24 RX ORDER — MAGNESIUM SULFATE HEPTAHYDRATE 40 MG/ML
2 INJECTION, SOLUTION INTRAVENOUS ONCE
Status: COMPLETED | OUTPATIENT
Start: 2021-03-24 | End: 2021-03-24

## 2021-03-24 RX ORDER — VANCOMYCIN HYDROCHLORIDE 1 G/20ML
INJECTION, POWDER, LYOPHILIZED, FOR SOLUTION INTRAVENOUS AS NEEDED
Status: DISCONTINUED | OUTPATIENT
Start: 2021-03-24 | End: 2021-03-24 | Stop reason: HOSPADM

## 2021-03-24 RX ORDER — CEFAZOLIN SODIUM 2 G/50ML
SOLUTION INTRAVENOUS AS NEEDED
Status: DISCONTINUED | OUTPATIENT
Start: 2021-03-24 | End: 2021-03-24

## 2021-03-24 RX ORDER — FUROSEMIDE 10 MG/ML
40 INJECTION INTRAMUSCULAR; INTRAVENOUS EVERY 6 HOURS PRN
Status: DISCONTINUED | OUTPATIENT
Start: 2021-03-24 | End: 2021-03-25

## 2021-03-24 RX ORDER — LIDOCAINE HYDROCHLORIDE 10 MG/ML
INJECTION, SOLUTION EPIDURAL; INFILTRATION; INTRACAUDAL; PERINEURAL
Status: COMPLETED | OUTPATIENT
Start: 2021-03-24 | End: 2021-03-24

## 2021-03-24 RX ORDER — POTASSIUM CHLORIDE 14.9 MG/ML
20 INJECTION INTRAVENOUS
Status: DISCONTINUED | OUTPATIENT
Start: 2021-03-24 | End: 2021-03-25

## 2021-03-24 RX ORDER — ROCURONIUM BROMIDE 10 MG/ML
INJECTION, SOLUTION INTRAVENOUS AS NEEDED
Status: DISCONTINUED | OUTPATIENT
Start: 2021-03-24 | End: 2021-03-24

## 2021-03-24 RX ORDER — FONDAPARINUX SODIUM 2.5 MG/.5ML
2.5 INJECTION SUBCUTANEOUS DAILY
Status: DISCONTINUED | OUTPATIENT
Start: 2021-03-25 | End: 2021-03-25

## 2021-03-24 RX ORDER — ONDANSETRON 2 MG/ML
4 INJECTION INTRAMUSCULAR; INTRAVENOUS EVERY 6 HOURS PRN
Status: DISCONTINUED | OUTPATIENT
Start: 2021-03-24 | End: 2021-03-28 | Stop reason: HOSPADM

## 2021-03-24 RX ORDER — HYDROMORPHONE HCL/PF 1 MG/ML
0.5 SYRINGE (ML) INJECTION EVERY 2 HOUR PRN
Status: DISCONTINUED | OUTPATIENT
Start: 2021-03-24 | End: 2021-03-25

## 2021-03-24 RX ORDER — ATORVASTATIN CALCIUM 80 MG/1
80 TABLET, FILM COATED ORAL
Status: DISCONTINUED | OUTPATIENT
Start: 2021-03-24 | End: 2021-03-28 | Stop reason: HOSPADM

## 2021-03-24 RX ORDER — PROPOFOL 10 MG/ML
INJECTION, EMULSION INTRAVENOUS
Status: COMPLETED
Start: 2021-03-24 | End: 2021-03-24

## 2021-03-24 RX ORDER — PROPOFOL 10 MG/ML
INJECTION, EMULSION INTRAVENOUS AS NEEDED
Status: DISCONTINUED | OUTPATIENT
Start: 2021-03-24 | End: 2021-03-24

## 2021-03-24 RX ORDER — OXYCODONE HYDROCHLORIDE 5 MG/1
2.5 TABLET ORAL EVERY 4 HOURS PRN
Status: DISCONTINUED | OUTPATIENT
Start: 2021-03-24 | End: 2021-03-28 | Stop reason: HOSPADM

## 2021-03-24 RX ORDER — ALBUMIN, HUMAN INJ 5% 5 %
SOLUTION INTRAVENOUS CONTINUOUS PRN
Status: DISCONTINUED | OUTPATIENT
Start: 2021-03-24 | End: 2021-03-24

## 2021-03-24 RX ORDER — FENTANYL CITRATE 50 UG/ML
INJECTION, SOLUTION INTRAMUSCULAR; INTRAVENOUS AS NEEDED
Status: DISCONTINUED | OUTPATIENT
Start: 2021-03-24 | End: 2021-03-24

## 2021-03-24 RX ORDER — MIDAZOLAM HYDROCHLORIDE 2 MG/2ML
INJECTION, SOLUTION INTRAMUSCULAR; INTRAVENOUS AS NEEDED
Status: DISCONTINUED | OUTPATIENT
Start: 2021-03-24 | End: 2021-03-24

## 2021-03-24 RX ORDER — LEVOTHYROXINE SODIUM 0.1 MG/1
100 TABLET ORAL
Status: DISCONTINUED | OUTPATIENT
Start: 2021-03-25 | End: 2021-03-28 | Stop reason: HOSPADM

## 2021-03-24 RX ORDER — CHLORHEXIDINE GLUCONATE 0.12 MG/ML
15 RINSE ORAL EVERY 12 HOURS SCHEDULED
Status: DISCONTINUED | OUTPATIENT
Start: 2021-03-24 | End: 2021-03-25

## 2021-03-24 RX ORDER — MAGNESIUM HYDROXIDE 1200 MG/15ML
LIQUID ORAL AS NEEDED
Status: DISCONTINUED | OUTPATIENT
Start: 2021-03-24 | End: 2021-03-24 | Stop reason: HOSPADM

## 2021-03-24 RX ORDER — BISACODYL 10 MG
10 SUPPOSITORY, RECTAL RECTAL DAILY PRN
Status: DISCONTINUED | OUTPATIENT
Start: 2021-03-24 | End: 2021-03-28 | Stop reason: HOSPADM

## 2021-03-24 RX ORDER — PANTOPRAZOLE SODIUM 40 MG/1
40 TABLET, DELAYED RELEASE ORAL
Status: DISCONTINUED | OUTPATIENT
Start: 2021-03-25 | End: 2021-03-28 | Stop reason: HOSPADM

## 2021-03-24 RX ORDER — CALCIUM CHLORIDE 100 MG/ML
1 INJECTION INTRAVENOUS; INTRAVENTRICULAR ONCE
Status: DISCONTINUED | OUTPATIENT
Start: 2021-03-24 | End: 2021-03-25

## 2021-03-24 RX ORDER — POLYETHYLENE GLYCOL 3350 17 G/17G
17 POWDER, FOR SOLUTION ORAL DAILY
Status: DISCONTINUED | OUTPATIENT
Start: 2021-03-25 | End: 2021-03-28 | Stop reason: HOSPADM

## 2021-03-24 RX ORDER — CEFAZOLIN SODIUM 2 G/50ML
2000 SOLUTION INTRAVENOUS ONCE
Status: DISCONTINUED | OUTPATIENT
Start: 2021-03-24 | End: 2021-03-24

## 2021-03-24 RX ORDER — PROTAMINE SULFATE 10 MG/ML
INJECTION, SOLUTION INTRAVENOUS AS NEEDED
Status: DISCONTINUED | OUTPATIENT
Start: 2021-03-24 | End: 2021-03-24

## 2021-03-24 RX ORDER — CLOPIDOGREL BISULFATE 75 MG/1
75 TABLET ORAL DAILY
Status: DISCONTINUED | OUTPATIENT
Start: 2021-03-25 | End: 2021-03-28 | Stop reason: HOSPADM

## 2021-03-24 RX ORDER — ACETAMINOPHEN 325 MG/1
975 TABLET ORAL EVERY 8 HOURS
Status: DISCONTINUED | OUTPATIENT
Start: 2021-03-24 | End: 2021-03-28 | Stop reason: HOSPADM

## 2021-03-24 RX ORDER — CHLORHEXIDINE GLUCONATE 0.12 MG/ML
15 RINSE ORAL ONCE
Status: COMPLETED | OUTPATIENT
Start: 2021-03-24 | End: 2021-03-24

## 2021-03-24 RX ORDER — PROPOFOL 10 MG/ML
INJECTION, EMULSION INTRAVENOUS CONTINUOUS PRN
Status: DISCONTINUED | OUTPATIENT
Start: 2021-03-24 | End: 2021-03-24

## 2021-03-24 RX ADMIN — LIDOCAINE HYDROCHLORIDE 5 ML: 10 INJECTION, SOLUTION EPIDURAL; INFILTRATION; INTRACAUDAL; PERINEURAL at 10:57

## 2021-03-24 RX ADMIN — MAGNESIUM SULFATE HEPTAHYDRATE 2 G: 40 INJECTION, SOLUTION INTRAVENOUS at 14:27

## 2021-03-24 RX ADMIN — FENTANYL CITRATE 50 MCG: 50 INJECTION INTRAMUSCULAR; INTRAVENOUS at 15:29

## 2021-03-24 RX ADMIN — FENTANYL CITRATE 1000 MCG: 50 INJECTION INTRAMUSCULAR; INTRAVENOUS at 08:34

## 2021-03-24 RX ADMIN — SODIUM CHLORIDE, SODIUM LACTATE, POTASSIUM CHLORIDE, AND CALCIUM CHLORIDE 500 ML: .6; .31; .03; .02 INJECTION, SOLUTION INTRAVENOUS at 23:09

## 2021-03-24 RX ADMIN — ACETAMINOPHEN 975 MG: 325 TABLET, FILM COATED ORAL at 15:42

## 2021-03-24 RX ADMIN — SODIUM CHLORIDE 1 UNITS/HR: 9 INJECTION, SOLUTION INTRAVENOUS at 16:08

## 2021-03-24 RX ADMIN — MIDAZOLAM 6 MG: 1 INJECTION INTRAMUSCULAR; INTRAVENOUS at 08:34

## 2021-03-24 RX ADMIN — AMINOCAPROIC ACID 5 G: 250 INJECTION, SOLUTION INTRAVENOUS at 09:46

## 2021-03-24 RX ADMIN — AMINOCAPROIC ACID 5 G: 250 INJECTION, SOLUTION INTRAVENOUS at 12:39

## 2021-03-24 RX ADMIN — CEFAZOLIN SODIUM 2000 MG: 2 SOLUTION INTRAVENOUS at 20:11

## 2021-03-24 RX ADMIN — LIDOCAINE HYDROCHLORIDE 5 ML: 10 INJECTION, SOLUTION EPIDURAL; INFILTRATION; INTRACAUDAL; PERINEURAL at 08:33

## 2021-03-24 RX ADMIN — HYDROMORPHONE HYDROCHLORIDE 0.5 MG: 1 INJECTION, SOLUTION INTRAMUSCULAR; INTRAVENOUS; SUBCUTANEOUS at 15:39

## 2021-03-24 RX ADMIN — NICARDIPINE HYDROCHLORIDE 2 MG/HR: 2.5 INJECTION, SOLUTION INTRAVENOUS at 22:29

## 2021-03-24 RX ADMIN — CHLORHEXIDINE GLUCONATE 0.12% ORAL RINSE 15 ML: 1.2 LIQUID ORAL at 06:51

## 2021-03-24 RX ADMIN — MUPIROCIN 1 APPLICATION: 20 OINTMENT TOPICAL at 06:52

## 2021-03-24 RX ADMIN — ALBUMIN (HUMAN): 12.5 INJECTION, SOLUTION INTRAVENOUS at 13:17

## 2021-03-24 RX ADMIN — CEFAZOLIN SODIUM 2000 MG: 2 SOLUTION INTRAVENOUS at 09:03

## 2021-03-24 RX ADMIN — CEFAZOLIN SODIUM 2000 MG: 2 SOLUTION INTRAVENOUS at 13:03

## 2021-03-24 RX ADMIN — OXYCODONE HYDROCHLORIDE 5 MG: 5 TABLET ORAL at 15:43

## 2021-03-24 RX ADMIN — FENTANYL CITRATE 50 MCG: 50 INJECTION INTRAMUSCULAR; INTRAVENOUS at 16:16

## 2021-03-24 RX ADMIN — Medication 12.5 MG: at 06:51

## 2021-03-24 RX ADMIN — PROPOFOL 40 MCG/KG/MIN: 10 INJECTION, EMULSION INTRAVENOUS at 14:31

## 2021-03-24 RX ADMIN — NICARDIPINE HYDROCHLORIDE 4 MG/HR: 2.5 INJECTION, SOLUTION INTRAVENOUS at 14:20

## 2021-03-24 RX ADMIN — ACETAMINOPHEN 975 MG: 325 TABLET, FILM COATED ORAL at 21:00

## 2021-03-24 RX ADMIN — OXYCODONE HYDROCHLORIDE 5 MG: 5 TABLET ORAL at 20:59

## 2021-03-24 RX ADMIN — HYDROMORPHONE HYDROCHLORIDE 0.5 MG: 1 INJECTION, SOLUTION INTRAMUSCULAR; INTRAVENOUS; SUBCUTANEOUS at 18:11

## 2021-03-24 RX ADMIN — PROTAMINE SULFATE 200 MG: 10 INJECTION, SOLUTION INTRAVENOUS at 12:37

## 2021-03-24 RX ADMIN — AMIODARONE HYDROCHLORIDE 200 MG: 200 TABLET ORAL at 21:00

## 2021-03-24 RX ADMIN — MUPIROCIN 1 APPLICATION: 20 OINTMENT TOPICAL at 20:11

## 2021-03-24 RX ADMIN — MIDAZOLAM 4 MG: 1 INJECTION INTRAMUSCULAR; INTRAVENOUS at 08:23

## 2021-03-24 RX ADMIN — SODIUM CHLORIDE 20 ML/HR: 0.45 INJECTION, SOLUTION INTRAVENOUS at 14:30

## 2021-03-24 RX ADMIN — PROPOFOL 80 MCG/KG/MIN: 10 INJECTION, EMULSION INTRAVENOUS at 10:54

## 2021-03-24 RX ADMIN — PROPOFOL 100 MG: 10 INJECTION, EMULSION INTRAVENOUS at 08:37

## 2021-03-24 RX ADMIN — HYDROMORPHONE HYDROCHLORIDE 0.5 MG: 1 INJECTION, SOLUTION INTRAMUSCULAR; INTRAVENOUS; SUBCUTANEOUS at 22:55

## 2021-03-24 RX ADMIN — ROCURONIUM BROMIDE 100 MG: 50 INJECTION, SOLUTION INTRAVENOUS at 08:34

## 2021-03-24 RX ADMIN — PHENYLEPHRINE HYDROCHLORIDE 100 MCG: 10 INJECTION INTRAVENOUS at 13:11

## 2021-03-24 RX ADMIN — HYDROMORPHONE HYDROCHLORIDE 0.5 MG: 1 INJECTION, SOLUTION INTRAMUSCULAR; INTRAVENOUS; SUBCUTANEOUS at 20:12

## 2021-03-24 RX ADMIN — PHENYLEPHRINE HYDROCHLORIDE 200 MCG: 10 INJECTION INTRAVENOUS at 11:17

## 2021-03-24 RX ADMIN — CHLORHEXIDINE GLUCONATE 0.12% ORAL RINSE 15 ML: 1.2 LIQUID ORAL at 20:11

## 2021-03-24 RX ADMIN — ASPIRIN 325 MG ORAL TABLET 325 MG: 325 PILL ORAL at 15:43

## 2021-03-24 RX ADMIN — HEPARIN SODIUM 42800 UNITS: 1000 INJECTION INTRAVENOUS; SUBCUTANEOUS at 10:31

## 2021-03-24 RX ADMIN — AMIODARONE HYDROCHLORIDE 200 MG: 200 TABLET ORAL at 15:43

## 2021-03-24 RX ADMIN — PROPOFOL 100 MG: 10 INJECTION, EMULSION INTRAVENOUS at 08:34

## 2021-03-24 NOTE — ANESTHESIA POST-OP FOLLOW-UP NOTE
Patient: Cory Avina  Procedure(s):  CORONARY ARTERY BYPASS GRAFT (CABG) 3 VESSELS,  USING LEFT VANE-LAD AND LEFT GSV-RPL & RAMUS  TRANSESOPHAGEAL ECHOCARDIOGRAM (MARLEN)  Vitals:    03/24/21 1800   BP:    Pulse: 84   Resp: 13   Temp: 99 5 °F (37 5 °C)   SpO2: 97%

## 2021-03-24 NOTE — CONSULTS
Consult - Critical Care    Orion Cushing 63 y o  male MRN: [id number]  Unit/Bed#: [facility] 417-01 Encounter: [id number]    Consults    Physician Requesting Consult: Quinn Waddell MD    Reason for Consult / Principal Problem: CAD (coronary artery disease)    HPI: Orion Cushing is a 63 y o  male  with significant cardiovascular history including 11 stents  Recently saw Dr Juan Gomze for second opinion and underwent cardiac catheterization which demonstrated multivessel disease  CT Surgery was consulted for surgical evaluation  Today he presents s/p CABG x 3 (LIMA to LAD, SVG to R posterolateral branch, SVG to ramus intermedius)  He arrives to the ICU on no continuous infusions  Cardiopulmonary bypass time: 84 minutes  Crossclamp time: 67 minutes    Past medical history of CAD s/p 11 stents, HTN, hyperlipidemia, hypothyroid, hx of CVA with residual right sided weakness    History obtained from chart review due to patient being intubated and sedated  ---------------------------------------------------------------------------------------------------------------------------------------------------------------------  Impressions:  1  CAD s/p CABG x 3  2  HTN  3  HLD  4  Hypothyroid  5  Hx of CVA with residual right sided weakness    Plan:    Neuro: Wean off propofol as tolerated  PRN oxycodone, ATC tylenol for pain  Trend neuro exam   Delirium precautions  CV: MAP goal >65  SBP goal <130  CI>2 2  Post-op medications: None     Volume resuscitation as needed  Monitor rhythm on telemetry  Epicardial pacing wires Ax1, Vx1  Intra-op MARLEN LVEF normal     Lung: Check STAT post-op ABG and CXR  Wean vent with spontaneous breathing trial with goal to extubate  GI: GI prophylaxis with PPI  Bowel regimen  Zofran PRN for nausea  FEN: NPO  Replenish K >4 0, mag >2 0 and calcium >7 0  : Check STAT post-op BMP  Dunn in place  Monitor UOP with goal >0 5cc/kg/hour   Lasix versus volume resuscitate as needed depending on hemodynamics and volume status  ID: Prophylactic post-op abx  Maintain normothermia  Trend temps  Heme: Check STAT post-op H/H and platelets  Monitor incision site, invasive lines, and chest tube outputs for bleeding  Send coag panel if needed  Endo: Insulin gtt for blood sugar control  Results from last 6 Months   Lab Units 03/19/21  0939   HEMOGLOBIN A1C % 5 7*     Disposition: ICU Care   ---------------------------------------------------------------------------------------------------------------------------------------------------------------------  Historical Information   Past Medical History:   Diagnosis Date    Anxiety     CAD (coronary artery disease)     Depression     Dizziness     Eye problems     Heart disease     Hypertension     Hypothyroidism     Hypothyroidism     Migraines     Stroke ([facility] [address] )     Tremors of nervous system      Past Surgical History:   Procedure Laterality Date    COLONOSCOPY  09/01/2017    COLONOSCOPY      CORONARY STENT PLACEMENT      DENTAL SURGERY      entire top teeth removal    HERNIA REPAIR       Social History   Social History     Substance and Sexual Activity   Alcohol Use No     Social History     Substance and Sexual Activity   Drug Use No     Social History     Tobacco Use   Smoking Status Never Smoker   Smokeless Tobacco Never Used     Family History   Problem Relation Age of Onset    Cancer Mother     Heart attack Father     Heart disease Father     Heart attack Sister     Cancer Brother      I have reviewed this patient's family history and commented on sigificant items within the HPI    ROS: ROS unable to be obtained due to patient being intubated and sedated  Allergies: Allergies   Allergen Reactions    Medical Tape Other (See Comments)     Other reaction(s): BURNS USE PAPER TAPE       Home Medications:   Prior to Admission medications    Medication Sig Start Date End Date Taking?  Authorizing Provider   aspirin 81 MG tablet Take 81 mg by mouth   Yes Historical Provider, MD   atorvastatin (LIPITOR) 40 mg tablet TAKE 2 TABLETS EVERY MORNING 1/22/21  Yes Kevyn Thompson MD   clonazePAM (KlonoPIN) 2 mg tablet Take 2 tablets (4 mg total) by mouth 2 (two) times a day as needed for seizures 1/26/21  Yes Kevyn Thompson MD   clopidogrel (PLAVIX) 75 mg tablet Take 75 mg by mouth daily   Yes Historical Provider, MD   levothyroxine 100 mcg tablet TAKE 1 TABLET EVERY DAY 1/22/21  Yes Kevyn Thompson MD   metoprolol succinate (TOPROL-XL) 100 mg 24 hr tablet Take 1 tablet (100 mg total) by mouth daily 1/26/21  Yes Kevyn Thompson MD   mupirocin (BACTROBAN) 2 % ointment Apply 1 application topically 2 (two) times a day for 5 days Apply to each nostril twice daily for five days before your operation   3/12/21 3/22/21 Yes Aaron Anderson PA-C   tiZANidine (ZANAFLEX) 2 mg tablet Take 1 tablet (2 mg total) by mouth every 8 (eight) hours as needed for muscle spasms 11/9/20  Yes Kevyn Thompson MD   zolpidem (AMBIEN) 10 mg tablet 2 po  q hs 2/7/21  Yes Kevyn Thompson MD   oxyCODONE-acetaminophen (PERCOCET) 5-325 mg per tablet Take 1 tablet by mouth every 8 (eight) hours as needed for moderate painMax Daily Amount: 3 tablets 8/20/20   Kevyn Thompson MD   sildenafil (VIAGRA) 100 mg tablet Take 1 tablet (100 mg total) by mouth daily as needed for erectile dysfunction Pt request generic brand Tania to offset costs 10/5/20   Kevyn Thompson MD   metoprolol succinate (TOPROL-XL) 100 mg 24 hr tablet Take 1 tablet (100 mg total) by mouth daily 2/7/21 3/24/21  Kevyn Thompson MD     Inpatient Medications:  Scheduled Meds:  Current Facility-Administered Medications   Medication Dose Route Frequency Provider Last Rate    acetaminophen  650 mg Rectal Q4H PRN Aaron Anderson PA-C      acetaminophen  975 mg Oral Q8H Aaron Anderson PA-C      amiodarone  200 mg Oral [facility] Aaron [place], PA-C      aspirin  325 mg Oral Daily Aaron Anderson PA-C  atorvastatin  80 mg Oral After LuisMILTON GarciaC      bisacodyl  10 mg Rectal Daily PRN ASTRID Castillo-GRAHAM      calcium chloride  1 g Intravenous Once Theodora Slaughter PA-C      cefazolin  2,000 mg Intravenous Q8H ASTRID Castillo-GRAHAM      chlorhexidine  15 mL Swish & Spit Q12H [address] [place], [place]      [START ON 3/25/2021] clopidogrel  75 mg Oral Daily Theodora Slaughter PA-C      fentanyl citrate (PF)  50 mcg Intravenous Q1H PRN ASTRID Castillo-GRAHAM      [START ON 3/25/2021] fondaparinux  2 5 mg Subcutaneous Daily Theodora Slaughter PA-C      furosemide  40 mg Intravenous Q6H PRN ASTRID Castillo-GRAHAM      HYDROmorphone  0 5 mg Intravenous Q2H PRN ASTRID Castillo-GRAHAM      insulin regular (HumuLIN R,NovoLIN R) infusion  0 3-21 Units/hr Intravenous Titrated Theodora Slaughter PA-C 1 Units/hr (03/24/21 1608)    lactated ringers  500 mL Intravenous Q30 Min PRN Theodora Slaughter PA-C      [START ON 3/25/2021] levothyroxine  100 mcg Oral Early Morning Theodora Slaughter PA-C      lidocaine (cardiac)  100 mg Intravenous Q30 Min PRN Theodora Slaughter PA-C      magnesium sulfate  2 g Intravenous Once Theodora Slaughter PA-C      mupirocin  1 application Nasal [id number] [address] Theodora Slaughter PA-C      niCARdipine  2 5-15 mg/hr Intravenous Titrated MILTON CastilloC 4 mg/hr (03/24/21 1618)    ondansetron  4 mg Intravenous Q6H PRN ASTRID Castillo-GRAHAM      oxyCODONE  2 5 mg Oral Q4H PRN ASTRID Castillo-GRAHAM      oxyCODONE  5 mg Oral Q4H PRN Theodora Slaughter PA-C      [START ON 3/25/2021] pantoprazole  40 mg Oral Early Morning ASTRID Castillo-C      [START ON 3/25/2021] polyethylene glycol  17 g Oral Daily Theodora Slaughter PA-C      potassium chloride  20 mEq Intravenous Once PRN Theodora Slaughter PA-C      potassium chloride  20 mEq Intravenous Q1H PRN ASTRID Castillo-GRAHAM      potassium chloride  20 mEq Intravenous Q30 Min PRN Theodora Slaughter PA-C      propofol  5-50 mcg/kg/min Intravenous Titrated CHRISTA Estrada Stopped (21)    sodium chloride  20 mL/hr Intravenous Continuous Nba NINO Velasquez 20 mL/hr (21 143)     Continuous Infusions:insulin regular (HumuLIN R,NovoLIN R) infusion, 0 3-21 Units/hr, Last Rate: 1 Units/hr (21 1608)  niCARdipine, 2 5-15 mg/hr, Last Rate: 4 mg/hr (21 1618)  propofol, 5-50 mcg/kg/min, Last Rate: Stopped (21)  sodium chloride, 20 mL/hr, Last Rate: 20 mL/hr (21 143)      PRN Meds:  acetaminophen, 650 mg, Q4H PRN  bisacodyl, 10 mg, Daily PRN  fentanyl citrate (PF), 50 mcg, Q1H PRN  furosemide, 40 mg, Q6H PRN  HYDROmorphone, 0 5 mg, Q2H PRN  lactated ringers, 500 mL, Q30 Min PRN  lidocaine (cardiac), 100 mg, Q30 Min PRN  ondansetron, 4 mg, Q6H PRN  oxyCODONE, 2 5 mg, Q4H PRN  oxyCODONE, 5 mg, Q4H PRN  potassium chloride, 20 mEq, Once PRN  potassium chloride, 20 mEq, Q1H PRN  potassium chloride, 20 mEq, Q30 Min PRN      ---------------------------------------------------------------------------------------------------------------------------------------------------------------------  Vitals:   Vitals:    21 1445 21 1500 21 1530 21 1600   BP:       Pulse: 76 78 86 80   Resp: 14 13 (!) 37 19   Temp:  (!) 97 2 °F (36 2 °C)  98 1 °F (36 7 °C)   TempSrc:  Probe  Probe   SpO2: 100% 100% 100% 100%   Weight:       Height:         Arterial Line:  Arterial Line BP: 138/68  Arterial Line MAP (mmHg): 90 mmHg    Temperature: Temp (24hrs), Av 5 °F (36 4 °C), Min:97 2 °F (36 2 °C), Max:98 1 °F (36 7 °C)  Current: Temperature: 98 1 °F (36 7 °C)    Weights: IBW: 70 7 kg  Body mass index is 34 11 kg/m²      Hemodynamic Monitoring:  PAP: PAP: 30/14, CVP: CVP (mean): 10 mmHg, CO: CO (L/min): 5 7 L/min, CI: CI (L/min/m2): 2 6 L/min/m2, SVR: SVR (dyne*sec)/cm5: 1136 (dyne*sec)/cm5    Ventilator Settings:  Respiratory    Lab Data (Last 4 hours)    None         O2/Vent Data (Last 4 hours)       1347  1530  1540       Vent Mode AC/VC CPAP/PS Spont AC/VC+     Resp Rate (BPM) (BPM) 14       Vt (mL) (mL) 500       FIO2 (%) (%) 80       PEEP (cmH2O) (cmH2O) 8       Patient safety screen outcome:  Passed      MV 6 28       FIO2 (%) (%)  50      PEEP (cmH2O) (cmH2O)  8      Pressure Support (cmH2O) (cmH20)  8      MV (Obs)  15 5                Labs:   Results from last 7 days   Lab Units 21  1403 21  1358 21  1252  21  1209   HEMOGLOBIN g/dL  --  12 0  --   --   --    I STAT HEMOGLOBIN g/dl 10 5*  --  9 2*   < >  --    HEMATOCRIT %  --  34 9*  --   --   --    HEMATOCRIT, ISTAT % 31*  --  27*   < >  --    PLATELETS Thousands/uL  --  119*  --   --  146*    < > = values in this interval not displayed  Results from last 7 days   Lab Units 21  1403 21  1358 21  1252 21  1220   SODIUM mmol/L  --  141  --   --    POTASSIUM mmol/L  --  4 2  --   --    CHLORIDE mmol/L  --  109*  --   --    CO2 mmol/L  --  24  --   --    CO2, I-STAT mmol/L 26  --  24 29   BUN mg/dL  --  11  --   --    CREATININE mg/dL  --  1 15  --   --    CALCIUM mg/dL  --  8 2*  --   --    GLUCOSE, ISTAT mg/dl 138  --  137 147*     Post-op AB 40/40/243/25/0/100%  Post-op CXR: Lines and tubes in adequate position  No pneumothorax  I have personally reviewed pertinent films in PACS  Post-op EKG: Normal sinus rhythm, left axis deviation  This was personally reviewed by myself  Physical Exam  Constitutional:       Interventions: He is sedated and intubated  HENT:      Head: Normocephalic and atraumatic  Mouth/Throat:      Mouth: Mucous membranes are dry  Pharynx: Oropharynx is clear  Eyes:      Pupils: Pupils are equal, round, and reactive to light  Neck:      Musculoskeletal: Normal range of motion  Cardiovascular:      Rate and Rhythm: Normal rate and regular rhythm  Pulses: Normal pulses  Heart sounds: Normal heart sounds  No murmur        Comments: AV epicardial wires  Pulmonary:      Effort: Pulmonary effort is normal  No respiratory distress  He is intubated  Breath sounds: Decreased breath sounds present  Comments: CT with minimal output   Abdominal:      General: Abdomen is flat  Bowel sounds are decreased  Palpations: Abdomen is soft  Genitourinary:     Comments: Dunn with clear, yellow urine   Musculoskeletal: Normal range of motion  Right lower leg: No edema  Left lower leg: No edema  Skin:     General: Skin is warm and dry  Capillary Refill: Capillary refill takes less than 2 seconds  Findings: No lesion  Comments: MSI dressing clean, dry and intact   Neurological:      Comments: Sedated immediately post op        Invasive lines and devices:   Invasive Devices     Central Venous Catheter Line            CVC Central Lines 03/24/21 Triple less than 1 day    Introducer 03/24/21 less than 1 day          Peripheral Intravenous Line            Peripheral IV 03/24/21 Left Antecubital less than 1 day          Arterial Line            Arterial Line 03/24/21 Right Radial less than 1 day          Line            Pacer Wires less than 1 day    Pacer Wires less than 1 day          Drain            Chest Tube 1 Left Pleural 32 Fr  less than 1 day    Chest Tube 2 Posterior Mediastinal 32 Fr  less than 1 day    Chest Tube 3 Anterior Mediastinal 32 Fr  less than 1 day    Urethral Catheter 16 Fr  less than 1 day          Airway            ETT  Hi-Lo 8 mm less than 1 day              ---------------------------------------------------------------------------------------------------------------------------------------------------------------------  Care Time Delivered:   No Critical Care time spent     SIGNATURE: CHRISTA Cerna  DATE: March 24, 2021  TIME: 4:26 PM

## 2021-03-24 NOTE — RESPIRATORY THERAPY NOTE
RT Protocol Note  Carolann Liu 63 y o  male MRN: [id number]  Unit/Bed#: [facility] 417-01 Encounter: [id number]    Assessment    Principal Problem:    CAD (coronary artery disease)  Active Problems:    Claudication [facility])    Essential hypertension    Mixed hyperlipidemia    Hypothyroidism    Myofascial pain syndrome    Radiculopathy, lumbar region    History of CVA (cerebrovascular accident)    History of percutaneous coronary intervention    S/P CABG (coronary artery bypass graft)    Thrombocytopenia (HCC)    Hyperchloremia    Hypocalcemia      Home Pulmonary Medications:  none       Past Medical History:   Diagnosis Date    Anxiety     CAD (coronary artery disease)     Depression     Dizziness     Eye problems     Heart disease     Hypertension     Hypothyroidism     Hypothyroidism     Migraines     St[facility] [address] [address]     Tremors of nervous system      Social History     Socioeconomic History    Marital status: /Civil Union     Spouse name: None    Number of children: None    Years of education: None    Highest education level: None   Occupational History    None   Social Needs    Financial resource strain: None    Food insecurity     Worry: None     Inability: None    Transportation needs     Medical: None     Non-medical: None   Tobacco Use    Smoking status: Never Smoker    Smokeless tobacco: Never Used   Substance and Sexual Activity    Alcohol use: No    Drug use: No    Sexual activity: Not Currently   Lifestyle    Physical activity     Days per week: None     Minutes per session: None    Stress: None   Relationships    Social connections     Talks on phone: None     Gets together: None     Attends Oriental orthodox service: None     Active member of club or organization: None     Attends meetings of clubs or organizations: None     Relationship status: None    Intimate partner violence     Fear of current or ex partner: None     Emotionally abused: None     Physically abused: None Forced sexual activity: None   Other Topics Concern    None   Social History Narrative    Most recent tobacco use screenin2019      Do you currently or have you served in the [employer]:   No      Were you activated, into active duty, as a member of the [employer] or as a Reservist:   No      Occupation:   retired      Education:   15      Marital status:         Sexual orientation:   Heterosexual      Exercise level:   Occasional      Diet:   Regular      General stress level:   Medium      Alcohol intake:   Occasional      Caffeine intake:   Occasional      Chewing tobacco:   none      Illicit drugs:   none      Guns present in home:   No      Seat belts used routinely:   Yes      Smoke alarm in home:   Yes      Advance directive:   No      Live alone or with others:   with others      Are there stairs in your home:   Yes  only in front of house     Pets:   No        Subjective         Objective    Physical Exam:   Assessment Type: Assess only  General Appearance: Sedated  Respiratory Pattern: Assisted  Chest Assessment: Chest expansion symmetrical  Bilateral Breath Sounds: Clear    Vitals:  Blood pressure 142/95, pulse 78, temperature (!) 97 3 °F (36 3 °C), temperature source Tympanic, resp  rate 14, height 5' 9" (1 753 m), weight 105 kg (231 lb), SpO2 100 %  Imaging and other studies: I have personally reviewed pertinent reports              Plan    Respiratory Plan: (P) Vent/NIV/HFNC  Airway Clearance Plan: (P) Incentive Spirometer(when extubated)     Resp Comments: (P) admitted to 417 from OR, s/p CABG x 3; found no documented pulm hx; takes no pulm home meds; will start IS when extubated and alter therapy as indicated

## 2021-03-24 NOTE — ANESTHESIA PROCEDURE NOTES
Central Line Insertion  Performed by: Vickie Hatch MD  Authorized by: Vickie Hatch MD     Date/Time: 3/24/2021 8:45 AM  Catheter Type:  triple lumen  Consent: Verbal consent obtained  Written consent obtained  Risks and benefits: risks, benefits and alternatives were discussed  Consent given by: patient  Patient understanding: patient states understanding of the procedure being performed  Patient consent: the patient's understanding of the procedure matches consent given  Procedure consent: procedure consent matches procedure scheduled  Relevant documents: relevant documents present and verified  Test results: test results available and properly labeled  Site marked: the operative site was marked  Radiology Images: Radiology Images displayed and confirmed  If images not available, report reviewed  Required items: required blood products, implants, devices, and special equipment available  Patient identity confirmed: verbally with patient, arm band, provided demographic data and hospital-assigned identification number  Time out: Immediately prior to procedure a "time out" was called to verify the correct patient, procedure, equipment, support staff and site/side marked as required    Indications: vascular access  Location details: right internal jugular  Catheter size: 7 Fr  Patient position: Trendelenburg  Assessment: blood return through all ports and free fluid flow  Preparation: skin prepped with ChloraPrep  Skin prep agent dried: skin prep agent completely dried prior to procedure  Sterile barriers: all five maximum sterile barriers used - cap, mask, sterile gown, sterile gloves, and large sterile sheet  Hand hygiene: hand hygiene performed prior to central venous catheter insertion  Ultrasound guidance: yes  sterile gel and probe cover used in ultrasound-guided central venous catheter insertionultrasound permanent image saved  Pre-procedure: landmarks identified  Number of attempts: 1  Successful placement: yes  Post-procedure: chlorhexidine patch applied,  dressing applied and line sutured  Patient tolerance: patient tolerated the procedure well with no immediate complications

## 2021-03-24 NOTE — ANESTHESIA PROCEDURE NOTES
Introducehenrik/Maureen  Performed by: Vickie Hatch MD  Authorized by: Vickie Hatch MD     Date/Time: 3/24/2021 8:45 AM  Consent: Verbal consent obtained  Written consent obtained  Risks and benefits: risks, benefits and alternatives were discussed  Consent given by: patient  Patient understanding: patient states understanding of the procedure being performed  Patient consent: the patient's understanding of the procedure matches consent given  Procedure consent: procedure consent matches procedure scheduled  Relevant documents: relevant documents present and verified  Test results: test results available and properly labeled  Site marked: the operative site was marked  Radiology Images: Radiology Images displayed and confirmed  If images not available, report reviewed  Required items: required blood products, implants, devices, and special equipment available  Patient identity confirmed: verbally with patient, arm band and provided demographic data  Time out: Immediately prior to procedure a "time out" was called to verify the correct patient, procedure, equipment, support staff and site/side marked as required    Indications: central pressure monitoring    Sedation:  Patient sedated: yes    Preparation: skin prepped with ChloraPrep  Skin prep agent dried: skin prep agent completely dried prior to procedure  Sterile barriers: all five maximum sterile barriers used - cap, mask, sterile gown, sterile gloves, and large sterile sheet

## 2021-03-24 NOTE — INTERVAL H&P NOTE
Vitals:    03/24/21 0639   BP: 142/95   Pulse: 65   Resp: 17   Temp: (!) 97 3 °F (36 3 °C)   SpO2: 98%         H&P reviewed  After examining the patient I find no changes in the patients condition since the H&P was completed  Plan for CABG  Preoperative Beta Blocker: indicated  Anticipated Length of Stay: Patient will be admitted on an inpatient basis with an anticipated length of stay of grater than 2 midnights  Justification for Hospital StaY: Post surgical recovery following open heart surgery        Kylah Carter MD  03/24/21  7:34 AM

## 2021-03-24 NOTE — ANESTHESIA PROCEDURE NOTES
Arterial Line Insertion  Performed by: Eddie Quiroz MD  Authorized by: Eddie Quiroz MD   Consent: Verbal consent obtained  Written consent obtained  Risks and benefits: risks, benefits and alternatives were discussed  Consent given by: patient  Patient understanding: patient states understanding of the procedure being performed  Patient consent: the patient's understanding of the procedure matches consent given  Procedure consent: procedure consent matches procedure scheduled  Relevant documents: relevant documents present and verified  Test results: test results available and properly labeled  Site marked: the operative site was marked  Radiology Images: Radiology Images displayed and confirmed  If images not available, report reviewed  Required items: required blood products, implants, devices, and special equipment available  Patient identity confirmed: verbally with patient, arm band, provided demographic data and hospital-assigned identification number  Time out: Immediately prior to procedure a "time out" was called to verify the correct patient, procedure, equipment, support staff and site/side marked as required  Preparation: Patient was prepped and draped in the usual sterile fashion    Indications: hemodynamic monitoring  Orientation:  Right  Location: radial arterylidocaine (PF) (XYLOCAINE-MPF) 1 % infiltration, 5 mL  Procedure Details:  Stephen's test normal: yes  Needle gauge: 20  Seldinger technique: Seldinger technique used  Cutdown: cutdown required  Number of attempts: 1    Post-procedure:  Post-procedure: dressing applied and chlorhexidine patch applied  Waveform: good waveform  Post-procedure CNS: normal  Patient tolerance: patient tolerated the procedure well with no immediate complications

## 2021-03-24 NOTE — ANESTHESIA POSTPROCEDURE EVALUATION
Post-Op Assessment Note    CV Status:  Stable  Pain Score: 2    Pain management: adequate     Mental Status:  Alert and awake   Hydration Status:  Stable   PONV Controlled:  None   Airway Patency:  Patent      Post Op Vitals Reviewed: Yes      Staff: Anesthesiologist         No complications documented      BP   112/78   Temp      Pulse  80   Resp   14   SpO2   99

## 2021-03-24 NOTE — ANESTHESIA PREPROCEDURE EVALUATION
Procedure:  CORONARY ARTERY BYPASS GRAFT (CABG) 4 VESSELS,  USING LEFT VANE-LAD AND LEFT GSV- (N/A Chest)  TRANSESOPHAGEAL ECHOCARDIOGRAM (MARLEN) (N/A Esophagus)    Relevant Problems   CARDIO   (+) CAD (coronary artery disease)   (+) Chest pain   (+) Essential hypertension   (+) Mixed hyperlipidemia   (+) S/P CABG (coronary artery bypass graft)      ENDO   (+) Hypothyroidism      MUSCULOSKELETAL   (+) Myofascial pain syndrome      NEURO/PSYCH   (+) Claudication (HCC)   (+) History of CVA (cerebrovascular accident)   (+) Myofascial pain syndrome        Physical Exam    Airway    Mallampati score: I  TM Distance: >3 FB  Neck ROM: full     Dental   upper dentures and lower dentures,     Cardiovascular  Rhythm: regular, Rate: normal, Cardiovascular exam normal    Pulmonary  Pulmonary exam normal Breath sounds clear to auscultation,     Other Findings        Anesthesia Plan  ASA Score- 4     Anesthesia Type- general with ASA Monitors  Additional Monitors: arterial line, central venous line and pulmonary artery catheter  Airway Plan: ETT  Comment: Pt reports being a 'rapid metabolizer' with very difficult time with sedation and awareness for sedation procedures          Plan Factors-Exercise tolerance (METS): >4 METS  Chart reviewed  EKG reviewed  Imaging results reviewed  Existing labs reviewed  Patient summary reviewed  Induction- intravenous  Postoperative Plan- Plan for postoperative opioid use  Planned trial extubation    Informed Consent- Anesthetic plan and risks discussed with patient and spouse

## 2021-03-24 NOTE — OP NOTE
OPERATIVE REPORT  PATIENT NAME: Allegra Leblanc    :  1955  MRN: [id number]  Pt Location: [facility] OR ROOM 12    SURGERY DATE: 3/24/2021    SURGEON: Елена Leary MD    ASSISTANT: Hans Gifford MD    ADDITIONAL ASSISTANT: Soledad Farah PA-C    PREOPERATIVE DIAGNOSIS:  Multivessel coronary artery disease    POSTOPERATIVE DIAGNOSIS:  Multivessel coronary artery disease    NYHA Class: 3    CCS Class: 3    PROCEDURE: Coronary artery bypass grafting x 3 with left internal mammary artery to left anterior descending, saphenous vein graft to right posterolateral branch, saphenous vein graft to ramus intermedius  ANESTHESIA: General endotracheal anesthesia with transesophageal echocardiogram guidance, Dr Lemos Mail: 84 minutes  CROSSCLAMP TIME: 67 minutes  PACKS/TUBES/DRAINS: Chest tubes x 3  MATERIALS: Pacing wires: A x1  V x1  TRANSFUSION: None  SPECIMENS: None  ESTIMATED BLOOD LOSS: 200 mL    OPERATIVE TECHNIQUE:    The patient was taken to the operating room and placed supine on the operating table  Following the satisfactory induction of general anesthesia and placement of monitoring lines, the patient was prepped and draped in the usual sterile fashion  A time-out procedure was performed  The patient underwent median sternotomy, LIMA harvest, endoscopic right greater saphenous vein harvest, systemic heparinization and conduit preparation  The patient underwent pericardiotomy and epiaortic ultrasound was used to evaluate the ascending aorta, which was found to be free of significant atheromatous disease  The patient underwent aortic and right atrial cannulation and was initiated on bypass  The ascending aorta was crossclamped  [place] cardioplegia was delivered with an excellent arrest     The saphenous vein was anastomosed to the right posterolateral branch in end-to-side fashion using running 7-0 Prolene suture   The obtuse marginal was evaluated but was not suitable as a bypass graft target  The saphenous vein was anastomosed to an intramyocardial  ramus intermediusin end-to-side fashion using running 7-0 Prolene suture  The left internal mammary artery was anastomosed to the left anterior descending in end-to-side fashion using running 7-0 Prolene suture  The quality of all three grafts was excellent  A total of 2 proximal anastomoses were completed on the ascending aorta in end-to-side fashion using running 5-0 Prolene suture  The heart was de-aired and the crossclamp was removed  Atrial and ventricular pacing wires were placed  Following a period of reperfusion, the patient was weaned from cardiopulmonary bypass and decannulated  Protamine was administered with normalization of the ACT  Hemostasis was confirmed in all fields  Thoracostomy tubes were placed  The sternum was closed with stainless steel wires  The fascia, subdermis and skin were closed with multiple layers of running absorbable suture  As the attending surgeonEMILIANO was present and scrubbed for all critical portions of this procedure  Sponge, needle and instrument counts were reported as correct by the nursing staff  Final transesophageal echocardiogram demonstrated normal biventricular function  The assistance of a PA was required to complete this case, specifically for assistance with endoscopic saphenous vein harvesting         Veronika Meza MD  DATE: March 24, 2021  TIME: 1:27 PM

## 2021-03-25 ENCOUNTER — APPOINTMENT (INPATIENT)
Dept: RADIOLOGY | Facility: HOSPITAL | Age: 66
DRG: 235 | End: 2021-03-25
Payer: MEDICARE

## 2021-03-25 LAB
ABO GROUP BLD BPU: NORMAL
ANION GAP SERPL CALCULATED.3IONS-SCNC: 5 MMOL/L (ref 4–13)
ATRIAL RATE: 73 BPM
BPU ID: NORMAL
BUN SERPL-MCNC: 14 MG/DL (ref 5–25)
CALCIUM SERPL-MCNC: 7.9 MG/DL (ref 8.3–10.1)
CHLORIDE SERPL-SCNC: 108 MMOL/L (ref 100–108)
CO2 SERPL-SCNC: 26 MMOL/L (ref 21–32)
CREAT SERPL-MCNC: 1.2 MG/DL (ref 0.6–1.3)
CROSSMATCH: NORMAL
ERYTHROCYTE [DISTWIDTH] IN BLOOD BY AUTOMATED COUNT: 14.1 % (ref 11.6–15.1)
GFR SERPL CREATININE-BSD FRML MDRD: 64 ML/MIN/1.73SQ M
GLUCOSE SERPL-MCNC: 122 MG/DL (ref 65–140)
GLUCOSE SERPL-MCNC: 134 MG/DL (ref 65–140)
GLUCOSE SERPL-MCNC: 138 MG/DL (ref 65–140)
GLUCOSE SERPL-MCNC: 142 MG/DL (ref 65–140)
GLUCOSE SERPL-MCNC: 145 MG/DL (ref 65–140)
GLUCOSE SERPL-MCNC: 152 MG/DL (ref 65–140)
GLUCOSE SERPL-MCNC: 152 MG/DL (ref 65–140)
GLUCOSE SERPL-MCNC: 164 MG/DL (ref 65–140)
GLUCOSE SERPL-MCNC: 202 MG/DL (ref 65–140)
HCT VFR BLD AUTO: 38.4 % (ref 36.5–49.3)
HGB BLD-MCNC: 12.7 G/DL (ref 12–17)
MAGNESIUM SERPL-MCNC: 2.2 MG/DL (ref 1.6–2.6)
MCH RBC QN AUTO: 33.2 PG (ref 26.8–34.3)
MCHC RBC AUTO-ENTMCNC: 33.1 G/DL (ref 31.4–37.4)
MCV RBC AUTO: 100 FL (ref 82–98)
P AXIS: 48 DEGREES
PLATELET # BLD AUTO: 154 THOUSANDS/UL (ref 149–390)
PMV BLD AUTO: 10.2 FL (ref 8.9–12.7)
POTASSIUM SERPL-SCNC: 4.5 MMOL/L (ref 3.5–5.3)
PR INTERVAL: 167 MS
QRS AXIS: -24 DEGREES
QRSD INTERVAL: 104 MS
QT INTERVAL: 379 MS
QTC INTERVAL: 418 MS
RBC # BLD AUTO: 3.83 MILLION/UL (ref 3.88–5.62)
SODIUM SERPL-SCNC: 139 MMOL/L (ref 136–145)
T WAVE AXIS: 23 DEGREES
UNIT DISPENSE STATUS: NORMAL
UNIT PRODUCT CODE: NORMAL
UNIT RH: NORMAL
VENTRICULAR RATE: 73 BPM
WBC # BLD AUTO: 13.2 THOUSAND/UL (ref 4.31–10.16)

## 2021-03-25 PROCEDURE — 80048 BASIC METABOLIC PNL TOTAL CA: CPT | Performed by: PHYSICIAN ASSISTANT

## 2021-03-25 PROCEDURE — 97163 PT EVAL HIGH COMPLEX 45 MIN: CPT

## 2021-03-25 PROCEDURE — 82948 REAGENT STRIP/BLOOD GLUCOSE: CPT

## 2021-03-25 PROCEDURE — 93005 ELECTROCARDIOGRAM TRACING: CPT

## 2021-03-25 PROCEDURE — 83735 ASSAY OF MAGNESIUM: CPT | Performed by: PHYSICIAN ASSISTANT

## 2021-03-25 PROCEDURE — 93010 ELECTROCARDIOGRAM REPORT: CPT | Performed by: INTERNAL MEDICINE

## 2021-03-25 PROCEDURE — 71045 X-RAY EXAM CHEST 1 VIEW: CPT

## 2021-03-25 PROCEDURE — 85027 COMPLETE CBC AUTOMATED: CPT | Performed by: PHYSICIAN ASSISTANT

## 2021-03-25 PROCEDURE — 99024 POSTOP FOLLOW-UP VISIT: CPT | Performed by: THORACIC SURGERY (CARDIOTHORACIC VASCULAR SURGERY)

## 2021-03-25 PROCEDURE — 99222 1ST HOSP IP/OBS MODERATE 55: CPT | Performed by: INTERNAL MEDICINE

## 2021-03-25 PROCEDURE — 97167 OT EVAL HIGH COMPLEX 60 MIN: CPT

## 2021-03-25 RX ORDER — HEPARIN SODIUM 5000 [USP'U]/ML
5000 INJECTION, SOLUTION INTRAVENOUS; SUBCUTANEOUS EVERY 8 HOURS SCHEDULED
Status: DISCONTINUED | OUTPATIENT
Start: 2021-03-25 | End: 2021-03-28 | Stop reason: HOSPADM

## 2021-03-25 RX ORDER — FUROSEMIDE 10 MG/ML
40 INJECTION INTRAMUSCULAR; INTRAVENOUS
Status: DISCONTINUED | OUTPATIENT
Start: 2021-03-25 | End: 2021-03-26

## 2021-03-25 RX ORDER — TEMAZEPAM 15 MG/1
15 CAPSULE ORAL
Status: DISCONTINUED | OUTPATIENT
Start: 2021-03-25 | End: 2021-03-25

## 2021-03-25 RX ORDER — KETOROLAC TROMETHAMINE 30 MG/ML
15 INJECTION, SOLUTION INTRAMUSCULAR; INTRAVENOUS EVERY 8 HOURS PRN
Status: DISCONTINUED | OUTPATIENT
Start: 2021-03-25 | End: 2021-03-26

## 2021-03-25 RX ORDER — ZOLPIDEM TARTRATE 5 MG/1
10 TABLET ORAL
Status: DISCONTINUED | OUTPATIENT
Start: 2021-03-25 | End: 2021-03-26

## 2021-03-25 RX ORDER — ONDANSETRON 2 MG/ML
4 INJECTION INTRAMUSCULAR; INTRAVENOUS ONCE
Status: COMPLETED | OUTPATIENT
Start: 2021-03-25 | End: 2021-03-25

## 2021-03-25 RX ORDER — ASPIRIN 81 MG/1
81 TABLET, CHEWABLE ORAL DAILY
Status: DISCONTINUED | OUTPATIENT
Start: 2021-03-25 | End: 2021-03-28 | Stop reason: HOSPADM

## 2021-03-25 RX ORDER — POTASSIUM CHLORIDE 20 MEQ/1
20 TABLET, EXTENDED RELEASE ORAL 2 TIMES DAILY
Status: DISCONTINUED | OUTPATIENT
Start: 2021-03-25 | End: 2021-03-28 | Stop reason: HOSPADM

## 2021-03-25 RX ORDER — DOCUSATE SODIUM 100 MG/1
100 CAPSULE, LIQUID FILLED ORAL 2 TIMES DAILY
Status: DISCONTINUED | OUTPATIENT
Start: 2021-03-25 | End: 2021-03-26

## 2021-03-25 RX ORDER — FUROSEMIDE 10 MG/ML
40 INJECTION INTRAMUSCULAR; INTRAVENOUS
Status: DISCONTINUED | OUTPATIENT
Start: 2021-03-25 | End: 2021-03-25

## 2021-03-25 RX ADMIN — ONDANSETRON 4 MG: 2 INJECTION INTRAMUSCULAR; INTRAVENOUS at 09:30

## 2021-03-25 RX ADMIN — POLYETHYLENE GLYCOL 3350 17 G: 17 POWDER, FOR SOLUTION ORAL at 09:22

## 2021-03-25 RX ADMIN — INSULIN LISPRO 2 UNITS: 100 INJECTION, SOLUTION INTRAVENOUS; SUBCUTANEOUS at 10:57

## 2021-03-25 RX ADMIN — ACETAMINOPHEN 975 MG: 325 TABLET, FILM COATED ORAL at 06:18

## 2021-03-25 RX ADMIN — METOPROLOL TARTRATE 25 MG: 25 TABLET, FILM COATED ORAL at 09:14

## 2021-03-25 RX ADMIN — AMIODARONE HYDROCHLORIDE 200 MG: 200 TABLET ORAL at 06:18

## 2021-03-25 RX ADMIN — FUROSEMIDE 40 MG: 10 INJECTION, SOLUTION INTRAMUSCULAR; INTRAVENOUS at 17:35

## 2021-03-25 RX ADMIN — ZOLPIDEM TARTRATE 10 MG: 5 TABLET ORAL at 21:10

## 2021-03-25 RX ADMIN — OXYCODONE HYDROCHLORIDE 5 MG: 5 TABLET ORAL at 07:09

## 2021-03-25 RX ADMIN — ONDANSETRON 4 MG: 2 INJECTION INTRAMUSCULAR; INTRAVENOUS at 02:17

## 2021-03-25 RX ADMIN — DOCUSATE SODIUM 100 MG: 100 CAPSULE, LIQUID FILLED ORAL at 17:34

## 2021-03-25 RX ADMIN — AMIODARONE HYDROCHLORIDE 200 MG: 200 TABLET ORAL at 15:04

## 2021-03-25 RX ADMIN — ATORVASTATIN CALCIUM 80 MG: 80 TABLET, FILM COATED ORAL at 17:35

## 2021-03-25 RX ADMIN — OXYCODONE HYDROCHLORIDE 5 MG: 5 TABLET ORAL at 15:05

## 2021-03-25 RX ADMIN — HYDROMORPHONE HYDROCHLORIDE 0.5 MG: 1 INJECTION, SOLUTION INTRAMUSCULAR; INTRAVENOUS; SUBCUTANEOUS at 06:18

## 2021-03-25 RX ADMIN — ACETAMINOPHEN 975 MG: 325 TABLET, FILM COATED ORAL at 21:09

## 2021-03-25 RX ADMIN — FUROSEMIDE 40 MG: 10 INJECTION, SOLUTION INTRAMUSCULAR; INTRAVENOUS at 09:10

## 2021-03-25 RX ADMIN — HEPARIN SODIUM 5000 UNITS: 5000 INJECTION INTRAVENOUS; SUBCUTANEOUS at 09:22

## 2021-03-25 RX ADMIN — HEPARIN SODIUM 5000 UNITS: 5000 INJECTION INTRAVENOUS; SUBCUTANEOUS at 15:05

## 2021-03-25 RX ADMIN — POTASSIUM CHLORIDE 20 MEQ: 1500 TABLET, EXTENDED RELEASE ORAL at 17:35

## 2021-03-25 RX ADMIN — ONDANSETRON 4 MG: 2 INJECTION INTRAMUSCULAR; INTRAVENOUS at 13:09

## 2021-03-25 RX ADMIN — POTASSIUM CHLORIDE 20 MEQ: 1500 TABLET, EXTENDED RELEASE ORAL at 09:15

## 2021-03-25 RX ADMIN — CEFAZOLIN SODIUM 2000 MG: 2 SOLUTION INTRAVENOUS at 06:19

## 2021-03-25 RX ADMIN — HYDROMORPHONE HYDROCHLORIDE 0.5 MG: 1 INJECTION, SOLUTION INTRAMUSCULAR; INTRAVENOUS; SUBCUTANEOUS at 00:55

## 2021-03-25 RX ADMIN — AMIODARONE HYDROCHLORIDE 200 MG: 200 TABLET ORAL at 21:10

## 2021-03-25 RX ADMIN — ASPIRIN 81 MG: 81 TABLET, CHEWABLE ORAL at 09:15

## 2021-03-25 RX ADMIN — PANTOPRAZOLE SODIUM 40 MG: 40 TABLET, DELAYED RELEASE ORAL at 06:18

## 2021-03-25 RX ADMIN — ACETAMINOPHEN 975 MG: 325 TABLET, FILM COATED ORAL at 15:05

## 2021-03-25 RX ADMIN — LEVOTHYROXINE SODIUM 100 MCG: 100 TABLET ORAL at 06:18

## 2021-03-25 RX ADMIN — INSULIN LISPRO 1 UNITS: 100 INJECTION, SOLUTION INTRAVENOUS; SUBCUTANEOUS at 17:35

## 2021-03-25 RX ADMIN — HEPARIN SODIUM 5000 UNITS: 5000 INJECTION INTRAVENOUS; SUBCUTANEOUS at 21:10

## 2021-03-25 RX ADMIN — KETOROLAC TROMETHAMINE 15 MG: 30 INJECTION, SOLUTION INTRAMUSCULAR at 09:01

## 2021-03-25 RX ADMIN — OXYCODONE HYDROCHLORIDE 5 MG: 5 TABLET ORAL at 23:30

## 2021-03-25 RX ADMIN — DOCUSATE SODIUM 100 MG: 100 CAPSULE, LIQUID FILLED ORAL at 09:14

## 2021-03-25 RX ADMIN — CLOPIDOGREL BISULFATE 75 MG: 75 TABLET ORAL at 09:14

## 2021-03-25 RX ADMIN — OXYCODONE HYDROCHLORIDE 5 MG: 5 TABLET ORAL at 10:56

## 2021-03-25 RX ADMIN — KETOROLAC TROMETHAMINE 15 MG: 30 INJECTION, SOLUTION INTRAMUSCULAR at 17:35

## 2021-03-25 RX ADMIN — METOPROLOL TARTRATE 25 MG: 25 TABLET, FILM COATED ORAL at 21:09

## 2021-03-25 NOTE — OCCUPATIONAL THERAPY NOTE
Occupational Therapy Evaluation     Patient Name: Germain Crystal  AJMUG'W Date: 3/25/2021  Problem List  Principal Problem:    CAD (coronary artery disease)  Active Problems:    Claudication [facility])    Essential hypertension    Mixed hyperlipidemia    Hypothyroidism    Myofascial pain syndrome    Radiculopathy, lumbar region    History of CVA (cerebrovascular accident)    History of percutaneous coronary intervention    S/P CABG (coronary artery bypass graft)    Thrombocytopenia (HCC)    Hyperchloremia    Hypocalcemia    Past Medical History  Past Medical History:   Diagnosis Date    Anxiety     CAD (coronary artery disease)     Depression     Dizziness     Eye problems     Heart disease     Hypertension     Hypothyroidism     Hypothyroidism     Migraines     Stroke ([address] )     Tremors of nervous system      Past Surgical History  Past Surgical History:   Procedure Laterality Date    COLONOSCOPY  09/01/2017    COLONOSCOPY      CORONARY STENT PLACEMENT      DENTAL SURGERY      entire top teeth removal    HERNIA REPAIR      MS CABG, ARTERY-VEIN, FOUR N/A 3/24/2021    Procedure: CORONARY ARTERY BYPASS GRAFT (CABG) 3 VESSELS,  USING LEFT VANE-LAD AND LEFT GSV-RPL & RAMUS;  Surgeon: Yue Shea MD;  Location: [facility] MA[facility];  Service: Cardiac Surgery    MS ECHO TRANSESOPHAG [address] N/A 3/24/2021    Procedure: TRANSESOPHAGEAL ECHOCARDIOGRAM (MARLEN); Surgeon: Yue Shea MD;  Location: [facility];  Service: Cardiac Surgery           03/25/21 0950   OT Last Visit   OT Visit Date 03/25/21   Note Type   Note type Evaluation   Restrictions/Precautions   Weight Bearing Precautions Per Order No   Other Precautions Cardiac/sternal;Multiple lines;Telemetry; Fall Risk;Pain   Pain Assessment   Pain Assessment Tool 0-10   Pain Score Worst Possible Pain   Pain Location/Orientation Orientation: Mid;Location: Chest   Hospital Pain Intervention(s) Repositioned; Ambulation/increased activity; Emotional support   Home Living   Type of [address] [facility] One level;Stairs to enter with rails   Bathroom Shower/Tub Tub/shower unit   Bathroom Toilet Standard   Bathroom Equipment Shower chair;Commode   [address]; Hospital bed   Additional Comments Pt reports living in a 1 story home with a 5+5 CATY  Prior Function   Level of [place] Independent with ADLs and functional mobility   Lives With Spouse   Receives Help From Family   ADL Assistance Independent   IADLs Independent   Falls in the last 6 months 0   Vocational Retired   Lifestyle   Autonomy Pt reports being I with ADLS, IADLS and mobility with SPC in the community as needed PTA  (+)     Reciprocal Relationships Pt lives with his wife who he reports is able to assist as needed upon d/c   Service to Others Retired   [address] Enjoys music and writing    ADL   Where Chaim [phone]  [facility] 5  [address] [facility] 3  Moderate Assistance   LB [address] 2  Maximal [facility] 200 3  Moderate Assistance    [facility] 2  Maximal [address]  3  Moderate Assistance   Transfers   Sit to Stand 3  Moderate assistance   Additional items Assist x 1; Increased time required;Verbal cues   Stand to Sit 3  Moderate assistance   Additional items Assist x 1; Increased time required;Verbal cues   Functional Mobility   Functional Mobility 3  Moderate assistance   Additional Comments Pt demonstrated short household mobility with mod-min A and RW     Additional items Rolling walker   Balance   Static Sitting Fair +   Dynamic Sitting Fair   Static Standing Fair -   Dynamic Standing Poor +   Ambulatory Poor   Activity Tolerance   Activity Tolerance Patient limited by fatigue;Patient limited by pain   Medical Staff Made Aware PT [facility]   Nurse Made Aware RN confirmed okay to see pt   RUE Assessment   RUE Assessment X  (Pt reports he has a torn rotator cuff and residual deficits from previous CVA)   LUE Assessment   LUE Assessment WFL   Cognition   Overall Cognitive Status WFL   Arousal/Participation Alert; Cooperative   Attention Attends with cues to redirect   Orientation Level Oriented X4   Memory Decreased recall of precautions   Following Commands Follows one step commands with increased time or repetition   Comments Pt requires some increased processing time, but is overall pleasant and cooperative  Assessment   Limitation Decreased ADL status; Decreased endurance;Decreased high-level ADLs; Decreased self-care trans   Prognosis Good   Assessment Pt is a 63 y o  male admitted to [facility] on 3/24/2021 w/ CAD s/p CABG x3 on 3/24/2021  Comorbidities include a h/o Anxiety, CAD (coronary artery disease), Depression, Dizziness, Eye problems, Heart disease, Hypertension, Hypothyroidism, Hypothyroidism, Migraines, Stroke ([facility] [address] ), and Tremors of nervous system  Pt with active OT orders and activity as tolerated orders  Pt resides in a 1 story home with 5+5 CATY  Pt lives with his wife who he reports is able to assist as needed upon d/c  Pt was I w/  ADLS and IADLS, (+) drove, & required use of SPC as needed PTA  Currently pt is mod A for functional transfers and functional mobility, mod A for UB ADLS and max A for LB ADLS  Pt is limited at this time 2*: pain, endurance, activity tolerance, functional mobility, forward functional reach, functional standing tolerance and decreased I w/ ADLS/IADLS  The following Occupational Performance Areas to address include: grooming, bathing/shower, toilet hygiene, dressing, functional mobility and clothing management  Based on the aforementioned OT evaluation, functional performance deficits, and assessments, pt has been identified as a high complexity evaluation  From OT standpoint, anticipate d/c home OT pending progress   Pt to continue to benefit from acute immediate OT services to address the following goals 3-5x/week to  w/in 10-14 days: [place] Goals   Patient Goals To feel better   LTG Time Frame 10-14   Long Term Goal #1 See goals below   Plan   Treatment Interventions ADL retraining;Functional transfer training;UE strengthening/ROM; Endurance training;Cognitive reorientation;Patient/family training;Equipment evaluation/education; Compensatory technique education;Continued evaluation;Cardiac education; Energy conservation; Activityengagement   Goal Expiration Date 21   OT Frequency 3-5x/wk   Recommendation   OT Discharge Recommendation Home with skilled therapy  (pending progress)   AM-PAC Daily Activity Inpatient   Lower Body Dressing 2   Bathing 2   Toileting 2   Upper Body Dressing 3   Grooming 3   Eating 4   Daily Activity Raw Score 16   Daily Activity Standardized Score (Calc for Raw Score >=11) 35 96   AM-PAC Applied Cognition Inpatient   Following a Speech/Presentation 3   Understanding Ordinary Conversation 3   Taking Medications 3   Remembering Where Things Are Placed or Put Away 3   Remembering List of 4-5 Errands 3   Taking Care of Complicated Tasks 3   Applied Cognition Raw Score 18   Applied Cognition Standardized Score 38 07   Modified [place] Scale   Modified [place] Scale 4       GOALS    1) Pt will increase activity tolerance to G for 30 min txment sessions    2) Pt will complete UB/LB dressing/self care w/ mod I using adaptive device and DME as needed    3) Pt will complete bathing w/ Mod I w/ use of AE and DME as needed    4) Pt will complete toileting w/ mod I w/ G hygiene/thoroughness using DME as needed    5) Pt will improve functional transfers to Mod I on/off all surfaces using DME as needed w/ G balance/safety     6) Pt will improve functional mobility during ADL/IADL/leisure tasks to Mod I using DME as needed w/ G balance/safety     7) Pt will demonstrate G carryover of pt/caregiver education and training as appropriate w/ mod I 8) Pt will engage in cardiac education using the Recovering After Cardiac Surgery packet w/ G participation and G carryover  9) Pt will demonstrate 100% carryover of precautions s/p review w/ mod I w/ G tolerance/participation t/o functional ADL/IADL/leisure tasks      10) Pt will independently identify and utilize 2-3 coping strategies to increase positive affect and promote overall well-being      11) Pt will engage in ongoing cognitive assessment w/ G participation to assist w/ safe d/c planning/recommendations (as needed)    PERLA Hayes, OTR/L

## 2021-03-25 NOTE — PLAN OF CARE
Problem: OCCUPATIONAL THERAPY ADULT  Goal: Performs self-care activities at highest level of function for planned discharge setting  See evaluation for individualized goals  Description: Treatment Interventions: ADL retraining, Functional transfer training, UE strengthening/ROM, Endurance training, Cognitive reorientation, Patient/family training, Equipment evaluation/education, Compensatory technique education, Continued evaluation, Cardiac education, Energy conservation, Activityengagement          See flowsheet documentation for full assessment, interventions and recommendations  Note: Limitation: Decreased ADL status, Decreased endurance, Decreased high-level ADLs, Decreased self-care trans  Prognosis: Good  Assessment: Pt is a 63 y o  male admitted to [facility] on 3/24/2021 w/ CAD s/p CABG x3 on 3/24/2021  Comorbidities include a h/o Anxiety, CAD (coronary artery disease), Depression, Dizziness, Eye problems, Heart disease, Hypertension, Hypothyroidism, Hypothyroidism, Migraines, Stroke ([facility] [address] ), and Tremors of nervous system  Pt with active OT orders and activity as tolerated orders  Pt resides in a 1 story home with 5+5 CATY  Pt lives with his wife who he reports is able to assist as needed upon d/c  Pt was I w/  ADLS and IADLS, (+) drove, & required use of SPC as needed PTA  Currently pt is mod A for functional transfers and functional mobility, mod A for UB ADLS and max A for LB ADLS  Pt is limited at this time 2*: pain, endurance, activity tolerance, functional mobility, forward functional reach, functional standing tolerance and decreased I w/ ADLS/IADLS  The following Occupational Performance Areas to address include: grooming, bathing/shower, toilet hygiene, dressing, functional mobility and clothing management  Based on the aforementioned OT evaluation, functional performance deficits, and assessments, pt has been identified as a high complexity evaluation   From OT standpoint, anticipate d/c home OT pending progress  Pt to continue to benefit from acute immediate OT services to address the following goals 3-5x/week to  w/in 10-14 days:        OT Discharge Recommendation: Home with skilled therapy(pending progress)

## 2021-03-25 NOTE — CONSULTS
Consultation - Cardiology Team One  Israel Suggs 63 y o  male MRN: [id number]  Unit/Bed#: [facility] 406-01 Encounter: [id number]    Consults    Physician Requesting Consult: Tracy Barron MD  Reason for Consult / Principal Problem:  CAD s/p CABG    Assessment:    1  CAD: s/p CABG x3 with LIMA-LAD, SVG-or PLB, SVG-RI  POD #1  · Rhythm management:  Amiodarone 200 mg TID, Lopressor 25 mg BID  · Volume management:  IV Lasix 40 mg BID w/ K+ supplement  Net output +1 8 L  · Hemodynamics:  Off pressors  Remains with CT and EPW  · Aspirin, Plavix, beta-blocker, statin  2  Preserved biventricular systolic function:  EF 57%, no WMA, G1DD, no significant valvular abnormality  Volume management as above  3  Essential hypertension:  Last /62 on Lopressor 25 mg BID and IV Lasix  4  Hyperlipidemia: , TG 93, HDL 42, LDL 77 on atorvastatin 80 mg daily  5  Hypothyroidism:  Maintained on levothyroxine 100 mcg daily  6  History of CVA:  With residual right-sided weakness  On aspirin and statin      Plan/Recommendations:  · Continue IV diuretics for negative fluid balance  · Monitor I&Os, renal function, electrolytes and standing weight  · Maintain potassium >4 and magnesium >2  · Continue current cardiac medications  · Encourage ambulation and incentive spirometry  · Wean oxygen as able  · Follow up with CHRISTA Ramsey 04/08/2020    ____________________________________________________________    CC:  Chest pain      History of Present Illness   HPI: Israel Suggs is a 63y o  year old male who has CAD with history of multiple stents (11 stents over 16 years) from essential hypertension, hyperlipidemia, history of CVA with residual right-sided weakness who saw cardiologist Dr Nora Randolph for 2nd opinion  Patient previously followed with [facility] Dr Franchesca Howell  He was seen in the office 02/26/2021 with nonspecific chest pain complaints and was recommended for cardiac catheterization that revealed multivessel CAD  Patient was referred to CT surgery for CABG evaluation  After routine preoperative testing patient presented 3/24/21 underwent CABG x3 with LIMA-LAD, SVG-or PLB, SVG-RI  Cardiology has been consulted for postoperative co-management  Patient resting in a chair during consultation has some incisional chest discomfort  Denies any shortness of breath, palpitations  He did have some lightheadedness earlier that is now improved  Home medication regimen includes aspirin 81 mg daily, atorvastatin 80 mg daily, Plavix 75 mg daily, Toprol- mg daily  Echocardiogram 03/18/2021:  EF 60%, no obvious WMA, G1DD  Normal RV function  No significant valvular abnormality  Cardiac catheterization 03/08/2021:  CORONARY VESSELS:     --  The coronary circulation is right dominant  --  Left main: Angiography showed mild atherosclerosis  --  Mid LAD: There was a 80 % stenosis in the middle third of the vessel segment  --  Proximal circumflex: There was a 85 % stenosis  --  Ostial ramus intermedius: There was a 70 % stenosis  --  Proximal RCA: There was a 50- 60 % stenosis      ARCH VESSELS:   --  LIMA: suitable conduit for coronary artery bypass grafting      IMPRESSIONS:  Pt with several cardiac procedures in past with 11 stents spanning [facility], [facility]  Chronic CAD as described      RECOMMENDATIONS  Cardiac Surgery eval for CABG    EKG reviewed personally: 3/25/2021-sinus rhythm at a rate of 73 beats per minute with left axis deviation with anterolateral ST abnormality  No significant change when compared to EKG from 03/24/2021  Telemetry reviewed personally:  Normal sinus rhythm        Review of Systems   Constitution: Negative  Negative for chills  Cardiovascular: Positive for chest pain  Negative for dyspnea on exertion, leg swelling, near-syncope, orthopnea, palpitations, paroxysmal nocturnal dyspnea and syncope  Respiratory: Negative  Negative for cough, shortness of breath and wheezing  Endocrine: Negative  Hematologic/Lymphatic: Negative  Skin: Negative  Musculoskeletal: Negative  Gastrointestinal: Negative  Negative for diarrhea, nausea and vomiting  Neurological: Positive for light-headedness  Negative for dizziness and weakness  Psychiatric/Behavioral: Negative  Negative for altered mental status  All other systems reviewed and are negative      Historical Information   Past Medical History:   Diagnosis Date    Anxiety     CAD (coronary artery disease)     Depression     Dizziness     Eye problems     Heart disease     Hypertension     Hypothyroidism     Hypothyroidism     Migraines     Stroke ([address] )     Tremors of nervous system      Past Surgical History:   Procedure Laterality Date    COLONOSCOPY  09/01/2017    COLONOSCOPY      CORONARY STENT PLACEMENT      DENTAL SURGERY      entire top teeth removal    HERNIA REPAIR       Social History     Substance and Sexual Activity   Alcohol Use No     Social History     Substance and Sexual Activity   Drug Use No     Social History     Tobacco Use   Smoking Status Never Smoker   Smokeless Tobacco Never Used     Family History:   Family History   Problem Relation Age of Onset    Cancer Mother     Heart attack Father     Heart disease Father     Heart attack Sister     Cancer Brother        Meds/Allergies   all current active meds have been reviewed, current meds:   Current Facility-Administered Medications   Medication Dose Route Frequency    acetaminophen (TYLENOL) tablet 975 mg  975 mg Oral Q8H    amiodarone tablet 200 mg  200 mg Oral Q8H [address]    aspirin chewable tablet 81 mg  81 mg Oral Daily    atorvastatin (LIPITOR) tablet 80 mg  80 mg Oral After Dinner    bisacodyl (DULCOLAX) rectal suppository 10 mg  10 mg Rectal Daily PRN    clopidogrel (PLAVIX) tablet 75 mg  75 mg Oral Daily    docusate sodium (COLACE) capsule 100 mg  100 mg Oral BID    furosemide (LASIX) injection 40 mg  40 mg Intravenous BID (diuretic)    heparin (porcine) subcutaneous injection 5,000 Units  5,000 Units Subcutaneous Q8H [address]    insulin lispro (HumaLOG) 100 units/mL subcutaneous injection 1-5 Units  1-5 Units Subcutaneous HS    insulin lispro (HumaLOG) 100 units/mL subcutaneous injection 1-6 Units  1-6 Units Subcutaneous TID AC    [MAR Hold] ketorolac (TORADOL) injection 15 mg  15 mg Intravenous Q8H PRN    levothyroxine tablet 100 mcg  100 mcg Oral Early Morning    metoprolol tartrate (LOPRESSOR) tablet 25 mg  25 mg Oral Q12H [address]    ondansetron (ZOFRAN) injection 4 mg  4 mg Intravenous Q6H PRN    oxyCODONE (ROXICODONE) IR tablet 2 5 mg  2 5 mg Oral Q4H PRN    oxyCODONE (ROXICODONE) IR tablet 5 mg  5 mg Oral Q4H PRN    pantoprazole (PROTONIX) EC tablet 40 mg  40 mg Oral Early Morning    polyethylene glycol (MIRALAX) packet 17 g  17 g Oral Daily    potassium chloride (K-DUR,KLOR-CON) CR tablet 20 mEq  20 mEq Oral BID    temazepam (RESTORIL) capsule 15 mg  15 mg Oral HS PRN    and PTA meds:   Prior to Admission Medications   Prescriptions Last Dose Informant Patient Reported?  Taking?   aspirin 81 MG tablet 3/23/2021 at Unknown time Self Yes Yes   Sig: Take 81 mg by mouth   atorvastatin (LIPITOR) 40 mg tablet 3/23/2021 at Unknown time Self No Yes   Sig: TAKE 2 TABLETS EVERY MORNING   clonazePAM (KlonoPIN) 2 mg tablet 3/23/2021 at Unknown time Self No Yes   Sig: Take 2 tablets (4 mg total) by mouth 2 (two) times a day as needed for seizures   clopidogrel (PLAVIX) 75 mg tablet 3/19/2021  Yes Yes   Sig: Take 75 mg by mouth daily   levothyroxine 100 mcg tablet 3/23/2021 at Unknown time Self No Yes   Sig: TAKE 1 TABLET EVERY DAY   metoprolol succinate (TOPROL-XL) 100 mg 24 hr tablet 3/23/2021 at Unknown time Self No Yes   Sig: Take 1 tablet (100 mg total) by mouth daily   mupirocin (BACTROBAN) 2 % ointment 3/24/2021 at 0600  No Yes   Sig: Apply 1 application topically 2 (two) times a day for 5 days Apply to each nostril twice daily for five days before your operation  oxyCODONE-acetaminophen (PERCOCET) 5-325 mg per tablet More than a month at Unknown time Self No No   Sig: Take 1 tablet by mouth every 8 (eight) hours as needed for moderate painMax Daily Amount: 3 tablets   sildenafil (VIAGRA) 100 mg tablet More than a month at Unknown time Self No No   Sig: Take 1 tablet (100 mg total) by mouth daily as needed for erectile dysfunction Pt request generic brand Tania to offset costs   tiZANidine (ZANAFLEX) 2 mg tablet 3/23/2021 at Unknown time Self No Yes   Sig: Take 1 tablet (2 mg total) by mouth every 8 (eight) hours as needed for muscle spasms   zolpidem (AMBIEN) 10 mg tablet 3/23/2021 at Unknown time Self No Yes   Si po  q hs      Facility-Administered Medications: None          Allergies   Allergen Reactions    Medical Tape Other (See Comments)     Other reaction(s): BURNS USE PAPER TAPE       Objective   Vitals: Blood pressure 142/95, pulse 76, temperature 98 2 °F (36 8 °C), temperature source Oral, resp  rate 18, height 5' 9" (1 753 m), weight 109 kg (240 lb 15 4 oz), SpO2 97 %  ,     Body mass index is 35 58 kg/m² ,     No data recorded  No data recorded      Wt Readings from Last 3 Encounters:   21 109 kg (240 lb 15 4 oz)   21 107 kg (235 lb 12 8 oz)   21 107 kg (235 lb)      Lab Results   Component Value Date    CREATININE 1 20 2021    CREATININE 1 15 2021    CREATININE 1 27 2021             Intake/Output Summary (Last 24 hours) at 3/25/2021 1103  Last data filed at 3/25/2021 1044  Gross per 24 hour   Intake 3790 04 ml   Output 1915 ml   Net 1875 04 ml     Weight (last 2 days)     Date/Time   Weight    21 0600   109 (240 96)    21 0639   105 (231)            Invasive Devices     Central Venous Catheter Line            CVC Central Lines 21 Triple 1 day          Peripheral Intravenous Line            Peripheral IV 21 Left Antecubital 1 day          Arterial Line Arterial Line 03/24/21 Right Radial less than 1 day          Line            Pacer Wires less than 1 day    Pacer Wires less than 1 day          Drain            Chest Tube 1 Left Pleural 32 Fr  1 day    Chest Tube 2 Posterior Mediastinal 32 Fr  1 day    Chest Tube 3 Anterior Mediastinal 32 Fr  less than 1 day                  Physical Exam  Vitals signs and nursing note reviewed  Constitutional:       General: He is not in acute distress  Appearance: He is well-developed  Comments: On 2 L NC in NAD   HENT:      Head: Normocephalic and atraumatic  Neck:      Musculoskeletal: Normal range of motion and neck supple  Vascular: No JVD  Comments: Invasive lines noted right  Cardiovascular:      Rate and Rhythm: Normal rate and regular rhythm  Heart sounds: Normal heart sounds  No murmur  No friction rub  No gallop  Pulmonary:      Effort: Pulmonary effort is normal  No respiratory distress  Breath sounds: No wheezing or rales  Comments: Diminished breath sounds at the bases bilaterally  Chest:      Chest wall: No tenderness  Abdominal:      General: Bowel sounds are normal  There is no distension  Palpations: Abdomen is soft  Tenderness: There is no abdominal tenderness  Musculoskeletal: Normal range of motion  General: No tenderness  Skin:     General: Skin is warm and dry  Coloration: Skin is not pale  Findings: No erythema  Comments: Sternal incision with occlusive dressing   Neurological:      Mental Status: He is alert and oriented to person, place, and time  Mental status is at baseline  Psychiatric:         Mood and Affect: Mood normal          Behavior: Behavior normal          Thought Content:  Thought content normal          Judgment: Judgment normal            LABORATORY RESULTS:      CBC with diff:   Results from last 7 days   Lab Units 03/25/21  0405 03/24/21  2125 03/24/21  1403 03/24/21  1358 03/24/21  1252 03/24/21  1220 03/24/21  1209 03/24/21  1152   WBC Thousand/uL 13 20*  --   --   --   --   --   --   --    HEMOGLOBIN g/dL 12 7 12 8  --  12 0  --   --   --   --    I STAT HEMOGLOBIN g/dl  --   --  10 5*  --  9 2* 9 5*  --  9 2*   HEMATOCRIT % 38 4 38 6  --  34 9*  --   --   --   --    HEMATOCRIT, ISTAT %  --   --  31*  --  27* 28*  --  27*   MCV fL 100*  --   --   --   --   --   --   --    PLATELETS Thousands/uL 154  --   --  119*  --   --  146*  --    MCH pg 33 2  --   --   --   --   --   --   --    MCHC g/dL 33 1  --   --   --   --   --   --   --    RDW % 14 1  --   --   --   --   --   --   --    MPV fL 10 2  --   --  9 8  --   --  10 0  --        CMP:  Results from last 7 days   Lab Units 03/25/21  0405 03/24/21  2125 03/24/21  1804 03/24/21  1403 03/24/21  1358 03/24/21  1252 03/24/21  1220 03/24/21  1152 03/24/21  1140 03/24/21  1127 03/24/21  1039   POTASSIUM mmol/L 4 5 4 3 4 0  --  4 2  --   --   --   --   --   --    CHLORIDE mmol/L 108  --   --   --  109*  --   --   --   --   --   --    CO2 mmol/L 26  --   --   --  24  --   --   --   --   --   --    CO2, I-STAT mmol/L  --   --   --  26  --  24 29 30 28 30 29   BUN mg/dL 14  --   --   --  11  --   --   --   --   --   --    CREATININE mg/dL 1 20  --   --   --  1 15  --   --   --   --   --   --    GLUCOSE, ISTAT mg/dl  --   --   --  138  --  137 147* 132 125 119 117   CALCIUM mg/dL 7 9*  --   --   --  8 2*  --   --   --   --   --   --    EGFR ml/min/1 73sq m 64  --   --   --  67  --   --   --   --   --   --        BMP:  Results from last 7 days   Lab Units 03/25/21  0405 03/24/21  2125 03/24/21  1804 03/24/21  1403 03/24/21  1358 03/24/21  1252 03/24/21  1220 03/24/21  1152 03/24/21  1140   POTASSIUM mmol/L 4 5 4 3 4 0  --  4 2  --   --   --   --    CHLORIDE mmol/L 108  --   --   --  109*  --   --   --   --    CO2 mmol/L 26  --   --   --  24  --   --   --   --    CO2, I-STAT mmol/L  --   --   --  26  --  24 29 30 28   BUN mg/dL 14  --   --   --  11  --   -- --   --    CREATININE mg/dL 1 20  --   --   --  1 15  --   --   --   --    GLUCOSE, ISTAT mg/dl  --   --   --  138  --  137 147* 132 125   CALCIUM mg/dL 7 9*  --   --   --  8 2*  --   --   --   --           No results found for: NTBNP         Results from last 7 days   Lab Units 21  0405   MAGNESIUM mg/dL 2 2          Results from last 7 days   Lab Units 21  0939   HEMOGLOBIN A1C % 5 7*              Results from last 7 days   Lab Units 21  0939   INR  0 96     Lipid Profile:   No results found for: CHOL  Lab Results   Component Value Date    HDL 42 2021    HDL 39 (L) 2020    HDL 40 2019     Lab Results   Component Value Date    LDLCALC 77 2021    LDLCALC 69 2020    LDLCALC 74 2019     Lab Results   Component Value Date    TRIG 93 2021    TRIG 116 2020    TRIG 121 2019         Cardiac testing:   Results for orders placed during the hospital encounter of 21   Echo complete with contrast if indicated    Narrative [place] [address], [address] [place] [address]  [phone]    Transthoracic Echocardiogram  2D, M-mode, Doppler, and Color Doppler    Study date:  18-Mar-2021    Patient: Aleksandr Meraz  MR number: [id number]  Account number: [de-identified]  : 1955  Age: 63 years  Gender: Male  Status: Outpatient  Location: [place] [address]   Height: 69 in  Weight: 235 lb  BP: 160/ 100 mmHg    Indications: Assess left ventricular function  Diagnoses: I25 10 - Atherosclerotic heart disease of native coronary artery without angina pectoris    Sonographer:  Ismael Mehta RDCS  Primary Physician:  Elsie Bishop MD  Referring Physician:  Meredith Christian PA-C  Group:  [facility]  Interpreting Physician:  Raffi Jennings MD    IMPRESSIONS:  The examination was technically difficult  SUMMARY    LEFT VENTRICLE:  Systolic function was normal  Ejection fraction was estimated to be 60 %    Although no diagnostic regional wall motion abnormality was identified, this possibility cannot be completely excluded on the basis of this study  Wall thickness was mildly increased  Doppler parameters were consistent with abnormal left ventricular relaxation (grade 1 diastolic dysfunction)  HISTORY: PRIOR HISTORY: CAD, hyperlipidemia, CHF, hypertension, CVA, palpitations    PROCEDURE: The study was performed in the [address]  This was a routine study  The transthoracic approach was used  The study included complete 2D imaging, M-mode, complete spectral Doppler, and color Doppler  The heart rate was  65 bpm, at the start of the study  Images were obtained from the parasternal, apical, subcostal, and suprasternal notch acoustic windows  Echocardiographic views were limited due to decreased penetration  This was a technically difficult  study  LEFT VENTRICLE: Size was normal  Systolic function was normal  Ejection fraction was estimated to be 60 %  Although no diagnostic regional wall motion abnormality was identified, this possibility cannot be completely excluded on the basis  of this study  Wall thickness was mildly increased  DOPPLER: Doppler parameters were consistent with abnormal left ventricular relaxation (grade 1 diastolic dysfunction)  RIGHT VENTRICLE: The size was normal  Systolic function was normal  Wall thickness was normal     LEFT ATRIUM: Size was normal     RIGHT ATRIUM: Size was normal     MITRAL VALVE: Valve structure was normal  There was normal leaflet separation  DOPPLER: The transmitral velocity was within the normal range  There was no evidence for stenosis  There was no significant regurgitation  AORTIC VALVE: The valve was trileaflet  Leaflets exhibited normal thickness and normal cuspal separation  DOPPLER: Transaortic velocity was within the normal range  There was no evidence for stenosis  There was no significant  regurgitation      TRICUSPID VALVE: The valve structure was normal  There was normal leaflet separation  DOPPLER: The transtricuspid velocity was within the normal range  There was no evidence for stenosis  There was no significant regurgitation  PULMONIC VALVE: Leaflets exhibited normal thickness, no calcification, and normal cuspal separation  DOPPLER: The transpulmonic velocity was within the normal range  There was no significant regurgitation  PERICARDIUM: There was no pericardial effusion  The pericardium was normal in appearance  AORTA: The root exhibited normal size  SYSTEMIC VEINS: IVC: The inferior vena cava was normal in size  SYSTEM MEASUREMENT TABLES    2D  %FS: 35 97 %  Ao Diam: 4 04 cm  EDV(Teich): 82 4 ml  EF(Teich): 65 89 %  ESV(Teich): 28 11 ml  IVSd: 1 43 cm  LA Area: 12 13 cm2  LA Diam: 3 47 cm  LVEDV MOD A4C: 137 79 ml  LVEF MOD A4C: 59 94 %  LVESV MOD A4C: 55 19 ml  LVIDd: 4 29 cm  LVIDs: 2 74 cm  LVLd A4C: 8 78 cm  LVLs A4C: 7 67 cm  LVPWd: 1 24 cm  RA Area: 12 42 cm2  RVIDd: 3 52 cm  SV MOD A4C: 82 6 ml  SV(Teich): 54 29 ml    CW  TR MaxP 3 mmHg  TR Vmax: 2 02 m/s    MM  TAPSE: 1 91 cm    PW  E' Sept: 0 05 m/s  E/E' Sept: 7 3  MV A Simone: 0 6 m/s  MV Dec Floyd: 1 42 m/s2  MV DecT: 281 74 ms  MV E Simone: 0 4 m/s  MV E/A Ratio: 0 66  MV PHT: 81 71 ms  MVA By PHT: 2 69 cm2    [employer] Accredited Echocardiography Laboratory    Prepared and electronically signed by    Miri De Leon MD  Signed 18-Mar-2021 12:18:58       No results found for this or any previous visit  No procedure found  No results found for this or any previous visit  Imaging: I have personally reviewed pertinent reports  Xr Chest Portable    Result Date: 3/25/2021  Narrative: CHEST INDICATION:   CABG on 3/24/2021  COMPARISON:  Chest radiograph from 3/24/2021  EXAM PERFORMED/VIEWS:  XR CHEST PORTABLE  FINDINGS:  ET tube and NG tube removed  Right jugular catheter at cavoatrial junction    Right jugular pulmonary artery catheter in main pulmonary artery  Normal heart size  CABG  Sternal wires well aligned  Temporary epicardial pacemaker wires  Two mediastinal drains  Coronary stent  Left chest tube with no effusion or pneumothorax  Increased left base atelectasis post extubation  Osseous structures normal for age  Impression: Expected appearance after CABG  Increased left base atelectasis post extubation  Workstation performed: [id number]     Xr Chest Pa & Lateral    Result Date: 3/22/2021  Narrative: CHEST INDICATION:   I25 119: Atherosclerotic heart disease of native coronary artery with unspecified angina pectoris Z01 810: Encounter for preprocedural cardiovascular examination  COMPARISON:  None EXAM PERFORMED/VIEWS:  XR CHEST PA & LATERAL FINDINGS: Cardiomediastinal silhouette appears unremarkable  The lungs are clear  No pneumothorax or pleural effusion  Osseous structures appear within normal limits for patient age  Impression: No acute cardiopulmonary disease  Workstation performed: [id number]     Vas Carotid Complete Study    Result Date: 3/15/2021  Narrative:  [facility] REPORT CLINICAL: Indications:  Patient presents for a general health evaluation secondary to future open heart surgery  H/O a left hemispheric stroke with persistent residual Rt arm and Rt leg weakness  Operative History: Coronary stent placement Risk Factors The patient has history of HTN, HLD and CAD  Clinical Right Pressure:  157/ mm Hg, Left Pressure:  175/ mm Hg  FINDINGS:  Right        Impression  PSV  EDV (cm/s)  Direction of Flow  Ratio  Dist  ICA                 64          22                      0 70  Mid  ICA                  58          20                      0 63  Prox   ICA    1 - 49%      64          18                      0 69  Dist CCA                  74          16                            Mid CCA                   92          19                      1 11  Prox CCA                  83          12                      1 30  Ext Carotid 61           6                      0 66  Prox Vert                 38           8  Antegrade                 Subclavian               116           0                            Innominate                64           0                             Left         Impression  PSV  EDV (cm/s)  Direction of Flow  Ratio  Dist  ICA                 66          23                      0 79  Mid  ICA                  58          21                      0 69  Prox  ICA    1 - 49%      47          15                      0 57  Dist CCA                  72          19                            Mid CCA                   84          19                      0 75  Prox CCA                 111          19                            Ext Carotid               56           6                      0 67  Prox Vert                 35          12  Antegrade                 Subclavian               139           0                               CONCLUSION:  Impression RIGHT: There is <50% stenosis noted in the internal carotid artery  Plaque is heterogenous and smooth  Vertebral artery flow is antegrade  There is no significant subclavian artery disease  LEFT: There is <50% stenosis noted in the internal carotid artery  Plaque is heterogenous and smooth  Vertebral artery flow is antegrade  There is no significant subclavian artery disease  Internal carotid artery stenosis determination by consensus criteria from: Brooke Thomson et al  Carotid Artery Stenosis: Gray-Scale and Doppler US Diagnosis - Society of Radiologists in [facility] [address], Radiology 2003; [phone]  SIGNATURE: Electronically Signed by: Milagros Jacobson on 2021-03-15 04:34:49 PM    Vas Lower Limb Arterial Duplex, Complete Bilateral    Result Date: 3/19/2021  Narrative:  [facility] REPORT CLINICAL: Indications:  Preop exam for CABG  Patient denies claudication  History of claudication in medical record   Operative History: Coronary stent placement Risk Factors The patient has history of HTN, HLD and CAD  Clinical Right Pressure:  145/ mm Hg, Left Pressure:  140/ mm Hg  FINDINGS:  Segment                Right            Left                                          PSV (cm/s)  EDV  PSV (cm/s)  EDV  Common Femoral Artery          63    0          88    0  Prox Profunda                  27    0          61    0  Prox SFA                       59    0          78    0  Mid SFA                        54    0          63    0  Dist SFA                       52    0          50    0  Proximal Pop                   31    0          38    4  Distal Pop                     38    0          57    0  Dist Post Tibial               45    0          72    0  Dist  Ant  Tibial              71    0          62    0     CONCLUSION:  Impression: RIGHT LOWER LIMB: This resting evaluation shows no evidence of significant lower extremity arterial occlusive disease  Ankle/Brachial index:   Non-compressible PVR/ PPG tracings are normal  Triphasic waveforms at ankle  Metatarsal pressure of 161 mmHg Great toe pressure of 123 mmHg, within the healing range   LEFT LOWER LIMB: This resting evaluation shows no evidence of significant lower extremity arterial occlusive disease  Ankle/Brachial index:  Non-compressible PVR/ PPG tracings are normal  Triphasic waveforms at ankle  Metatarsal pressure of  156 mmHg Great toe pressure of 130  mmHg, within the healing range  SIGNATURE: Electronically Signed by: Fermin Sanches on 2021-03-19 02:44:56 PM    Vas Lower Limb Vein Mapping Bypass Graft    Result Date: 3/19/2021  Narrative:  [facility] REPORT CLINICAL: Indications: Pre-op Vein Mapping for Future Open Heart Surgery Operative History: Coronary stent placement Risk Factors The patient has history of HTN, HLD and CAD    FINDINGS:  Segment         Right        Left                            Diameter AP  Diameter AP  GSV Inguinal            7 4          5 6  GSV Prox Thigh          3 3          4 1  GSV Mid Thigh           2 6          5 1  GSV Dist Thigh          2 6          5 1  GSV Knee                2 1          4 1  GSV Prox Calf                        2 7  GSV Mid Calf            2 2          2 3  GSV Dist Calf                        2 6  GSV Ankle               3 1               SSV Mid Calf            2 2          1 3  SSV Knee                2 0               SSV Ankle               2 1                  CONCLUSION:  Impression: RIGHT LOWER LIMB: The great saphenous vein is patent  from the groin to the ankle  The vein measures >2mm in caliber from the groin to the ankle  The small saphenous vein is patent and is of adequate caliber for conduit  LEFT LOWER LIMB: The great saphenous vein is patent  from the groin to the ankle  The vein measures >2mm in caliber from the groin to the ankle  The small saphenous vein is difficult to visualize due to small caliber  SIGNATURE: Electronically Signed by: Clint Elliott on 2021-03-19 02:44:32 PM    Xr Chest Portable Icu    Result Date: 3/24/2021  Narrative: CHEST INDICATION:   S/P open heart  COMPARISON:  3/19/2021 EXAM PERFORMED/VIEWS:  XR CHEST PORTABLE ICU Images: 2 FINDINGS: Typical appearance status post recent open heart surgery Mediastinal and left basal chest tube  Typical right IJ CVP line and Barron-Melissa catheter  Sternal wires and cardiac clips Cardiomediastinal silhouette appears unremarkable  Left basal opacity, likely subsegmental atelectasis No pneumothorax or pleural effusion  Osseous structures appear within normal limits for patient age  Impression: Typical appearance status post recent cardiac surgery without complication Probable subsegmental atelectasis left lung base Workstation performed: [id number]       Counseling / Coordination of Care  Total floor / unit time spent today 45 minutes    Greater than 50% of total time was spent with the patient and / or family counseling and / or coordination of care  A description of the counseling / coordination of care: Review of history, current assessment, development of a plan  Code Status: Level 1 - Full Code    ** Please Note: Dragon 360 Dictation voice to text software may have been used in the creation of this document   **

## 2021-03-25 NOTE — CASE MANAGEMENT
PT IS NOT A READMISSION  PT IS NOT IDENTIFIED AS A BUNDLE  READMISSION RISK SCORE OF 12     CM met with Pt with an introduction and explanation of role  Pt reported residing with his spouse Ismael Rajput in a ranch style home with no current use of DME but has access to a cane, roller walker, hospital bed, bedside commode, shower chair and 10 steps to enter the home  Pt reported being independent with ADLs, continues to drive, had [facility] in the past for both [facility] services and rehab with no hx of mental health or drug/alcohol placement  Pt reported having a living will, uses [address] with no has Lloyd Rodas as a PCP  Family to transport upon d/c  Pt reported being agreeable to [facility] for an RN, and requested a referral to [facility]  CM reviewed d/c planning process including the following: identifying help at home, patient preference for d/c planning needs, Discharge Lounge, [facility] Meds to Bed program, availability of treatment team to discuss questions or concerns patient and/or family may have regarding understanding medications and recognizing signs and symptoms once discharged  CM also encouraged patient to follow up with all recommended appointments after discharge  Patient advised of importance for patient and family to participate in managing patients medical well being

## 2021-03-25 NOTE — CASE MANAGEMENT
Pt is s/p CABGx3  CM s/w pt about his aftercare services  [place] of choice was offered and pt was agreeable for a referral to [facility]  CM made the referral at pt's request  CM will follow

## 2021-03-25 NOTE — PLAN OF CARE
Problem: PHYSICAL THERAPY ADULT  Goal: Performs mobility at highest level of function for planned discharge setting  See evaluation for individualized goals  Description: Treatment/Interventions: Functional transfer training, LE strengthening/ROM, Elevations, Therapeutic exercise, Endurance training, Bed mobility, Gait training, Spoke to nursing, Spoke to case management, OT  Equipment Recommended: Walker(pt already owns)       See flowsheet documentation for full assessment, interventions and recommendations  Note: Prognosis: Good  Problem List: Decreased strength, Decreased endurance, Impaired balance, Decreased mobility, Decreased cognition, Pain  Assessment: Pt is a 63 y o  male seen for PT evaluation s/p admit to [facility] on 3/24/2021  Pt was admitted with a primary dx of: CAD  Pt is POD#1 s/p CABGx3  PT now consulted for assessment of mobility and d/c needs  Pt with Up with assistance, Ambulate, Up as tolerated, active PT evaluation orders  Pts current comorbidities effecting treatment include: anxiety, CAD, depression, eye problems, HTN, hypothyroidism, migraines, hx of stroke, tremors of nervous system  Pts current clinical presentation is Unstable/ Unpredictable (high complexity) due to Ongoing medical management for primary dx, Increased reliance on more restrictive AD compared to baseline, Decreased activity tolerance compared to baseline, Fall risk, Increased assistance needed from caregiver at current time, Ongoing telemetry monitoring, s/p surgical intervention  Prior to admission, pt was I with ADLs and ambulating w/o an AD  Pt reports living with his wife in a [facility] with 5+5 CATY w/ handrails  Upon evaluation, pt currently is requiring modA for transfers and modA for ambulation 100 ft w/ RW   Pt presents at PT eval functioning below baseline and currently w/ overall mobility deficits 2* to: BLE weakness, impaired balance, decreased endurance, gait deviations, pain, decreased activity tolerance compared to baseline, decreased functional mobility tolerance compared to baseline, fall risk  Pt currently at a fall risk 2* to impairments listed above  Pt will continue to benefit from skilled acute PT interventions to address stated impairments; to maximize functional mobility; for ongoing pt/ family training; and DME needs  At conclusion of PT session pt returned back in chair and RN notified of session findings/recommendations with phone and call bell within reach  Pt denies any further questions at this time  Recommend home with HHPT pending progress upon hospital D/C  PT Discharge Recommendation: Return to previous environment with social support, Home with skilled therapy(HHPT pending progress)          See flowsheet documentation for full assessment

## 2021-03-25 NOTE — DISCHARGE INSTRUCTIONS
Sternal Precautions   WHAT YOU NEED TO KNOW:   What are sternal precautions? Sternal precautions are used to help protect your sternum (breastbone) after open chest surgery  Wires are placed during surgery to hold the sternum together as it heals  Sternal precautions help prevent the wires from cutting through the sternum  The precautions also help prevent the sternum from coming apart from an injury, and prevent pain and bleeding  You may need to use the precautions for up to 12 weeks after surgery  Your surgeon will give you specific instructions based on the type of surgery you had  It is important to follow the instructions carefully  An injury to the healing sternum can be life-threatening  What are some general sternal precautions? Start slowly and do more as you get stronger  Pain medicine might make it harder for you to know when to slow down or be careful  Stop immediately if you hear a crunch or pop in your sternum  · Protect your sternum  Hug a pillow to your chest or cross your arms over your chest when you laugh, sneeze, or cough  · Be careful when you get into or out of a chair or bed  Hug a pillow or cross your arms when you stand or sit  Do not twist as you move  Use only your legs to sit and stand  You may need to use a raised toilet seat if you have trouble standing up without using your arms  Your healthcare provider may teach you to use your elbow for support as you move from lying to sitting  · Ask when you may take a bath or shower  You may need to use a bath chair if you have trouble getting into or out of the tub  Do not use a grab bar  · Do not lift or carry anything heavier than 10 pounds  For example, a gallon of milk weighs 8 pounds  · Keep your arms down as much as possible  Do not put your arms out to the side, behind you, or over your head  Do not let anyone pull your arms to help you move or dress  Do not reach for items  · Do not push or pull anything  Examples include a car door or a vacuum   · Do not drive while you are healing  Your surgeon will tell you when it is safe for you to start driving again  How do I care for my surgery wound? Always wash your hands before you care for your wound  Wash your wound as directed  Do not rub the wound as you wash or dry the area  Pat the area dry with a clean towel  Change the bandages as directed and when they get wet or dirty  Do not smoke:  Nicotine can damage blood vessels and make it more difficult to heal  Do not use e-cigarettes or smokeless tobacco in place of cigarettes or to help you quit  They still contain nicotine  Ask your healthcare provider for information if you currently smoke and need help quitting  Call 911 for any of the following:   · You have sudden pain in your sternum and hear a crunch or pop  · You have bleeding that does not stop even after you apply pressure for 5 minutes  When should I seek immediate care? · You hear crunching or grinding in your sternum  · You have signs of an infection, such as a fever, red or warm skin, or pus in the surgery wound  When should I contact my healthcare provider? · You continue to feel pain, even after you take your pain medicine  · You have new or worsening pain, or any pain with movement  · You have questions or concerns about your condition or care  CARE AGREEMENT:   You have the right to help plan your care  Learn about your health condition and how it may be treated  Discuss treatment options with your healthcare providers to decide what care you want to receive  You always have the right to refuse treatment  The above information is an  only  It is not intended as medical advice for individual conditions or treatments  Talk to your doctor, nurse or pharmacist before following any medical regimen to see if it is safe and effective for you    © Copyright [employer] 2020 Information is for End User's use only and may not be sold, redistributed or otherwise used for commercial purposes  All illustrations and images included in CareNotes® are the copyrighted property of A D A M [employer] Inc  or Saleem Seymour       CABG (Coronary Artery Bypass Graft)   WHAT YOU NEED TO KNOW:   A CABG is open heart surgery to clear blocked arteries in your heart  CABG surgery improves blood flow to your heart by bypassing (sending blood around) the blocked part of an artery  This restores blood flow to your heart and helps prevent a heart attack  DISCHARGE INSTRUCTIONS:   Call your local emergency number (911 in the [address]) if:   · You feel lightheaded, short of breath, and have chest pain  · You cough up blood  · You have a fast heartbeat that flutters  · You feel like you are going to faint  Call your doctor if:   · Your arm or leg feels warm, tender, and painful  It may look swollen and red  · You have numbness or tingling in your arms or legs  · You have a severe headache  · You have a fever higher than 101°F (38 4°C)  · You have gained 2 pounds in 24 hours  · Your wound is red, swollen, or draining pus  · Your signs and symptoms return  · You feel depressed  · You have questions or concerns about your condition or care  Medicines: You may need any of the following:  · Prescription pain medicine  may be given  Ask how to take this medicine safely  · Antiplatelets , such as aspirin, help prevent blood clots  Take your antiplatelet medicine exactly as directed  These medicines make it more likely for you to bleed or bruise  If you are told to take aspirin, do not take acetaminophen or ibuprofen instead  · Cholesterol medicine  helps lower cholesterol and lipid levels in your blood  · Antibiotics  help prevent a bacterial infection  · Heart medicine  helps strengthen and regulate your heartbeat      · Blood pressure medicine  helps lower or control your blood pressure  · Take your medicine as directed  Contact your healthcare provider if you think your medicine is not helping or if you have side effects  Tell him or her if you are allergic to any medicine  Keep a list of the medicines, vitamins, and herbs you take  Include the amounts, and when and why you take them  Bring the list or the pill bottles to follow-up visits  Carry your medicine list with you in case of an emergency  Go to cardiac rehabilitation:  Cardiac rehabilitation (rehab) is a program run by specialists who will help you safely strengthen your heart and prevent more heart disease  This plan includes exercise, relaxation, stress management, and heart-healthy nutrition  Healthcare providers will also check to make sure any medicines you take are working  The plan may also include instructions for when you can drive, return to work, and do other normal daily activities  Care for your wound as directed:  Carefully wash the wound with soap and water  If you do not have a bandage, gently pat the incision dry with a clean towel  If you have a bandage, dry the area and put on a new, clean bandage  Change your bandage if it gets wet or dirty  Prevent another blocked artery:   · Manage other health conditions  Diabetes and high cholesterol puts you at risk for a heart attack and stroke  Talk to your healthcare provider about your management plan  He or she will make a plan that helps you manage your conditions  · Eat heart healthy foods  You may need to eat foods that are low in salt, fat, or cholesterol  Healthy foods include fruits, vegetables, whole-grain breads, low-fat dairy products, beans, lean meats, and fish  Ask your healthcare provider for more information about a heart healthy diet  · Do not smoke  Nicotine and other chemicals in cigarettes and cigars can cause heart and lung damage  Ask your healthcare provider for information if you currently smoke and need help to quit  E-cigarettes or smokeless tobacco still contain nicotine  Talk to your healthcare provider before you use these products  · Maintain a healthy weight  Ask your healthcare provider how much you should weigh  Extra weight can increase the stress on your heart  Ask him or her to help you create a weight loss plan if you are overweight  Get a flu shot: The flu can be dangerous for a person who has heart disease  All adults should get the flu vaccine every year as soon as it becomes available  Follow up with your healthcare provider as directed:  Write down your questions so you remember to ask them during your visits  © Copyright [address] Information is for End User's use only and may not be sold, redistributed or otherwise used for commercial purposes  All illustrations and images included in CareNotes® are the copyrighted property of A D A M , Inc  or [address] [place], [place] [address]   The above information is an  only  It is not intended as medical advice for individual conditions or treatments  Talk to your doctor, nurse or pharmacist before following any medical regimen to see if it is safe and effective for you  Heart Healthy Diet   WHAT YOU NEED TO KNOW:   What is a heart healthy diet? A heart healthy diet is an eating plan low in unhealthy fats and sodium (salt)  The plan is high in healthy fats and fiber  A heart healthy diet helps improve your cholesterol levels and lowers your risk for heart disease and stroke  A dietitian will teach you how to read and understand food labels  What diet guidelines should I follow? · Choose foods that contain healthy fats  ? Unsaturated fats  include monounsaturated and polyunsaturated fats  Unsaturated fat is found in foods such as soybean, canola, olive, corn, and safflower oils  It is also found in soft tub margarine that is made with liquid vegetable oil      ? Omega-3 fat  is found in certain fish, such as salmon, tuna, and trout, and in walnuts and flaxseed  Eat fish high in omega-3 fats at least 2 times a week  · Get 20 to 30 grams of fiber each day  Fruits, vegetables, whole-grain foods, and legumes (cooked beans) are good sources of fiber  · Limit or do not have unhealthy fats  ? Cholesterol  is found in animal foods, such as eggs and lobster, and in dairy products made from whole milk  Limit cholesterol to less than 200 mg each day  ? Saturated fat  is found in meats, such as peoples and hamburger  It is also found in chicken or turkey skin, whole milk, and butter  Limit saturated fat to less than 7% of your total daily calories  ? Trans fat  is found in packaged foods, such as potato chips and cookies  It is also in hard margarine, some fried foods, and shortening  Do not eat foods that contain trans fats  · Limit sodium as directed  You may be told to limit sodium to 2,000 to 2,300 mg each day  Choose low-sodium or no-salt-added foods  Add little or no salt to food you prepare  Use herbs and spices in place of salt  What can I include in my heart healthy plan? Ask your dietitian or healthcare provider how many servings to have from each of the following food groups:  · Grains:      ? Whole-wheat breads, cereals, and pastas, and brown rice    ? Low-fat, low-sodium crackers and chips    · Vegetables:      ? Broccoli, green beans, green peas, and spinach    ? Collards, kale, and lima beans    ? Carrots, sweet potatoes, tomatoes, and peppers    ? Canned vegetables with no salt added    · Fruits:      ? Bananas, peaches, pears, and pineapple    ? Grapes, raisins, and dates    ? Oranges, tangerines, grapefruit, orange juice, and grapefruit juice    ? Apricots, mangoes, melons, and papaya    ? Raspberries and strawberries    ? Canned fruit with no added sugar    · Low-fat dairy:      ? Nonfat (skim) milk, 1% milk, and low-fat almond, cashew, or soy milks fortified with calcium    ?  Low-fat cheese, regular or frozen yogurt, and cottage cheese    · Meats and proteins:      ? Lean cuts of beef and pork (loin, leg, round), skinless chicken and turkey    ? Legumes, soy products, egg whites, or nuts    What should I limit or not include in my heart healthy plan? · Unhealthy fats and oils:      ? Whole or 2% milk, cream cheese, sour cream, or cheese    ? High-fat cuts of beef (T-bone steaks, ribs), chicken or turkey with skin, and organ meats such as liver    ? Butter, stick margarine, shortening, and cooking oils such as coconut or palm oil    · Foods and liquids high in sodium:      ? Packaged foods, such as frozen dinners, cookies, macaroni and cheese, and cereals with more than 300 mg of sodium per serving    ? Vegetables with added sodium, such as instant potatoes, vegetables with added sauces, or regular canned vegetables    ? Cured or smoked meats, such as hot dogs, peoples, and sausage    ? High-sodium ketchup, barbecue sauce, salad dressing, pickles, olives, soy sauce, or miso    · Foods and liquids high in sugar:      ? Candy, cake, cookies, pies, or doughnuts    ? Soft drinks (soda), sports drinks, or sweetened tea    ? Canned or dry mixes for cakes, soups, sauces, or gravies    What other guidelines should I follow for a healthy heart? · Do not smoke  Nicotine and other chemicals in cigarettes and cigars can cause lung and heart damage  Ask your healthcare provider for information if you currently smoke and need help to quit  E-cigarettes or smokeless tobacco still contain nicotine  Talk to your healthcare provider before you use these products  · Limit or do not drink alcohol as directed  Alcohol can damage your heart and raise your blood pressure  Your healthcare provider may give you specific daily and weekly limits  The general recommended limit is 1 drink a day for women 21 or older and for men 72 or older  Do not have more than 3 drinks in a day or 7 in a week   The recommended limit is 2 drinks a day for men 24to 59years of age  Do not have more than 4 drinks in a day or 14 in a week  A drink of alcohol is 12 ounces of beer, 5 ounces of wine, or 1½ ounces of liquor  · Exercise regularly  Exercise can help you maintain a healthy weight and improve your blood pressure and cholesterol levels  Regular exercise can also decrease your risk for heart problems  Ask your healthcare provider about the best exercise plan for you  Do not start an exercise program without asking your healthcare provider  CARE AGREEMENT:   You have the right to help plan your care  Discuss treatment options with your healthcare provider to decide what care you want to receive  You always have the right to refuse treatment  The above information is an  only  It is not intended as medical advice for individual conditions or treatments  Talk to your doctor, nurse or pharmacist before following any medical regimen to see if it is safe and effective for you  © Copyright [address] Information is for End User's use only and may not be sold, redistributed or otherwise used for commercial purposes  All illustrations and images included in CareNotes® are the copyrighted property of [employer]  or [address] [place] [address]   WHAT YOU NEED TO KNOW:   What do I need to know about narcotic safety? Safety includes the correct use, storage, and disposal of narcotics  Examples of narcotic pain medicines are oxycodone, morphine, fentanyl, and codeine  How are narcotics given? Narcotics can be given as a pill, patch, or suppository  They can also be given as an injection into a vein, near a nerve, or into a joint  Your prescription may include one or both of the following:  · Short-acting narcotics  work fast and relieve pain for about 3 to 6 hours  They are often used for acute or breakthrough pain  · Long-acting narcotics  usually last at least 8 hours   You can take them less often and they may be used for chronic pain  How do I use narcotics safely? · Take prescribed narcotics exactly as directed  Narcotics come with directions based on the kind and how it is given  Talk to your healthcare provider or a pharmacist if you have any questions  Do not take more than the recommended amount  Too much can cause a life-threatening overdose  Do not continue to take it after your pain stops  You may develop tolerance  This means you keep needing higher doses to get the same effect  You may also develop narcotic use disorder  This means you are not able to control your narcotic use  · Do not give narcotics to others or take narcotics that belong to someone else  The kind or amount one person takes may not be right for another  The person you share them with may also be taking medicines that do not mix with narcotics  He or she may drink alcohol or use other drugs that can cause life-threatening problems when mixed with narcotics  · Do not mix narcotics with other medicines or alcohol  The combination can cause an overdose, or cause you to stop breathing  Alcohol, sleeping pills, and medicines such as antihistamines can make you sleepy  A combination with narcotics can lead to a coma  · Do not drive or operate heavy machinery after you use a narcotic  You may feel drowsy or have trouble concentrating  You can injure yourself or others if you drive or use heavy machinery when you are not alert  Your provider or pharmacist can tell you how long to wait after a dose before you do these activities  · Talk to your healthcare provider if you have any side effects  Side effects include nausea, sleepiness, itching, and trouble thinking clearly  Your provider may need to make changes to the kind or amount of narcotic you are taking  He or she can also help you find ways to prevent or relieve side effects  What can I do to manage constipation?   Constipation is the most common side effect of narcotic medicine  Constipation is when you have hard, dry bowel movements, or you go longer than usual between bowel movements  Tell your healthcare provider about all changes in your bowel movements while you are taking narcotics  He or she may recommend laxative medicine to help you have a bowel movement  He or she may also change the kind of narcotic you are taking, or change when you take it  The following are more ways you can prevent or relieve constipation:  · Drink liquids as directed  You may need to drink extra liquids to help soften and move your bowels  Ask how much liquid to drink each day and which liquids are best for you  · Eat high-fiber foods  This may help decrease constipation by adding bulk to your bowel movements  High-fiber foods include fruits, vegetables, whole-grain breads and cereals, and beans  Your healthcare provider or dietitian can help you create a high-fiber meal plan  Your provider may also recommend a fiber supplement if you cannot get enough fiber from food  · Exercise regularly  Regular physical activity can help stimulate your intestines  Walking is a good exercise to prevent or relieve constipation  Ask which exercises are best for you  · Schedule a time each day to have a bowel movement  This may help train your body to have regular bowel movements  Bend forward while you are on the toilet to help move the bowel movement out  Sit on the toilet for at least 10 minutes, even if you do not have a bowel movement  How do I store narcotics safely? · Store narcotics where others cannot easily get them  Keep them in a locked cabinet or secure area  Do not  keep them in a purse or other bag you carry with you  A person may be looking for something else and find the narcotics  · Make sure narcotics are stored out of the reach of children  A child can easily overdose on narcotics  Narcotics may look like candy to a small child      What is the best way to dispose of narcotics? The laws vary by country and area  In the [place], the best way is to return the narcotics through a take-back program  This program is offered by the [employer]  The following are options for using the program:  · Take the narcotics to a DONNIE collection site  The site is often a law enforcement center  Call your local law enforcement center for scheduled take-back days in your area  You will be given information on where to go if the collection site is in a different location  · Take the narcotics to an approved pharmacy or hospital   A pharmacy or hospital may be set up as a collection site  You will need to ask if it is a DONNIE collection site if you were not directed there  A pharmacy or doctor's office may not be able to take back narcotics unless it is a DONNIE site  · Use a mail-back system  This means you are given containers to put the narcotics into  You will then mail them in the containers  · Use a take-back drop box  This is a place to leave the narcotics at any time  People and animals will not be able to get into the box  Your local law enforcement agency can tell you where to find a drop box in your area  What are some other safe ways to dispose of narcotics? The medicine may come with disposal instructions  The instructions may vary depending on the brand of medicine you are using  Instructions may come in a Medication Guide, but not every medicine has one  You may instead get instructions from your pharmacy or doctor  Follow instructions carefully  The following are general guidelines to follow:  · Find out if you can flush the narcotic  Some narcotics can be flushed down the toilet or poured into the sink  You will need to contact authorities in your area to see if this is an option for you  The FDA also offers a list of medicines that are safe to flush down the toilet   You can check the list if you cannot get the information for your local area     · Ask your waste management company about rules for putting narcotics in the trash  The company will be able to give you specific directions  Scratch out personal information on the original medicine label so it cannot be read  Then put it in the trash  Do not label the trash or put any information on it about the narcotics  It should look like regular household trash so no one is tempted to look for the narcotics  Keep the trash out of the reach of children and animals  Always make sure trash is secure  · Talk to officials if you live in a facility  If you live in a nursing home or assisted living center, talk to an official  The person will know the rules for your area  What are some other ways to manage pain? · Ask your healthcare provider about non-narcotic medicines to control pain  Some medicines may even work better than narcotics, depending on the cause of your pain  Nonprescription medicines include NSAIDs (such as ibuprofen) and acetaminophen  Prescription medicines include muscle relaxers, antidepressants, and steroids  · Pain may be managed without any medicines  Some ways to relieve pain include massage, aromatherapy, or meditation  Physical or occupational therapy may also help  Where can I find more information? · Drug Enforcement Administration  [facility] [address]  Phone: 7- 348 - [phone]  Web Address: [facility]/drug_disposal/    · [address]  and Drug Administration  [facility] , [address] [place] [address]  Phone: 7- 592 - [phone]  Web Address: http://[employer]/  Call your local emergency number (911 in the [address]), or have someone else call if:   · You have a seizure  · You cannot be woken  · You have trouble staying awake and your breathing is slow or shallow  · Your speech is slurred, or you are confused  · You are dizzy or stumble when you walk      When should I or someone close to me call my doctor? · You are extremely drowsy, or you have trouble staying awake or speaking  · You have pale or clammy skin  · You have blue fingernails or lips  · Your heartbeat is slower than normal     · You cannot stop vomiting  · You have questions or concerns about your condition or care  CARE AGREEMENT:   You have the right to help plan your care  Learn about your health condition and how it may be treated  Discuss treatment options with your healthcare providers to decide what care you want to receive  You always have the right to refuse treatment  The above information is an  only  It is not intended as medical advice for individual conditions or treatments  Talk to your doctor, nurse or pharmacist before following any medical regimen to see if it is safe and effective for you  © Copyright [address] Information is for End User's use only and may not be sold, redistributed or otherwise used for commercial purposes  All illustrations and images included in CareNotes® are the copyrighted property of A D A M , Inc  or [facility]      COVID-19 (Coronavirus Disease 2019)   WHAT YOU NEED TO KNOW:   What do I need to know about coronavirus disease 2019 (COVID-19)? COVID-19 is the disease caused by the novel (new) coronavirus first discovered in December 2019  Coronaviruses generally cause upper respiratory (nose, throat, and lung) infections, such as a cold  The new virus can also cause serious lower respiratory conditions, such as pneumonia or acute respiratory distress syndrome (ARDS)  Anyone can develop serious problems from the new virus, but your risk is higher if you are 65 or older  A weak immune system, diabetes, or a heart or lung condition can also increase your risk  What are the signs and symptoms of COVID-19? You may not develop any signs or symptoms  Signs and symptoms that do develop usually start about 5 days after infection but can take 2 to 14 days  Signs and symptoms range from mild to severe  You may feel like you have the flu or a bad cold  Information on COVID-19 is still being learned  Tell your healthcare provider if you think you were infected but develop signs or symptoms not listed below:  · A cough    · Shortness of breath or trouble breathing that may become severe    · A fever of at least 100 4°F, or 38°C (may be lower in adults 65 or older)    · Chills that might include shaking    · Muscle pain, body aches, or a headache    · A sore throat    · Suddenly not being able to taste or smell anything    · Feeling mentally and physically tired (fatigue)    · Congestion (stuffy head and nose), or a runny nose    · Diarrhea, nausea, or vomiting    How is COVID-19 diagnosed? If you think you have COVID-19, call your healthcare provider  In some areas, testing is only done if a person has severe symptoms or is hospitalized  Testing is done more widely in other places  Your provider will tell you what to do based on your symptoms and the rules in your area  In general, the following may be used:  · A viral test  shows if you have a current infection  Samples are taken from your nose and throat, usually with swabs  You may need to wait several days to get the test results  Your healthcare provider will tell you how to get your results  You will need to quarantine (stay physically away from others) until you get your results  If results show you have COVID-19, you will need to quarantine until you are well  Your provider or other health official may give you more directions  You will also need to prevent another infection until it is known if you can get COVID-19 again  · An antibody test  shows if you had a past infection  Blood samples are used for this test  Antibodies are made by your immune system to attack the virus that causes COVID-19  Antibodies will form 1 to 3 weeks after you are infected   It is not known if antibodies prevent a second infection, or for how long a person might be protected  If you have antibodies, you will still need to be careful around others until more is known  · CT scans or x-rays  may be used to check for signs of pneumonia  The 2019 coronavirus causes a specific kind of pneumonia, usually in both lungs  How is COVID-19 treated? No medicine or specific treatment is currently approved for COVID-19  The following may be used to manage your symptoms or treat the effects of COVID-19:  · Mild symptoms  may get better on their own  If you do not need to be treated in a hospital, you will be given instructions to use at home  Your condition will be closely monitored  You will need to watch for worsening symptoms and seek immediate care if needed  Talk to your healthcare provider about the following:    ? Relieve your symptoms  To soothe a sore throat, gargle with warm salt water, or use throat lozenges or a throat spray  Your healthcare provider may recommend a cough medicine  Drink more liquids to thin and loosen mucus and to prevent dehydration  Use decongestants or saline drops as directed for nasal congestion  ? NSAIDs or acetaminophen  can help lower a fever and relieve body aches or a headache  Follow directions  If not taken correctly, NSAIDs can cause kidney damage and acetaminophen can cause liver damage  · Severe or life-threatening symptoms  are treated in the hospital  You may need a combination of the following:    ? Medicines  may be given to reduce inflammation or to fight the virus  You may also need blood thinners to prevent or treat blood clots  If you have a deep vein thrombosis (DVT) or pulmonary embolism (PE), you may need to keep using blood thinners for 3 months  ? Extra oxygen  may be given if you have respiratory failure  This means your lungs cannot get enough oxygen into your blood and out to your organs  Extra oxygen can help prevent organ failure      ? A ventilator  may be used to help you breathe  ? Convalescent plasma (part of blood)  from a patient who has recovered from COVID-19 may be used  The plasma contains antibodies that can help your body fight the infection  Convalescent plasma is only given to patients who have severe signs and symptoms  How does the 2019 coronavirus spread? The virus spreads quickly and easily  You can become infected if you are in contact with a large amount of the virus, even for a short time  You can also become infected by being around a small amount of virus for a long time  The following are ways the virus is thought to spread, but more information may be coming:  · Droplets are the most common way all coronaviruses spread  The virus can travel in droplets that form when a person talks, coughs, or sneezes  Anyone who breathes in the droplets or gets them in his or her eyes can become infected with the virus  Close personal contact with an infected person is thought to be the main way the virus spreads  Close personal contact means you are within 6 feet (2 meters) of the person  · Person-to-person contact can spread the virus  For example, a person with the virus on his or her hands can spread it by shaking hands with someone  At this time, it does not appear that the virus can be passed to a baby during pregnancy or delivery  The baby can be infected after he or she is born through person-to-person contact  The virus also does not appear to spread in breast milk  If you are pregnant or breastfeeding, talk to your healthcare provider or obstetrician about any concerns you have  · The virus can stay on objects and surfaces  A person can get the virus on his or her hands by touching the object or surface  Infection happens if the person then touches his or her eyes or mouth with unwashed hands  It is not yet known how long the virus can stay on an object or surface  That is why it is important to clean all surfaces that are used regularly      · An infected animal may be able to infect a person who touches it  This may happen at live markets or on a farm  How can everyone lower the risk for COVID-19? The best way to prevent infection is to avoid anyone who is infected, but this can be hard to do  An infected person can spread the virus before signs or symptoms begin, or even if signs or symptoms never develop  The following can help lower the risk for infection:      · Wash your hands often throughout the day  Use soap and water  Rub your soapy hands together, lacing your fingers  Wash the front and back of each hand, and in between your fingers  Use the fingers of one hand to scrub under the fingernails of the other hand  Wash for at least 20 seconds  Rinse with warm, running water for several seconds  Then dry your hands with a clean towel or paper towel  Use hand  that contains alcohol if soap and water are not available  Do not touch your eyes, nose, or mouth without washing your hands first  Teach children how to wash their hands and use hand   · Cover a sneeze or cough  This prevents droplets from traveling from you to others  Turn your face away and cover your mouth and nose with a tissue  Throw the tissue away  Use the bend of your arm if a tissue is not available  Then wash your hands well with soap and water or use hand   Turn and cover your face if you are around someone who is sneezing or coughing  Teach children how to cover a cough or sneeze  · Follow worldwide, national, and local social distancing guidelines  Social distancing means people avoid close physical contact so the virus cannot spread from one person to another  Keep at least 6 feet (2 meters) between you and others  Also keep this distance from anyone who comes to your home, such as someone making a delivery  · Make a habit of not touching your face  It is not known how long the virus can stay on objects and surfaces   If you get the virus on your hands, you can transfer it to your eyes, nose, or mouth and become infected  You can also transfer it to objects, surfaces, or people  Be aware of what you touch when you go out  Examples include handrails and elevator buttons  Try not to touch anything with bare hands unless it is necessary  Wash your hands before you leave your home and when you return  · Clean and disinfect high-touch surfaces and objects often  Use a disinfecting solution or wipes  You can make a solution by diluting 4 teaspoons of bleach in 1 quart (4 cups) of water  Clean and disinfect even if you think no one living in or coming to your home is infected with the virus  You can wipe items with a disinfecting cloth before you bring them into your home  Wash your hands after you handle anything you bring into your home  · Make your immune system as healthy as possible  A weakened immune system makes you more vulnerable to the new coronavirus  No COVID-19 vaccine is available yet  Vaccines such as the flu and pneumonia vaccines can help your immune system  Your healthcare provider can tell you which vaccines to get, and when to get them  Keep your immune system as strong as possible  Do not smoke  Eat healthy foods, exercise regularly, and try to manage stress  Go to bed and wake up at the same times each day  How do I follow social distancing guidelines to help lower the risk for COVID-19? National and local social distancing rules vary  Rules may change over time as restrictions are lifted  Restrictions may return if an outbreak happens where you live  It is important to know and follow all current social distancing rules in your area  The following are general guidelines:  · Limit trips out of your home  You may be able to have food, medicines, and other supplies delivered  If possible, have delivered items left at your door or other area  Try not to have someone hand you an item   You will be so close to the person that the virus can spread between you  · Do not have close physical contact with anyone who does not live in your home  Do not shake hands with, hug, or kiss a person as a greeting  Stand or walk as far from others as possible  If you must use public transportation (such as a bus or subway), try to sit or stand away from others  You can stay safely connected with others through phone calls, e-mail messages, social media websites, and video chats  Check in on anyone who may be having a hard time socially distancing, or who lives alone  Ask administrators at nursing homes or long-term care facilities how you can safely communicate with someone living there  · Wear a cloth face covering around others who do not live in your home  Face coverings help prevent the virus from spreading to others in droplets  You can use a clear face covering if someone needs to read your lips  This is a cloth covering that has plastic over the mouth area so your lips can be seen  Do not use coverings that have breathing valves or vents  The virus can travel out of the valve or vent and be spread to others  Do not take your covering off to talk, cough, or sneeze  Do not use coverings on children younger than 2 years or on anyone who has breathing problems or cannot remove it  · Only allow medical or other necessary professionals into your home  Wear your face covering, and remind professionals to wear a face covering  Remind them to wash their hands when they arrive and before they leave  Do not  let anyone who does not live in your home in, even if the person is not sick  A person can pass the virus to others before symptoms of COVID-19 begin  Some people never even develop symptoms  Children commonly have mild symptoms or no symptoms  It may be hard to tell a child not to hug or kiss you  Explain that this is how he or she can help you stay healthy  · Do not go to someone else's home unless it is necessary    Do not go over to visit, even if the person is lonely  Only go if you need to help him or her  Make sure you both wear face coverings while you are there  · Avoid large gatherings and crowds  Gatherings or crowds of 10 or more individuals can cause the virus to spread  Examples of gatherings include parties, sporting events, [employer] services, and conferences  Crowds may form at beaches, flores, and tourist attractions  Protect yourself by staying away from large gatherings and crowds  · Ask your healthcare provider for other ways to have appointments  You may be able to have appointments without having to go into the provider's office  Some providers offer phone, video, or other types of appointments  You may also be able to get prescriptions for a few months of your medicines at a time  · Stay safe if you must go out to work  You may have a job that can only be done outside your home  Keep physical distance between you and other workers as much as possible  Follow your employer's rules so everyone stays safe  What should I do if I have COVID-19 and am recovering at home? Healthcare providers will give you specific instructions to follow  The following are general guidelines to remind you how to keep others safe until you are well:  · Wash your hands often  Use soap and water as much as possible  You can use hand  that contains alcohol if soap and water are not available  Do not share towels with anyone  If you use paper towels, throw them away in a lined trash can kept in your room or area  Use a covered trash can, if possible  · Do not go out of your home unless it is necessary  You may have to go to your healthcare provider's office for check-ups or to get prescription refills  Do not arrive at the provider's office without an appointment  Providers have to make their offices safe for staff and other patients  · Do not have close physical contact with anyone unless it is necessary    Only have close physical contact with a person giving direct care, or a baby or child you must care for  Family members and friends should not visit you  If possible, stay in a separate area or room of your home if you live with others  No one should go into the area or room except to give you care  You can visit with others by phone, video chat, e-mail, or similar systems  It is important to stay connected with others in your life while you recover  · Wear a face covering while others are near you  This can help prevent droplets from spreading the virus when you talk, sneeze, or cough  Put the covering on before anyone comes into your room or area  Remind the person to cover his or her nose and mouth before going in to provide care for you  · Do not share items  Do not share dishes, towels, or other items with anyone  Items need to be washed after you use them  · Protect your baby  Wash your hands with soap and water often throughout the day  Wear a clean face covering while you breastfeed, or while you express or pump breast milk  If possible, ask someone who is well to care for your baby  You can put breast milk in bottles for the person to use, if needed  Talk to your healthcare provider if you have any questions or concerns about caring for or bonding with your baby  He or she will tell you when to bring your baby in for check-ups and vaccines  He or she will also tell you what to do if you think your baby was infected with the new virus  · Do not handle live animals  Until more is known, it is best not to touch, play with, or handle live animals  Some animals, including pets, have been infected with the new coronavirus  Do not handle or care for animals until you are well  Care includes feeding, petting, and cuddling your pet  Do not let your pet lick you or share your food  Ask someone who is not infected to take care of your pet, if possible  If you must care for a pet, wear a face covering   Wash your hands before and after you give care  · Follow directions from your healthcare provider for being around others after you recover  You will need to wait at least 10 days after symptoms first appeared  Then you will need to have no fever for 24 hours without fever medicine, and no other symptoms  A loss of taste or smell may continue for several months  It is considered okay to be around others if this is your only symptom  It is not known for sure if or for how long a recovered person can pass the virus to others  Your provider may give you instructions, such as continuing social distancing or wearing a face covering around others  How should I take care of someone who has COVID-19? If the person lives in another home, arrange for a time to give care  Remember to bring a few pairs of disposable gloves and a cloth face covering  The following are general guidelines to help you safely care for anyone who has COVID-19:  · Wash your hands often  Wash before and after you go into the person's home, area, or room  Throw paper towels away in a lined trash can that has a lid, if possible  · Do not allow others to go near the person  No one should come into the person's home unless it is necessary  If possible, the person should be in a separate area or room if he or she lives with others  Keep the room's door shut unless you need to go in or out  Have others call, video chat, or e-mail the person if he or she is feeling well enough  The person may feel lonely if he or she is kept separate for a long period of time  Safe communication can help him or her stay connected to family and friends  · Make sure the person's room has good air flow  You may be able to open the window if the weather allows  An air conditioner can also be turned on to help air move  · Contact the person before you go in to give care  Make sure the person is wearing a face covering   Remind him or her to wash his or her hands with soap and water  He or she can use hand  that contains alcohol if soap and water are not available  Put on a face covering before you go in to give care  · Wear gloves while you give care and clean  Clean items the person uses often  Clean countertops, cooking surfaces, and the fronts and insides of the microwave and refrigerator  Clean the shower, toilet, the area around the toilet, the sink, the area around the sink, and faucets  Gather used laundry or bedding  Wash and dry items on the warmest settings the fabric allows  Wash dishes and silverware in hot, soapy water or in a   · Anything you throw away needs to go into a lined trash can  When you need to empty the trash, close the open end of the lining and tie it closed  This helps prevent items the virus is on from spilling out of the trash  Remove your gloves and throw them away  Wash your hands  Where can I find more information? · Centers for Disease Control and Prevention  [address] , [address]  Phone: [phone]  Web Address: [employer] com     What should I do if I think I or someone I know may be infected? Do the following to protect others:  · If emergency care is needed,  tell the  about the possible infection, or call ahead and tell the emergency department  · Call a healthcare provider  for instructions if symptoms are mild  Anyone who may be infected should not  arrive without calling first  The provider will need to protect staff members and other patients  · The person who may be infected needs to wear a face covering  while getting medical care  This will help lower the risk of infecting others  Coverings are not used for anyone who is younger than 2 years, has breathing problems, or cannot remove it  The provider can give you instructions for anyone who cannot wear a covering      Call your local emergency number [phone] in the [facility] or an emergency department if:   · You have trouble breathing or shortness of breath at rest     · You have chest pain or pressure that lasts longer than 5 minutes  · You become confused or hard to wake  · Your lips or face are blue  · You have a fever of 104°F (40°C) or higher  When should I call my doctor? · You do not  have symptoms of COVID-19 but had close physical contact within 14 days with someone who tested positive  · You have questions or concerns about your condition or care  CARE AGREEMENT:   You have the right to help plan your care  Learn about your health condition and how it may be treated  Discuss treatment options with your healthcare providers to decide what care you want to receive  You always have the right to refuse treatment  The above information is an  only  It is not intended as medical advice for individual conditions or treatments  Talk to your doctor, nurse or pharmacist before following any medical regimen to see if it is safe and effective for you  © Copyright [address] Information is for End User's use only and may not be sold, redistributed or otherwise used for commercial purposes   All illustrations and images included in CareNotes® are the copyrighted property of A D A M , Inc  or [address] [place] [place] [address]

## 2021-03-25 NOTE — PHYSICAL THERAPY NOTE
Physical Therapy Evaluation    Patient's Name: Sarabjit Bloom    Admitting Diagnosis  Coronary artery disease involving native coronary artery of native heart with angina pectoris ([facility] ) [I25 [id number]]    Problem List  Patient Active Problem List   Diagnosis    CAD (coronary artery disease)    Chest pain    Claudication [facility])    Essential hypertension    Mixed hyperlipidemia    Hypothyroidism    Myofascial pain syndrome    Radiculopathy, lumbar region    Bilateral lower extremity edema    Palpitation    History of CVA (cerebrovascular accident)    History of percutaneous coronary intervention    Chronic diastolic heart failure ([facility] )    Encounter for screening colonoscopy    S/P CABG (coronary artery bypass graft)    Thrombocytopenia (HCC)    Hyperchloremia    Hypocalcemia       Past Medical History  Past Medical History:   Diagnosis Date    Anxiety     CAD (coronary artery disease)     Depression     Dizziness     Eye problems     Heart disease     Hypertension     Hypothyroidism     Hypothyroidism     Migraines     Stroke ([facility] )     Tremors of nervous system        Past Surgical History  Past Surgical History:   Procedure Laterality Date    COLONOSCOPY  09/01/2017    COLONOSCOPY      CORONARY STENT PLACEMENT      DENTAL SURGERY      entire top teeth removal    HERNIA REPAIR      MS CABG, ARTERY-VEIN, FOUR N/A 3/24/2021    Procedure: CORONARY ARTERY BYPASS GRAFT (CABG) 3 VESSELS,  USING LEFT VANE-LAD AND LEFT GSV-RPL & RAMUS;  Surgeon: Park Robins MD;  [facility]: [facility];  Service: Cardiac Surgery    MS ECHO TRANSESOPHAG [address] N/A 3/24/2021    Procedure: TRANSESOPHAGEAL ECHOCARDIOGRAM (MARLEN);   Surgeon: Park Robins MD;  [facility]: [facility];  Service: Cardiac Surgery        03/25/21 0951   PT Last Visit   PT Visit Date 03/25/21   Note Type   Note type Evaluation   Pain Assessment   Pain Assessment Tool 0-10   Pain Score Worst Possible Pain Pain Location/Orientation Orientation: Mid;Location: Chest;Location: Incision  (with movement)   Hospital Pain Intervention(s) Repositioned; Ambulation/increased activity   Home Living   Type of [address] One level;Stairs to enter with rails   [address] [facility],[address]; Hospital bed   Additional Comments Pt reports living in a [facility] with 5+5 CATY w/ HR  Was not using any AD for ambulation PTA  Prior Function   Level of [place] Independent with ADLs and functional mobility   Lives With Spouse   Receives Help From Family   ADL Assistance Independent   IADLs Independent   Falls in the last 6 months 0   Restrictions/Precautions   Weight Bearing Precautions Per Order No   Other Precautions Cardiac/sternal;Multiple lines;Telemetry;O2;Fall Risk;Pain   General   Additional Pertinent History pt has a history of CVA with mild residual R sided weakness   Cognition   Orientation Level Oriented X4   Memory Decreased recall of precautions   Following Commands Follows one step commands with increased time or repetition   Comments Pt pleasant and cooperative  Very appreciative of therapy working with him  Requires occassional VCs for redirection as well as increased time processing   RLE Assessment   RLE Assessment X   Strength RLE   R Hip Flexion 3+/5   R Knee Flexion 4-/5   R Knee Extension 4/5   R Ankle Dorsiflexion 4-/5   R Ankle Plantar Flexion 4-/5   LLE Assessment   LLE Assessment WFL   Bed Mobility   Additional Comments OOB upon arrival, not assessed   Transfers   Sit to Stand 3  Moderate assistance   Additional items Assist x 1; Increased time required;Verbal cues   Stand to Sit 3  Moderate assistance   Additional items Assist x 1; Increased time required;Verbal cues   Additional Comments Transfers with RW  VCs for proper hand placement  Ambulation/Elevation   Gait pattern Excessively slow; Step to; Inconsistent jennifer   Gait Assistance 3  Moderate assist   Additional items Assist x 1;Verbal cues   Assistive Device Rolling walker   Distance 100ft progressing from modA to El with distance   Balance   Static Sitting Fair +   Dynamic Sitting Fair   Static Standing Fair -   Dynamic Standing Poor +   Ambulatory Poor   Activity Tolerance   Activity Tolerance Patient limited by fatigue;Patient limited by pain   Medical Staff Made Aware ROXANA Jeffries   Nurse Made Aware ok to see per RN   Assessment   Prognosis Good   Problem List Decreased strength;Decreased endurance; Impaired balance;Decreased mobility; Decreased cognition;Pain   Assessment Pt is a 63 y o  male seen for PT evaluation s/p admit to [facility] on 3/24/2021  Pt was admitted with a primary dx of: CAD  Pt is POD#1 s/p CABGx3  PT now consulted for assessment of mobility and d/c needs  Pt with Up with assistance, Ambulate, Up as tolerated, active PT evaluation orders  Pts current comorbidities effecting treatment include: anxiety, CAD, depression, eye problems, HTN, hypothyroidism, migraines, hx of stroke, tremors of nervous system  Pts current clinical presentation is Unstable/ Unpredictable (high complexity) due to Ongoing medical management for primary dx, Increased reliance on more restrictive AD compared to baseline, Decreased activity tolerance compared to baseline, Fall risk, Increased assistance needed from caregiver at current time, Ongoing telemetry monitoring, s/p surgical intervention  Prior to admission, pt was I with ADLs and ambulating w/o an AD  Pt reports living with his wife in a [facility] with 5+5 CATY w/ handrails  Upon evaluation, pt currently is requiring modA for transfers and modA for ambulation 100 ft w/ RW  Pt presents at PT eval functioning below baseline and currently w/ overall mobility deficits 2* to: BLE weakness, impaired balance, decreased endurance, gait deviations, pain, decreased activity tolerance compared to baseline, decreased functional mobility tolerance compared to baseline, fall risk   Pt currently at a fall risk 2* to impairments listed above  Pt will continue to benefit from skilled acute PT interventions to address stated impairments; to maximize functional mobility; for ongoing pt/ family training; and DME needs  At conclusion of PT session pt returned back in chair and RN notified of session findings/recommendations with phone and call bell within reach  Pt denies any further questions at this time  Recommend home with HHPT pending progress upon hospital D/C  Goals   Patient Goals to get better   STG Expiration Date 04/08/21   Short Term Goal #1 In 10-14 days pt will be able to: 1  Demonstrate ability to perform all aspects of bed mobility independently to increase functional independence  2  Perform functional transfers with LRAD at mod I level to facilitate safe return to previous living environment  3   Ambulate 300 ft with LRAD at mod I level with stable vitals to improve safety with household distances and reduce fall risk  4  Climb 5+5 steps with HR independently to simulate entrance to home  5  Improve LE strength grades by 1 to increase ease of functional mobility with transfers and gait  6  Pt will demonstrate improved balance by one grade order to decrease risk of falls  PT Treatment Day 0   Plan   Treatment/Interventions Functional transfer training;LE strengthening/ROM; Elevations; Therapeutic exercise; Endurance training;Bed mobility;Gait training;Spoke to nursing;Spoke to case management;OT   PT Frequency Other (Comment)  (4-6x/wk)   Recommendation   PT Discharge Recommendation Return to previous environment with social support;Home with skilled therapy  (HHPT pending progress)   Equipment Recommended Walker  (pt already owns)   [place] 8 in Bed Without Bedrails 2   Lying on Back to Sitting on Edge of Flat Bed 2   Moving Bed to Chair 2   Standing Up From Chair 2   Walk in Room 2   Climb 3-5 Stairs 1   Basic Mobility Inpatient Raw Score 11   Basic Mobility Standardized Score 30 25   Modified [place] Scale   Modified [place] Scale 4      The patient's AM-PAC Basic Mobility Inpatient Short Form Raw Score is 11, Standardized Score is 30 25  A standardized score less than 42 9 suggests the patient may benefit from discharge to post-acute rehabilitation services  However, anticipate that pt will make quick progress to allow for d/c home with HHPT  Please also refer to the recommendation of the Physical Therapist for safe discharge planning      Johnathan Jung, PT, DPT

## 2021-03-25 NOTE — PROGRESS NOTES
Progress Note - Cardiothoracic Surgery   Clara Morris 63 y o  male MRN: [id number]  Unit/Bed#: [facility] 417-01 Encounter: [id number]    Coronary artery disease  S/P coronary artery bypass grafting; POD # 1    24 Hour Events: Extubated to 4L last evening  On and off cardene overnight  Currently off  Delined this morning       Medications:   Scheduled Meds:  Current Facility-Administered Medications   Medication Dose Route Frequency Provider Last Rate    acetaminophen  650 mg Rectal Q4H PRN Lb Bates PA-C      acetaminophen  975 mg Oral Q8H Lb Bates PA-C      amiodarone  200 mg Oral [facility] Lb Bates PA-C      aspirin  325 mg Oral Daily Lb Bates PA-C      atorvastatin  80 mg Oral After Lynnraman Velasquez PA-C      bisacodyl  10 mg Rectal Daily PRN Lb Bates PA-C      calcium chloride  1 g Intravenous Once Lb Bates PA-C      cefazolin  2,000 mg Intravenous Q8H Lb Bates PA-C 2,000 mg (03/25/21 0627)    chlorhexidine  15 mL [facility] Theresa Guillermo      clopidogrel  75 mg Oral Daily Lb Bates PA-C      fentanyl citrate (PF)  50 mcg Intravenous Q1H PRN bL Bates PA-C      fondaparinux  2 5 mg Subcutaneous Daily Lb Bates PA-C      furosemide  40 mg Intravenous Q6H PRN Lb Bates PA-C      HYDROmorphone  0 5 mg Intravenous Q2H PRN Lb Bates PA-C      insulin regular (HumuLIN R,NovoLIN R) infusion  0 3-21 Units/hr Intravenous Titrated Lb Bates PA-C 1 5 Units/hr (03/25/21 0620)    lactated ringers  500 mL Intravenous Q30 Min PRN Lb Bates PA-C Stopped (03/25/21 0000)    levothyroxine  100 mcg Oral Early Morning Lb Bates PA-C      lidocaine (cardiac)  100 mg Intravenous Q30 Min PRN Lb Bates PA-C      mupirocin  1 application Nasal [address] [place] [address] & [facility] Lb Bates PA-C      niCARdipine  2 5-15 mg/hr Intravenous Titrated Lb Bates PA-C Stopped (03/25/21 0227)    ondansetron  4 mg Intravenous Q6H PRN Nba Copping, PA-C      oxyCODONE  2 5 mg Oral Q4H PRN Nba Copping, PA-C      oxyCODONE  5 mg Oral Q4H PRN Nba Copping, PA-C      pantoprazole  40 mg Oral Early Morning Nba Copping, PA-C      polyethylene glycol  17 g Oral Daily Nba Copping, PA-C      potassium chloride  20 mEq Intravenous Once PRN Nba Copping, PA-C      potassium chloride  20 mEq Intravenous Q1H PRN Nba Copping, PA-C      potassium chloride  20 mEq Intravenous Q30 Min PRN Nba Copping, PA-C      propofol  5-50 mcg/kg/min Intravenous Titrated Martín Pu, CRNP Stopped (03/24/21 1500)    sodium chloride  20 mL/hr Intravenous Continuous Nba Copping, PA-C 20 mL/hr (03/24/21 1430)     Continuous Infusions:insulin regular (HumuLIN R,NovoLIN R) infusion, 0 3-21 Units/hr, Last Rate: 1 5 Units/hr (03/25/21 0620)  niCARdipine, 2 5-15 mg/hr, Last Rate: Stopped (03/25/21 0227)  propofol, 5-50 mcg/kg/min, Last Rate: Stopped (03/24/21 1500)  sodium chloride, 20 mL/hr, Last Rate: 20 mL/hr (03/24/21 1430)      PRN Meds:   acetaminophen    bisacodyl    fentanyl citrate (PF)    furosemide    HYDROmorphone    lactated ringers    lidocaine (cardiac)    ondansetron    oxyCODONE    oxyCODONE    potassium chloride    potassium chloride    potassium chloride    Vitals:  124/66  Vitals:    03/25/21 0100 03/25/21 0200 03/25/21 0300 03/25/21 0400   BP:       Pulse: 76 76 77 80   Resp: 15 20 16    Temp: 99 7 °F (37 6 °C)   99 5 °F (37 5 °C)   TempSrc: Core      SpO2: 97% 96% 96%    Weight:       Height:           Hemodynamics:  PAP: (25-34)/(12-21) 31/15  CVP:  [9 mmHg-13 mmHg] 11 mmHg  CO:  [3 7 L/min-5 7 L/min] 3 7 L/min  CI:  [1 7 L/min/m2-2 6 L/min/m2] 1 7 L/min/m2    Respiratory:   SpO2: SpO2: 96 %; 4 LPM    Intake/Output:   I/O       03/23 0701 - 03/24 0700 03/24 0701 - 03/25 0700 03/25 0701 - 03/26 0700    P  O   240     I V  (mL/kg)  1027 5 (9 8)     IV Piggyback  850     Cell Saver  1450 Total Intake(mL/kg)  3567 5 (34)     Urine (mL/kg/hr)  885 (0 4)     Chest Tube  310     Total Output  1195     Net  +2372 5              UOP 30-40cc/hr    Chest tube Output:    Mediastinal tubes: 60 mL/8 hours  220 mL/24 hours   Pleural tubes: 90 mL/8 hours  130 mL/24 hours     Weights:   Weight (last 2 days)     Date/Time   Weight    03/24/21 0639   105 (231)            Admit Weight: 105 kg    Results:   Results from last 7 days   Lab Units 03/25/21  0405 03/24/21 2125 03/24/21  1403 03/24/21  1358  03/24/21  1209   WBC Thousand/uL 13 20*  --   --   --   --   --    HEMOGLOBIN g/dL 12 7 12 8  --  12 0  --   --    I STAT HEMOGLOBIN g/dl  --   --  10 5*  --    < >  --    HEMATOCRIT % 38 4 38 6  --  34 9*  --   --    HEMATOCRIT, ISTAT %  --   --  31*  --    < >  --    PLATELETS Thousands/uL 154  --   --  119*  --  146*    < > = values in this interval not displayed  Results from last 7 days   Lab Units 03/25/21  0405 03/24/21 2125 03/24/21  1804 03/24/21  1403 03/24/21  1358   SODIUM mmol/L 139  --   --   --  141   POTASSIUM mmol/L 4 5 4 3 4 0  --  4 2   CHLORIDE mmol/L 108  --   --   --  109*   CO2 mmol/L 26  --   --   --  24   CO2, I-STAT mmol/L  --   --   --  26  --    BUN mg/dL 14  --   --   --  11   CREATININE mg/dL 1 20  --   --   --  1 15   GLUCOSE, ISTAT mg/dl  --   --   --  138  --    CALCIUM mg/dL 7 9*  --   --   --  8 2*     Results from last 7 days   Lab Units 03/19/21  0939   INR  0 96       Studies:  CXR: Left lower lobe atelectasis  Large gastric bubble  Lines in good position    EKG: NSR, HR 73    I have personally reviewed pertinent films in PACS    Invasive Lines/Tubes:  Invasive Devices     Central Venous Catheter Line            CVC Central Lines 03/24/21 Triple less than 1 day    Introducer 03/24/21 less than 1 day          Peripheral Intravenous Line            Peripheral IV 03/24/21 Left Antecubital less than 1 day          Arterial Line            Arterial Line 03/24/21 Right Radial less than 1 day          Line            Pacer Wires less than 1 day    Pacer Wires less than 1 day          Drain            Chest Tube 1 Left Pleural 32 Fr  less than 1 day    Chest Tube 2 Posterior Mediastinal 32 Fr  less than 1 day    Chest Tube 3 Anterior Mediastinal 32 Fr  less than 1 day    Urethral Catheter 16 Fr  less than 1 day                Physical Exam:    HEENT/NECK:  Normocephalic  Atraumatic  No jugular venous distention  Cardiac: Regular rate and rhythm and No murmurs/rubs/gallops  Pulmonary:  Breath sounds diminished in the bases bilaterally  and No rales/rhonchi/wheezes  Abdomen:  Non-tender, Non-distended and Normal bowel sounds  Incisions: Sternum is stable  Incision dressed with Acticoat  No erythema or drainage and Saphenectomy incision dressed with Acticoat  No erythema or drainage  Extremities: Extremities warm/dry and 1+ edema B/L  Neuro: Alert and oriented X 3, Sensation is grossly intact and No focal deficits  Skin: Warm/Dry, without rashes or lesions  Assessment:  Principal Problem:    CAD (coronary artery disease)  Active Problems:    Claudication [facility])    Essential hypertension    Mixed hyperlipidemia    Hypothyroidism    Myofascial pain syndrome    Radiculopathy, lumbar region    History of CVA (cerebrovascular accident)    History of percutaneous coronary intervention    S/P CABG (coronary artery bypass graft)    Thrombocytopenia (HCC)    Hyperchloremia    Hypocalcemia       Coronary artery disease  S/P coronary artery bypass grafting; POD # 1    Plan:    1  Cardiac:   Cardiac infusions: None  Cardiac index > 2 2  D/C Sinton Melissa catheter   D/C Arterial line  NSR; HR/BP well-controlled  Lopressor, 25mg PO BID  Continue ASA and Statin therapy  Maintain epicardial pacing wires for today  Maintain central IV access today for medication access  Continue DVT prophylaxis  Consult cardiology for postoperative medical management    2   Pulmonary:   Extubated  CXR findings: see above  Acute post-op pulmonary insufficiency; Requiring 4 liters via nasal cannla, secondary to atelectasis and poor inspiratory effort secondary to pain  Continue incentive spirometry/coughing/deep breathing exercises  Wean supplemental oxygen as tolerated for saturation > 90%  Chest tube output remains persistently high; Continue chest tubes to suction today    3  Renal:   Intake/Output net: +2 3 L/24 hours  Post op volume overload: Add Lasix, 40 mg IV QD  Add Potassium supplementation, 20 mEq QD  Post op Creatinine stable; Follow up labs prn  Discontinue potassium replacement scales  Discontinue ayala catheter    4  Neuro:  Neurologically intact; No active issues  Incisional pain well-controlled  Continue Tylenol, 975 mg PO q 8, standing dose  Continue Oxycodone, 2 5 to 5 mg PO q 4 hours prn painContinue prn Percocet    5  GI:  Tolerating TLC 2 3 gm sodium diet  Maintain 1800 mL daily fluid restriction   Continue stool softeners and prn suppository  Continue GI prophylaxis    6  Endo:   Glucose well-controlled  Discontinue continuous insulin infusion and add Insulin sliding scale coverage    7  Hematology:    Post-operative blood count acceptable; Trend prn and Leukocytosis    8   Disposition:  Transfer from ICU to telemetry today    VTE Pharmacologic Prophylaxis: Heparin  VTE Mechanical Prophylaxis: sequential compression device    Collaborative rounds completed with LARISSA Pretty  and with the bedside nurse    SIGNATURE: Beau Ruffin PA-C  DATE: March 25, 2021  TIME: 7:04 AM

## 2021-03-26 LAB
ANION GAP SERPL CALCULATED.3IONS-SCNC: 5 MMOL/L (ref 4–13)
BUN SERPL-MCNC: 19 MG/DL (ref 5–25)
CALCIUM SERPL-MCNC: 8.7 MG/DL (ref 8.3–10.1)
CHLORIDE SERPL-SCNC: 103 MMOL/L (ref 100–108)
CO2 SERPL-SCNC: 28 MMOL/L (ref 21–32)
CREAT SERPL-MCNC: 1.44 MG/DL (ref 0.6–1.3)
ERYTHROCYTE [DISTWIDTH] IN BLOOD BY AUTOMATED COUNT: 14.4 % (ref 11.6–15.1)
GFR SERPL CREATININE-BSD FRML MDRD: 51 ML/MIN/1.73SQ M
GLUCOSE SERPL-MCNC: 101 MG/DL (ref 65–140)
GLUCOSE SERPL-MCNC: 115 MG/DL (ref 65–140)
GLUCOSE SERPL-MCNC: 119 MG/DL (ref 65–140)
GLUCOSE SERPL-MCNC: 142 MG/DL (ref 65–140)
GLUCOSE SERPL-MCNC: 152 MG/DL (ref 65–140)
HCT VFR BLD AUTO: 33.8 % (ref 36.5–49.3)
HCT VFR BLD AUTO: 36.5 % (ref 36.5–49.3)
HGB BLD-MCNC: 11.5 G/DL (ref 12–17)
HGB BLD-MCNC: 12 G/DL (ref 12–17)
MCH RBC QN AUTO: 33.7 PG (ref 26.8–34.3)
MCHC RBC AUTO-ENTMCNC: 32.9 G/DL (ref 31.4–37.4)
MCV RBC AUTO: 103 FL (ref 82–98)
PLATELET # BLD AUTO: 144 THOUSANDS/UL (ref 149–390)
PMV BLD AUTO: 10.4 FL (ref 8.9–12.7)
POTASSIUM SERPL-SCNC: 4 MMOL/L (ref 3.5–5.3)
RBC # BLD AUTO: 3.56 MILLION/UL (ref 3.88–5.62)
SODIUM SERPL-SCNC: 136 MMOL/L (ref 136–145)
WBC # BLD AUTO: 12.83 THOUSAND/UL (ref 4.31–10.16)

## 2021-03-26 PROCEDURE — 99024 POSTOP FOLLOW-UP VISIT: CPT | Performed by: THORACIC SURGERY (CARDIOTHORACIC VASCULAR SURGERY)

## 2021-03-26 PROCEDURE — 99024 POSTOP FOLLOW-UP VISIT: CPT | Performed by: PHYSICIAN ASSISTANT

## 2021-03-26 PROCEDURE — 85027 COMPLETE CBC AUTOMATED: CPT | Performed by: PHYSICIAN ASSISTANT

## 2021-03-26 PROCEDURE — 99232 SBSQ HOSP IP/OBS MODERATE 35: CPT | Performed by: INTERNAL MEDICINE

## 2021-03-26 PROCEDURE — 80048 BASIC METABOLIC PNL TOTAL CA: CPT | Performed by: PHYSICIAN ASSISTANT

## 2021-03-26 PROCEDURE — 97530 THERAPEUTIC ACTIVITIES: CPT

## 2021-03-26 PROCEDURE — 85018 HEMOGLOBIN: CPT | Performed by: PHYSICIAN ASSISTANT

## 2021-03-26 PROCEDURE — 85014 HEMATOCRIT: CPT | Performed by: PHYSICIAN ASSISTANT

## 2021-03-26 PROCEDURE — 82948 REAGENT STRIP/BLOOD GLUCOSE: CPT

## 2021-03-26 PROCEDURE — 97116 GAIT TRAINING THERAPY: CPT

## 2021-03-26 RX ORDER — AMOXICILLIN 250 MG
1 CAPSULE ORAL 2 TIMES DAILY
Status: DISCONTINUED | OUTPATIENT
Start: 2021-03-26 | End: 2021-03-28 | Stop reason: HOSPADM

## 2021-03-26 RX ORDER — FUROSEMIDE 10 MG/ML
40 INJECTION INTRAMUSCULAR; INTRAVENOUS
Status: DISCONTINUED | OUTPATIENT
Start: 2021-03-26 | End: 2021-03-28 | Stop reason: HOSPADM

## 2021-03-26 RX ORDER — ZOLPIDEM TARTRATE 5 MG/1
20 TABLET ORAL
Status: DISCONTINUED | OUTPATIENT
Start: 2021-03-26 | End: 2021-03-28 | Stop reason: HOSPADM

## 2021-03-26 RX ORDER — LIDOCAINE HYDROCHLORIDE 10 MG/ML
10 INJECTION, SOLUTION EPIDURAL; INFILTRATION; INTRACAUDAL; PERINEURAL ONCE
Status: COMPLETED | OUTPATIENT
Start: 2021-03-26 | End: 2021-03-27

## 2021-03-26 RX ADMIN — HEPARIN SODIUM 5000 UNITS: 5000 INJECTION INTRAVENOUS; SUBCUTANEOUS at 21:46

## 2021-03-26 RX ADMIN — ASPIRIN 81 MG: 81 TABLET, CHEWABLE ORAL at 09:38

## 2021-03-26 RX ADMIN — KETOROLAC TROMETHAMINE 15 MG: 30 INJECTION, SOLUTION INTRAMUSCULAR at 05:09

## 2021-03-26 RX ADMIN — AMIODARONE HYDROCHLORIDE 200 MG: 200 TABLET ORAL at 13:29

## 2021-03-26 RX ADMIN — OXYCODONE HYDROCHLORIDE 5 MG: 5 TABLET ORAL at 04:22

## 2021-03-26 RX ADMIN — AMIODARONE HYDROCHLORIDE 200 MG: 200 TABLET ORAL at 21:46

## 2021-03-26 RX ADMIN — ATORVASTATIN CALCIUM 80 MG: 80 TABLET, FILM COATED ORAL at 18:30

## 2021-03-26 RX ADMIN — FUROSEMIDE 40 MG: 10 INJECTION, SOLUTION INTRAMUSCULAR; INTRAVENOUS at 09:44

## 2021-03-26 RX ADMIN — LEVOTHYROXINE SODIUM 100 MCG: 100 TABLET ORAL at 05:09

## 2021-03-26 RX ADMIN — OXYCODONE HYDROCHLORIDE 5 MG: 5 TABLET ORAL at 23:03

## 2021-03-26 RX ADMIN — PANTOPRAZOLE SODIUM 40 MG: 40 TABLET, DELAYED RELEASE ORAL at 05:09

## 2021-03-26 RX ADMIN — METOPROLOL TARTRATE 25 MG: 25 TABLET, FILM COATED ORAL at 21:46

## 2021-03-26 RX ADMIN — POTASSIUM CHLORIDE 20 MEQ: 1500 TABLET, EXTENDED RELEASE ORAL at 09:38

## 2021-03-26 RX ADMIN — INSULIN LISPRO 1 UNITS: 100 INJECTION, SOLUTION INTRAVENOUS; SUBCUTANEOUS at 06:20

## 2021-03-26 RX ADMIN — CLOPIDOGREL BISULFATE 75 MG: 75 TABLET ORAL at 09:38

## 2021-03-26 RX ADMIN — HEPARIN SODIUM 5000 UNITS: 5000 INJECTION INTRAVENOUS; SUBCUTANEOUS at 13:29

## 2021-03-26 RX ADMIN — METOPROLOL TARTRATE 25 MG: 25 TABLET, FILM COATED ORAL at 09:37

## 2021-03-26 RX ADMIN — FUROSEMIDE 40 MG: 10 INJECTION, SOLUTION INTRAVENOUS at 13:29

## 2021-03-26 RX ADMIN — ACETAMINOPHEN 975 MG: 325 TABLET, FILM COATED ORAL at 13:29

## 2021-03-26 RX ADMIN — DOCUSATE SODIUM AND SENNOSIDES 1 TABLET: 8.6; 5 TABLET ORAL at 09:37

## 2021-03-26 RX ADMIN — AMIODARONE HYDROCHLORIDE 200 MG: 200 TABLET ORAL at 05:09

## 2021-03-26 RX ADMIN — DOCUSATE SODIUM AND SENNOSIDES 1 TABLET: 8.6; 5 TABLET ORAL at 18:30

## 2021-03-26 RX ADMIN — FUROSEMIDE 40 MG: 10 INJECTION, SOLUTION INTRAVENOUS at 18:30

## 2021-03-26 RX ADMIN — OXYCODONE HYDROCHLORIDE 5 MG: 5 TABLET ORAL at 13:29

## 2021-03-26 RX ADMIN — ACETAMINOPHEN 975 MG: 325 TABLET, FILM COATED ORAL at 05:09

## 2021-03-26 RX ADMIN — HEPARIN SODIUM 5000 UNITS: 5000 INJECTION INTRAVENOUS; SUBCUTANEOUS at 05:09

## 2021-03-26 RX ADMIN — ZOLPIDEM TARTRATE 20 MG: 5 TABLET ORAL at 21:48

## 2021-03-26 RX ADMIN — ACETAMINOPHEN 975 MG: 325 TABLET, FILM COATED ORAL at 21:46

## 2021-03-26 RX ADMIN — POTASSIUM CHLORIDE 20 MEQ: 1500 TABLET, EXTENDED RELEASE ORAL at 18:30

## 2021-03-26 NOTE — PLAN OF CARE
Problem: PHYSICAL THERAPY ADULT  Goal: Performs mobility at highest level of function for planned discharge setting  See evaluation for individualized goals  Description: Treatment/Interventions: Functional transfer training, LE strengthening/ROM, Elevations, Therapeutic exercise, Endurance training, Bed mobility, Gait training, Spoke to nursing, Spoke to case management, OT  Equipment Recommended: Walker(pt already owns)       See flowsheet documentation for full assessment, interventions and recommendations  Outcome: Progressing  Note: Prognosis: Good  Problem List: Decreased strength, Decreased endurance, Impaired balance, Decreased mobility, Pain, Decreased safety awareness, Decreased cognition  Assessment: Pt is making slow but steady progress towards mobility goals  Pt able to perform transfers and ambulate short community distances today with decreased assistance  However, pt with decreased insight into deficits requiring frequent VCs for safety  During transfers, pt insistent on putting BUEs on RW, despite max VCs for proper technique stating "this is how they taught me at rehab " Pt would benefit from continued acute skilled PT to address above deficits and allow for improved functional mobility  Recommending home with HHPT upon d/c         PT Discharge Recommendation: Return to previous environment with social support, Home with skilled therapy(HHPT)          See flowsheet documentation for full assessment

## 2021-03-26 NOTE — PROCEDURES
03/26/21    Procedure: Chest tube removal    Chest tubes removed in routine fashion without incident  Insertion site dressed with Acticoat  Lizette Bradley tolerated the procedure well  Nurse notified  Matthew Ann PA-C  DATE: March 26, 2021  TIME: 3:10 PM      03/26/21    Procedure: Epicardial Pacing Wire removal    Lizette Bradley was returned to bed and informed of mandatory one hour post-procedure bed rest   The assigned nurse was notified  Epicardial pacing wires removed in routine fashion, without incident  The patient tolerated the procedure well  Vital signs ordered  q 15 minutes for one hour, as per protocol      SIGNATURE: Peng Troncoso PA-C  DATE: March 26, 2021  TIME: 3:10 PM

## 2021-03-26 NOTE — PHYSICAL THERAPY NOTE
Physical Therapy Treatment Note    Patient's Name: Mitchell Tao  : 21 0824   PT Last Visit   PT Visit Date 21   Note Type   Note Type Treatment   Pain Assessment   Pain Assessment Tool Pain Assessment not indicated - pt denies pain   Restrictions/Precautions   Weight Bearing Precautions Per Order No   Other Precautions Cardiac/sternal;Cognitive; Chair Alarm;Multiple lines;Telemetry; Fall Risk;Pain  (CT)   General   Chart Reviewed Yes   Cognition   Attention Attends with cues to redirect   Orientation Level Oriented X4   Memory Decreased recall of precautions   Following Commands Follows one step commands with increased time or repetition   Comments Pt with questionable baseline cog  Requires frequent redirection and has limited insight into safety/deficits  Subjective   Subjective Arrived in room with pt standing up at chair, gown tangled in his wires, attempting to pee  Pt perseverating on the sensation of having to pee, but unable to  Assisted pt back to chair and educated pt on the importance of asking for assistance before getting up for safety purposes  Pt verbalized understanding  Ended session with pt in chair and chair alarm activated  Bed Mobility   Additional Comments OOB upon arrival, not assessed  Transfers   Sit to Stand 5  Supervision   Additional items Assist x 1; Increased time required;Verbal cues   Stand to Sit 5  Supervision   Additional items Assist x 1; Increased time required;Verbal cues   Additional Comments Transfers with RW  VCs for proper hand placement with limited carryover throughout session  Pt reporting "they taught me how to do this in rehab" and pulling up to stand with both hands on RW  Ambulation/Elevation   Gait pattern Excessively slow; Inconsistent jennifer;Decreased foot clearance   Gait Assistance 4  Minimal assist   Additional items Assist x 1;Verbal cues   Assistive Device Rolling walker   Distance 160ft with VCs for proximity to RW and improved posture   Balance   Static Sitting Fair +   Dynamic Sitting Fair   Static Standing Fair -   Dynamic Standing Fair -   Ambulatory Poor +   Activity Tolerance   Activity Tolerance Patient limited by fatigue   Nurse Made Aware ok to see per RN   Exercises   Knee AROM Long Arc Quad Sitting;15 reps;AROM; Bilateral   Assessment   Prognosis Good   Problem List Decreased strength;Decreased endurance; Impaired balance;Decreased mobility;Pain;Decreased safety awareness;Decreased cognition   Assessment Pt is making slow but steady progress towards mobility goals  Pt able to perform transfers and ambulate short community distances today with decreased assistance  However, pt with decreased insight into deficits requiring frequent VCs for safety  During transfers, pt insistent on putting BUEs on RW, despite max VCs for proper technique stating "this is how they taught me at rehab " Pt would benefit from continued acute skilled PT to address above deficits and allow for improved functional mobility  Recommending home with HHPT upon d/c  Goals   Patient Goals to go home   STG Expiration Date 04/08/21   PT Treatment Day 1   Plan   Treatment/Interventions Functional transfer training;LE strengthening/ROM; Elevations; Therapeutic exercise; Endurance training;Bed mobility;Gait training;Spoke to nursing;Spoke to case management;OT   Progress Progressing toward goals   PT Frequency Other (Comment)  (4-6x/wk)   Recommendation   PT Discharge Recommendation Return to previous environment with social support;Home with skilled therapy  (HHPT)   Equipment Recommended Walker  (pt already owns)   C[place] 8 in Bed Without Bedrails 3   Lying on Back to Sitting on Edge of Flat Bed 3   Moving Bed to Chair 3   Standing Up From Chair 3   Walk in Room 3   Climb 3-5 Stairs 3   Basic Mobility Inpatient Raw Score 18   Basic Mobility Standardized Score 41 05     PT Treatment (1:11-1:24)    S: "I was supposed to die when I had my stroke    I can do anything "    O: Pt performed an additional 120ft  Ambulation with supervision assist with RW  Pt also performed 7 steps up/down with single UE on RHR using step to pattern with El  A: Initiated stair training this session with VCs for step to pattern  Pt required VCs for getting his entire foot onto the step with inconsistent carryover  Pt steady, but slow with SOB afterwards  Pt denies any concerns about being able to navigate his 10 CATY at home  His wife was present at end of session and also denied any concerns about pt returning home  P: Pt would benefit from continued acute skilled PT to address above deficits  Continuing to recommend home with HHPT upon d/c       Stephanie Wilder, PT, DPT

## 2021-03-26 NOTE — PROGRESS NOTES
Progress Note - Cardiothoracic Surgery   Drea Soto 63 y o  male MRN: [id number]  Unit/Bed#: [facility] 406-01 Encounter: [id number]    Coronary artery disease  S/P coronary artery bypass grafting; POD # 2    24 Hour Events:  No events overnight  Complaints of poor sleeping overnight  Tolerating diet  Passing flatus       Medications:   Scheduled Meds:  Current Facility-Administered Medications   Medication Dose Route Frequency Provider Last Rate    acetaminophen  975 mg Oral Q8H Dolores Ariza PA-C      amiodarone  200 mg Oral Q8H [address] Dolores Ariza PA-C      aspirin  81 mg Oral Daily Dolores Ariza PA-C      atorvastatin  80 mg Oral After [employer]      bisacodyl  5 mg Oral Once Sixto Queen PA-C      bisacodyl  10 mg Rectal Daily PRN Dolores Ariza PA-C      clopidogrel  75 mg Oral Daily Dolores Ariza PA-C      furosemide  40 mg Intravenous BID (diuretic) Dolores Ariza PA-C      heparin (porcine)  5,000 Units Subcutaneous Q8H [address] Dolores Ariza PA-C      insulin lispro  1-5 Units Subcutaneous HS Dolores Ariza PA-C      insulin lispro  1-6 Units Subcutaneous TID AC Dolores Ariza PA-C      levothyroxine  100 mcg Oral Early Morning Dolores Ariza PA-C      metoprolol tartrate  25 mg Oral Q12H [address] Dolores Ariza PA-C      ondansetron  4 mg Intravenous Q6H PRN Dolores Ariza PA-C      oxyCODONE  2 5 mg Oral Q4H PRN Dolores Ariza PA-C      oxyCODONE  5 mg Oral Q4H PRN Dolores Ariza PA-C      pantoprazole  40 mg Oral Early Morning Dolores Ariza PA-C      polyethylene glycol  17 g Oral Daily Dolores Ariza PA-C      potassium chloride  20 mEq Oral BID Dolores Ariza PA-C      senna-docusate sodium  1 tablet Oral BID Sixto Queen PA-C      zolpidem  10 mg Oral HS PRN Sharad Davies PA-C       Continuous Infusions:   PRN Meds: bisacodyl    ondansetron    oxyCODONE    oxyCODONE    zolpidem    Vitals:   Vitals:    03/25/21 2301 03/26/21 0300 03/26/21 0544 03/26/21 0721   BP: 112/70 129/64  129/79   BP Location: Right arm Right arm  Left arm   Pulse: 80 82  86   Resp: 16 16  16   Temp: 98 7 °F (37 1 °C) 98 1 °F (36 7 °C)  98 7 °F (37 1 °C)   TempSrc: Oral Oral  Oral   SpO2: 93% 92%  93%   Weight:   110 kg (241 lb 10 oz)    Height:           Telemetry: NSR; Heart Rate: 86       Respiratory:   SpO2: SpO2: 93 %; Room Air    Intake/Output:   I/O       03/24 0701 - 03/25 0700 03/25 0701 - 03/26 0700 03/26 0701 - 03/27 0700    P  O  240 640     I V  (mL/kg) 1156 5 (10 6) 86 5 (0 8)     IV Piggyback 850 50     Cell Saver 1450      Total Intake(mL/kg) 3696 5 (33 9) 776 5 (7 1)     Urine (mL/kg/hr) 995 (0 4) 1220 (0 5)     Chest Tube 460 290     Total Output 1455 1510     Net +2241 5 -733 5                  Chest tube Output:    Mediastinal tubes: 20 mL/8 hours  180 mL/24 hours   Pleural tubes: 0 mL/8 hours  110 mL/24 hours     Weights:   Weight (last 2 days)     Date/Time   Weight    03/26/21 0544   110 (241 62)    03/25/21 0600   109 (240 96)    03/24/21 0639   105 (231)            Results:   Results from last 7 days   Lab Units 03/26/21 0448 03/25/21  0405 03/24/21 2125 03/24/21  1358   WBC Thousand/uL 12 83* 13 20*  --   --   --    HEMOGLOBIN g/dL 12 0 12 7 12 8  --  12 0   I STAT HEMOGLOBIN   --   --   --    < >  --    HEMATOCRIT % 36 5 38 4 38 6  --  34 9*   HEMATOCRIT, ISTAT   --   --   --    < >  --    PLATELETS Thousands/uL 144* 154  --   --  119*    < > = values in this interval not displayed       Results from last 7 days   Lab Units 03/26/21 0448 03/25/21  0405 03/24/21 2125 03/24/21  1403 03/24/21  1358   SODIUM mmol/L 136 139  --   --   --  141   POTASSIUM mmol/L 4 0 4 5 4 3   < >  --  4 2   CHLORIDE mmol/L 103 108  --   --   --  109*   CO2 mmol/L 28 26  --   --   --  24   CO2, I-STAT mmol/L  --   --   --   --  26  --    BUN mg/dL 19 14  --   --   --  11   CREATININE mg/dL 1 44* 1 20  --   --   --  1 15   GLUCOSE, ISTAT mg/dl  --   --   --   -- 138  --    CALCIUM mg/dL 8 7 7 9*  --   --   --  8 2*    < > = values in this interval not displayed  Results from last 7 days   Lab Units 03/19/21  0939   INR  0 96     Point of care glucose: 122-202    Invasive Lines/Tubes:  Invasive Devices     Central Venous Catheter Line            CVC Central Lines 03/24/21 Triple 1 day          Peripheral Intravenous Line            Peripheral IV 03/24/21 Left Antecubital 1 day          Line            Pacer Wires 1 day    Pacer Wires 1 day          Drain            Chest Tube 1 Left Pleural 32 Fr  1 day    Chest Tube 2 Posterior Mediastinal 32 Fr  1 day    Chest Tube 3 Anterior Mediastinal 32 Fr  1 day                Physical Exam:    HEENT/NECK:  Normocephalic  Atraumatic  No jugular venous distention  Cardiac: Regular rate and rhythm  Pulmonary:  Breath sounds clear bilaterally  Abdomen:  Non-tender, Non-distended and Normal bowel sounds  Incisions: Sternum is stable  Incision is clean, dry, and intact  and Saphenectomy incision dressed with Acticoat  No erythema or drainage  Extremities: Extremities warm/dry and 1+ edema B/L  Neuro: Alert and oriented X 3  Skin: Warm/Dry, without rashes or lesions  Assessment:  Principal Problem:    S/P CABG (coronary artery bypass graft)  Active Problems:    CAD (coronary artery disease)    Claudication (HCC)    Essential hypertension    Mixed hyperlipidemia    Hypothyroidism    Myofascial pain syndrome    Radiculopathy, lumbar region    History of CVA (cerebrovascular accident)    History of percutaneous coronary intervention    Thrombocytopenia (HCC)    Hyperchloremia    Hypocalcemia       Coronary artery disease  S/P coronary artery bypass grafting; POD # 2    Plan:    1  Cardiac:   NSR; HR/BP well-controlled  Lopressor, 25mg PO BID  Continue ASA and Statin therapy  Plavix: hx of CVA and hx of multiple coronary stenting  Epicardial pacing wires no longer required    Remove today  Maintain central IV access today for today  Continue DVT prophylaxis    2  Pulmonary:   Good Room air oxygen saturation; Continue incentive spirometry/Coughing/Deep breathing exercises  May be able to d/c chest tubes today pending output    3  Renal:   Intake/Output net: -733 mL/24 hours  Continue diuresis   Lasix 40 mg IV BID  Potassium Chloride 20 mEq PO BID  Post op Creatinine stable; Follow up labs prn    4  Neuro: poor overnight sleep - takes home dose of ambien 20 mg   Neurologically intact; No active issues  Incisional pain well-controlled  Continue Tylenol, 975 mg PO q 8, standing dose  Continue Oxycodone, 2 5 to 5 mg PO q 4 hours prn pain    5  GI:  Tolerating TLC 2 3 gm sodium diet  Maintain 1800 mL daily fluid restriction   Continue stool softeners and prn suppository  Continue GI prophylaxis    6  Endo:   No history of diabetes; Glucose well-controlled with sliding scale coverage    7    Hematology:    Post-operative blood count acceptable; Trend prn    8  Disposition:      Anticipate discharge to home when medically ready     VTE Pharmacologic Prophylaxis: Heparin  VTE Mechanical Prophylaxis: sequential compression device    Collaborative rounds completed with LARISSA Bergman    Plan of care discussed with bedside nurse    SIGNATURE: Benny Bhat PA-C  DATE: March 26, 2021  TIME: 7:52 AM

## 2021-03-26 NOTE — PROGRESS NOTES
General Cardiology   Progress Note -  [employer]   Chata Moser 63 y o  male MRN: [id number]    Unit/Bed#: [facility] 406-01 Encounter: [id number]    Assessment:    1  CAD: s/p CABG x3 with LIMA-LAD, SVG-or PLB, SVG-RI  POD #2  · Rhythm management:  Amiodarone 200 mg TID, Lopressor 25 mg BID  · Volume management:  IV Lasix 40 mg BID w/ K+ supplement  Net output +1 5 L  · Hemodynamics:  Remains with CT and EPW  · Aspirin, Plavix, beta-blocker, statin  2  Preserved biventricular systolic function:  EF 91%, no WMA, G1DD, no significant valvular abnormality  Volume management as above  3  Essential hypertension:  Last /72 on Lopressor 25 mg BID and IV Lasix  4  Hyperlipidemia: , TG 93, HDL 42, LDL 77 on atorvastatin 80 mg daily  5  Hypothyroidism:  Maintained on levothyroxine 100 mcg daily  6  History of CVA:  With residual right-sided weakness  On aspirin and statin        Plan/Recommendations:  · Continue IV diuretics for negative fluid balance  · Monitor I&Os, renal function, electrolytes and standing weight  · Maintain potassium >4 and magnesium >2  · Continue current cardiac medications  · Encourage ambulation and incentive spirometry  · Follow up with CHRISTA Pak 04/08/2020    ____________________________________________________________    Subjective    Patient seen and examined  No acute events overnight  He has incisional chest discomfort but denies any shortness of breath or palpitations  Review of Systems   Constitution: Negative  Negative for chills  Cardiovascular: Positive for chest pain  Negative for dyspnea on exertion, leg swelling, near-syncope, orthopnea, palpitations, paroxysmal nocturnal dyspnea and syncope  Respiratory: Negative  Negative for cough, shortness of breath and wheezing  Endocrine: Negative  Hematologic/Lymphatic: Negative  Skin: Negative  Musculoskeletal: Negative  Gastrointestinal: Negative  Negative for diarrhea, nausea and vomiting  Neurological: Negative for dizziness, light-headedness and weakness  Psychiatric/Behavioral: Negative  Negative for altered mental status  All other systems reviewed and are negative  Objective:   Vitals: Blood pressure 129/79, pulse 86, temperature 98 7 °F (37 1 °C), temperature source Oral, resp  rate 16, height 5' 9" (1 753 m), weight 110 kg (241 lb 10 oz), SpO2 93 %  ,     Wt Readings from Last 3 Encounters:   03/26/21 110 kg (241 lb 10 oz)   03/12/21 107 kg (235 lb 12 8 oz)   03/08/21 107 kg (235 lb)        Lab Results   Component Value Date    CREATININE 1 44 (H) 03/26/2021    CREATININE 1 20 03/25/2021    CREATININE 1 15 03/24/2021         Body mass index is 35 68 kg/m²  ,     Systolic (19OIO), RFY:471 , Min:112 , UNW:868     Diastolic (00OIA), PATRIZIA:77, Min:60, Max:80          Intake/Output Summary (Last 24 hours) at 3/26/2021 1009  Last data filed at 3/26/2021 0515  Gross per 24 hour   Intake 640 ml   Output 1325 ml   Net -685 ml     Weight (last 2 days)     Date/Time   Weight    03/26/21 0544   110 (241 62)    03/25/21 0600   109 (240 96)    03/24/21 0639   105 (231)              Telemetry Review: No significant arrhythmias seen on telemetry review  Physical Exam  Vitals signs and nursing note reviewed  Constitutional:       General: He is not in acute distress  Appearance: He is well-developed  Comments: On RA in NAD   HENT:      Head: Normocephalic and atraumatic  Neck:      Musculoskeletal: Normal range of motion and neck supple  Vascular: No JVD  Comments: Invasive lines noted in right neck  Cardiovascular:      Rate and Rhythm: Normal rate and regular rhythm  Heart sounds: Normal heart sounds  No murmur  No friction rub  No gallop  Pulmonary:      Effort: Pulmonary effort is normal  No respiratory distress  Breath sounds: No wheezing or rales  Comments: Diminished breath sounds at the bases  Chest:      Chest wall: No tenderness     Abdominal: General: Bowel sounds are normal  There is no distension  Palpations: Abdomen is soft  Tenderness: There is no abdominal tenderness  Musculoskeletal: Normal range of motion  General: No tenderness  Right lower leg: No edema  Left lower leg: No edema  Skin:     General: Skin is warm and dry  Coloration: Skin is not pale  Findings: No erythema  Comments: Sternal incision with occlusive dressing   Neurological:      Mental Status: He is alert and oriented to person, place, and time  Psychiatric:         Mood and Affect: Mood normal          Behavior: Behavior normal          Thought Content:  Thought content normal          Judgment: Judgment normal          LABORATORY RESULTS      CBC with diff:   Results from last 7 days   Lab Units 03/26/21 0448 03/25/21  0405 03/24/21 2125 03/24/21  1403 03/24/21  1358 03/24/21  1252 03/24/21  1220 03/24/21  1209   WBC Thousand/uL 12 83* 13 20*  --   --   --   --   --   --    HEMOGLOBIN g/dL 12 0 12 7 12 8  --  12 0  --   --   --    I STAT HEMOGLOBIN g/dl  --   --   --  10 5*  --  9 2* 9 5*  --    HEMATOCRIT % 36 5 38 4 38 6  --  34 9*  --   --   --    HEMATOCRIT, ISTAT %  --   --   --  31*  --  27* 28*  --    MCV fL 103* 100*  --   --   --   --   --   --    PLATELETS Thousands/uL 144* 154  --   --  119*  --   --  146*   MCH pg 33 7 33 2  --   --   --   --   --   --    MCHC g/dL 32 9 33 1  --   --   --   --   --   --    RDW % 14 4 14 1  --   --   --   --   --   --    MPV fL 10 4 10 2  --   --  9 8  --   --  10 0       CMP:  Results from last 7 days   Lab Units 03/26/21 0448 03/25/21  0405 03/24/21 2125 03/24/21  1804 03/24/21  1403 03/24/21  1358 03/24/21  1252 03/24/21  1220 03/24/21  1152 03/24/21  1140 03/24/21  1127 03/24/21  1039   POTASSIUM mmol/L 4 0 4 5 4 3 4 0  --  4 2  --   --   --   --   --   --    CHLORIDE mmol/L 103 108  --   --   --  109*  --   --   --   --   --   --    CO2 mmol/L 28 26  --   --   --  24  --   -- --   --   --   --    CO2, I-STAT mmol/L  --   --   --   --  26  --  24 29 30 28 30 29   BUN mg/dL 19 14  --   --   --  11  --   --   --   --   --   --    CREATININE mg/dL 1 44* 1 20  --   --   --  1 15  --   --   --   --   --   --    GLUCOSE, ISTAT mg/dl  --   --   --   --  138  --  137 147* 132 125 119 117   CALCIUM mg/dL 8 7 7 9*  --   --   --  8 2*  --   --   --   --   --   --    EGFR ml/min/1 73sq m 51 64  --   --   --  67  --   --   --   --   --   --        BMP:  Results from last 7 days   Lab Units 03/26/21  0448 03/25/21  0405 03/24/21  2125 03/24/21  1804 03/24/21  1403 03/24/21  1358 03/24/21  1252 03/24/21  1220 03/24/21  1152   POTASSIUM mmol/L 4 0 4 5 4 3 4 0  --  4 2  --   --   --    CHLORIDE mmol/L 103 108  --   --   --  109*  --   --   --    CO2 mmol/L 28 26  --   --   --  24  --   --   --    CO2, I-STAT mmol/L  --   --   --   --  26  --  24 29 30   BUN mg/dL 19 14  --   --   --  11  --   --   --    CREATININE mg/dL 1 44* 1 20  --   --   --  1 15  --   --   --    GLUCOSE, ISTAT mg/dl  --   --   --   --  138  --  137 147* 132   CALCIUM mg/dL 8 7 7 9*  --   --   --  8 2*  --   --   --        No results found for: NTBNP          Results from last 7 days   Lab Units 03/25/21  0405   MAGNESIUM mg/dL 2 2                           Lipid Profile:   No results found for: CHOL  Lab Results   Component Value Date    HDL 42 01/08/2021    HDL 39 (L) 07/24/2020    HDL 40 11/29/2019     Lab Results   Component Value Date    LDLCALC 77 01/08/2021    LDLCALC 69 07/24/2020    LDLCALC 74 11/29/2019     Lab Results   Component Value Date    TRIG 93 01/08/2021    TRIG 116 07/24/2020    TRIG 121 11/29/2019       Cardiac testing:   Results for orders placed during the hospital encounter of 03/18/21   Echo complete with contrast if indicated    Narrative [address], [address] [facility]  [phone]    Transthoracic Echocardiogram  2D, M-mode, Doppler, and Color Doppler    Study date: 18-Mar-2021    Patient: Kulwinder Denny  MR number: [id number]  Account number: [de-identified]  : 1955  Age: 63 years  Gender: Male  Status: Outpatient  Location: [place] [address]   Height: 69 in  Weight: 235 lb  BP: 160/ 100 mmHg    Indications: Assess left ventricular function  Diagnoses: I25 10 - Atherosclerotic heart disease of native coronary artery without angina pectoris    Sonographer:  Emy Du RDCS  Primary Physician:  Radha Jamil MD  Referring Physician:  Theodora Slaughter PA-C  Group:  [facility]  Interpreting Physician:  Annia Coreas MD    IMPRESSIONS:  The examination was technically difficult  SUMMARY    LEFT VENTRICLE:  Systolic function was normal  Ejection fraction was estimated to be 60 %  Although no diagnostic regional wall motion abnormality was identified, this possibility cannot be completely excluded on the basis of this study  Wall thickness was mildly increased  Doppler parameters were consistent with abnormal left ventricular relaxation (grade 1 diastolic dysfunction)  HISTORY: PRIOR HISTORY: CAD, hyperlipidemia, CHF, hypertension, CVA, palpitations    PROCEDURE: The study was performed in the [address]  This was a routine study  The transthoracic approach was used  The study included complete 2D imaging, M-mode, complete spectral Doppler, and color Doppler  The heart rate was  65 bpm, at the start of the study  Images were obtained from the parasternal, apical, subcostal, and suprasternal notch acoustic windows  Echocardiographic views were limited due to decreased penetration  This was a technically difficult  study  LEFT VENTRICLE: Size was normal  Systolic function was normal  Ejection fraction was estimated to be 60 %  Although no diagnostic regional wall motion abnormality was identified, this possibility cannot be completely excluded on the basis  of this study  Wall thickness was mildly increased   DOPPLER: Doppler parameters were consistent with abnormal left ventricular relaxation (grade 1 diastolic dysfunction)  RIGHT VENTRICLE: The size was normal  Systolic function was normal  Wall thickness was normal     LEFT ATRIUM: Size was normal     RIGHT ATRIUM: Size was normal     MITRAL VALVE: Valve structure was normal  There was normal leaflet separation  DOPPLER: The transmitral velocity was within the normal range  There was no evidence for stenosis  There was no significant regurgitation  AORTIC VALVE: The valve was trileaflet  Leaflets exhibited normal thickness and normal cuspal separation  DOPPLER: Transaortic velocity was within the normal range  There was no evidence for stenosis  There was no significant  regurgitation  TRICUSPID VALVE: The valve structure was normal  There was normal leaflet separation  DOPPLER: The transtricuspid velocity was within the normal range  There was no evidence for stenosis  There was no significant regurgitation  PULMONIC VALVE: Leaflets exhibited normal thickness, no calcification, and normal cuspal separation  DOPPLER: The transpulmonic velocity was within the normal range  There was no significant regurgitation  PERICARDIUM: There was no pericardial effusion  The pericardium was normal in appearance  AORTA: The root exhibited normal size  SYSTEMIC VEINS: IVC: The inferior vena cava was normal in size      SYSTEM MEASUREMENT TABLES    2D  %FS: 35 97 %  Ao Diam: 4 04 cm  EDV(Teich): 82 4 ml  EF(Teich): 65 89 %  ESV(Teich): 28 11 ml  IVSd: 1 43 cm  LA Area: 12 13 cm2  LA Diam: 3 47 cm  LVEDV MOD A4C: 137 79 ml  LVEF MOD A4C: 59 94 %  LVESV MOD A4C: 55 19 ml  LVIDd: 4 29 cm  LVIDs: 2 74 cm  LVLd A4C: 8 78 cm  LVLs A4C: 7 67 cm  LVPWd: 1 24 cm  RA Area: 12 42 cm2  RVIDd: 3 52 cm  SV MOD A4C: 82 6 ml  SV(Teich): 54 29 ml    CW  TR MaxP 3 mmHg  TR Vmax: 2 02 m/s    MM  TAPSE: 1 91 cm    PW  E' Sept: 0 05 m/s  E/E' Sept: 7 3  MV A Simone: 0 6 m/s  MV Dec [place]: 1 42 m/s2  MV DecT: 281 74 ms  MV E Simone: 0 4 m/s  MV E/A Ratio: 0 66  MV PHT: 81 71 ms  MVA By PHT: 2 69 cm2    [facility] Accredited Echocardiography Laboratory    Prepared and electronically signed by    Bhargav Duff MD  Signed 18-Mar-2021 12:18:58       No results found for this or any previous visit  No results found for this or any previous visit  No procedure found  No results found for this or any previous visit        Meds/Allergies   all current active meds have been reviewed, current meds:   Current Facility-Administered Medications   Medication Dose Route Frequency    acetaminophen (TYLENOL) tablet 975 mg  975 mg Oral Q8H    amiodarone tablet 200 mg  200 mg Oral Q8H TISHA    aspirin chewable tablet 81 mg  81 mg Oral Daily    atorvastatin (LIPITOR) tablet 80 mg  80 mg Oral After Dinner    bisacodyl (DULCOLAX) EC tablet 5 mg  5 mg Oral Once    bisacodyl (DULCOLAX) rectal suppository 10 mg  10 mg Rectal Daily PRN    clopidogrel (PLAVIX) tablet 75 mg  75 mg Oral Daily    furosemide (LASIX) injection 40 mg  40 mg Intravenous BID (diuretic)    heparin (porcine) subcutaneous injection 5,000 Units  5,000 Units Subcutaneous Q8H [address]    insulin lispro (HumaLOG) 100 units/mL subcutaneous injection 1-5 Units  1-5 Units Subcutaneous HS    insulin lispro (HumaLOG) 100 units/mL subcutaneous injection 1-6 Units  1-6 Units Subcutaneous TID AC    levothyroxine tablet 100 mcg  100 mcg Oral Early Morning    metoprolol tartrate (LOPRESSOR) tablet 25 mg  25 mg Oral Q12H [address]    ondansetron (ZOFRAN) injection 4 mg  4 mg Intravenous Q6H PRN    oxyCODONE (ROXICODONE) IR tablet 2 5 mg  2 5 mg Oral Q4H PRN    oxyCODONE (ROXICODONE) IR tablet 5 mg  5 mg Oral Q4H PRN    pantoprazole (PROTONIX) EC tablet 40 mg  40 mg Oral Early Morning    polyethylene glycol (MIRALAX) packet 17 g  17 g Oral Daily    potassium chloride (K-DUR,KLOR-CON) CR tablet 20 mEq  20 mEq Oral BID    senna-docusate sodium (SENOKOT S) 8 6-50 mg per tablet 1 tablet  1 tablet Oral BID    zolpidem (AMBIEN) tablet 10 mg  10 mg Oral HS PRN    and PTA meds:   Prior to Admission Medications   Prescriptions Last Dose Informant Patient Reported? Taking?   aspirin 81 MG tablet 3/23/2021 at Unknown time Self Yes Yes   Sig: Take 81 mg by mouth   atorvastatin (LIPITOR) 40 mg tablet 3/23/2021 at Unknown time Self No Yes   Sig: TAKE 2 TABLETS EVERY MORNING   clonazePAM (KlonoPIN) 2 mg tablet 3/23/2021 at Unknown time Self No Yes   Sig: Take 2 tablets (4 mg total) by mouth 2 (two) times a day as needed for seizures   clopidogrel (PLAVIX) 75 mg tablet 3/19/2021  Yes Yes   Sig: Take 75 mg by mouth daily   levothyroxine 100 mcg tablet 3/23/2021 at Unknown time Self No Yes   Sig: TAKE 1 TABLET EVERY DAY   metoprolol succinate (TOPROL-XL) 100 mg 24 hr tablet 3/23/2021 at Unknown time Self No Yes   Sig: Take 1 tablet (100 mg total) by mouth daily   mupirocin (BACTROBAN) 2 % ointment 3/24/2021 at 0600  No Yes   Sig: Apply 1 application topically 2 (two) times a day for 5 days Apply to each nostril twice daily for five days before your operation     oxyCODONE-acetaminophen (PERCOCET) 5-325 mg per tablet More than a month at Unknown time Self No No   Sig: Take 1 tablet by mouth every 8 (eight) hours as needed for moderate painMax Daily Amount: 3 tablets   sildenafil (VIAGRA) 100 mg tablet More than a month at Unknown time Self No No   Sig: Take 1 tablet (100 mg total) by mouth daily as needed for erectile dysfunction Pt request generic brand Tania to offset costs   tiZANidine (ZANAFLEX) 2 mg tablet 3/23/2021 at Unknown time Self No Yes   Sig: Take 1 tablet (2 mg total) by mouth every 8 (eight) hours as needed for muscle spasms   zolpidem (AMBIEN) 10 mg tablet 3/23/2021 at Unknown time Self No Yes   Si po  q hs      Facility-Administered Medications: None     Medications Prior to Admission   Medication    aspirin 81 MG tablet    atorvastatin (LIPITOR) 40 mg tablet    clonazePAM (KlonoPIN) 2 mg tablet    clopidogrel (PLAVIX) 75 mg tablet    levothyroxine 100 mcg tablet    metoprolol succinate (TOPROL-XL) 100 mg 24 hr tablet    mupirocin (BACTROBAN) 2 % ointment    tiZANidine (ZANAFLEX) 2 mg tablet    zolpidem (AMBIEN) 10 mg tablet    oxyCODONE-acetaminophen (PERCOCET) 5-325 mg per tablet    sildenafil (VIAGRA) 100 mg tablet            Counseling / Coordination of Care  Total floor / unit time spent today 20 minutes  Greater than 50% of total time was spent with the patient and / or family counseling and / or coordination of care  ** Please Note: Dragon 360 Dictation voice to text software may have been used in the creation of this document   **

## 2021-03-27 LAB
ANION GAP SERPL CALCULATED.3IONS-SCNC: 1 MMOL/L (ref 4–13)
BUN SERPL-MCNC: 19 MG/DL (ref 5–25)
CALCIUM SERPL-MCNC: 8.4 MG/DL (ref 8.3–10.1)
CHLORIDE SERPL-SCNC: 101 MMOL/L (ref 100–108)
CO2 SERPL-SCNC: 35 MMOL/L (ref 21–32)
CREAT SERPL-MCNC: 1.25 MG/DL (ref 0.6–1.3)
ERYTHROCYTE [DISTWIDTH] IN BLOOD BY AUTOMATED COUNT: 14.1 % (ref 11.6–15.1)
GFR SERPL CREATININE-BSD FRML MDRD: 61 ML/MIN/1.73SQ M
GLUCOSE SERPL-MCNC: 109 MG/DL (ref 65–140)
GLUCOSE SERPL-MCNC: 109 MG/DL (ref 65–140)
GLUCOSE SERPL-MCNC: 110 MG/DL (ref 65–140)
GLUCOSE SERPL-MCNC: 113 MG/DL (ref 65–140)
GLUCOSE SERPL-MCNC: 130 MG/DL (ref 65–140)
HCT VFR BLD AUTO: 31.3 % (ref 36.5–49.3)
HCT VFR BLD AUTO: 33.1 % (ref 36.5–49.3)
HGB BLD-MCNC: 10.6 G/DL (ref 12–17)
HGB BLD-MCNC: 11.3 G/DL (ref 12–17)
MCH RBC QN AUTO: 34.5 PG (ref 26.8–34.3)
MCHC RBC AUTO-ENTMCNC: 33.9 G/DL (ref 31.4–37.4)
MCV RBC AUTO: 102 FL (ref 82–98)
PLATELET # BLD AUTO: 135 THOUSANDS/UL (ref 149–390)
PMV BLD AUTO: 10.3 FL (ref 8.9–12.7)
POTASSIUM SERPL-SCNC: 3.6 MMOL/L (ref 3.5–5.3)
RBC # BLD AUTO: 3.07 MILLION/UL (ref 3.88–5.62)
SODIUM SERPL-SCNC: 137 MMOL/L (ref 136–145)
WBC # BLD AUTO: 8.75 THOUSAND/UL (ref 4.31–10.16)

## 2021-03-27 PROCEDURE — 99024 POSTOP FOLLOW-UP VISIT: CPT | Performed by: PHYSICIAN ASSISTANT

## 2021-03-27 PROCEDURE — 12001 RPR S/N/AX/GEN/TRNK 2.5CM/<: CPT | Performed by: EMERGENCY MEDICINE

## 2021-03-27 PROCEDURE — 99232 SBSQ HOSP IP/OBS MODERATE 35: CPT | Performed by: INTERNAL MEDICINE

## 2021-03-27 PROCEDURE — 85027 COMPLETE CBC AUTOMATED: CPT | Performed by: PHYSICIAN ASSISTANT

## 2021-03-27 PROCEDURE — 85018 HEMOGLOBIN: CPT | Performed by: PHYSICIAN ASSISTANT

## 2021-03-27 PROCEDURE — 82948 REAGENT STRIP/BLOOD GLUCOSE: CPT

## 2021-03-27 PROCEDURE — 0HQ5XZZ REPAIR CHEST SKIN, EXTERNAL APPROACH: ICD-10-PCS | Performed by: EMERGENCY MEDICINE

## 2021-03-27 PROCEDURE — 85014 HEMATOCRIT: CPT | Performed by: PHYSICIAN ASSISTANT

## 2021-03-27 PROCEDURE — 80048 BASIC METABOLIC PNL TOTAL CA: CPT | Performed by: PHYSICIAN ASSISTANT

## 2021-03-27 RX ORDER — METOPROLOL TARTRATE 50 MG/1
50 TABLET, FILM COATED ORAL EVERY 12 HOURS SCHEDULED
Status: DISCONTINUED | OUTPATIENT
Start: 2021-03-27 | End: 2021-03-28 | Stop reason: HOSPADM

## 2021-03-27 RX ORDER — POTASSIUM CHLORIDE 20 MEQ/1
40 TABLET, EXTENDED RELEASE ORAL ONCE
Status: COMPLETED | OUTPATIENT
Start: 2021-03-27 | End: 2021-03-27

## 2021-03-27 RX ORDER — TIZANIDINE 2 MG/1
2 TABLET ORAL EVERY 8 HOURS PRN
Status: DISCONTINUED | OUTPATIENT
Start: 2021-03-27 | End: 2021-03-28 | Stop reason: HOSPADM

## 2021-03-27 RX ADMIN — FUROSEMIDE 40 MG: 10 INJECTION, SOLUTION INTRAVENOUS at 17:53

## 2021-03-27 RX ADMIN — FUROSEMIDE 40 MG: 10 INJECTION, SOLUTION INTRAVENOUS at 13:19

## 2021-03-27 RX ADMIN — HEPARIN SODIUM 5000 UNITS: 5000 INJECTION INTRAVENOUS; SUBCUTANEOUS at 21:25

## 2021-03-27 RX ADMIN — METOPROLOL TARTRATE 50 MG: 50 TABLET, FILM COATED ORAL at 21:25

## 2021-03-27 RX ADMIN — AMIODARONE HYDROCHLORIDE 200 MG: 200 TABLET ORAL at 05:37

## 2021-03-27 RX ADMIN — DOCUSATE SODIUM AND SENNOSIDES 1 TABLET: 8.6; 5 TABLET ORAL at 10:15

## 2021-03-27 RX ADMIN — POTASSIUM CHLORIDE 20 MEQ: 1500 TABLET, EXTENDED RELEASE ORAL at 10:15

## 2021-03-27 RX ADMIN — ASPIRIN 81 MG: 81 TABLET, CHEWABLE ORAL at 10:15

## 2021-03-27 RX ADMIN — AMIODARONE HYDROCHLORIDE 200 MG: 200 TABLET ORAL at 21:25

## 2021-03-27 RX ADMIN — ACETAMINOPHEN 975 MG: 325 TABLET, FILM COATED ORAL at 21:25

## 2021-03-27 RX ADMIN — LIDOCAINE HYDROCHLORIDE 10 ML: 10 INJECTION, SOLUTION EPIDURAL; INFILTRATION; INTRACAUDAL; PERINEURAL at 00:14

## 2021-03-27 RX ADMIN — METOPROLOL TARTRATE 50 MG: 50 TABLET, FILM COATED ORAL at 10:14

## 2021-03-27 RX ADMIN — OXYCODONE HYDROCHLORIDE 5 MG: 5 TABLET ORAL at 05:37

## 2021-03-27 RX ADMIN — POTASSIUM CHLORIDE 20 MEQ: 1500 TABLET, EXTENDED RELEASE ORAL at 17:53

## 2021-03-27 RX ADMIN — PANTOPRAZOLE SODIUM 40 MG: 40 TABLET, DELAYED RELEASE ORAL at 05:37

## 2021-03-27 RX ADMIN — HEPARIN SODIUM 5000 UNITS: 5000 INJECTION INTRAVENOUS; SUBCUTANEOUS at 05:37

## 2021-03-27 RX ADMIN — ACETAMINOPHEN 975 MG: 325 TABLET, FILM COATED ORAL at 13:20

## 2021-03-27 RX ADMIN — AMIODARONE HYDROCHLORIDE 200 MG: 200 TABLET ORAL at 13:20

## 2021-03-27 RX ADMIN — CLOPIDOGREL BISULFATE 75 MG: 75 TABLET ORAL at 10:15

## 2021-03-27 RX ADMIN — ZOLPIDEM TARTRATE 20 MG: 5 TABLET ORAL at 21:25

## 2021-03-27 RX ADMIN — DOCUSATE SODIUM AND SENNOSIDES 1 TABLET: 8.6; 5 TABLET ORAL at 17:53

## 2021-03-27 RX ADMIN — ACETAMINOPHEN 975 MG: 325 TABLET, FILM COATED ORAL at 05:37

## 2021-03-27 RX ADMIN — OXYCODONE HYDROCHLORIDE 5 MG: 5 TABLET ORAL at 13:39

## 2021-03-27 RX ADMIN — HEPARIN SODIUM 5000 UNITS: 5000 INJECTION INTRAVENOUS; SUBCUTANEOUS at 13:19

## 2021-03-27 RX ADMIN — POTASSIUM CHLORIDE 40 MEQ: 1500 TABLET, EXTENDED RELEASE ORAL at 13:19

## 2021-03-27 RX ADMIN — ATORVASTATIN CALCIUM 80 MG: 80 TABLET, FILM COATED ORAL at 17:53

## 2021-03-27 RX ADMIN — FUROSEMIDE 40 MG: 10 INJECTION, SOLUTION INTRAVENOUS at 05:37

## 2021-03-27 RX ADMIN — LEVOTHYROXINE SODIUM 100 MCG: 100 TABLET ORAL at 05:37

## 2021-03-27 RX ADMIN — OXYCODONE HYDROCHLORIDE 5 MG: 5 TABLET ORAL at 21:26

## 2021-03-27 NOTE — PROCEDURES
Procedure: Suture placement    Left chest tube site with heavy oozing  Complete saturation of heavy-drainage pads x2  Area was cleaned with Chloraprep  Patient was numbed with 6ml 1% lidocaine without epi  A single suture of 2 0 silk was placed with good hemostasis  Insertion sites were cleaned with chlorhexidine and re-dressed with Acticoat  Patient tolerated procedure well  Nurse at bedside to provide assistance  Martha Lozano

## 2021-03-27 NOTE — PROGRESS NOTES
Cardiology Progress Note - Dwaine Agent 63 y o  male MRN: [id number]    Unit/Bed#: [facility] 406-01 Encounter: [id number]      Assessment/Recommendations:  1  CAD:  Status post CABG x3 lima to LAD, SVG to PLV, SVG to ramus intermedius  Continued aspirin Plavix, beta-blocker, statin  Remains on Plavix due to significant number of prior coronary stents  No significant arrhythmias on monitor  2  Hypertension:  Mildly elevated this morning, continue beta-blocker  Cannot titrate beta-blocker as needed for blood pressure management  3  Hyperlipidemia:  Continue on statin  4  Hypothyroidism:  Maintained on levothyroxine  5  History of CVA:  With residual right-sided weakness  Continue on aspirin and statin  Subjective:   Patient seen and examined  No significant events overnight   ; pertinent negatives - chest pain, chest pressure/discomfort, dyspnea, irregular heart beat, lower extremity edema and palpitations  Objective:     Vitals: Blood pressure 157/94, pulse 89, temperature 98 °F (36 7 °C), temperature source Oral, resp  rate 18, height 5' 9" (1 753 m), weight 106 kg (233 lb 11 oz), SpO2 98 %  , Body mass index is 34 51 kg/m² ,   Orthostatic Blood Pressures      Most Recent Value   Blood Pressure  157/94 filed at 03/27/2021 1106   Patient Position - Orthostatic VS  Sitting filed at 03/27/2021 1106            Intake/Output Summary (Last 24 hours) at 3/27/2021 1334  Last data filed at 3/27/2021 1227  Gross per 24 hour   Intake 420 ml   Output 2980 ml   Net -2560 ml       TELE: No significant arrhythmias seen      Physical Exam:    GEN: Dwaine Agent appears well, alert and oriented x 3, pleasant and cooperative   HEENT: pupils equal, round, and reactive to light; extraocular muscles intact  NECK: supple, no carotid bruits   HEART: regular rhythm, normal S1 and S2, no murmurs, clicks, gallops or rubs   LUNGS: clear to auscultation bilaterally; no wheezes, rales, or rhonchi   ABDOMEN: normal bowel sounds, soft, no tenderness, no distention  EXTREMITIES: peripheral pulses normal; no clubbing, cyanosis, or edema  NEURO: no focal findings   SKIN: normal without suspicious lesions on exposed skin    Medications:      Current Facility-Administered Medications:     acetaminophen (TYLENOL) tablet 975 mg, 975 mg, Oral, Q8H, Dolores Ariza PA-C, 975 mg at 03/27/21 1320    amiodarone tablet 200 mg, 200 mg, Oral, Q8H [facility], Dolores Ariza PA-C, 200 mg at 03/27/21 1320    aspirin chewable tablet 81 mg, 81 mg, Oral, Daily, Dolores Ariza PA-C, 81 mg at 03/27/21 1015    atorvastatin (LIPITOR) tablet 80 mg, 80 mg, Oral, After Dinner, Dolores Ariza PA-C, 80 mg at 03/26/21 1830    bisacodyl (DULCOLAX) EC tablet 5 mg, 5 mg, Oral, Once, Winston Salinas PA-C    bisacodyl (DULCOLAX) rectal suppository 10 mg, 10 mg, Rectal, Daily PRN, Dolores Ariza PA-C    clopidogrel (PLAVIX) tablet 75 mg, 75 mg, Oral, Daily, Dolores Ariza PA-C, 75 mg at 03/27/21 1015    furosemide (LASIX) injection 40 mg, 40 mg, Intravenous, TID (diuretic), Winston Salinas PA-C, 40 mg at 03/27/21 1319    heparin (porcine) subcutaneous injection 5,000 Units, 5,000 Units, Subcutaneous, Q8H [facility], 5,000 Units at 03/27/21 1319 **AND** [CANCELED] Platelet count, , , Once, Dolores Ariza PA-C    insulin lispro (HumaLOG) 100 units/mL subcutaneous injection 1-5 Units, 1-5 Units, Subcutaneous, HS, Dolores Ariza PA-C    insulin lispro (HumaLOG) 100 units/mL subcutaneous injection 1-6 Units, 1-6 Units, Subcutaneous, TID AC, 1 Units at 03/26/21 0620 **AND** Fingerstick Glucose (POCT), , , TID AC, Dolores Ariza PA-C    levothyroxine tablet 100 mcg, 100 mcg, Oral, Early Morning, Dolores Ariza PA-C, 100 mcg at 03/27/21 0537    metoprolol tartrate (LOPRESSOR) tablet 50 mg, 50 mg, Oral, Q12H [facility], [facility] Camron Salinas PA-C, 50 mg at 03/27/21 1014    ondansetron (ZOFRAN) injection 4 mg, 4 mg, Intravenous, Q6H PRN, Dolores Ariza PA-C, 4 mg at 03/25/21 0930    oxyCODONE (ROXICODONE) IR tablet 2 5 mg, 2 5 mg, Oral, Q4H PRN, Dolores Ariza PA-C    oxyCODONE (ROXICODONE) IR tablet 5 mg, 5 mg, Oral, Q4H PRN, Dolores Ariza PA-C, 5 mg at 03/27/21 0537    pantoprazole (PROTONIX) EC tablet 40 mg, 40 mg, Oral, Early Morning, Dolores Ariza PA-C, 40 mg at 03/27/21 0537    polyethylene glycol (MIRALAX) packet 17 g, 17 g, Oral, Daily, Dolores Ariza PA-C, 17 g at 03/25/21 4014    potassium chloride (K-DUR,KLOR-CON) CR tablet 20 mEq, 20 mEq, Oral, BID, Dolores Ariza PA-C, 20 mEq at 03/27/21 1015    senna-docusate sodium (SENOKOT S) 8 6-50 mg per tablet 1 tablet, 1 tablet, Oral, BID, Zhanna Paul PA-C, 1 tablet at 03/27/21 1015    silver nitrate-potassium nitrate (ARZOL SILVER NITRATE) 19-07 % applicator 1 applicator, 1 applicator, Topical, Once, CHRISTA    tiZANidine (ZANAFLEX) tablet 2 mg, 2 mg, Oral, Q8H PRN, Zhanna Salinas PA-C    zolpidem (AMBIEN) tablet 20 mg, 20 mg, Oral, HS PRN, Zhanna Salinas PA-C, 20 mg at 03/26/21 2148     Labs & Results:        Results from last 7 days   Lab Units 03/27/21  0410 03/27/21  0019 03/26/21  2108 03/26/21  0448 03/25/21  0405   WBC Thousand/uL 8 75  --   --  12 83* 13 20*   HEMOGLOBIN g/dL 10 6* 11 3* 11 5* 12 0 12 7   HEMATOCRIT % 31 3* 33 1* 33 8* 36 5 38 4   PLATELETS Thousands/uL 135*  --   --  144* 154         Results from last 7 days   Lab Units 03/27/21  0410 03/26/21  0448 03/25/21  0405  03/24/21  1403  03/24/21  1252 03/24/21  1220   POTASSIUM mmol/L 3 6 4 0 4 5   < >  --    < >  --   --    CHLORIDE mmol/L 101 103 108  --   --    < >  --   --    CO2 mmol/L 35* 28 26  --   --    < >  --   --    CO2, I-STAT mmol/L  --   --   --   --  26  --  24 29   BUN mg/dL 19 19 14  --   --    < >  --   --    CREATININE mg/dL 1 25 1 44* 1 20  --   --    < >  --   --    CALCIUM mg/dL 8 4 8 7 7 9*  --   --    < >  --   --    GLUCOSE, ISTAT mg/dl  --   --   --   --  138  --  137 147*    < > = values in this interval not displayed  Results from last 7 days   Lab Units 03/25/21  0405   MAGNESIUM mg/dL 2 2       Echo: personally reviewed - normal LV function with grade 1 diastolic dysfunction      EKG personally reviewed by Jacki Dumont MD

## 2021-03-27 NOTE — PROGRESS NOTES
Progress Note - Cardiothoracic Surgery   Cory Avina 63 y o  male MRN: [id number]  Unit/Bed#: [facility] 406-01 Encounter: [id number]    Coronary artery disease  S/P coronary artery bypass grafting; POD # 3    24 Hour Events: No events overnight  NO complaints this morning  Ambulating independently  Denies any pain  Passing flatus  Tolerating diet       Medications:   Scheduled Meds:  Current Facility-Administered Medications   Medication Dose Route Frequency Provider Last Rate    acetaminophen  975 mg Oral Q8H Dolores Ariza PA-C      amiodarone  200 mg Oral Q8H [facility] Dolores Ariza PA-C      aspirin  81 mg Oral Daily Dolores Ariza PA-C      atorvastatin  80 mg Oral After ViacomNINO      bisacodyl  5 mg Oral Once Chana Ohara PA-C      bisacodyl  10 mg Rectal Daily PRN Dolores Ariza PA-C      clopidogrel  75 mg Oral Daily Dolores Ariza PA-C      furosemide  40 mg Intravenous TID (diuretic) Chana Ohara PA-C      heparin (porcine)  5,000 Units Subcutaneous Q8H [facility] Dolores Ariza PA-C      insulin lispro  1-5 Units Subcutaneous HS Dolores Ariza PA-C      insulin lispro  1-6 Units Subcutaneous TID AC Dolores Ariza PA-C      levothyroxine  100 mcg Oral Early Morning Dolores Ariza PA-C      metoprolol tartrate  25 mg Oral Q12H [facility] Dolores Ariza PA-C      ondansetron  4 mg Intravenous Q6H PRN Dolores Ariza PA-C      oxyCODONE  2 5 mg Oral Q4H PRN Dolores Ariza PA-C      oxyCODONE  5 mg Oral Q4H PRN Dolores Ariza PA-C      pantoprazole  40 mg Oral Early Morning Dolores Ariza PA-C      polyethylene glycol  17 g Oral Daily Dolores Ariza PA-C      potassium chloride  20 mEq Oral BID Dolores Ariza PA-C      senna-docusate sodium  1 tablet Oral BID Chana Ohara PA-C      silver nitrate-potassium nitrate  1 applicator Topical Once CHRISTA Mirza      zolpidem  20 mg Oral HS PRN Chana Ohara PA-C       Continuous Infusions:   PRN Meds: bisacodyl    ondansetron    oxyCODONE    oxyCODONE    zolpidem    Vitals:   Vitals:    03/26/21 2317 03/27/21 0245 03/27/21 0552 03/27/21 0720   BP: 144/87 115/74  146/93   BP Location: Left arm Left arm  Right arm   Pulse: 99 84  96   Resp: 18 18  18   Temp: 98 2 °F (36 8 °C) 99 °F (37 2 °C)  97 9 °F (36 6 °C)   TempSrc: Oral Oral  Oral   SpO2: 95% 93%     Weight:   106 kg (233 lb 11 oz)    Height:           Telemetry: NSR; Heart Rate: 98    Respiratory:   SpO2: SpO2: 93 %; Room Air    Intake/Output:   I/O       03/25 0701 - 03/26 0700 03/26 0701 - 03/27 0700 03/27 0701 - 03/28 0700    P  O  640 200     I V  (mL/kg) 86 5 (0 8)      IV Piggyback 50      Cell Saver       Total Intake(mL/kg) 776 5 (7 1) 200 (1 9)     Urine (mL/kg/hr) 1220 (0 5) 2700 (1 1) 200 (3 1)    Chest Tube 290 80     Total Output 1510 2780 200    Net -733 5 -2580 -200           Unmeasured Urine Occurrence  1 x         Weights:   Weight (last 2 days)     Date/Time   Weight    03/27/21 0552   106 (233 69)    03/26/21 0544   110 (241 62)    03/25/21 0600   109 (240 96)            Results:   Results from last 7 days   Lab Units 03/27/21  0410 03/27/21  0019 03/26/21 2108 03/26/21  0448 03/25/21  0405   WBC Thousand/uL 8 75  --   --  12 83* 13 20*   HEMOGLOBIN g/dL 10 6* 11 3* 11 5* 12 0 12 7   HEMATOCRIT % 31 3* 33 1* 33 8* 36 5 38 4   PLATELETS Thousands/uL 135*  --   --  144* 154     Results from last 7 days   Lab Units 03/27/21  0410 03/26/21  0448 03/25/21  0405  03/24/21  1403   SODIUM mmol/L 137 136 139  --   --    POTASSIUM mmol/L 3 6 4 0 4 5   < >  --    CHLORIDE mmol/L 101 103 108  --   --    CO2 mmol/L 35* 28 26  --   --    CO2, I-STAT mmol/L  --   --   --   --  26   BUN mg/dL 19 19 14  --   --    CREATININE mg/dL 1 25 1 44* 1 20  --   --    GLUCOSE, ISTAT mg/dl  --   --   --   --  138   CALCIUM mg/dL 8 4 8 7 7 9*  --   --     < > = values in this interval not displayed           Point of care glucose: 109-152    Invasive Lines/Tubes:  Invasive Devices     Central Venous Catheter Line            CVC Central Lines 03/24/21 Triple 2 days          Peripheral Intravenous Line            Peripheral IV 03/24/21 Left Antecubital 2 days                Physical Exam:    HEENT/NECK:  Normocephalic  Atraumatic  No jugular venous distention  Cardiac: Regular rate and rhythm  Pulmonary:  Breath sounds clear bilaterally  Abdomen:  Non-tender, Non-distended and Normal bowel sounds  Incisions: Sternum is stable  Incision is clean, dry, and intact  and Saphenectomy incison is clean, dry, and intact  Extremities: Extremities warm/dry and 1+ edema B/L  Neuro: Alert and oriented X 3  Skin: Warm/Dry, without rashes or lesions  Assessment:  Principal Problem:    S/P CABG (coronary artery bypass graft)  Active Problems:    CAD (coronary artery disease)    Claudication (HCC)    Essential hypertension    Mixed hyperlipidemia    Hypothyroidism    Myofascial pain syndrome    Radiculopathy, lumbar region    History of CVA (cerebrovascular accident)    History of percutaneous coronary intervention    Thrombocytopenia (HCC)    Hyperchloremia    Hypocalcemia       Coronary artery disease  S/P coronary artery bypass grafting; POD # 3    Plan:    1  Cardiac:   NSR; HR/BP well-controlled  Lopressor, 50mg PO BID (home dose)  Continue ASA and Statin therapy, Plavix resumed   Epicardial pacing wires out  Central IV access no longer required; Remove central venous catheter today  Continue DVT prophylaxis    2  Pulmonary:   Good Room air oxygen saturation; Continue incentive spirometry/Coughing/Deep breathing exercises  Chest tubes have been discontinued    3  Renal:   Intake/Output net: -2380  mL/24 hours  Continue diuresis   Lasix 40 mg IV TID  Potassium Chloride 20 mEq PO BID  Post op Creatinine stable; Follow up labs prn    4  Neuro: resume home zanaflex for preexisting muscle spasm  Neurologically intact;  No active issues  Incisional pain well-controlled  Continue Tylenol, 975 mg PO q 8, standing dose  Continue Oxycodone, 2 5 to 5 mg PO q 4 hours prn pain    5  GI:  Tolerating TLC 2 3 gm sodium diet  Maintain 1800 mL daily fluid restriction   Continue stool softeners and prn suppository  Continue GI prophylaxis    6  Endo:   No history of diabetes; Glucose well-controlled with sliding scale coverage    7    Hematology:    Post-operative blood count acceptable; Trend prn    8  Disposition:      Anticipate discharge [facility] home [facility]      VTE Pharmacologic Prophylaxis: Heparin  VTE Mechanical Prophylaxis: sequential compression device    Collaborative rounds completed with LARISSA Tijerina    Plan of care discussed with bedside nurse    SIGNATURE: Nino Cooper PA-C  DATE: March 27, 2021  TIME: 7:37 AM

## 2021-03-28 VITALS
SYSTOLIC BLOOD PRESSURE: 148 MMHG | TEMPERATURE: 98.1 F | HEIGHT: 69 IN | OXYGEN SATURATION: 98 % | DIASTOLIC BLOOD PRESSURE: 93 MMHG | RESPIRATION RATE: 19 BRPM | WEIGHT: 230.6 LBS | HEART RATE: 74 BPM | BODY MASS INDEX: 34.16 KG/M2

## 2021-03-28 LAB
ANION GAP SERPL CALCULATED.3IONS-SCNC: 3 MMOL/L (ref 4–13)
BUN SERPL-MCNC: 19 MG/DL (ref 5–25)
CALCIUM SERPL-MCNC: 8.8 MG/DL (ref 8.3–10.1)
CHLORIDE SERPL-SCNC: 99 MMOL/L (ref 100–108)
CO2 SERPL-SCNC: 35 MMOL/L (ref 21–32)
CREAT SERPL-MCNC: 1.29 MG/DL (ref 0.6–1.3)
ERYTHROCYTE [DISTWIDTH] IN BLOOD BY AUTOMATED COUNT: 13.9 % (ref 11.6–15.1)
GFR SERPL CREATININE-BSD FRML MDRD: 59 ML/MIN/1.73SQ M
GLUCOSE SERPL-MCNC: 107 MG/DL (ref 65–140)
GLUCOSE SERPL-MCNC: 112 MG/DL (ref 65–140)
GLUCOSE SERPL-MCNC: 115 MG/DL (ref 65–140)
HCT VFR BLD AUTO: 32.1 % (ref 36.5–49.3)
HGB BLD-MCNC: 10.8 G/DL (ref 12–17)
MCH RBC QN AUTO: 34 PG (ref 26.8–34.3)
MCHC RBC AUTO-ENTMCNC: 33.6 G/DL (ref 31.4–37.4)
MCV RBC AUTO: 101 FL (ref 82–98)
PLATELET # BLD AUTO: 155 THOUSANDS/UL (ref 149–390)
PMV BLD AUTO: 9.7 FL (ref 8.9–12.7)
POTASSIUM SERPL-SCNC: 3.7 MMOL/L (ref 3.5–5.3)
RBC # BLD AUTO: 3.18 MILLION/UL (ref 3.88–5.62)
SODIUM SERPL-SCNC: 137 MMOL/L (ref 136–145)
WBC # BLD AUTO: 6.82 THOUSAND/UL (ref 4.31–10.16)

## 2021-03-28 PROCEDURE — 99024 POSTOP FOLLOW-UP VISIT: CPT | Performed by: PHYSICIAN ASSISTANT

## 2021-03-28 PROCEDURE — 80048 BASIC METABOLIC PNL TOTAL CA: CPT | Performed by: PHYSICIAN ASSISTANT

## 2021-03-28 PROCEDURE — 99232 SBSQ HOSP IP/OBS MODERATE 35: CPT | Performed by: INTERNAL MEDICINE

## 2021-03-28 PROCEDURE — 82948 REAGENT STRIP/BLOOD GLUCOSE: CPT

## 2021-03-28 PROCEDURE — 85027 COMPLETE CBC AUTOMATED: CPT | Performed by: PHYSICIAN ASSISTANT

## 2021-03-28 RX ORDER — POTASSIUM CHLORIDE 20 MEQ/1
20 TABLET, EXTENDED RELEASE ORAL DAILY
Qty: 7 TABLET | Refills: 1 | Status: SHIPPED | OUTPATIENT
Start: 2021-03-28 | End: 2021-04-08 | Stop reason: ALTCHOICE

## 2021-03-28 RX ORDER — ATORVASTATIN CALCIUM 80 MG/1
80 TABLET, FILM COATED ORAL
Qty: 30 TABLET | Refills: 3 | Status: SHIPPED | OUTPATIENT
Start: 2021-03-28 | End: 2021-04-08 | Stop reason: ALTCHOICE

## 2021-03-28 RX ORDER — ACETAMINOPHEN 325 MG/1
975 TABLET ORAL EVERY 6 HOURS PRN
Refills: 0 | COMMUNITY
Start: 2021-03-28 | End: 2021-06-30 | Stop reason: CLARIF

## 2021-03-28 RX ORDER — TORSEMIDE 20 MG/1
20 TABLET ORAL DAILY
Qty: 7 TABLET | Refills: 1 | Status: SHIPPED | OUTPATIENT
Start: 2021-03-28 | End: 2021-04-08 | Stop reason: ALTCHOICE

## 2021-03-28 RX ORDER — AMOXICILLIN 250 MG
1 CAPSULE ORAL 2 TIMES DAILY
Refills: 0 | COMMUNITY
Start: 2021-03-28 | End: 2021-06-30 | Stop reason: CLARIF

## 2021-03-28 RX ORDER — OXYCODONE HYDROCHLORIDE AND ACETAMINOPHEN 5; 325 MG/1; MG/1
1 TABLET ORAL EVERY 8 HOURS PRN
Qty: 30 TABLET | Refills: 0 | Status: SHIPPED | OUTPATIENT
Start: 2021-03-28 | End: 2021-04-07

## 2021-03-28 RX ORDER — CLOPIDOGREL BISULFATE 75 MG/1
75 TABLET ORAL DAILY
Qty: 30 TABLET | Refills: 2 | Status: SHIPPED | OUTPATIENT
Start: 2021-03-28 | End: 2021-05-07 | Stop reason: SDUPTHER

## 2021-03-28 RX ORDER — METOPROLOL TARTRATE 50 MG/1
50 TABLET, FILM COATED ORAL EVERY 12 HOURS SCHEDULED
Qty: 60 TABLET | Refills: 2 | Status: SHIPPED | OUTPATIENT
Start: 2021-03-28 | End: 2021-04-08 | Stop reason: ALTCHOICE

## 2021-03-28 RX ORDER — OMEPRAZOLE 20 MG/1
20 CAPSULE, DELAYED RELEASE ORAL DAILY
Qty: 30 CAPSULE | Refills: 0 | Status: SHIPPED | OUTPATIENT
Start: 2021-03-28 | End: 2021-06-30 | Stop reason: CLARIF

## 2021-03-28 RX ADMIN — DOCUSATE SODIUM AND SENNOSIDES 1 TABLET: 8.6; 5 TABLET ORAL at 08:07

## 2021-03-28 RX ADMIN — POTASSIUM CHLORIDE 20 MEQ: 1500 TABLET, EXTENDED RELEASE ORAL at 08:05

## 2021-03-28 RX ADMIN — CLOPIDOGREL BISULFATE 75 MG: 75 TABLET ORAL at 08:07

## 2021-03-28 RX ADMIN — ASPIRIN 81 MG: 81 TABLET, CHEWABLE ORAL at 08:07

## 2021-03-28 RX ADMIN — LEVOTHYROXINE SODIUM 100 MCG: 100 TABLET ORAL at 05:00

## 2021-03-28 RX ADMIN — METOPROLOL TARTRATE 50 MG: 50 TABLET, FILM COATED ORAL at 08:07

## 2021-03-28 RX ADMIN — HEPARIN SODIUM 5000 UNITS: 5000 INJECTION INTRAVENOUS; SUBCUTANEOUS at 05:01

## 2021-03-28 RX ADMIN — PANTOPRAZOLE SODIUM 40 MG: 40 TABLET, DELAYED RELEASE ORAL at 05:00

## 2021-03-28 RX ADMIN — FUROSEMIDE 40 MG: 10 INJECTION, SOLUTION INTRAVENOUS at 05:01

## 2021-03-28 RX ADMIN — AMIODARONE HYDROCHLORIDE 200 MG: 200 TABLET ORAL at 05:00

## 2021-03-28 RX ADMIN — ACETAMINOPHEN 975 MG: 325 TABLET, FILM COATED ORAL at 05:00

## 2021-03-28 NOTE — DISCHARGE SUMMARY
Discharge Summary - Cardiothoracic Surgery   Whitley Catherine 63 y o  male MRN: [id number]  Unit/Bed#: [facility] 406-01 Encounter: [id number]    Admission Date: 3/24/2021     Discharge Date: 03/28/21    Admitting Diagnosis: Coronary artery disease involving native coronary artery of native heart with angina pectoris [facility]) [I25 [id number]]    Primary Discharge Diagnosis:   Coronary artery disease  S/P coronary artery bypass grafting;    Secondary Discharge Diagnosis:   hypertension, claudication, hyperlipidemia, hypothyroidism, lumbar radiculopathy, history of ischemic CVA in the mikhail distribution in 2014 with residual right-sided weakness, chronic diastolic heart failure, myofascial pain syndrome, anxiety, depression, and chronic lower extremity edema  Attending: LARISSA Her  Consulting Physician(s):   Cardiology  Medical/Critical Care    Procedures Performed:   Procedure(s):  CORONARY ARTERY BYPASS GRAFT (CABG) 3 VESSELS,  USING LEFT VANE-LAD AND LEFT GSV-RPL & RAMUS  TRANSESOPHAGEAL ECHOCARDIOGRAM (MARLEN)     Hospital Course:   3/24: Elective admission for CABGx3  Transferred to ICU supported with no gtts  No significant postoperative bleeding  Wean towards extubation  PM: Intermittently on cardene for high SVR/low CI  Remains intuabted  3/25: Extubated to 4LNC, wean as tolerated  Intermittently on cardene, now off, start Lopressor 25mg BID, discontiune a line  Delined  Start Lasix 40mg IV BID, discontinue ayala catheter  Discontinue insulin gtt, transition to [facility]  Dose Toradol 15mg IV x3 for pain  Transfer to telem  PM: Pre Op Ambien 10 mg PO qHS restarted at half of his home dose (20 mg)  3/26: Poor sleep overnight, increase ambien to 20 mg hs home dose  Increase TID Lasix  Cr 1 44  Chest tubes and pacing wires discontinued  3/27: Needs more diuresis  D/c TLC  Home tomorrow       3/28: Stable for d/c to home, diuretics x 7 days      Condition at Discharge:   good     Discharge Physical Exam:    Please see the documented physical exam from this morning's progress note for details  Discharge Data:  Results from last 7 days   Lab Units 03/28/21  0501 03/27/21  0410 03/27/21  0019  03/26/21  0448   WBC Thousand/uL 6 82 8 75  --   --  12 83*   HEMOGLOBIN g/dL 10 8* 10 6* 11 3*   < > 12 0   HEMATOCRIT % 32 1* 31 3* 33 1*   < > 36 5   PLATELETS Thousands/uL 155 135*  --   --  144*    < > = values in this interval not displayed  Results from last 7 days   Lab Units 03/28/21  0501 03/27/21  0410 03/26/21  0448  03/24/21  1403   POTASSIUM mmol/L 3 7 3 6 4 0   < >  --    CHLORIDE mmol/L 99* 101 103   < >  --    CO2 mmol/L 35* 35* 28   < >  --    CO2, I-STAT mmol/L  --   --   --   --  26   BUN mg/dL 19 19 19   < >  --    CREATININE mg/dL 1 29 1 25 1 44*   < >  --    GLUCOSE, ISTAT mg/dl  --   --   --   --  138   CALCIUM mg/dL 8 8 8 4 8 7   < >  --     < > = values in this interval not displayed  Discharge instructions/Information to patient and family:   See after visit summary for information provided to patient and family  Dwaine Carnes was educated on restrictions regarding driving and lifting, and techniques of proper incisional care  They were specifically counselled on signs and symptoms of an incisional infection, and advised to contact our service immediately should they develop fevers, sweats, chill, redness or drainage at the site of any incisions  Provisions for Follow-Up Care:  See after visit summary for information related to follow-up care and any pertinent home health orders  Disposition:  Home    Planned Readmission:   No    Discharge Medications:  See after visit summary for reconciled discharge medications provided to patient and family  Dwaine Carnes was provided contact information and scheduled a follow up appointment with LARISSA Gtz    Additionally, follow up appointments have been scheduled for their primary care physician and primary cardiologist   Contact information was provided  Shanell Bardales was counseled on the importance of avoiding tobacco products  As with all patients whom have undergone open heart surgery, tobacco cessation medication was contraindicated at the time of discharge  ACE/ARB was Contraindicated secondary to hypotension      The patient was discharged on ongoing diuretic therapy with Torsemide 20 mg, PO QD and Potassium Chloride 20 mEq, PO QD  They were advised to continue these medications for 7 days, unless otherwise directed  Narcotic pain medication was prescribed in the form of oxycodone  Prior to prescribing, their prescription profile was reviewed on the [facility] prescription drug monitoring program     The patient was informed that following their postoperative surgical evaluation, they will be referred to outpatient cardiac rehabilitation  They were counseled that this program is run by specialists who will help them safely strengthen their heart and prevent more heart disease  Cardiac rehabilitation will include exercise, relaxation, stress management, and heart-healthy nutrition  Caregivers will also check to make sure their medication regimen is working  During this admission, the patient was questioned on their use of tobacco, alcohol, and illicit/non-prescription drug use in the  previous 24 months  Shanell Bardales states that they have not used any of these substances in this time frame  I spent 30 minutes discharging the patient  This time was spent on the day of discharge  I had direct contact with the patient on the day of discharge  Additional documentation is required if more than 30 minutes were spent on discharge       SIGNATURE: Theresa Murphy  DATE: March 28, 2021  TIME: 10:29 AM

## 2021-03-28 NOTE — PLAN OF CARE
Problem: Prexisting or High Potential for Compromised Skin Integrity  Goal: Skin integrity is maintained or improved  Description: INTERVENTIONS:  - Identify patients at risk for skin breakdown  - Assess and monitor skin integrity  - Assess and monitor nutrition and hydration status  - Monitor labs   - Assess for incontinence   - Turn and reposition patient  - Assist with mobility/ambulation  - Relieve pressure over bony prominences  - Avoid friction and shearing  - Provide appropriate hygiene as needed including keeping skin clean and dry  - Evaluate need for skin moisturizer/barrier cream  - Collaborate with interdisciplinary team   - Patient/family teaching  - Consider wound care consult   Outcome: Progressing     Problem: Potential for Falls  Goal: Patient will remain free of falls  Description: INTERVENTIONS:  - Assess patient frequently for physical needs  -  Identify cognitive and physical deficits and behaviors that affect risk of falls    -  [place] fall precautions as indicated by assessment   - Educate patient/family on patient safety including physical limitations  - Instruct patient to call for assistance with activity based on assessment  - Modify environment to reduce risk of injury  - Consider OT/PT consult to assist with strengthening/mobility  Outcome: Progressing

## 2021-03-28 NOTE — PROGRESS NOTES
Progress Note - Cardiothoracic Surgery   Francis Urban 63 y o  male MRN: [id number]  Unit/Bed#: [facility] 406-01 Encounter: [id number]    Coronary artery disease  S/P coronary artery bypass grafting; POD # 4    24 Hour Events: No events overnight  No complaints  Denies any pain  Tolerating diet    + flatus no BM     Medications:   Scheduled Meds:  Current Facility-Administered Medications   Medication Dose Route Frequency Provider Last Rate    acetaminophen  975 mg Oral Q8H Dolores Ariza PA-C      amiodarone  200 mg Oral Q8H [facility] Dolores Ariza PA-C      aspirin  81 mg Oral Daily Dolores Ariza PA-C      atorvastatin  80 mg Oral After [employer]omNINO      bisacodyl  5 mg Oral Once Anthony StableNINO      bisacodyl  10 mg Rectal Daily PRN Dolores Ariza PA-C      clopidogrel  75 mg Oral Daily Dolores Ariza PA-C      furosemide  40 mg Intravenous TID (diuretic) Anthony Sumner PA-C      heparin (porcine)  5,000 Units Subcutaneous Q8H [facility] Dolores Ariza PA-C      insulin lispro  1-5 Units Subcutaneous HS Dolores Ariza PA-C      insulin lispro  1-6 Units Subcutaneous TID AC Dolores Ariza PA-C      levothyroxine  100 mcg Oral Early Morning Dolores Ariza PA-C      metoprolol tartrate  50 mg Oral Q12H [facility] Nahomy Paul PA-C      ondansetron  4 mg Intravenous Q6H PRN Dolores Ariza PA-C      oxyCODONE  2 5 mg Oral Q4H PRN Dolores Ariza PA-C      oxyCODONE  5 mg Oral Q4H PRN Dolores Ariza PA-C      pantoprazole  40 mg Oral Early Morning Dolores Ariza PA-C      polyethylene glycol  17 g Oral Daily Dolores Ariza PA-C      potassium chloride  20 mEq Oral BID Dolores Ariza PA-C      senna-docusate sodium  1 tablet Oral BID Anthonysamantha Sumner PA-C      silver nitrate-potassium nitrate  1 applicator Topical Once CHRISTA Sin      tiZANidine  2 mg Oral Q8H PRN Nuris Paul PA-C      zolpidem  20 mg Oral HS PRN Anthony Sumner PA-C Continuous Infusions:   PRN Meds: bisacodyl    ondansetron    oxyCODONE    oxyCODONE    tiZANidine    zolpidem    Vitals:   Vitals:    03/27/21 2125 03/27/21 2300 03/28/21 0300 03/28/21 0600   BP: 143/88 141/80 133/66    BP Location:  Left arm Left arm    Pulse: 97 82 78    Resp:  20 20    Temp:  98 °F (36 7 °C) 98 2 °F (36 8 °C)    TempSrc:  Oral Oral    SpO2:  97% 95%    Weight:    105 kg (230 lb 9 6 oz)   Height:           Telemetry: NSR; Heart Rate: 85    Respiratory:   SpO2: SpO2: 95 %; Room Air    Intake/Output:   I/O       03/26 0701 - 03/27 0700 03/27 0701 - 03/28 0700 03/28 0701 - 03/29 0700    P  O  200 440     I V  (mL/kg)       IV Piggyback       Total Intake(mL/kg) 200 (1 9) 440 (4 2)     Urine (mL/kg/hr) 2700 (1 1) 2300 (0 9)     Chest Tube 80      Total Output 2780 2300     Net -2580 -1860            Unmeasured Urine Occurrence 1 x          Weights:   Weight (last 2 days)     Date/Time   Weight    03/28/21 0600   105 (230 6)    03/27/21 0552   106 (233 69)    03/26/21 0544   110 (241 62)            Results:   Results from last 7 days   Lab Units 03/28/21  0501 03/27/21  0410 03/27/21  0019  03/26/21  0448   WBC Thousand/uL 6 82 8 75  --   --  12 83*   HEMOGLOBIN g/dL 10 8* 10 6* 11 3*   < > 12 0   HEMATOCRIT % 32 1* 31 3* 33 1*   < > 36 5   PLATELETS Thousands/uL 155 135*  --   --  144*    < > = values in this interval not displayed  Results from last 7 days   Lab Units 03/28/21  0501 03/27/21  0410 03/26/21  0448  03/24/21  1403   SODIUM mmol/L 137 137 136   < >  --    POTASSIUM mmol/L 3 7 3 6 4 0   < >  --    CHLORIDE mmol/L 99* 101 103   < >  --    CO2 mmol/L 35* 35* 28   < >  --    CO2, I-STAT mmol/L  --   --   --   --  26   BUN mg/dL 19 19 19   < >  --    CREATININE mg/dL 1 29 1 25 1 44*   < >  --    GLUCOSE, ISTAT mg/dl  --   --   --   --  138   CALCIUM mg/dL 8 8 8 4 8 7   < >  --     < > = values in this interval not displayed           Point of care glucose: within normal    Invasive Lines/Tubes:  Invasive Devices     Peripheral Intravenous Line            Peripheral IV 03/24/21 Left Antecubital 3 days                Physical Exam:    HEENT/NECK:  Normocephalic  Atraumatic  No jugular venous distention  Cardiac: Regular rate and rhythm  Pulmonary:  Breath sounds clear bilaterally  Abdomen:  Non-tender, Non-distended and Normal bowel sounds  Incisions: Sternum is stable  Incision is clean, dry, and intact  and Groin puncture sites clean, dry, and intact without hematoma  Extremities: Extremities warm/dry and Trace edema B/L  Neuro: Alert and oriented X 3  Skin: Warm/Dry, without rashes or lesions  Assessment:  Principal Problem:    S/P CABG (coronary artery bypass graft)  Active Problems:    CAD (coronary artery disease)    Claudication (HCC)    Essential hypertension    Mixed hyperlipidemia    Hypothyroidism    Myofascial pain syndrome    Radiculopathy, lumbar region    History of CVA (cerebrovascular accident)    History of percutaneous coronary intervention    Thrombocytopenia (HCC)    Hyperchloremia    Hypocalcemia       Coronary artery disease  S/P coronary artery bypass grafting; POD # 4    Plan:    1  Cardiac:   NSR; HR/BP well-controlled  Lopressor, 50mg PO BID  Continue ASA and Statin therapy plus Plavix   Epicardial pacing wires out  Patient no longer has central IV access   Continue DVT prophylaxis    2  Pulmonary:   Good Room air oxygen saturation; Continue incentive spirometry/Coughing/Deep breathing exercises  Chest tubes have been discontinued    3  Renal:   Intake/Output net: -1860 mL/24 hours  Continue diuresis   Lasix 40 mg IV TID  Potassium Chloride 20 mEq PO BID  Post op Creatinine stable; Follow up labs prn    4  Neuro:  Neurologically intact; No active issues  Incisional pain well-controlled  Continue Tylenol, 975 mg PO q 8, standing dose  Continue Oxycodone, 2 5 to 5 mg PO q 4 hours prn pain    5   GI:  Tolerating TLC 2 3 gm sodium diet  Maintain 1800 mL daily fluid restriction   Continue stool softeners and prn suppository  Continue GI prophylaxis    6  Endo:   No history of diabetes; Glucose well-controlled with sliding scale coverage    7    Hematology:    Post-operative blood count acceptable; Trend prn    8  Disposition:      Anticipate discharge to home potentially today     VTE Pharmacologic Prophylaxis: Heparin  VTE Mechanical Prophylaxis: sequential compression device    Collaborative rounds completed with LARISSA Rice    Plan of care discussed with bedside nurse    SIGNATURE: Perez Lanza PA-C  DATE: March 28, 2021  TIME: 7:08 AM

## 2021-03-28 NOTE — PLAN OF CARE
Problem: Prexisting or High Potential for Compromised Skin Integrity  Goal: Skin integrity is maintained or improved  Description: INTERVENTIONS:  - Identify patients at risk for skin breakdown  - Assess and monitor skin integrity  - Assess and monitor nutrition and hydration status  - Monitor labs   - Assess for incontinence   - Turn and reposition patient  - Assist with mobility/ambulation  - Relieve pressure over bony prominences  - Avoid friction and shearing  - Provide appropriate hygiene as needed including keeping skin clean and dry  - Evaluate need for skin moisturizer/barrier cream  - Collaborate with interdisciplinary team   - Patient/family teaching  - Consider wound care consult   3/28/2021 1216 by Darlene Juan RN  Outcome: Adequate for Discharge  3/28/2021 0808 by Darlene Juan RN  Outcome: Progressing     Problem: Potential for Falls  Goal: Patient will remain free of falls  Description: INTERVENTIONS:  - Assess patient frequently for physical needs  -  Identify cognitive and physical deficits and behaviors that affect risk of falls  -  [place] fall precautions as indicated by assessment   - Educate patient/family on patient safety including physical limitations  - Instruct patient to call for assistance with activity based on assessment  - Modify environment to reduce risk of injury  - Consider OT/PT consult to assist with strengthening/mobility  3/28/2021 1216 by Darlene Juan RN  Outcome: Adequate for Discharge  3/28/2021 0808 by Darlene Juan RN  Outcome: Progressing     Problem: PHYSICAL THERAPY ADULT  Goal: Performs mobility at highest level of function for planned discharge setting  See evaluation for individualized goals    Description: Treatment/Interventions: Functional transfer training, LE strengthening/ROM, Elevations, Therapeutic exercise, Endurance training, Bed mobility, Gait training, Spoke to nursing, Spoke to case management, OT  Equipment Recommended: Walker(pt already owns)       See flowsheet documentation for full assessment, interventions and recommendations  Outcome: Adequate for Discharge     Problem: OCCUPATIONAL THERAPY ADULT  Goal: Performs self-care activities at highest level of function for planned discharge setting  See evaluation for individualized goals  Description: Treatment Interventions: ADL retraining, Functional transfer training, UE strengthening/ROM, Endurance training, Cognitive reorientation, Patient/family training, Equipment evaluation/education, Compensatory technique education, Continued evaluation, Cardiac education, Energy conservation, Activityengagement          See flowsheet documentation for full assessment, interventions and recommendations     Outcome: Adequate for Discharge

## 2021-03-28 NOTE — PROGRESS NOTES
Cardiology Progress Note - Tariq Cook 63 y o  male MRN: [id number]    Unit/Bed#: [facility] 406-01 Encounter: [id number]      Assessment/Recommendations:  1  CAD:  Status post CABG x3 lima to LAD, SVG to PLV, SVG to ramus intermedius  Continued aspirin Plavix, beta-blocker, statin  Remains on Plavix due to significant number of prior coronary stents  No significant arrhythmias on monitor, feels well without complaints today  2  Hypertension:  Mildly elevated this morning, continued on beta-blocker  Cannot titrate beta-blocker as needed for blood pressure management  3  Hyperlipidemia:  Continue on statin  4  Hypothyroidism:  Maintained on levothyroxine  5  History of CVA:  With residual right-sided weakness  Continue on aspirin and statin  6   Stable from cardiac standpoint for discharge today  Subjective:   Patient seen and examined  No significant events overnight   ; pertinent negatives - chest pain, chest pressure/discomfort, dyspnea, irregular heart beat, lower extremity edema and palpitations  Objective:     Vitals: Blood pressure 148/93, pulse 74, temperature 98 1 °F (36 7 °C), temperature source Oral, resp  rate 19, height 5' 9" (1 753 m), weight 105 kg (230 lb 9 6 oz), SpO2 98 %  , Body mass index is 34 05 kg/m² ,   Orthostatic Blood Pressures      Most Recent Value   Blood Pressure  148/93 filed at 03/28/2021 1050   Patient Position - Orthostatic VS  Sitting filed at 03/28/2021 1050            Intake/Output Summary (Last 24 hours) at 3/28/2021 1147  Last data filed at 3/28/2021 0837  Gross per 24 hour   Intake 560 ml   Output 2700 ml   Net -2140 ml       TELE: No significant arrhythmias seen      Physical Exam:    GEN: Tariq Cook appears well, alert and oriented x 3, pleasant and cooperative   HEENT: pupils equal, round, and reactive to light; extraocular muscles intact  NECK: supple, no carotid bruits   HEART: regular rhythm, normal S1 and S2, no murmurs, clicks, gallops or rubs LUNGS: clear to auscultation bilaterally; no wheezes, rales, or rhonchi   ABDOMEN: normal bowel sounds, soft, no tenderness, no distention  EXTREMITIES: peripheral pulses normal; no clubbing, cyanosis, or edema  NEURO: no focal findings   SKIN: normal without suspicious lesions on exposed skin      Medications:      Current Facility-Administered Medications:     acetaminophen (TYLENOL) tablet 975 mg, 975 mg, Oral, Q8H, Dolores Ariza PA-C, 975 mg at 03/28/21 0500    amiodarone tablet 200 mg, 200 mg, Oral, Q8H [address], Dolores Ariza PA-C, 200 mg at 03/28/21 0500    aspirin chewable tablet 81 mg, 81 mg, Oral, Daily, Dolores Ariza PA-C, 81 mg at 03/28/21 0807    atorvastatin (LIPITOR) tablet 80 mg, 80 mg, Oral, After Dinner, Dolores Ariza PA-C, 80 mg at 03/27/21 1753    bisacodyl (DULCOLAX) EC tablet 5 mg, 5 mg, Oral, Once, Nuris Hubbard NINO Salinas    bisacodyl (DULCOLAX) rectal suppository 10 mg, 10 mg, Rectal, Daily PRN, Dolores Ariza PA-C    clopidogrel (PLAVIX) tablet 75 mg, 75 mg, Oral, Daily, Dolores Ariza PA-C, 75 mg at 03/28/21 0807    furosemide (LASIX) injection 40 mg, 40 mg, Intravenous, TID (diuretic), [facility] NINO Salinas, 40 mg at 03/28/21 0501    heparin (porcine) subcutaneous injection 5,000 Units, 5,000 Units, Subcutaneous, Q8H [address], 5,000 Units at 03/28/21 0501 **AND** [CANCELED] Platelet count, , , Once, Dolores Ariza PA-C    insulin lispro (HumaLOG) 100 units/mL subcutaneous injection 1-5 Units, 1-5 Units, Subcutaneous, HS, Dolores Ariza PA-C    insulin lispro (HumaLOG) 100 units/mL subcutaneous injection 1-6 Units, 1-6 Units, Subcutaneous, TID AC, 1 Units at 03/26/21 0620 **AND** Fingerstick Glucose (POCT), , , TID AC, Dolores Ariza PA-C    levothyroxine tablet 100 mcg, 100 mcg, Oral, Early Morning, Dolores Ariza PA-C, 100 mcg at 03/28/21 0500    metoprolol tartrate (LOPRESSOR) tablet 50 mg, 50 mg, Oral, Q12H [address], Nuris Salinas PA-C, 50 mg at 03/28/21 6175   ondansetron (ZOFRAN) injection 4 mg, 4 mg, Intravenous, Q6H PRN, Dolores Ariza PA-C, 4 mg at 03/25/21 0930    oxyCODONE (ROXICODONE) IR tablet 2 5 mg, 2 5 mg, Oral, Q4H PRN, Dolores Ariza PA-C    oxyCODONE (ROXICODONE) IR tablet 5 mg, 5 mg, Oral, Q4H PRN, Dolores Ariza PA-C, 5 mg at 03/27/21 2126    pantoprazole (PROTONIX) EC tablet 40 mg, 40 mg, Oral, Early Morning, Dolores Ariza PA-C, 40 mg at 03/28/21 0500    polyethylene glycol (MIRALAX) packet 17 g, 17 g, Oral, Daily, Dolores Ariza PA-C, 17 g at 03/25/21 0168    potassium chloride (K-DUR,KLOR-CON) CR tablet 20 mEq, 20 mEq, Oral, BID, Dolores Ariza PA-C, 20 mEq at 03/28/21 0805    senna-docusate sodium (SENOKOT S) 8 6-50 mg per tablet 1 tablet, 1 tablet, Oral, BID, Lilliam Hughes PA-C, 1 tablet at 03/28/21 9735    silver nitrate-potassium nitrate (ARZOL SILVER NITRATE) 59-39 % applicator 1 applicator, 1 applicator, Topical, Once, , CRNP    tiZANidine (ZANAFLEX) tablet 2 mg, 2 mg, Oral, Q8H PRN, Lilliam Salinas PA-C    zolpidem (AMBIEN) tablet 20 mg, 20 mg, Oral, HS PRN, Lilliam Salinas PA-C, 20 mg at 03/27/21 2125     Labs & Results:        Results from last 7 days   Lab Units 03/28/21  0501 03/27/21  0410 03/27/21  0019  03/26/21  0448   WBC Thousand/uL 6 82 8 75  --   --  12 83*   HEMOGLOBIN g/dL 10 8* 10 6* 11 3*   < > 12 0   HEMATOCRIT % 32 1* 31 3* 33 1*   < > 36 5   PLATELETS Thousands/uL 155 135*  --   --  144*    < > = values in this interval not displayed           Results from last 7 days   Lab Units 03/28/21  0501 03/27/21  0410 03/26/21  0448  03/24/21  1403  03/24/21  1252 03/24/21  1220   POTASSIUM mmol/L 3 7 3 6 4 0   < >  --    < >  --   --    CHLORIDE mmol/L 99* 101 103   < >  --    < >  --   --    CO2 mmol/L 35* 35* 28   < >  --    < >  --   --    CO2, I-STAT mmol/L  --   --   --   --  26 --  24 29   BUN mg/dL 19 19 19   < >  --    < >  --   --    CREATININE mg/dL 1 29 1 25 1 44*   < >  -- < >  --   --    CALCIUM mg/dL 8 8 8 4 8 7   < >  --    < >  --   --    GLUCOSE, ISTAT mg/dl  --   --   --   --  138  --  137 147*    < > = values in this interval not displayed  Results from last 7 days   Lab Units 03/25/21  0405   MAGNESIUM mg/dL 2 2       Echo: personally reviewed - normal LV function with grade 1 diastolic dysfunction      EKG personally reviewed by Gorge Daugherty MD

## 2021-03-29 ENCOUNTER — TRANSITIONAL CARE MANAGEMENT (OUTPATIENT)
Dept: FAMILY MEDICINE CLINIC | Facility: CLINIC | Age: 66
End: 2021-03-29

## 2021-04-01 ENCOUNTER — TELEPHONE (OUTPATIENT)
Dept: CARDIAC SURGERY | Facility: CLINIC | Age: 66
End: 2021-04-01

## 2021-04-01 ENCOUNTER — TELEPHONE (OUTPATIENT)
Dept: FAMILY MEDICINE CLINIC | Facility: CLINIC | Age: 66
End: 2021-04-01

## 2021-04-01 NOTE — TELEPHONE ENCOUNTER
Pt called and stated that he has an appointment tomorrow and wants you to write him a script for a new "firm" mattress that needs to go to [facility]     He wanted you to have it done for when he comes in tomorrow

## 2021-04-01 NOTE — TELEPHONE ENCOUNTER
Spoke with patient regarding recovery from CABGx3 on 3/24  He states he is recovering well so far and making progress  He is active throughout the day, going for frequent short walks  He does have trouble sleeping through the night  Patient was informed that will get better with time and encouraged to stay active during the day to promote good sleep hygiene  Patient is weighing himself daily and has no signs of fluid retention  Medications were reviewed  Upcoming appointment times were reviewed

## 2021-04-02 ENCOUNTER — OFFICE VISIT (OUTPATIENT)
Dept: FAMILY MEDICINE CLINIC | Facility: CLINIC | Age: 66
End: 2021-04-02
Payer: MEDICARE

## 2021-04-02 VITALS
HEART RATE: 74 BPM | HEIGHT: 69 IN | OXYGEN SATURATION: 96 % | BODY MASS INDEX: 33.47 KG/M2 | DIASTOLIC BLOOD PRESSURE: 78 MMHG | WEIGHT: 226 LBS | TEMPERATURE: 98 F | SYSTOLIC BLOOD PRESSURE: 128 MMHG

## 2021-04-02 DIAGNOSIS — I50.32 CHRONIC DIASTOLIC HEART FAILURE (HCC): ICD-10-CM

## 2021-04-02 DIAGNOSIS — Z11.4 SCREENING FOR HIV (HUMAN IMMUNODEFICIENCY VIRUS): Primary | ICD-10-CM

## 2021-04-02 DIAGNOSIS — I25.709 CORONARY ARTERY DISEASE INVOLVING CORONARY BYPASS GRAFT OF NATIVE HEART WITH ANGINA PECTORIS (HCC): ICD-10-CM

## 2021-04-02 DIAGNOSIS — E78.2 MIXED HYPERLIPIDEMIA: ICD-10-CM

## 2021-04-02 DIAGNOSIS — I10 ESSENTIAL HYPERTENSION: ICD-10-CM

## 2021-04-02 DIAGNOSIS — E03.9 HYPOTHYROIDISM, UNSPECIFIED TYPE: ICD-10-CM

## 2021-04-02 DIAGNOSIS — Z86.73 HISTORY OF CVA (CEREBROVASCULAR ACCIDENT): ICD-10-CM

## 2021-04-02 PROCEDURE — 99496 TRANSJ CARE MGMT HIGH F2F 7D: CPT | Performed by: FAMILY MEDICINE

## 2021-04-02 NOTE — PROGRESS NOTES
Assessment/Plan:         Problem List Items Addressed This Visit        Endocrine    Hypothyroidism       Cardiovascular and Mediastinum    CAD (coronary artery disease)    Essential hypertension     Patient is stable  and will continue present plan of care and reassess at next routine visit         Chronic diastolic heart failure ([address] )     Wt Readings from Last 3 Encounters:   04/02/21 103 kg (226 lb)   03/28/21 105 kg (230 lb 9 6 oz)   03/12/21 107 kg (235 lb 12 8 oz)     Patient is stable  and will continue present plan of care and reassess at next routine visit                Other    Mixed hyperlipidemia    History of CVA (cerebrovascular accident)     Patient is stable  and will continue present plan of care and reassess at next routine visit           Other Visit Diagnoses     Screening for HIV (human immunodeficiency virus)    -  Primary        TCM Call (since 3/2/2021)     Date and time call was made  3/29/2021  8:47 AM    Hospital care reviewed  Records reviewed    Patient was hospitialized at  [facility]        Date of Admission  03/24/21    Date of discharge  03/28/21    Diagnosis  coronary artery bypass graft)    Disposition  Home    Were the patients medications reviewed and updated  Yes    Current Symptoms  None      TCM Call (since 3/2/2021)     Post hospital issues  None    Should patient be enrolled in anticoag monitoring? No    Scheduled for follow up?   Yes    Did you obtain your prescribed medications  Yes    Do you need help managing your prescriptions or medications  No    Is transportation to your appointment needed  No    I have advised the patient to call PCP with any new or worsening symptoms  [place] or [facility] other    Support System  Spouse    The type of support provided  Emotional; Physical    Do you have social support  Yes, as much as I need    Are you recieving any outpatient services  No    Are you recieving home care services  Yes    Types of home care services  Home health aid    Are you using any community resources  No    Current waiver services  No    Have you fallen in the last 12 months  No    Interperter language line needed  No    Counseling  Family    Counseling topics  Activities of daily living          Subjective:      Patient ID: Ken Woods is a 63 y o  male  Pt here for post op checkup on CABG x 4  Pt had meds reviewed and is doing as well as expected  Pt is  Home with his wife and is  Doing  OK with his care  Pt had meds reviewed and we need to assess for  Depression in the near future  This patient is also going to need a new mattress for his hospital bed  He will need this for the rest of his lifetime  Since he had a stroke and cardiac bypass surgery he is going to need help with changing his body positions  Due to his weakness from his stroke and debility from his heart disease he is going to need help with the getting out of bed  This cannot be she by having a normal mattress in his home  He will also need frequent changing was body position and a hospital bed will facilitate  He also should have a head of his bed raised about 30° for good posterior drainage with his heart disease  The following portions of the patient's history were reviewed and updated as appropriate:   Past Medical History:  He has a past medical history of Anxiety, CAD (coronary artery disease), Depression, Dizziness, Eye problems, Heart disease, Hypertension, Hypothyroidism, Migraines, Stroke ([address] ), and Tremors of nervous system  ,  _______________________________________________________________________  Medical Problems:  does not have any pertinent problems on file ,  _______________________________________________________________________  Past Surgical History:   has a past surgical history that includes Coronary stent placement; Hernia repair; Colonoscopy (09/01/2017); Dental surgery;  Colonoscopy; pr cabg, artery-vein, four (N/A, 3/24/2021); and pr echo transesophag montr cardiac pump functj (N/A, 3/24/2021)  ,  _______________________________________________________________________  Family History:  family history includes Cancer in his brother and mother; Heart attack in his father and sister; Heart disease in his father ,  _______________________________________________________________________  Social History:   reports that he has never smoked  He has never used smokeless tobacco  He reports that he does not drink alcohol or use drugs  ,  _______________________________________________________________________  Allergies:  is allergic to medical tape     _______________________________________________________________________  Current Outpatient Medications   Medication Sig Dispense Refill    acetaminophen (TYLENOL) 325 mg tablet Take 3 tablets (975 mg total) by mouth every 6 (six) hours as needed for mild pain, moderate pain, headaches or fever  0    aspirin 81 MG tablet Take 81 mg by mouth      atorvastatin (LIPITOR) 80 mg tablet Take 1 tablet (80 mg total) by mouth daily after dinner 30 tablet 3    clonazePAM (KlonoPIN) 2 mg tablet Take 2 tablets (4 mg total) by mouth 2 (two) times a day as needed for seizures 180 tablet 1    clopidogrel (PLAVIX) 75 mg tablet Take 1 tablet (75 mg total) by mouth daily 30 tablet 2    levothyroxine 100 mcg tablet TAKE 1 TABLET EVERY DAY 90 tablet 1    metoprolol tartrate (LOPRESSOR) 50 mg tablet Take 1 tablet (50 mg total) by mouth every 12 (twelve) hours 60 tablet 2    omeprazole (PriLOSEC) 20 mg delayed release capsule Take 1 capsule (20 mg total) by mouth daily Take 30 minutes prior to breakfast 30 capsule 0    oxyCODONE-acetaminophen (PERCOCET) 5-325 mg per tablet Take 1 tablet by mouth every 8 (eight) hours as needed for moderate pain for up to 10 daysMax Daily Amount: 3 tablets 30 tablet 0    potassium chloride (K-DUR,KLOR-CON) 20 mEq tablet Take 1 tablet (20 mEq total) by mouth daily for 7 days 7 tablet 1    senna-docusate sodium (SENOKOT S) 8 6-50 mg per tablet Take 1 tablet by mouth 2 (two) times a day  0    tiZANidine (ZANAFLEX) 2 mg tablet Take 1 tablet (2 mg total) by mouth every 8 (eight) hours as needed for muscle spasms 270 tablet 1    torsemide (DEMADEX) 20 mg tablet Take 1 tablet (20 mg total) by mouth daily for 7 days 7 tablet 1    zolpidem (AMBIEN) 10 mg tablet 2 po  q hs 180 tablet 0     No current facility-administered medications for this visit       _______________________________________________________________________  Review of Systems   Constitutional: Negative for activity change, appetite change, chills, fatigue, fever and unexpected weight change  HENT: Negative for congestion, ear pain, hearing loss, mouth sores, postnasal drip, sinus pressure, sinus pain, sneezing and sore throat  Respiratory: Negative for apnea, cough, shortness of breath and wheezing  Cardiovascular: Negative for chest pain, palpitations and leg swelling  Gastrointestinal: Negative for abdominal pain, constipation, diarrhea, nausea and vomiting  Endocrine: Negative for cold intolerance and heat intolerance  Genitourinary: Negative for dysuria, frequency and hematuria  Musculoskeletal: Negative for arthralgias, back pain, gait problem, joint swelling and neck pain  Skin: Negative for rash  Neurological: Negative for dizziness, weakness and numbness  Hematological: Does not bruise/bleed easily  Psychiatric/Behavioral: Negative for agitation, behavioral problems, confusion, hallucinations and sleep disturbance  The patient is not nervous/anxious  Objective:  Vitals:    04/02/21 1314   BP: 128/78   BP Location: Left arm   Patient Position: Sitting   Cuff Size: Standard   Pulse: 74   Temp: 98 °F (36 7 °C)   TempSrc: Temporal   SpO2: 96%   Weight: 103 kg (226 lb)   Height: 5' 9" (1 753 m)     Body mass index is 33 37 kg/m²       Physical Exam

## 2021-04-02 NOTE — ASSESSMENT & PLAN NOTE
Wt Readings from Last 3 Encounters:   04/02/21 103 kg (226 lb)   03/28/21 105 kg (230 lb 9 6 oz)   03/12/21 107 kg (235 lb 12 8 oz)     Patient is stable  and will continue present plan of care and reassess at next routine visit

## 2021-04-05 ENCOUNTER — TELEPHONE (OUTPATIENT)
Dept: FAMILY MEDICINE CLINIC | Facility: CLINIC | Age: 66
End: 2021-04-05

## 2021-04-05 NOTE — TELEPHONE ENCOUNTER
Patients script for mattress needs a diagnosis code in order for it to be processed   He is using [employer]

## 2021-04-06 NOTE — TELEPHONE ENCOUNTER
I need a new order written, the one the chart is hard to read because since it's a copy it says VOID all over it   Please rewrite

## 2021-04-07 NOTE — PROGRESS NOTES
Follow Up   Office Visit Note  Irish Bautista   63 y o    male   MRN: [id number]  [address] [facility] [address]  [address]  [facility] [address] [id number] [phone]    PCP: Luisito Packer MD  Cardiologist: Dr Chappell  Prior: Dr Cruzito Peres, [facility]              Summary of recommendations  -Heart healthy diet  Educational information provided  -CBC, BMP next week  -Cardiac rehab was sched , but then canceled, as he was told by someone he needs to be cleared to start  Will reach out to CTS to sort out  -Stop metoprolol tartrate 50 mg q 12; start Toprol 100 mg daily  He reports he has an intolerance to metoprolol, and he was taken Toprol before without any issue  -Stop Atorvastatin 80  Start atorvastatin 40 m daily, reports he cannot tolerate the tablet size of an 80 mg pill  --Follow up will be scheduled with Dr Tessa Belle 4-6 weeks        Assessment/plan  CAD,  s/p CABG x3 with LIMA-LAD, SVG-or PLB, SVG-RI adm 3/24/21-3/28/21   On aspirin, statin, beta-blocker  Hypertension, essential   /72 by me, on metoprolol tartrate 50 mg BID  He finished short course of diuretic post bypass, 4 days ago  He would like to go back to Toprol 100 mg daily, as he reports an intolerance to metoprolol tartrate  I wrote for Toprol 100 daily  Blood pressure yesterday systolic 762  Hypothyroidism, on levothyroxine  Hyperlipidemia, on atorvastatin 80 mg daily  21: LDL 77  The patient would like a prescription for atorvastatin 40 mg, 2 tablets daily  He likes the tablet size  Rx written  Hx CVA with rt sided weakness  On ASA, Plavix,statin  Cardiac testing   TTE  EF 60%, no obvious WMA, G1DD  Normal RV function  No significant valvular abnormality   Cardiac catheterization 3/8/21  Mid LAD: There was a 80 % stenosis in the middle third of the vessel segment  Proximal circumflex: There was a 85 % stenosis  Ostial ramus intermedius: There was a 70 % stenosis    Proximal RCA: There was a 50- 60 % stenosis            HPI  Kylah Curran is a 63y o  year old male who has CAD with history of multiple stents (11 stents over 16 years) ,essential hypertension, hyperlipidemia, history of CVA with residual right-sided weakness  Patient previously followed with [facility] Dr Joseluis Gordon  He saw cardiologist Dr Troy Anderson for 2nd opinion  He was seen in the office 02/26/2021 with nonspecific chest pain complaints and was recommended for cardiac catheterization  This showed multivessel CAD, and the patient was referred to CT surgery for CABG evaluation  After testing was complete, he was electively admitted March 24th and underwent coronary bypass grafting timesx 3 with left internal mammary artery to left anterior descending, saphenous vein graft to right posterolateral branch, saphenous vein graft to ramus intermedius  His LV function is preserved        4/8/21   He presents for hospital follow-up, after CABG, accompanied by his wife  He tells me he feels a little short of breath  His vital signs are stable  His weight is stable  His exam is unremarkable; his incision sites are healing, no erythema /drainage  Wt Readings from Last 3 Encounters:   04/08/21 103 kg (226 lb)   04/02/21 103 kg (226 lb)   03/28/21 105 kg (230 lb 9 6 oz)     He tells me he has had some vivid night dreams on metoprolol tartrate  He was able to tolerate Toprol and would like to go back to that  I prescribed Toprol for him  He also likes a smaller tablet size of atorvastatin 40 versus 80mg  I gave him tablets of 40 so he can take 2 daily  Agreeable to getting blood work next week  He will be in touch with CT surgery about participating in cardiac rehab  Follow-up appointments were discussed  Low-salt, heart healthy diet was recommended  Education provided        I have spent 40 minutes with Patient and family today in which greater than 50% of this time was spent in counseling/coordination of care regarding Intructions for management, Patient and family education, Importance of tx compliance and Risk factor reductions  Assessment  Diagnoses and all orders for this visit:    Hospital discharge follow-up    Coronary artery disease involving coronary bypass graft of native heart with angina pectoris (HCC)  -     CBC and Platelet; Future  -     Basic metabolic panel; Future    S/P CABG (coronary artery bypass graft)    Mixed hyperlipidemia  -     atorvastatin (LIPITOR) 40 mg tablet; Take 2 tablets (80 mg total) by mouth daily    History of CVA (cerebrovascular accident)    Essential hypertension  -     metoprolol succinate (TOPROL-XL) 100 mg 24 hr tablet; Take 1 tablet (100 mg total) by mouth daily  -     CBC and Platelet; Future    Chronic diastolic heart failure (HCC)          Past Medical History:   Diagnosis Date    Anxiety     CAD (coronary artery disease)     Depression     Dizziness     Eye problems     Heart disease     Hypertension     Hypothyroidism     Migraines     Stroke ([facility] )     Stroke ([facility] ) 11/17/2014    Tremors of nervous system        Review of Systems   Constitution: Negative for chills  Cardiovascular: Positive for dyspnea on exertion  Negative for chest pain, claudication, cyanosis, irregular heartbeat, leg swelling, near-syncope, orthopnea, palpitations, paroxysmal nocturnal dyspnea and syncope  Respiratory: Negative for cough and shortness of breath  Gastrointestinal: Negative for heartburn and nausea  Neurological: Negative for dizziness, focal weakness, headaches, light-headedness and weakness  Right-sided weakness   All other systems reviewed and are negative  Allergies   Allergen Reactions    Medical Tape Other (See Comments)     Other reaction(s): BURNS USE PAPER TAPE           Current Outpatient Medications:     acetaminophen (TYLENOL) 325 mg tablet, Take 3 tablets (975 mg total) by mouth every 6 (six) hours as needed for mild pain, moderate pain, headaches or fever, Disp: , Rfl: 0    aspirin 81 MG tablet, Take 81 mg by mouth, Disp: , Rfl:     clonazePAM (KlonoPIN) 2 mg tablet, Take 2 tablets (4 mg total) by mouth 2 (two) times a day as needed for seizures, Disp: 180 tablet, Rfl: 1    clopidogrel (PLAVIX) 75 mg tablet, Take 1 tablet (75 mg total) by mouth daily, Disp: 30 tablet, Rfl: 2    levothyroxine 100 mcg tablet, TAKE 1 TABLET EVERY DAY, Disp: 90 tablet, Rfl: 1    omeprazole (PriLOSEC) 20 mg delayed release capsule, Take 1 capsule (20 mg total) by mouth daily Take 30 minutes prior to breakfast, Disp: 30 capsule, Rfl: 0    tiZANidine (ZANAFLEX) 2 mg tablet, Take 1 tablet (2 mg total) by mouth every 8 (eight) hours as needed for muscle spasms, Disp: 270 tablet, Rfl: 1    zolpidem (AMBIEN) 10 mg tablet, 2 po  q hs, Disp: 180 tablet, Rfl: 0    atorvastatin (LIPITOR) 40 mg tablet, Take 2 tablets (80 mg total) by mouth daily, Disp: 180 tablet, Rfl: 1    metoprolol succinate (TOPROL-XL) 100 mg 24 hr tablet, Take 1 tablet (100 mg total) by mouth daily, Disp:  , Rfl:     senna-docusate sodium (SENOKOT S) 8 6-50 mg per tablet, Take 1 tablet by mouth 2 (two) times a day, Disp:  , Rfl: 0        Social History     Socioeconomic History    Marital status: /Civil Union     Spouse name: Not on file    Number of children: Not on file    Years of education: Not on file    Highest education level: Not on file   Occupational History    Not on file   Social Needs    Financial resource strain: Not on file    Food insecurity     Worry: Not on file     Inability: Not on file   [employer] needs     Medical: Not on file     Non-medical: Not on file   Tobacco Use    Smoking status: Never Smoker    Smokeless tobacco: Never Used   Substance and Sexual Activity    Alcohol use: No    Drug use: No    Sexual activity: Not Currently   Lifestyle    Physical activity     Days per week: Not on file     Minutes per session: Not on file    Stress: Not on file   Relationships  Social connections     Talks on phone: Not on file     Gets together: Not on file     Attends Orthodox service: Not on file     Active member of club or organization: Not on file     Attends meetings of clubs or organizations: Not on file     Relationship status: Not on file    Intimate partner violence     Fear of current or ex partner: Not on file     Emotionally abused: Not on file     Physically abused: Not on file     Forced sexual activity: Not on file   Other Topics Concern    Not on file   Social History Narrative    Most recent tobacco use screenin2019      Do you currently or have you served in [employer]:   No      Were you activated, into active duty, as a member of the [employer] or as a Reservist:   No      Occupation:   retired      Education:   15      Marital status:         Sexual orientation:   Heterosexual      Exercise level:   Occasional      Diet:   Regular      General stress level:   Medium      Alcohol intake:   Occasional      Caffeine intake:   Occasional      Chewing tobacco:   none      Illicit drugs:   none      Guns present in home:   No      Seat belts used routinely:   Yes      Smoke alarm in home:   Yes      Advance directive:   No      Live alone or with others:   with others      Are there stairs in your home:   Yes  only in front of house     Pets:   No        Family History   Problem Relation Age of Onset    Cancer Mother     Heart attack Father     Heart disease Father     Heart attack Sister     Cancer Brother        Physical Exam   Constitutional: He is oriented to person, place, and time  No distress  HENT:   Head: Normocephalic and atraumatic  Eyes: Conjunctivae and EOM are normal    Neck: Normal range of motion  Neck supple  Cardiovascular: Normal rate, regular rhythm, normal heart sounds and intact distal pulses  Pulmonary/Chest: Effort normal and breath sounds normal    Abdominal: Soft   Bowel sounds are normal  Musculoskeletal: Normal range of motion  General: Edema present  Neurological: He is alert and oriented to person, place, and time  Skin: Skin is warm and dry  There is pallor  Incisions healing of the chest and leg  No erythema  Skin edges well approximated   Psychiatric: He has a normal mood and affect  Nursing note and vitals reviewed  Vitals: Blood pressure 110/72, pulse 69, height 5' 9" (1 753 m), weight 103 kg (226 lb), SpO2 98 %  Wt Readings from Last 3 Encounters:   21 103 kg (226 lb)   21 103 kg (226 lb)   21 105 kg (230 lb 9 6 oz)         Labs & Results:  Lab Results   Component Value Date    WBC 6 82 2021    HGB 10 8 (L) 2021    HCT 32 1 (L) 2021     (H) 2021     2021     No results found for: BNP  No components found for: CHEM  No results found for: Ari Males, CKMBINDEX  Results for orders placed during the hospital encounter of 21   Echo complete with contrast if indicated    Narrative [place] [address], [address] [facility]  [phone]    Transthoracic Echocardiogram  2D, M-mode, Doppler, and Color Doppler    Study date:  18-Mar-2021    Patient: Vitor Chi  MR number: [id number]  Account number: [de-identified]  : 1955  Age: 63 years  Gender: Male  Status: Outpatient  Location: [place] [address]   Height: 69 in  Weight: 235 lb  BP: 160/ 100 mmHg    Indications: Assess left ventricular function  Diagnoses: I25 10 - Atherosclerotic heart disease of native coronary artery without angina pectoris    Sonographer:  Panchito Swenson RDCS  Primary Physician:  Juancarlos Patel MD  Referring Physician:  Gabriele Morales PA-C  Group:  [facility]  Interpreting Physician:  Jerry Kwan MD    IMPRESSIONS:  The examination was technically difficult      SUMMARY    LEFT VENTRICLE:  Systolic function was normal  Ejection fraction was estimated to be 60 %  Although no diagnostic regional wall motion abnormality was identified, this possibility cannot be completely excluded on the basis of this study  Wall thickness was mildly increased  Doppler parameters were consistent with abnormal left ventricular relaxation (grade 1 diastolic dysfunction)  HISTORY: PRIOR HISTORY: CAD, hyperlipidemia, CHF, hypertension, CVA, palpitations    PROCEDURE: The study was performed in the [place]  This was a routine study  The transthoracic approach was used  The study included complete 2D imaging, M-mode, complete spectral Doppler, and color Doppler  The heart rate was  65 bpm, at the start of the study  Images were obtained from the parasternal, apical, subcostal, and suprasternal notch acoustic windows  Echocardiographic views were limited due to decreased penetration  This was a technically difficult  study  LEFT VENTRICLE: Size was normal  Systolic function was normal  Ejection fraction was estimated to be 60 %  Although no diagnostic regional wall motion abnormality was identified, this possibility cannot be completely excluded on the basis  of this study  Wall thickness was mildly increased  DOPPLER: Doppler parameters were consistent with abnormal left ventricular relaxation (grade 1 diastolic dysfunction)  RIGHT VENTRICLE: The size was normal  Systolic function was normal  Wall thickness was normal     LEFT ATRIUM: Size was normal     RIGHT ATRIUM: Size was normal     MITRAL VALVE: Valve structure was normal  There was normal leaflet separation  DOPPLER: The transmitral velocity was within the normal range  There was no evidence for stenosis  There was no significant regurgitation  AORTIC VALVE: The valve was trileaflet  Leaflets exhibited normal thickness and normal cuspal separation  DOPPLER: Transaortic velocity was within the normal range  There was no evidence for stenosis  There was no significant  regurgitation      TRICUSPID VALVE: The valve structure was normal  There was normal leaflet separation  DOPPLER: The transtricuspid velocity was within the normal range  There was no evidence for stenosis  There was no significant regurgitation  PULMONIC VALVE: Leaflets exhibited normal thickness, no calcification, and normal cuspal separation  DOPPLER: The transpulmonic velocity was within the normal range  There was no significant regurgitation  PERICARDIUM: There was no pericardial effusion  The pericardium was normal in appearance  AORTA: The root exhibited normal size  SYSTEMIC VEINS: IVC: The inferior vena cava was normal in size  SYSTEM MEASUREMENT TABLES    2D  %FS: 35 97 %  Ao Diam: 4 04 cm  EDV(Teich): 82 4 ml  EF(Teich): 65 89 %  ESV(Teich): 28 11 ml  IVSd: 1 43 cm  LA Area: 12 13 cm2  LA Diam: 3 47 cm  LVEDV MOD A4C: 137 79 ml  LVEF MOD A4C: 59 94 %  LVESV MOD A4C: 55 19 ml  LVIDd: 4 29 cm  LVIDs: 2 74 cm  LVLd A4C: 8 78 cm  LVLs A4C: 7 67 cm  LVPWd: 1 24 cm  RA Area: 12 42 cm2  RVIDd: 3 52 cm  SV MOD A4C: 82 6 ml  SV(Teich): 54 29 ml    CW  TR MaxP 3 mmHg  TR Vmax: 2 02 m/s    MM  TAPSE: 1 91 cm    PW  E' Sept: 0 05 m/s  E/E' Sept: 7 3  MV A Simone: 0 6 m/s  MV Dec [place]: 1 42 m/s2  MV DecT: 281 74 ms  MV E Simone: 0 4 m/s  MV E/A Ratio: 0 66  MV PHT: 81 71 ms  MVA By PHT: 2 69 cm2    [employer] Accredited Echocardiography [facility]    Prepared and electronically signed by    Barrie Silver MD  Signed 18-Mar-2021 12:18:58       No results found for this or any previous visit  This note was completed in part utilizing [employer] direct voice recognition software  Grammatical errors, random word insertion, spelling mistakes, and incomplete sentences may be an occasional consequence of the system secondary to software limitations, ambient noise and hardware issues  At the time of dictation, efforts were made to edit, clarify and /or correct errors    Please read the chart carefully and recognize, using context, where substitutions have occurred    If you have any questions or concerns about the context, text or information contained within the body of this dictation, please contact myself, the provider, for further clarification

## 2021-04-08 ENCOUNTER — OFFICE VISIT (OUTPATIENT)
Dept: CARDIOLOGY CLINIC | Facility: CLINIC | Age: 66
End: 2021-04-08
Payer: MEDICARE

## 2021-04-08 VITALS
WEIGHT: 226 LBS | DIASTOLIC BLOOD PRESSURE: 72 MMHG | HEIGHT: 69 IN | SYSTOLIC BLOOD PRESSURE: 110 MMHG | BODY MASS INDEX: 33.47 KG/M2 | OXYGEN SATURATION: 98 % | HEART RATE: 69 BPM

## 2021-04-08 DIAGNOSIS — E78.2 MIXED HYPERLIPIDEMIA: ICD-10-CM

## 2021-04-08 DIAGNOSIS — I10 ESSENTIAL HYPERTENSION: ICD-10-CM

## 2021-04-08 DIAGNOSIS — Z95.1 S/P CABG (CORONARY ARTERY BYPASS GRAFT): ICD-10-CM

## 2021-04-08 DIAGNOSIS — Z09 HOSPITAL DISCHARGE FOLLOW-UP: Primary | ICD-10-CM

## 2021-04-08 DIAGNOSIS — I25.709 CORONARY ARTERY DISEASE INVOLVING CORONARY BYPASS GRAFT OF NATIVE HEART WITH ANGINA PECTORIS (HCC): ICD-10-CM

## 2021-04-08 DIAGNOSIS — Z86.73 HISTORY OF CVA (CEREBROVASCULAR ACCIDENT): ICD-10-CM

## 2021-04-08 DIAGNOSIS — I50.32 CHRONIC DIASTOLIC HEART FAILURE (HCC): ICD-10-CM

## 2021-04-08 PROCEDURE — 99214 OFFICE O/P EST MOD 30 MIN: CPT | Performed by: INTERNAL MEDICINE

## 2021-04-08 RX ORDER — ATORVASTATIN CALCIUM 40 MG/1
80 TABLET, FILM COATED ORAL DAILY
Qty: 180 TABLET | Refills: 1 | Status: SHIPPED | OUTPATIENT
Start: 2021-04-08 | End: 2021-10-25 | Stop reason: ALTCHOICE

## 2021-04-08 RX ORDER — METOPROLOL SUCCINATE 100 MG/1
100 TABLET, EXTENDED RELEASE ORAL DAILY
Start: 2021-04-08 | End: 2021-04-16 | Stop reason: SDUPTHER

## 2021-04-08 NOTE — LETTER
2021     Lynn De Leon MD  [address] [facility] Male [address]    Patient: Bibiana Ca   YOB: 1955   Date of Visit: 2021       Dear Dr Anamika Judge: Thank you for referring Eve Atkins to me for evaluation  Below are my notes for this consultation  If you have questions, please do not hesitate to call me  I look forward to following your patient along with you  Sincerely,        CHRISTA Ornelas        CC: Merry Irizarry, CHRISTA Rodriguez  2021 10:57 AM  Sign when Signing Visit   Follow Up   Office Visit Note  Bibiana Ca   63 y o    male   MRN: [id number]  [address] [place], [place] [id number] [phone]    PCP: Lynn De Leon MD  Cardiologist: Dr Chappell  Prior: Dr Judith Arevalo, [facility]              Summary of recommendations  -Heart healthy diet  Educational information provided  -CBC, BMP next week  -Cardiac rehab was sched , but then canceled, as he was told by someone he needs to be cleared to start  Will reach out to [facility] to sort out  -Stop metoprolol tartrate 50 mg q 12; start Toprol 100 mg daily  He reports he has an intolerance to metoprolol, and he was taken Toprol before without any issue  -Stop Atorvastatin 80  Start atorvastatin 40 m daily, reports he cannot tolerate the tablet size of an 80 mg pill  --Follow up will be scheduled with Dr Kristen Rivera 4-6 weeks        Assessment/plan  CAD,  s/p CABG x3 with LIMA-LAD, SVG-or PLB, SVG-RI adm 3/24/21-3/28/21   On aspirin, statin, beta-blocker  Hypertension, essential   /72 by me, on metoprolol tartrate 50 mg BID  He finished short course of diuretic post bypass, 4 days ago  He would like to go back to Toprol 100 mg daily, as he reports an intolerance to metoprolol tartrate  I wrote for Toprol 100 daily   Blood pressure yesterday systolic 258  Hypothyroidism, on levothyroxine  Hyperlipidemia, on atorvastatin 80 mg daily  1/8/21: LDL 77  The patient would like a prescription for atorvastatin 40 mg, 2 tablets daily  He likes the tablet size  Rx written  Hx CVA with rt sided weakness  On ASA, Plavix,statin  Cardiac testing   TTE  EF 60%, no obvious WMA, G1DD  Normal RV function  No significant valvular abnormality   Cardiac catheterization 3/8/21  Mid LAD: There was a 80 % stenosis in the middle third of the vessel segment  Proximal circumflex: There was a 85 % stenosis  Ostial ramus intermedius: There was a 70 % stenosis  Proximal RCA: There was a 50- 60 % stenosis            HPI  Iraj Silva is a 63y o  year old male who has CAD with history of multiple stents (11 stents over 16 years) ,essential hypertension, hyperlipidemia, history of CVA with residual right-sided weakness  Patient previously followed with [facility] Dr Clementina Noel  He saw cardiologist Dr Lauren Estrada for 2nd opinion  He was seen in the office 02/26/2021 with nonspecific chest pain complaints and was recommended for cardiac catheterization  This showed multivessel CAD, and the patient was referred to CT surgery for CABG evaluation  After testing was complete, he was electively admitted March 24th and underwent coronary bypass grafting timesx 3 with left internal mammary artery to left anterior descending, saphenous vein graft to right posterolateral branch, saphenous vein graft to ramus intermedius  His LV function is preserved        4/8/21   He presents for hospital follow-up, after CABG, accompanied by his wife  He tells me he feels a little short of breath  His vital signs are stable  His weight is stable  His exam is unremarkable; his incision sites are healing, no erythema /drainage  Wt Readings from Last 3 Encounters:   04/08/21 103 kg (226 lb)   04/02/21 103 kg (226 lb)   03/28/21 105 kg (230 lb 9 6 oz)     He tells me he has had some vivid night dreams on metoprolol tartrate    He was able to tolerate Toprol and would like to go back to that  I prescribed Toprol for him  He also likes a smaller tablet size of atorvastatin 40 versus 80mg  I gave him tablets of 40 so he can take 2 daily  Agreeable to getting blood work next week  He will be in touch with CT surgery about participating in cardiac rehab  Follow-up appointments were discussed  Low-salt, heart healthy diet was recommended  Education provided  I have spent 40 minutes with Patient and family today in which greater than 50% of this time was spent in counseling/coordination of care regarding Intructions for management, Patient and family education, Importance of tx compliance and Risk factor reductions  Assessment  Diagnoses and all orders for this visit:    Hospital discharge follow-up    Coronary artery disease involving coronary bypass graft of native heart with angina pectoris (HCC)  -     CBC and Platelet; Future  -     Basic metabolic panel; Future    S/P CABG (coronary artery bypass graft)    Mixed hyperlipidemia  -     atorvastatin (LIPITOR) 40 mg tablet; Take 2 tablets (80 mg total) by mouth daily    History of CVA (cerebrovascular accident)    Essential hypertension  -     metoprolol succinate (TOPROL-XL) 100 mg 24 hr tablet; Take 1 tablet (100 mg total) by mouth daily  -     CBC and Platelet; Future    Chronic diastolic heart failure (HCC)          Past Medical History:   Diagnosis Date    Anxiety     CAD (coronary artery disease)     Depression     Dizziness     Eye problems     Heart disease     Hypertension     Hypothyroidism     Migraines     St[facility] ([facility] [address] )     Stroke ([facility] ) 11/17/2014    Tremors of nervous system        Review of Systems   Constitution: Negative for chills  Cardiovascular: Positive for dyspnea on exertion  Negative for chest pain, claudication, cyanosis, irregular heartbeat, leg swelling, near-syncope, orthopnea, palpitations, paroxysmal nocturnal dyspnea and syncope  Respiratory: Negative for cough and shortness of breath  Gastrointestinal: Negative for heartburn and nausea  Neurological: Negative for dizziness, focal weakness, headaches, light-headedness and weakness  Right-sided weakness   All other systems reviewed and are negative  Allergies   Allergen Reactions    Medical Tape Other (See Comments)     Other reaction(s): BURNS USE PAPER TAPE           Current Outpatient Medications:     acetaminophen (TYLENOL) 325 mg tablet, Take 3 tablets (975 mg total) by mouth every 6 (six) hours as needed for mild pain, moderate pain, headaches or fever, Disp: , Rfl: 0    aspirin 81 MG tablet, Take 81 mg by mouth, Disp: , Rfl:     clonazePAM (KlonoPIN) 2 mg tablet, Take 2 tablets (4 mg total) by mouth 2 (two) times a day as needed for seizures, Disp: 180 tablet, Rfl: 1    clopidogrel (PLAVIX) 75 mg tablet, Take 1 tablet (75 mg total) by mouth daily, Disp: 30 tablet, Rfl: 2    levothyroxine 100 mcg tablet, TAKE 1 TABLET EVERY DAY, Disp: 90 tablet, Rfl: 1    omeprazole (PriLOSEC) 20 mg delayed release capsule, Take 1 capsule (20 mg total) by mouth daily Take 30 minutes prior to breakfast, Disp: 30 capsule, Rfl: 0    tiZANidine (ZANAFLEX) 2 mg tablet, Take 1 tablet (2 mg total) by mouth every 8 (eight) hours as needed for muscle spasms, Disp: 270 tablet, Rfl: 1    zolpidem (AMBIEN) 10 mg tablet, 2 po  q hs, Disp: 180 tablet, Rfl: 0    atorvastatin (LIPITOR) 40 mg tablet, Take 2 tablets (80 mg total) by mouth daily, Disp: 180 tablet, Rfl: 1    metoprolol succinate (TOPROL-XL) 100 mg 24 hr tablet, Take 1 tablet (100 mg total) by mouth daily, Disp:  , Rfl:     senna-docusate sodium (SENOKOT S) 8 6-50 mg per tablet, Take 1 tablet by mouth 2 (two) times a day, Disp:  , Rfl: 0        Social History     Socioeconomic History    Marital status: /Civil Union     Spouse name: Not on file    Number of children: Not on file    Years of education: Not on file    Highest education level: Not on file   Occupational History  Not on file   Social Needs    Financial resource strain: Not on file    Food insecurity     Worry: Not on file     Inability: Not on file    Transportation needs     Medical: Not on file     Non-medical: Not on file   Tobacco Use    Smoking status: Never Smoker    Smokeless tobacco: Never Used   Substance and Sexual Activity    Alcohol use: No    Drug use: No    Sexual activity: Not Currently   Lifestyle    Physical activity     Days per week: Not on file     Minutes per session: Not on file    Stress: Not on file   Relationships    Social connections     Talks on phone: Not on file     Gets together: Not on file     Attends [employer] service: Not on file     Active member of club or organization: Not on file     Attends meetings of clubs or organizations: Not on file     Relationship status: Not on file    Intimate partner violence     Fear of current or ex partner: Not on file     Emotionally abused: Not on file     Physically abused: Not on file     Forced sexual activity: Not on file   Other Topics Concern    Not on file   Social History Narrative    Most recent tobacco use screenin2019      Do you currently or have you served in the [employer]:   No      Were you activated, into active duty, as a member of the [employer] or as a Reservist:   No      Occupation:   retired      Education:   15      Marital status:         Sexual orientation:   Heterosexual      Exercise level:   Occasional      Diet:   Regular      General stress level:   Medium      Alcohol intake:   Occasional      Caffeine intake:   Occasional      Chewing tobacco:   none      Illicit drugs:   none      Guns present in home:   No      Seat belts used routinely:   Yes      Smoke alarm in home:   Yes      Advance directive:   No      Live alone or with others:   with others      Are there stairs in your home:   Yes  only in front of house     Pets:   No        Family History   Problem Relation Age of Onset    Cancer Mother     Heart attack Father     Heart disease Father     Heart attack Sister     Cancer Brother        Physical Exam   Constitutional: He is oriented to person, place, and time  No distress  HENT:   Head: Normocephalic and atraumatic  Eyes: Conjunctivae and EOM are normal    Neck: Normal range of motion  Neck supple  Cardiovascular: Normal rate, regular rhythm, normal heart sounds and intact distal pulses  Pulmonary/Chest: Effort normal and breath sounds normal    Abdominal: Soft  Bowel sounds are normal    Musculoskeletal: Normal range of motion  General: Edema present  Neurological: He is alert and oriented to person, place, and time  Skin: Skin is warm and dry  There is pallor  Incisions healing of the chest and leg  No erythema  Skin edges well approximated   Psychiatric: He has a normal mood and affect  Nursing note and vitals reviewed  Vitals: Blood pressure 110/72, pulse 69, height 5' 9" (1 753 m), weight 103 kg (226 lb), SpO2 98 %     Wt Readings from Last 3 Encounters:   21 103 kg (226 lb)   21 103 kg (226 lb)   21 105 kg (230 lb 9 6 oz)         Labs & Results:  Lab Results   Component Value Date    WBC 6 82 2021    HGB 10 8 (L) 2021    HCT 32 1 (L) 2021     (H) 2021     2021     No results found for: BNP  No components found for: CHEM  No results found for: Eleanor Kand, CKMBINDEX  Results for orders placed during the hospital encounter of 21   Echo complete with contrast if indicated    Narrative [place] [address], [address] [facility]  [phone]    Transthoracic Echocardiogram  2D, M-mode, Doppler, and Color Doppler    Study date:  18-Mar-2021    Patient: Corey Thomas  MR number: [id number]  Account number: [de-identified]  : 1955  Age: 63 years  Gender: Male  Status: Outpatient  Location: [place] [address]   Height: 69 in  Weight: 235 lb  BP: 160/ 100 mmHg    Indications: Assess left ventricular function  Diagnoses: I25 10 - Atherosclerotic heart disease of native coronary artery without angina pectoris    Sonographer:  Isabel Davis RDCS  Primary Physician:  Sharifa Crump MD  Referring Physician:  Beryl Loo PA-C  Group:  [facility]  Interpreting Physician:  Barrie Silver MD    IMPRESSIONS:  The examination was technically difficult  SUMMARY    LEFT VENTRICLE:  Systolic function was normal  Ejection fraction was estimated to be 60 %  Although no diagnostic regional wall motion abnormality was identified, this possibility cannot be completely excluded on the basis of this study  Wall thickness was mildly increased  Doppler parameters were consistent with abnormal left ventricular relaxation (grade 1 diastolic dysfunction)  HISTORY: PRIOR HISTORY: CAD, hyperlipidemia, CHF, hypertension, CVA, palpitations    PROCEDURE: The study was performed in the [address]  This was a routine study  The transthoracic approach was used  The study included complete 2D imaging, M-mode, complete spectral Doppler, and color Doppler  The heart rate was  65 bpm, at the start of the study  Images were obtained from the parasternal, apical, subcostal, and suprasternal notch acoustic windows  Echocardiographic views were limited due to decreased penetration  This was a technically difficult  study  LEFT VENTRICLE: Size was normal  Systolic function was normal  Ejection fraction was estimated to be 60 %  Although no diagnostic regional wall motion abnormality was identified, this possibility cannot be completely excluded on the basis  of this study  Wall thickness was mildly increased  DOPPLER: Doppler parameters were consistent with abnormal left ventricular relaxation (grade 1 diastolic dysfunction)      RIGHT VENTRICLE: The size was normal  Systolic function was normal  Wall thickness was normal     LEFT ATRIUM: Size was normal     RIGHT ATRIUM: Size was normal     MITRAL VALVE: Valve structure was normal  There was normal leaflet separation  DOPPLER: The transmitral velocity was within the normal range  There was no evidence for stenosis  There was no significant regurgitation  AORTIC VALVE: The valve was trileaflet  Leaflets exhibited normal thickness and normal cuspal separation  DOPPLER: Transaortic velocity was within the normal range  There was no evidence for stenosis  There was no significant  regurgitation  TRICUSPID VALVE: The valve structure was normal  There was normal leaflet separation  DOPPLER: The transtricuspid velocity was within the normal range  There was no evidence for stenosis  There was no significant regurgitation  PULMONIC VALVE: Leaflets exhibited normal thickness, no calcification, and normal cuspal separation  DOPPLER: The transpulmonic velocity was within the normal range  There was no significant regurgitation  PERICARDIUM: There was no pericardial effusion  The pericardium was normal in appearance  AORTA: The root exhibited normal size  SYSTEMIC VEINS: IVC: The inferior vena cava was normal in size      SYSTEM MEASUREMENT TABLES    2D  %FS: 35 97 %  Ao Diam: 4 04 cm  EDV(Teich): 82 4 ml  EF(Teich): 65 89 %  ESV(Teich): 28 11 ml  IVSd: 1 43 cm  LA Area: 12 13 cm2  LA Diam: 3 47 cm  LVEDV MOD A4C: 137 79 ml  LVEF MOD A4C: 59 94 %  LVESV MOD A4C: 55 19 ml  LVIDd: 4 29 cm  LVIDs: 2 74 cm  LVLd A4C: 8 78 cm  LVLs A4C: 7 67 cm  LVPWd: 1 24 cm  RA Area: 12 42 cm2  RVIDd: 3 52 cm  SV MOD A4C: 82 6 ml  SV(Teich): 54 29 ml    CW  TR MaxP 3 mmHg  TR Vmax: 2 02 m/s    MM  TAPSE: 1 91 cm    PW  E' Sept: 0 05 m/s  E/E' Sept: 7 3  MV A Simone: 0 6 m/s  MV Dec Meigs: 1 42 m/s2  MV DecT: 281 74 ms  MV E Simone: 0 4 m/s  MV E/A Ratio: 0 66  MV PHT: 81 71 ms  MVA By PHT: 2 69 cm2    [employer] Accredited Echocardiography Laboratory    Prepared and electronically signed by    Eliana Chowdary MD  Signed 18-Mar-2021 12:18:58       No results found for this or any previous visit  This note was completed in part utilizing [employer] fluency direct voice recognition software  Grammatical errors, random word insertion, spelling mistakes, and incomplete sentences may be an occasional consequence of the system secondary to software limitations, ambient noise and hardware issues  At the time of dictation, efforts were made to edit, clarify and /or correct errors  Please read the chart carefully and recognize, using context, where substitutions have occurred    If you have any questions or concerns about the context, text or information contained within the body of this dictation, please contact myself, the provider, for further clarification

## 2021-04-08 NOTE — PATIENT INSTRUCTIONS
Mediterranean Diet   AMBULATORY CARE:   A Mediterranean diet  is a meal plan that includes foods that are commonly eaten in countries that border the [place]  This meal plan may provide several health benefits  These include losing or maintaining weight, and decreasing blood pressure, blood sugar, and cholesterol levels  It may also help protect against certain health conditions such as heart disease, cancer, type 2 diabetes, and Alzheimer disease  Work with a dietitian to develop a meal plan that is right for you  Foods to include in the [address] [facility] diet:   · Include fruits and vegetables in each meal   Eat a variety of fresh fruits and vegetables  · Choose whole grains every day  These foods include whole-grain breads, pastas, and cereals  It also includes brown rice, quinoa, and millet  · Use unsaturated fats instead of saturated fats  Cook with olive or canola oil  Limit saturated fats, such as butter, margarine, and shortening  Saturated fat is an unhealthy fat that can increase your cholesterol levels  · Choose plant foods, poultry, and fish as your main sources of protein  ? Eat plant-based foods that provide protein,  such as lentils, beans, chickpeas, nuts, and seeds  Choose mostly plant-based foods in place of meat on most days of the week  ? Eat protein foods high in omega-3 fats  Fish high in omega-3 fats include salmon, trout, and tuna  Include these types of fish 1 or 2 times each week  Limit fish high in mercury, such as shark, swordfish, tilefish, and terri mackerel  Omega-3 fats are also found in walnuts and flaxseed  ? Choose poultry (chicken or turkey)  without skin instead of red meat  Red meat is high in saturated fat  Limit eggs and high-fat meats, such as peoples, sausage, and hot dogs  · Choose low-fat dairy foods  such as nonfat or 1% milk, or low-fat almond, cashew, or soy milk  Other examples include low-fat cheese, yogurt, and cottage cheese  · Limit sweets  Limit your intake of high-sugar foods, such as soda, desserts, and candy  · Talk to your healthcare provider about alcohol  Studies have shown that moderate intake of wine may reduce the risk of heart disease  A moderate amount of wine is 1 serving for women and men 65 years and older each day  Two servings is recommended for men 24to 59years of age each day  A serving of wine is 5 ounces  Other things you need to know if you follow the Mediterranean diet:   · Include foods high in iron and vitamin C   Plant-based foods that are high in iron include spinach, beans, tofu, and artichoke  Eat a serving of vitamin C with any iron-rich food to help your body absorb more iron  Examples include oranges, strawberries, cantaloupe, broccoli, and yellow peppers  · Get regular physical activity  The Mediterranean diet will have the most benefit if you get regular physical activity  Get 30 minutes of physical activity at least 5 days a week  Choose physical activities that increase your heart rate  Examples include walking, hiking, swimming, and riding a bike  Ask your healthcare provider about the best exercise plan for you  © Copyright [address] [place] [address] Drive Information is for End User's use only and may not be sold, redistributed or otherwise used for commercial purposes  All illustrations and images included in CareNotes® are the copyrighted property of [employer]  or [facility] [place]note 2] Rafi Seymour   The above information is an  only  It is not intended as medical advice for individual conditions or treatments  Talk to your doctor, nurse or pharmacist before following any medical regimen to see if it is safe and effective for you  Cigarette Smoking and Your Health   AMBULATORY CARE:   Risks to your health if you smoke:  Nicotine and other chemicals found in tobacco and e-cigarettes can damage every cell in your body   Even if you are a light smoker, you have an increased risk for cancer, heart disease, and lung disease  If you are pregnant or have diabetes, smoking increases your risk for complications  Nicotine can affect an adolescent's developing brain  This can lead to trouble thinking, learning, or paying attention  Benefits to your health if you stop smoking:   · You decrease respiratory symptoms such as coughing, wheezing, and shortness of breath  · You reduce your risk for cancers of the lung, mouth, throat, kidney, bladder, pancreas, stomach, and cervix  If you already have cancer, you increase the benefits of chemotherapy  You also reduce your risk for cancer returning or a second cancer from developing  · You reduce your risk for heart disease, blood clots, heart attack, and stroke  · You reduce your risk for lung infections, and diseases such as pneumonia, asthma, chronic bronchitis, and emphysema  · Your circulation improves  More oxygen can be delivered to your body  If you have diabetes, you lower your risk for complications, such as kidney, artery, and eye diseases  You also lower your risk for nerve damage  Nerve damage can lead to amputations, poor vision, and blindness  · You improve your body's ability to heal and to fight infections  · An adolescent can help his or her brain and body develop in a healthy way  Talk to your adolescent about all the health risks of nicotine  If you can, start talking about nicotine when your child is younger than 12 years  This may make it easier for him or her not to start using nicotine as a teenager or adult  Explain to him or her that it is best never to start  It can be hard to try to quit later  Benefits to the health of others if you stop smoking:  Tobacco is harmful to nonsmokers who breathe in your secondhand smoke  The following are ways the health of others around you may improve when you stop smoking:  · You lower the risks for lung cancer and heart disease in nonsmoking adults      · If you are pregnant, you lower the risk for miscarriage, early delivery, low birth weight, and stillbirth  You also lower your baby's risk for SIDS, obesity, developmental delay, and neurobehavioral problems, such as ADHD  · If you have children, you lower their risk for ear infections, colds, pneumonia, bronchitis, and asthma  Follow up with your doctor as directed:  Write down your questions so you remember to ask them during your visits  For more information and support to stop smoking:   · [employer]  Phone: 3- 119 - 186-6985  Web Address: [employer]  [address] Information is for End User's use only and may not be sold, redistributed or otherwise used for commercial purposes  All illustrations and images included in CareNotes® are the copyrighted property of [employer]  or [facility] Rafi Vasques  The above information is an  only  It is not intended as medical advice for individual conditions or treatments  Talk to your doctor, nurse or pharmacist before following any medical regimen to see if it is safe and effective for you

## 2021-04-09 ENCOUNTER — TELEPHONE (OUTPATIENT)
Dept: FAMILY MEDICINE CLINIC | Facility: CLINIC | Age: 66
End: 2021-04-09

## 2021-04-09 ENCOUNTER — TELEPHONE (OUTPATIENT)
Dept: CARDIAC SURGERY | Facility: CLINIC | Age: 66
End: 2021-04-09

## 2021-04-09 NOTE — TELEPHONE ENCOUNTER
Patient called office with questions regarding his recovery  He states he does not like taking the prescribed oxycodone because it causes constipation and wanted to know what we recommend for pain control  Patient advised to take Tylenol  Warm compresses may be applied to either side of the chest wall, neck, or back  Patient was educated on normal sensations post-operatively, such as chest wall soreness on either side of his sternum or back, quick, sharp, shooting pain in his chest wall that may travel to his axilla, and skin sensitivity or numbness on his chest wall  All questions were answered  Patient states he is trying to stay active daily, using his walker to walk in the driveway  He is scheduled for cardiac rehab initial evaluation on 5/4

## 2021-04-09 NOTE — TELEPHONE ENCOUNTER
Patients wife called, she said [facility] needs a 3 digit code in order to get Donald mattress  She provided me with # of   I called [facility] and spoke to Wes Kelly, she advised mattress is waiting and jared just needed to call and ask for the scheduling dept  to verify his address and set up a delivery date  Wes Kelly advised [facility] has a standard mattress a well as other [employer]ME [employer] distributors

## 2021-04-15 ENCOUNTER — TELEPHONE (OUTPATIENT)
Dept: FAMILY MEDICINE CLINIC | Facility: CLINIC | Age: 66
End: 2021-04-15

## 2021-04-15 ENCOUNTER — APPOINTMENT (OUTPATIENT)
Dept: LAB | Facility: CLINIC | Age: 66
End: 2021-04-15
Payer: MEDICARE

## 2021-04-15 ENCOUNTER — TELEPHONE (OUTPATIENT)
Dept: CARDIOLOGY CLINIC | Facility: CLINIC | Age: 66
End: 2021-04-15

## 2021-04-15 DIAGNOSIS — I10 ESSENTIAL HYPERTENSION: ICD-10-CM

## 2021-04-15 DIAGNOSIS — I25.709 CORONARY ARTERY DISEASE INVOLVING CORONARY BYPASS GRAFT OF NATIVE HEART WITH ANGINA PECTORIS (HCC): ICD-10-CM

## 2021-04-15 LAB
ANION GAP SERPL CALCULATED.3IONS-SCNC: 6 MMOL/L (ref 4–13)
BUN SERPL-MCNC: 10 MG/DL (ref 5–25)
CALCIUM SERPL-MCNC: 9.1 MG/DL (ref 8.3–10.1)
CHLORIDE SERPL-SCNC: 103 MMOL/L (ref 100–108)
CO2 SERPL-SCNC: 28 MMOL/L (ref 21–32)
CREAT SERPL-MCNC: 1.19 MG/DL (ref 0.6–1.3)
ERYTHROCYTE [DISTWIDTH] IN BLOOD BY AUTOMATED COUNT: 14.3 % (ref 11.6–15.1)
GFR SERPL CREATININE-BSD FRML MDRD: 65 ML/MIN/1.73SQ M
GLUCOSE SERPL-MCNC: 125 MG/DL (ref 65–140)
HCT VFR BLD AUTO: 37.2 % (ref 36.5–49.3)
HGB BLD-MCNC: 12.4 G/DL (ref 12–17)
MCH RBC QN AUTO: 34.4 PG (ref 26.8–34.3)
MCHC RBC AUTO-ENTMCNC: 33.3 G/DL (ref 31.4–37.4)
MCV RBC AUTO: 103 FL (ref 82–98)
PLATELET # BLD AUTO: 272 THOUSANDS/UL (ref 149–390)
PMV BLD AUTO: 10 FL (ref 8.9–12.7)
POTASSIUM SERPL-SCNC: 4 MMOL/L (ref 3.5–5.3)
RBC # BLD AUTO: 3.6 MILLION/UL (ref 3.88–5.62)
SODIUM SERPL-SCNC: 137 MMOL/L (ref 136–145)
WBC # BLD AUTO: 5.07 THOUSAND/UL (ref 4.31–10.16)

## 2021-04-15 PROCEDURE — 85027 COMPLETE CBC AUTOMATED: CPT

## 2021-04-15 PROCEDURE — 36415 COLL VENOUS BLD VENIPUNCTURE: CPT

## 2021-04-15 PROCEDURE — 80048 BASIC METABOLIC PNL TOTAL CA: CPT

## 2021-04-15 NOTE — TELEPHONE ENCOUNTER
Patient is wondering when you think he should get his covid vaccine since he recently had surgery   His two specialists are each saying different things so he would like your opinion as well

## 2021-04-15 NOTE — TELEPHONE ENCOUNTER
P/c'd and would like to know if there is an amount of time post CABG he should wait to get the Covid vaccine?         Please advise

## 2021-04-16 DIAGNOSIS — I10 ESSENTIAL HYPERTENSION: ICD-10-CM

## 2021-04-16 RX ORDER — METOPROLOL SUCCINATE 100 MG/1
100 TABLET, EXTENDED RELEASE ORAL DAILY
Qty: 90 TABLET | Refills: 3
Start: 2021-04-16 | End: 2021-04-20 | Stop reason: SDUPTHER

## 2021-04-20 DIAGNOSIS — I10 ESSENTIAL HYPERTENSION: ICD-10-CM

## 2021-04-21 RX ORDER — METOPROLOL SUCCINATE 100 MG/1
100 TABLET, EXTENDED RELEASE ORAL DAILY
Qty: 90 TABLET | Refills: 3
Start: 2021-04-21 | End: 2021-04-26 | Stop reason: SDUPTHER

## 2021-04-24 DIAGNOSIS — F41.9 ANXIETY: ICD-10-CM

## 2021-04-26 DIAGNOSIS — I10 ESSENTIAL HYPERTENSION: ICD-10-CM

## 2021-04-26 RX ORDER — METOPROLOL SUCCINATE 100 MG/1
100 TABLET, EXTENDED RELEASE ORAL DAILY
Qty: 90 TABLET | Refills: 3 | Status: CANCELLED
Start: 2021-04-26

## 2021-04-26 RX ORDER — METOPROLOL SUCCINATE 100 MG/1
100 TABLET, EXTENDED RELEASE ORAL DAILY
Qty: 90 TABLET | Refills: 1 | OUTPATIENT
Start: 2021-04-26 | End: 2021-04-26 | Stop reason: SDUPTHER

## 2021-04-26 RX ORDER — METOPROLOL SUCCINATE 100 MG/1
100 TABLET, EXTENDED RELEASE ORAL DAILY
Qty: 90 TABLET | Refills: 1 | OUTPATIENT
Start: 2021-04-26 | End: 2021-05-06

## 2021-04-26 NOTE — TELEPHONE ENCOUNTER
Last couple scripts sent on 4/16, 4/19 and 4/20 were set to No print and did not transmit to [facility]    Verbal [place] [facility]

## 2021-04-27 RX ORDER — CLONAZEPAM 2 MG/1
4 TABLET ORAL 2 TIMES DAILY PRN
Qty: 180 TABLET | Refills: 0 | Status: SHIPPED | OUTPATIENT
Start: 2021-04-27 | End: 2021-06-02 | Stop reason: SDUPTHER

## 2021-04-29 ENCOUNTER — IMMUNIZATIONS (OUTPATIENT)
Dept: FAMILY MEDICINE CLINIC | Facility: HOSPITAL | Age: 66
End: 2021-04-29

## 2021-04-29 DIAGNOSIS — Z23 ENCOUNTER FOR IMMUNIZATION: Primary | ICD-10-CM

## 2021-04-29 PROCEDURE — 0001A SARS-COV-2 / COVID-19 MRNA VACCINE (PFIZER-BIONTECH) 30 MCG: CPT

## 2021-04-29 PROCEDURE — 91300 SARS-COV-2 / COVID-19 MRNA VACCINE (PFIZER-BIONTECH) 30 MCG: CPT

## 2021-04-30 ENCOUNTER — OFFICE VISIT (OUTPATIENT)
Dept: CARDIAC SURGERY | Facility: CLINIC | Age: 66
End: 2021-04-30

## 2021-04-30 VITALS
OXYGEN SATURATION: 96 % | TEMPERATURE: 97.3 F | HEIGHT: 69 IN | WEIGHT: 229.3 LBS | BODY MASS INDEX: 33.96 KG/M2 | SYSTOLIC BLOOD PRESSURE: 106 MMHG | RESPIRATION RATE: 18 BRPM | HEART RATE: 80 BPM | DIASTOLIC BLOOD PRESSURE: 84 MMHG

## 2021-04-30 DIAGNOSIS — I25.709 CORONARY ARTERY DISEASE INVOLVING CORONARY BYPASS GRAFT OF NATIVE HEART WITH ANGINA PECTORIS (HCC): ICD-10-CM

## 2021-04-30 DIAGNOSIS — Z48.89 ENCOUNTER FOR POSTOPERATIVE CARE: ICD-10-CM

## 2021-04-30 DIAGNOSIS — Z95.1 S/P CABG (CORONARY ARTERY BYPASS GRAFT): Primary | ICD-10-CM

## 2021-04-30 PROCEDURE — 99024 POSTOP FOLLOW-UP VISIT: CPT | Performed by: NURSE PRACTITIONER

## 2021-04-30 NOTE — PROGRESS NOTES
POST OP FOLLOW UP VISIT    Procedure: S/P elective coronary artery bypass grafting x 3 (LIMA to LAD, SVG to RPL, SVG to Ramus), performed on 3/24/21  History: Bella Preciado is a 63y o  year old male who presents to our office today for routine follow up care from elective coronary artery bypass grafting  Patient accompanied by wife today  He c/o chest wall soreness, taking an occasional oxycodone yet or Tylenol  Reports occasional SOB that wakes him but feels better after taking Tylenol  He denies JOE, angina, lightheadedness, weight gain, edema, palpitations, PND or orthopnea  He is attempting to ambulate at home, uses cane and has residual right sided weakness form previous CVA  He denies fever, chills or incisional issues  He insists there is a suture protruding from his one CT site and his VNA "trimmed" it  Vital Signs:   Vitals:    04/30/21 1057 04/30/21 1101   BP: 116/82 106/84   BP Location: Left arm Right arm   Patient Position: Sitting Sitting   Cuff Size: Standard    Pulse: 80    Resp: 18    Temp: (!) 97 3 °F (36 3 °C)    TempSrc: Tympanic    SpO2: 96%    Weight: 104 kg (229 lb 4 8 oz)    Height: 5' 9" (1 753 m)        Home Medications:   Prior to Admission medications    Medication Sig Start Date End Date Taking?  Authorizing Provider   acetaminophen (TYLENOL) 325 mg tablet Take 3 tablets (975 mg total) by mouth every 6 (six) hours as needed for mild pain, moderate pain, headaches or fever 3/28/21   Shania Herzog PA-C   aspirin 81 MG tablet Take 81 mg by mouth    Historical Provider, MD   atorvastatin (LIPITOR) 40 mg tablet Take 2 tablets (80 mg total) by mouth daily 4/8/21   CHRISTA Gomez   clonazePAM (KlonoPIN) 2 mg tablet Take 2 tablets (4 mg total) by mouth 2 (two) times a day as needed for seizures 4/27/21   Eryn Cevallos MD   clopidogrel (PLAVIX) 75 mg tablet Take 1 tablet (75 mg total) by mouth daily 3/28/21   Shania Herzog PA-C   levothyroxine 100 mcg tablet TAKE 1 TABLET EVERY DAY 1/22/21   Lynn De Leon MD   metoprolol succinate (TOPROL-XL) 100 mg 24 hr tablet Take 1 tablet (100 mg total) by mouth daily 4/26/21   Lynn De Leon MD   omeprazole (PriLOSEC) 20 mg delayed release capsule Take 1 capsule (20 mg total) by mouth daily Take 30 minutes prior to breakfast 3/28/21 4/27/21  Anna Bañuelos PA-C   senna-docusate sodium (SENOKOT S) 8 6-50 mg per tablet Take 1 tablet by mouth 2 (two) times a day 3/28/21   Anna Bañuelos PA-C   tiZANidine (ZANAFLEX) 2 mg tablet Take 1 tablet (2 mg total) by mouth every 8 (eight) hours as needed for muscle spasms 11/9/20   Lynn De Leon MD   zolpidem (AMBIEN) 10 mg tablet 2 po  q hs 2/7/21   Lynn De Leon MD       Physical Exam:  General: Alert, oriented, well developed, no acute distress  HEENT/NECK:  PERRLA  No jugular venous distention  Cardiac:Regular rate and rhythm, No murmurs rubs or gallops  Pulmonary:Breath sounds clear bilaterally  Abdomen:  Non-tender, Non-distended  Positive bowel sounds  Upper extremities: 2+ radial pulses; brisk capillary refill  Lower extremities: Extremities warm/dry  PT/DP pules 2+ bilaterally  No edema B/L  Incisions: Sternum is stable  Incision is clean, dry, and intact  Saphenectomy incision is clean, dry, and intact  Small scab on one CT site- scab removed NO SUTURE SEEN  Musculoskeletal: MAEE, right sided weakness, ambulates with cane  Neuro: Alert and oriented X 3  Sensation is grossly intact  No focal deficits  Skin: Warm/Dry, without rashes or lesions      Lab Results:     Lab Results   Component Value Date    WBC 5 07 04/15/2021    HGB 12 4 04/15/2021    HCT 37 2 04/15/2021     (H) 04/15/2021     04/15/2021     Lab Results   Component Value Date    SODIUM 137 04/15/2021    K 4 0 04/15/2021     04/15/2021    CO2 28 04/15/2021    BUN 10 04/15/2021    CREATININE 1 19 04/15/2021    GLUC 125 04/15/2021    CALCIUM 9 1 04/15/2021     Lab Results   Component Value Date    HGBA1C 5 7 (H) 03/19/2021       Assessment: Coronary artery disease  S/P coronary artery bypass grafting;    Plan:     Shelvy Shone is making progress in their recovery coronary artery bypass grafting  They are now 5 weeks post op  Incisions are well-healed and their sternum is stable  Weight and VS are stable  I reviewed medications and made no changes  Continued Aspirin, Statin, Beta Blocker  Plavix per Cardiology  I discussed the benefits of participating in cardiac rehab and have cleared them to begin the outpatient  program  Patient does not drive due to previous CVA  I reviewed their lifting restriction of no more than 25 lbs for an additional 2 months, until they reach 12 weeks post-op  Shelvy Shone  does not need to return to our office for follow-up at this point  I have advised them to call with any new concerns that may arise  They should maintain routine follow-up with their cardiologist and PCP for ongoing medical care  Shelvy Shone was comfortable with our recommendations and their questions were answered to their satisfaction  The patient recently had a screening colonoscopy in 10/4/2017  Therefore GI referral is not indicated at this time       CHRISTA Minaya  4/30/21  11:00AM

## 2021-05-04 ENCOUNTER — APPOINTMENT (OUTPATIENT)
Dept: CARDIAC REHAB | Age: 66
End: 2021-05-04
Payer: MEDICARE

## 2021-05-05 ENCOUNTER — CLINICAL SUPPORT (OUTPATIENT)
Dept: CARDIAC REHAB | Age: 66
End: 2021-05-05
Payer: MEDICARE

## 2021-05-05 DIAGNOSIS — Z95.1 S/P CABG (CORONARY ARTERY BYPASS GRAFT): ICD-10-CM

## 2021-05-05 PROCEDURE — 93797 PHYS/QHP OP CAR RHAB WO ECG: CPT

## 2021-05-05 NOTE — PROGRESS NOTES
CARDIAC REHAB ASSESSMENT    Today's date: May 5, 2021  Patient name: Christine Medina     : 1955       MRN: [id number]  PCP: Khai Grant MD  Referring Physician: Darius Arredondo,*  Cardiologist: Ren Mcleod MD  Surgeon: Darius Arredondo  Dx:   Encounter Diagnosis   Name Primary?     S/P CABG (coronary artery bypass graft)        Date of onset: 3/24/21  Cultural needs: None    Height:    Wt Readings from Last 1 Encounters:   21 104 kg (229 lb 4 8 oz)      Weight:   Ht Readings from Last 1 Encounters:   21 5' 9" (1 753 m)     Medical History:   Past Medical History:   Diagnosis Date    Anxiety     CAD (coronary artery disease)     Depression     Dizziness     Eye problems     Heart disease     Hypertension     Hypothyroidism     Migraines     [facility] [address] [facility]     Stroke ([facility] ) 2014    Tremors of nervous system          Physical Limitations: 2014  CVA - R sided weakness,   R rotator tear, pinched nerve in lumbar spine    Fall Risk: High   Comments: Patient uses walking assist device (walker/cane/rollator)    Anginal Equivalent: Dyspnea and Excessive fatigue   NTG use: No prescription    Risk Factors   Cholesterol: Yes  Smoking: Never used  HTN: Yes  DM: No  Obesity: No   Inactivity: Yes  Stress:  perceived  stress: 10   Stressors:none   Goals for Stress Management:Enjoy a hobby    Family History:  Family History   Problem Relation Age of Onset    Cancer Mother     Heart attack Father     Heart disease Father     Heart attack Sister     Cancer Brother        Allergies: Medical tape  ETOH:   Social History     Substance and Sexual Activity   Alcohol Use No         Current Medications:   Current Outpatient Medications   Medication Sig Dispense Refill    acetaminophen (TYLENOL) 325 mg tablet Take 3 tablets (975 mg total) by mouth every 6 (six) hours as needed for mild pain, moderate pain, headaches or fever  0    aspirin 81 MG tablet Take 81 mg by mouth      atorvastatin (LIPITOR) 40 mg tablet Take 2 tablets (80 mg total) by mouth daily 180 tablet 1    clonazePAM (KlonoPIN) 2 mg tablet Take 2 tablets (4 mg total) by mouth 2 (two) times a day as needed for seizures (Patient not taking: Reported on 4/30/2021) 180 tablet 0    clopidogrel (PLAVIX) 75 mg tablet Take 1 tablet (75 mg total) by mouth daily 30 tablet 2    levothyroxine 100 mcg tablet TAKE 1 TABLET EVERY DAY 90 tablet 1    metoprolol succinate (TOPROL-XL) 100 mg 24 hr tablet Take 1 tablet (100 mg total) by mouth daily 90 tablet 1    omeprazole (PriLOSEC) 20 mg delayed release capsule Take 1 capsule (20 mg total) by mouth daily Take 30 minutes prior to breakfast 30 capsule 0    senna-docusate sodium (SENOKOT S) 8 6-50 mg per tablet Take 1 tablet by mouth 2 (two) times a day  0    tiZANidine (ZANAFLEX) 2 mg tablet Take 1 tablet (2 mg total) by mouth every 8 (eight) hours as needed for muscle spasms 270 tablet 1    zolpidem (AMBIEN) 10 mg tablet 2 po  q hs 180 tablet 0     No current facility-administered medications for this visit            Functional Status Prior to Diagnosis for Treatment   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, poetry, song lyrics  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  ADLs: limited by Orthopedic limitations   [place]: No limitations  Exercise: none  Other:     Current Functional Status  Occupation: unemployed, disabled  Recreation: writing  ADLs:limited by Orthopedic limitations   [place]: No limitations  Exercise: not a lot of moving  Other: 9 steps into house    Patient Specific Goals:  Return to self-propelled mower    Short Term Program Goals: dietary modifications increased strength improved energy/stamina with ADLs exercise 120-150 mins/wk    Long Term Goals: Improved [facility] Activity Status score  Improved functional capacity  Improved Quality of Life - [facility] score reduced  Improved lipid profile  Reduced waist circumference  weight loss goal of 20lbs  improved Rate Your Plate Score    Ability to reach goals/rehabilitation potential:  Good    Projected return to function: 12 weeks  Objective tests: sub-max NuStep ETT      Nutritional   Reviewed details of Rate your Plate  Discussed key elements of heart healthy eating  Reviewed patient goals for dietary modifications and their clinical implications  Reviewed most recent lipid profile  Red meat occasionally  No processed meats  No sweets  Mrs  Dash - rare chips  Watching processed foods for sodium  Needs more fruits/vegs      Goals for dietary modification: choose lean cuts of meat  poultry without the skin  low fat dairy   reduced fat cheese  increase fruits and vegetables  eliminate butter  reduce sweets/frozen desserts      Emotional/Social  States he doesn't want to talk about it      Encourage to consider therapy  Good support from his wife    Marital status:     Domestic Violence Screening: No    Comments: monitors home BP - feels he feels better at 140/90 - tires when BP is lower  Doesn't sleep well  Current event; SOB with exertion  11 stents - [address]

## 2021-05-05 NOTE — PROGRESS NOTES
Martine Flood        Dear Dr Eric Caballero,    Emotional well-being and depression is addressed in the Cardiac  rehab evaluation  To assess the severity of depression, patients are given the PHQ-9 Depression Questionnaire  This is to inform you that your patient Addis Owens scored a 21 which is interpreted as 20-27 = Severe Depression  DUANE-7 measures anxiety  He scored a 11 - moderate anxiety  Addis Owens admits (without his wife in the room), that he feels depressed and would like to begin with counseling  He  was provided contact information for [facility] and has been encouraged to enroll in [employer]  A repeat PHQ -9 will be administered in 30 days to assess improvement  Thank you for your support of cardiopulmonary rehab      Sincerely,      Justo Ventura, MS, CEP, CCRP

## 2021-05-05 NOTE — PROGRESS NOTES
Cardiac Rehabilitation Plan of Care   Initial Care Plan          Today's date: 2021   # of Exercise Sessions Completed: Initial Evaluation Today  Patient name: Marylee Aly      : 1955  Age: 63 y o  MRN: [id number]  Referring Physician: Rodolfo Arango,*  Cardiologist: Corrina Pino MD  Provider: Maddy  Clinician: Jessie Myers, MS, CEP, CCRP    Dx:   Encounter Diagnosis   Name Primary?  S/P CABG (coronary artery bypass graft)      Date of onset: 3/24/21      SUMMARY OF PROGRESS:  Today is Deshawn's initial evaluation to begin Cardiac Rehab now 6 wks post CABG  The patient does not currently follow a formal exercise program at home - d/t his residual orthopedic limitations from his CVA  He has resumed all ADLs following sternal restrictions and without fatigue and tolerating them well  Depression screening using the PHQ-9 interprets the patient's score 20-27 = Severe Depression  DUANE-7 screening tool for anxiety suggests 10-14 = Moderate anxiety  When addressed, the patient admits to having depression/anxiety  Patient reports sufficient social/emotional support  Information to begin using [facility] [employer] was provided as well as contact information for counseling through [employer]  Hema Yeung was accompanied by his wife  When she was not present in the room, Hema Yeung admitted that he would like to seek therapy but did not want to tell his wife  A discussion was had with his wife without his presence  She stated that she would support him  PHQ-9 score will be reassessed in 30 days  The patient is a non-smoker  Patient admits to 100% medication compliance  The patient completed an initial submaximal NuStep ETT  The patient completed 1 minutes of stage 4 (2 9METs) with test termination of RPE 6  Resting  /70 with appropriate hemodynamic response to exercise reaching 184/86  Patient denied symptoms during exercise  Telemetry revealed NSR, rare PVC    Patient was counseled on exercise guidelines to achieve a minimum of 150 mins/wk of moderate intensity (RPE 4-6) exercise and encouraged to add 1-2 days of exercise on opposite days of cardiac rehab as tolerated  He owns a recumbent bike  We discussed current dietary habits and goals of heart healthy eating for lipid management and weight loss Patient's goals include: improved mental health, increased strength and stamina, return to regular exercise, wt loss of 20lbs  The patient's CAD risk factors include:  inactivity, obesity/overweight, hypertension and hyperlipidemia  His education will focus on lifestyle modification/education specific to His needs  Patient will attend group education classes on heart healthy eating, reading food labels, stress management, risk factor reduction, understanding heart disease and common heart medications  Patient will attend 35 monitored exercise sessions, 3x/wk for 12-18 weeks beginning March 7, 2021  Medication compliance: Yes   Comments: Pt reports to be compliant with medications  Fall Risk: High   Comments: Patient uses walking assist device (walker/cane/rollator) - post CVA    EKG Interpretation: NSR, rare PVC      EXERCISE ASSESSMENT and PLAN    Current Exercise Program in Rehab:       Frequency: 3 days/week Supplement with home exercise 2+ days/wk as tolerated       Minutes: 30-40         METS: 2 0-3 5            HR: RHR+30   RPE: 4-5         Modalities: UBE, NuStep and Recumbent bike      Exercise Progression 30 Day Goals :    Frequency: 3 days/week of cardiac rehab     Supplement with home exercise 2+ days/wk as tolerated    Minutes: 40                            >150 mins/wk of moderate intensity exercise   METS: 2 4-4 0   HR: RHR+30    RPE: 4-5   Modalities: UBE, NuStep and Recumbent bike    Strength training:   Will be added following at least 8 weeks post surgery and 8-10 monitored sessions   Modalities: Leg Press, Chest Press, Lateral Raise, Arm Curl and Seated Row    Home Exercise: none    Goals: 10% improvement in functional capacity - based on max METs achieved in fitness assessment, improved DASI score by 10%, Increase in exercise capacity by 40% - based on peak METs tolerated in cardiac rehab exercise session, Exercise 5 days/wk, >150mins/wk of moderate intensity exercise, Resume ADLs with increased strength, Attend Rehab regularly, Decrease sitting time and start a home exercise program    Progression Toward Goals:  Reviewed Pt goals and determined plan of care    Education: benefit of exercise for CAD risk factors, AHA guidelines to achieve >150 mins/wk of moderate exercise and RPE scale   Plan:education on home exercise guidelines, home exercise 30+ mins 2 days opposite CR and Education class: Risk Factors for Heart Disease  Readiness to change: Preparation:  (Getting ready to change)       NUTRITION ASSESSMENT AND PLAN    Weight control:    Starting weight: 224 4   Current weight:     Waist circumference:    Startin   Current:      Diabetes: N/A  A1c: 5 7    last measured: 3/19/21    Lipid management: Discussed diet and lipid management and Last lipid profile 21  Chol 138  TRG 93  HDL 42  LDL 77    Goals:reduced BMI to < 25, reduced waist circumference <40 inches (M), Improved Rate Your Plate score  >11, Wt  loss 1-2 ppw,  goal of 20 lbs  , choose lean meat (93-95%), eliminate processed meats, reduce portion sizes of meat to 3oz or less, use low fat dairy, reduce cheese intake or use reduced-fat, Eat 4-5 cups of fruits and vegetables daily, eliminate butter, Increase intake of nuts and seeds, eliminate or choose low-fat sweets and daily saturated fat intake <7%/13g    Progression Toward Goals: Reviewed Pt goals and determined plan of care    Education: heart healthy eating  low sodium diet  nutrition for  lipid management  wt  loss   portion control  Plan: Education class: Reading Food Labels, Education Class: Heart Healthy Eating, switch to low fat cheeses, replace butter with soft spreads made with olive oil, canola or yogurt, replace unhealthy snacks with fruits & vegs, reduce red meat 1x/wk, switch to skim or 1% milk, eat fewer desserts and sweets, avoid processed foods, will replace sugar sweetened cereals with whole grain or oatmeal, drink more water, replace sugar with stevia or truvia and keep added daily sugar <25g/day  Readiness to change: Preparation:  (Getting ready to change)       PSYCHOSOCIAL ASSESSMENT AND PLAN    Emotional:  Depression assessment:  PHQ-9 = 20-27 = Severe Depression            Anxiety measure:  DUANE-7 = 10-14 = Moderate anxiety  Self-reported stress level:  1  Social support: Very Good    Goals:  improved [facility] QOL < 27, PHQ-9 - reduced severity by one level, contact behavioral health , Feelings in [facility] Score < 3, Physical Fitness in [place] Score < 3, Social Support in [place] Score < 3, Daily Activity in [facility] Score < 3, Social Activities in [facility] Score < 3, Pain in [facility] Score < 3, Overall Health in [facility] Score < 3, Quality of Life in [facility] Score < 3 , Increased interest in doing things, improved sleep, Improved appetite, improved concentration, improved positive thoughts of well being, increased energy, stop worrying, take time to relax, Feel less anxious and Handle anger/frustration better    Progression Toward Goals: Reviewed Pt goals and determined plan of care    Education: signs/sxs of depression, benefits of a positive support system, stress management techniques, depression and CAD and benefits of mental health counseling  Plan: Class: Stress and Your Health, Class: Relaxation, Refer to behavioral health/counseling, PHQ-9 >5 will refer to MD, Refer to Sana Kol[facility], Practice relaxation techniques, Enjoy a hobby, Join a support group , Return to previous social activity and Repeat PHQ-9 every 30 days if score >5  Readiness to change: Preparation:  (Getting ready to change)       OTHER CORE COMPONENTS     Tobacco:   Social History     Tobacco Use   Smoking Status Never Smoker   Smokeless Tobacco Never Used       Tobacco Use Intervention:   N/A:  Patient is a non-smoker     Anginal Symptoms:  JOE and excessive fatigue   NTG use: No prescription    Blood pressure:    Restin/70   Exercise: 184/86    Goals: consistent BP < 130/80, reduced dietary sodium <2300mg, moderate intensity exercise >150 mins/wk and medication compliance    Progression Toward Goals: Reviewed Pt goals and determined plan of care    Education:  low sodium diet and HTN  Plan: Class: Understanding Heart Disease, Class: Common Heart Medications, Avoid Processed foods, engage in regular exercise, check labels for sodium content and monitor home BP  Readiness to change: Preparation:  (Getting ready to change)

## 2021-05-06 ENCOUNTER — OFFICE VISIT (OUTPATIENT)
Dept: CARDIOLOGY CLINIC | Facility: CLINIC | Age: 66
End: 2021-05-06
Payer: MEDICARE

## 2021-05-06 VITALS
SYSTOLIC BLOOD PRESSURE: 140 MMHG | BODY MASS INDEX: 33.33 KG/M2 | HEIGHT: 69 IN | DIASTOLIC BLOOD PRESSURE: 88 MMHG | WEIGHT: 225 LBS | HEART RATE: 60 BPM

## 2021-05-06 DIAGNOSIS — Z98.61 HISTORY OF PERCUTANEOUS CORONARY INTERVENTION: ICD-10-CM

## 2021-05-06 DIAGNOSIS — Z95.1 S/P CABG (CORONARY ARTERY BYPASS GRAFT): Primary | ICD-10-CM

## 2021-05-06 DIAGNOSIS — I10 ESSENTIAL HYPERTENSION: ICD-10-CM

## 2021-05-06 DIAGNOSIS — Z86.73 HISTORY OF CVA (CEREBROVASCULAR ACCIDENT): ICD-10-CM

## 2021-05-06 DIAGNOSIS — E78.2 MIXED HYPERLIPIDEMIA: ICD-10-CM

## 2021-05-06 PROBLEM — G47.00 INSOMNIA: Status: ACTIVE | Noted: 2021-05-06

## 2021-05-06 PROCEDURE — 99213 OFFICE O/P EST LOW 20 MIN: CPT | Performed by: INTERNAL MEDICINE

## 2021-05-06 RX ORDER — POTASSIUM CHLORIDE 1500 MG/1
TABLET, EXTENDED RELEASE ORAL
COMMUNITY
Start: 2021-04-09 | End: 2021-06-30 | Stop reason: CLARIF

## 2021-05-06 RX ORDER — TORSEMIDE 20 MG/1
TABLET ORAL
COMMUNITY
Start: 2021-04-09 | End: 2021-06-30 | Stop reason: CLARIF

## 2021-05-06 RX ORDER — METOPROLOL SUCCINATE 100 MG/1
100 TABLET, EXTENDED RELEASE ORAL DAILY
Qty: 90 TABLET | Refills: 1 | Status: SHIPPED | OUTPATIENT
Start: 2021-05-06 | End: 2021-10-23 | Stop reason: SDUPTHER

## 2021-05-06 NOTE — PROGRESS NOTES
Cardiology Follow Up Visit    Zaida Dean  1955  [id number]  [address] [facility] [address] [id number]    1  CABG x 3, March 2021, LIMA-LAD;SVG-rPLV; SVG-Ramus)     2  Mixed hyperlipidemia     3  History of CVA (cerebrovascular accident)     4  History of percutaneous coronary intervention           Discussion/Summary:    1  CAD  A  CABG x 3 3/2021 (LIMA-LAD;SVG to right PLV; SVG to RI)  B  Normal LVEF  C  Multiple stents pre cabg [facility]  2  Hypertension  3  Hyperlipidemia    Plan:   Patient doing well post CABG  Started cardiac rehab  Meds stayed the same  No cardiac testing required my standpoint  Interval History:     82-year-old male recent CABG x3 March 2021  History of prior multiple stents outside institutions beginning in the 2000s  Started cardiac rehab yesterday  Patient walks with a walker, prior CVA  No significant post CABG issues      Patient Active Problem List   Diagnosis    CAD (coronary artery disease)    Chest pain    Claudication ([address] )    Essential hypertension    Mixed hyperlipidemia    Hypothyroidism    Myofascial pain syndrome    Radiculopathy, lumbar region    Bilateral lower extremity edema    Palpitation    History of CVA (cerebrovascular accident)    History of percutaneous coronary intervention    Chronic diastolic heart failure ([address] )    Encounter for screening colonoscopy    S/P CABG (coronary artery bypass graft)    Thrombocytopenia (HCC)    Hyperchloremia    Hypocalcemia    Encounter for postoperative care    Stroke ([address] )    Insomnia    BMI 33 0-33 9,adult     Past Medical History:   Diagnosis Date    Anxiety     CAD (coronary artery disease)     Depression     Dizziness     Eye problems     Hypothyroidism     Migraines     Stroke ([address] ) 11/17/2014    Tremors of nervous system      Social History Socioeconomic History    Marital status: /Civil Union     Spouse name: Not on file    Number of children: Not on file    Years of education: Not on file    Highest education level: Not on file   Occupational History    Not on file   Social Needs    Financial resource strain: Not on file    Food insecurity     Worry: Not on file     Inability: Not on file    Transportation needs     Medical: Not on file     Non-medical: Not on file   Tobacco Use    Smoking status: Never Smoker    Smokeless tobacco: Never Used   Substance and Sexual Activity    Alcohol use: No    Drug use: No    Sexual activity: Not Currently   Lifestyle    Physical activity     Days per week: Not on file     Minutes per session: Not on file    Stress: Not on file   Relationships    Social connections     Talks on phone: Not on file     Gets together: Not on file     Attends Anabaptist service: Not on file     Active member of club or organization: Not on file     Attends meetings of clubs or organizations: Not on file     Relationship status: Not on file    Intimate partner violence     Fear of current or ex partner: Not on file     Emotionally abused: Not on file     Physically abused: Not on file     Forced sexual activity: Not on file   Other Topics Concern    Not on file   Social History Narrative    Most recent tobacco use screenin2019      Do you currently or have you served in [employer]:   No      Were you activated, into active duty, as a member of the [employer] or as a Reservist:   No      Occupation:   retired      Education:   15      Marital status:         Sexual orientation:   Heterosexual      Exercise level:   Occasional      Diet:   Regular      General stress level:   Medium      Alcohol intake:   Occasional      Caffeine intake:   Occasional      Chewing tobacco:   none      Illicit drugs:   none      Guns present in home:   No      Seat belts used routinely:   Yes Smoke alarm in home:   Yes      Advance directive:   No      Live alone or with others:   with others      Are there stairs in your home:   Yes  only in front of house     Pets:   No       Family History   Problem Relation Age of Onset    Cancer Mother     Heart attack Father     Heart disease Father     Heart attack Sister     Cancer Brother      Past Surgical History:   Procedure Laterality Date    COLONOSCOPY  09/01/2017    COLONOSCOPY      CORONARY STENT PLACEMENT      DENTAL SURGERY      entire top teeth removal    HERNIA REPAIR      [place] CABG, ARTERY-VEIN, FOUR N/A 3/24/2021    Procedure: CORONARY ARTERY BYPASS GRAFT (CABG) 3 VESSELS,  USING LEFT VANE-LAD AND LEFT GSV-RPL & RAMUS;  Surgeon: Charlene Allan MD;  [facility];  Service: Cardiac Surgery    [place] ECHO TRANSESOPHAG [address] N/A 3/24/2021    Procedure: TRANSESOPHAGEAL ECHOCARDIOGRAM (MARLEN);   Surgeon: Charlene Allan MD;  [facility];  Service: Cardiac Surgery       Current Outpatient Medications:     acetaminophen (TYLENOL) 325 mg tablet, Take 3 tablets (975 mg total) by mouth every 6 (six) hours as needed for mild pain, moderate pain, headaches or fever, Disp: , Rfl: 0    aspirin 81 MG tablet, Take 81 mg by mouth, Disp: , Rfl:     atorvastatin (LIPITOR) 40 mg tablet, Take 2 tablets (80 mg total) by mouth daily, Disp: 180 tablet, Rfl: 1    clonazePAM (KlonoPIN) 2 mg tablet, Take 2 tablets (4 mg total) by mouth 2 (two) times a day as needed for seizures, Disp: 180 tablet, Rfl: 0    clopidogrel (PLAVIX) 75 mg tablet, Take 1 tablet (75 mg total) by mouth daily, Disp: 30 tablet, Rfl: 2    levothyroxine 100 mcg tablet, TAKE 1 TABLET EVERY DAY, Disp: 90 tablet, Rfl: 1    metoprolol succinate (TOPROL-XL) 100 mg 24 hr tablet, Take 1 tablet (100 mg total) by mouth daily, Disp: 90 tablet, Rfl: 1    tiZANidine (ZANAFLEX) 2 mg tablet, Take 1 tablet (2 mg total) by mouth every 8 (eight) hours as needed for muscle spasms, Disp: 270 tablet, Rfl: 1    zolpidem (AMBIEN) 10 mg tablet, 2 po  q hs, Disp: 180 tablet, Rfl: 0    Klor-Con M20 20 MEQ tablet, TAKE 1 TABLET BY MOUTH ONCE DAILY FOR 7 DAYS, Disp: , Rfl:     omeprazole (PriLOSEC) 20 mg delayed release capsule, Take 1 capsule (20 mg total) by mouth daily Take 30 minutes prior to breakfast, Disp: 30 capsule, Rfl: 0    senna-docusate sodium (SENOKOT S) 8 6-50 mg per tablet, Take 1 tablet by mouth 2 (two) times a day, Disp:  , Rfl: 0    torsemide (DEMADEX) 20 mg tablet, TAKE 1 TABLET BY MOUTH ONCE DAILY FOR 7 DAYS, Disp: , Rfl:   Allergies   Allergen Reactions    Medical Tape Other (See Comments)     Other reaction(s): BURNS USE PAPER TAPE         Social, Family, Medication history reviewed and updated as necessary      Labs:     Lab Results   Component Value Date    K 4 0 04/15/2021     04/15/2021    CO2 28 04/15/2021    BUN 10 04/15/2021    CREATININE 1 19 04/15/2021    CREATININE 1 29 03/28/2021    GLUCOSE 138 03/24/2021    CALCIUM 9 1 04/15/2021       Lab Results   Component Value Date    WBC 5 07 04/15/2021    HGB 12 4 04/15/2021    HGB 10 8 (L) 03/28/2021    HCT 37 2 04/15/2021    HCT 32 1 (L) 03/28/2021     04/15/2021     03/28/2021       No results found for: CHOL  Lab Results   Component Value Date    HDL 42 01/08/2021    HDL 39 (L) 07/24/2020     Lab Results   Component Value Date    LDLCALC 77 01/08/2021    [address] 69 07/24/2020     No results found for: LDLDIRECT          Lab Results   Component Value Date    TRIG 93 01/08/2021    TRIG 116 07/24/2020       Lab Results   Component Value Date    ALT 25 03/02/2021    ALT 28 01/08/2021    AST 20 03/02/2021    AST 27 01/08/2021       Lab Results   Component Value Date    INR 0 96 03/19/2021       No results found for: NTBNP    Lab Results   Component Value Date    HGBA1C 5 7 (H) 03/19/2021           Imaging: Reviewed in epic        Review of Systems:  14 systems reviewed and negative with exception of the above        PHYSICAL EXAM:      Vitals:    05/06/21 1406   BP: 140/88   Pulse: 60     Body mass index is 33 23 kg/m²  Weight (last 2 days)     Date/Time   Weight    05/06/21 1406   102 (225)                Gen: No acute distress  HEENT: anicteric, mucous membranes moist  Neck: supple, no jugular venous distention, or carotid bruit  Heart: regular, normal s1 and s2, no murmur/rub or gallop  Lungs :clear to auscultation bilaterally, no rales/rhonchi or wheeze  Abdomen: soft nontender, normoactive bowel sounds, no organomegaly  Ext: warm and perfused, normal femoral pulses, no edema, or clubbing  Skin: warm, no rashes  Neuro: AAO x 3, no focal findings  Psychiatric: normal affect  Musculoskeletal: no obvious joint deformities

## 2021-05-06 NOTE — ASSESSMENT & PLAN NOTE
Patient is stable with current anti-hypertensive medicine and continue to follow a low sodium diet and take current medication  All questions about this condition were answered today

## 2021-05-06 NOTE — ASSESSMENT & PLAN NOTE
Patient is stable  and will continue present plan of care and reassess at next routine visit  All questions about this problem from patient were answered today

## 2021-05-06 NOTE — PROGRESS NOTES
Assessment/Plan:         Problem List Items Addressed This Visit        Endocrine    Hypothyroidism - Primary       Cardiovascular and Mediastinum    CAD (coronary artery disease)     Patient is stable  and will continue present plan of care and reassess at next routine visit  All questions about this problem from patient were answered today  Essential hypertension     Patient is stable with current anti-hypertensive medicine and continue to follow a low sodium diet and take current medication  All questions about this condition were answered today  Other    Mixed hyperlipidemia     Patient  is stable with current medication and we discussed a low fat low cholesterol diet  Weight loss also discussed for this will help lower cholesterol also  Recheck lipids in 6 months  Insomnia     Discussed with patient use of sedative  medications and good  sleep hygiene to maximize treatment for this problem  Pt  to use sedatives only prior to sleep and cautioned them about usage at any other time  All patient questions about this problem answered today  Subjective:      Patient ID: Marylee Aly is a 63 y o  male  This 56-year-old white male here today for checkup on his coronary artery disease status post CABG hypertension hyperlipidemia depression history of stroke migraines and he is doing well  Discussed with patient his rehab process with cardiac rehab which is started  Also his wound is healing nicely has light itching a little bit of tingling which is signifying he is having healing  Also discussed about getting a psychologist for evaluation he is looking for someone who specializes in people who had strokes and have a heart bypass  Patient will discuss with the rehab people who other patients go to see for he is curious as to who would be a good fit for him        The following portions of the patient's history were reviewed and updated as appropriate:   Past Medical History:  He has a past medical history of Anxiety, CAD (coronary artery disease), Depression, Dizziness, Eye problems, Hypothyroidism, Migraines, Stroke ([address] ) (11/17/2014), and Tremors of nervous system  ,  _______________________________________________________________________  Medical Problems:  does not have any pertinent problems on file ,  _______________________________________________________________________  Past Surgical History:   has a past surgical history that includes Coronary stent placement; Hernia repair; Colonoscopy (09/01/2017); Dental surgery; Colonoscopy; pr cabg, artery-vein, four (N/A, 3/24/2021); and pr echo transesophag montr cardiac pump functj (N/A, 3/24/2021)  ,  _______________________________________________________________________  Family History:  family history includes Cancer in his brother and mother; Heart attack in his father and sister; Heart disease in his father ,  _______________________________________________________________________  Social History:   reports that he has never smoked  He has never used smokeless tobacco  He reports that he does not drink alcohol or use drugs  ,  _______________________________________________________________________  Allergies:  is allergic to medical tape     _______________________________________________________________________  Current Outpatient Medications   Medication Sig Dispense Refill    acetaminophen (TYLENOL) 325 mg tablet Take 3 tablets (975 mg total) by mouth every 6 (six) hours as needed for mild pain, moderate pain, headaches or fever  0    aspirin 81 MG tablet Take 81 mg by mouth      atorvastatin (LIPITOR) 40 mg tablet Take 2 tablets (80 mg total) by mouth daily 180 tablet 1    clonazePAM (KlonoPIN) 2 mg tablet Take 2 tablets (4 mg total) by mouth 2 (two) times a day as needed for seizures (Patient not taking: Reported on 4/30/2021) 180 tablet 0    clopidogrel (PLAVIX) 75 mg tablet Take 1 tablet (75 mg total) by mouth daily 30 tablet 2    levothyroxine 100 mcg tablet TAKE 1 TABLET EVERY DAY 90 tablet 1    metoprolol succinate (TOPROL-XL) 100 mg 24 hr tablet Take 1 tablet (100 mg total) by mouth daily 90 tablet 1    omeprazole (PriLOSEC) 20 mg delayed release capsule Take 1 capsule (20 mg total) by mouth daily Take 30 minutes prior to breakfast 30 capsule 0    senna-docusate sodium (SENOKOT S) 8 6-50 mg per tablet Take 1 tablet by mouth 2 (two) times a day  0    tiZANidine (ZANAFLEX) 2 mg tablet Take 1 tablet (2 mg total) by mouth every 8 (eight) hours as needed for muscle spasms 270 tablet 1    zolpidem (AMBIEN) 10 mg tablet 2 po  q hs 180 tablet 0     No current facility-administered medications for this visit       _______________________________________________________________________  Review of Systems   Constitutional: Negative for activity change, appetite change, chills, fatigue, fever and unexpected weight change  HENT: Negative for congestion, ear pain, hearing loss, mouth sores, postnasal drip, sinus pressure, sinus pain, sneezing and sore throat  Respiratory: Negative for apnea, cough, shortness of breath and wheezing  Cardiovascular: Negative for chest pain, palpitations and leg swelling  Gastrointestinal: Negative for abdominal pain, constipation, diarrhea, nausea and vomiting  Endocrine: Negative for cold intolerance and heat intolerance  Genitourinary: Negative for dysuria, frequency and hematuria  Musculoskeletal: Negative for arthralgias, back pain, gait problem, joint swelling and neck pain  Skin: Negative for rash  Neurological: Negative for dizziness, weakness and numbness  Hematological: Does not bruise/bleed easily  Psychiatric/Behavioral: Negative for agitation, behavioral problems, confusion, hallucinations and sleep disturbance  The patient is not nervous/anxious  Objective: There were no vitals filed for this visit    There is no height or weight on file to calculate BMI  Physical Exam  Vitals signs and nursing note reviewed  Constitutional:       Appearance: He is well-developed  HENT:      Head: Normocephalic and atraumatic  Nose: Nose normal       Mouth/Throat:      Mouth: Mucous membranes are moist    Eyes:      General: No scleral icterus  Conjunctiva/sclera: Conjunctivae normal       Pupils: Pupils are equal, round, and reactive to light  Neck:      Musculoskeletal: Normal range of motion and neck supple  Thyroid: No thyromegaly  Cardiovascular:      Rate and Rhythm: Normal rate and regular rhythm  Heart sounds: Normal heart sounds  Pulmonary:      Effort: Pulmonary effort is normal  No respiratory distress  Breath sounds: Normal breath sounds  No wheezing  Abdominal:      General: Bowel sounds are normal       Palpations: Abdomen is soft  Tenderness: There is no abdominal tenderness  There is no guarding or rebound  Musculoskeletal: Normal range of motion  Skin:     General: Skin is warm and dry  Findings: No rash  Neurological:      Mental Status: He is alert and oriented to person, place, and time  Psychiatric:         Mood and Affect: Mood normal          Behavior: Behavior normal          Thought Content:  Thought content normal          Judgment: Judgment normal

## 2021-05-06 NOTE — ASSESSMENT & PLAN NOTE
Discussed with patient use of sedative  medications and good  sleep hygiene to maximize treatment for this problem  Pt  to use sedatives only prior to sleep and cautioned them about usage at any other time  All patient questions about this problem answered today

## 2021-05-07 ENCOUNTER — OFFICE VISIT (OUTPATIENT)
Dept: FAMILY MEDICINE CLINIC | Facility: CLINIC | Age: 66
End: 2021-05-07
Payer: MEDICARE

## 2021-05-07 ENCOUNTER — CLINICAL SUPPORT (OUTPATIENT)
Dept: CARDIAC REHAB | Age: 66
End: 2021-05-07
Payer: MEDICARE

## 2021-05-07 VITALS
TEMPERATURE: 98.1 F | BODY MASS INDEX: 33.03 KG/M2 | OXYGEN SATURATION: 97 % | HEIGHT: 69 IN | DIASTOLIC BLOOD PRESSURE: 86 MMHG | RESPIRATION RATE: 16 BRPM | HEART RATE: 71 BPM | WEIGHT: 223 LBS | SYSTOLIC BLOOD PRESSURE: 122 MMHG

## 2021-05-07 DIAGNOSIS — I10 ESSENTIAL HYPERTENSION: ICD-10-CM

## 2021-05-07 DIAGNOSIS — Z95.1 S/P CABG (CORONARY ARTERY BYPASS GRAFT): ICD-10-CM

## 2021-05-07 DIAGNOSIS — E03.4 HYPOTHYROIDISM DUE TO ACQUIRED ATROPHY OF THYROID: Primary | ICD-10-CM

## 2021-05-07 DIAGNOSIS — E78.2 MIXED HYPERLIPIDEMIA: ICD-10-CM

## 2021-05-07 DIAGNOSIS — R07.9 CHEST PAIN, UNSPECIFIED TYPE: ICD-10-CM

## 2021-05-07 DIAGNOSIS — G47.00 INSOMNIA, UNSPECIFIED TYPE: ICD-10-CM

## 2021-05-07 DIAGNOSIS — I25.709 CORONARY ARTERY DISEASE INVOLVING CORONARY BYPASS GRAFT OF NATIVE HEART WITH ANGINA PECTORIS (HCC): ICD-10-CM

## 2021-05-07 PROCEDURE — 93798 PHYS/QHP OP CAR RHAB W/ECG: CPT

## 2021-05-07 PROCEDURE — 99214 OFFICE O/P EST MOD 30 MIN: CPT | Performed by: FAMILY MEDICINE

## 2021-05-07 RX ORDER — ACETAMINOPHEN AND CODEINE PHOSPHATE 300; 30 MG/1; MG/1
1 TABLET ORAL EVERY 4 HOURS PRN
Qty: 30 TABLET | Refills: 0 | Status: SHIPPED | OUTPATIENT
Start: 2021-05-07 | End: 2021-10-23 | Stop reason: SDUPTHER

## 2021-05-07 RX ORDER — CLOPIDOGREL BISULFATE 75 MG/1
75 TABLET ORAL DAILY
Qty: 90 TABLET | Refills: 1 | Status: SHIPPED | OUTPATIENT
Start: 2021-05-07 | End: 2021-10-31 | Stop reason: SDUPTHER

## 2021-05-07 RX ORDER — ZOLPIDEM TARTRATE 10 MG/1
TABLET ORAL
Qty: 180 TABLET | Refills: 1 | Status: SHIPPED | OUTPATIENT
Start: 2021-05-07 | End: 2021-09-23 | Stop reason: SDUPTHER

## 2021-05-10 ENCOUNTER — APPOINTMENT (OUTPATIENT)
Dept: CARDIAC REHAB | Age: 66
End: 2021-05-10
Payer: MEDICARE

## 2021-05-10 ENCOUNTER — CLINICAL SUPPORT (OUTPATIENT)
Dept: CARDIAC REHAB | Age: 66
End: 2021-05-10
Payer: MEDICARE

## 2021-05-10 DIAGNOSIS — Z95.1 S/P CABG (CORONARY ARTERY BYPASS GRAFT): ICD-10-CM

## 2021-05-10 PROCEDURE — 93798 PHYS/QHP OP CAR RHAB W/ECG: CPT

## 2021-05-12 ENCOUNTER — APPOINTMENT (OUTPATIENT)
Dept: CARDIAC REHAB | Age: 66
End: 2021-05-12
Payer: MEDICARE

## 2021-05-12 ENCOUNTER — CLINICAL SUPPORT (OUTPATIENT)
Dept: CARDIAC REHAB | Age: 66
End: 2021-05-12
Payer: MEDICARE

## 2021-05-12 DIAGNOSIS — Z95.1 S/P CABG (CORONARY ARTERY BYPASS GRAFT): ICD-10-CM

## 2021-05-12 PROCEDURE — 93798 PHYS/QHP OP CAR RHAB W/ECG: CPT

## 2021-05-14 ENCOUNTER — APPOINTMENT (OUTPATIENT)
Dept: CARDIAC REHAB | Age: 66
End: 2021-05-14
Payer: MEDICARE

## 2021-05-14 ENCOUNTER — CLINICAL SUPPORT (OUTPATIENT)
Dept: CARDIAC REHAB | Age: 66
End: 2021-05-14
Payer: MEDICARE

## 2021-05-14 DIAGNOSIS — Z95.1 S/P CABG (CORONARY ARTERY BYPASS GRAFT): ICD-10-CM

## 2021-05-14 PROCEDURE — 93798 PHYS/QHP OP CAR RHAB W/ECG: CPT

## 2021-05-17 ENCOUNTER — APPOINTMENT (OUTPATIENT)
Dept: CARDIAC REHAB | Age: 66
End: 2021-05-17
Payer: MEDICARE

## 2021-05-19 ENCOUNTER — CLINICAL SUPPORT (OUTPATIENT)
Dept: CARDIAC REHAB | Age: 66
End: 2021-05-19
Payer: MEDICARE

## 2021-05-19 ENCOUNTER — APPOINTMENT (OUTPATIENT)
Dept: CARDIAC REHAB | Age: 66
End: 2021-05-19
Payer: MEDICARE

## 2021-05-19 DIAGNOSIS — Z95.1 S/P CABG (CORONARY ARTERY BYPASS GRAFT): ICD-10-CM

## 2021-05-19 PROCEDURE — 93798 PHYS/QHP OP CAR RHAB W/ECG: CPT

## 2021-05-20 ENCOUNTER — IMMUNIZATIONS (OUTPATIENT)
Dept: FAMILY MEDICINE CLINIC | Facility: HOSPITAL | Age: 66
End: 2021-05-20

## 2021-05-20 DIAGNOSIS — Z23 ENCOUNTER FOR IMMUNIZATION: Primary | ICD-10-CM

## 2021-05-20 PROCEDURE — 91300 SARS-COV-2 / COVID-19 MRNA VACCINE (PFIZER-BIONTECH) 30 MCG: CPT | Performed by: PHYSICIAN ASSISTANT

## 2021-05-20 PROCEDURE — 0002A SARS-COV-2 / COVID-19 MRNA VACCINE (PFIZER-BIONTECH) 30 MCG: CPT | Performed by: PHYSICIAN ASSISTANT

## 2021-05-21 ENCOUNTER — CLINICAL SUPPORT (OUTPATIENT)
Dept: CARDIAC REHAB | Age: 66
End: 2021-05-21
Payer: MEDICARE

## 2021-05-21 ENCOUNTER — APPOINTMENT (OUTPATIENT)
Dept: CARDIAC REHAB | Age: 66
End: 2021-05-21
Payer: MEDICARE

## 2021-05-21 DIAGNOSIS — Z95.1 S/P CABG (CORONARY ARTERY BYPASS GRAFT): ICD-10-CM

## 2021-05-21 PROCEDURE — 93798 PHYS/QHP OP CAR RHAB W/ECG: CPT

## 2021-05-24 ENCOUNTER — CLINICAL SUPPORT (OUTPATIENT)
Dept: CARDIAC REHAB | Age: 66
End: 2021-05-24
Payer: MEDICARE

## 2021-05-24 ENCOUNTER — TELEPHONE (OUTPATIENT)
Dept: FAMILY MEDICINE CLINIC | Facility: CLINIC | Age: 66
End: 2021-05-24

## 2021-05-24 ENCOUNTER — APPOINTMENT (OUTPATIENT)
Dept: CARDIAC REHAB | Age: 66
End: 2021-05-24
Payer: MEDICARE

## 2021-05-24 DIAGNOSIS — Z95.1 S/P CABG (CORONARY ARTERY BYPASS GRAFT): ICD-10-CM

## 2021-05-24 PROCEDURE — 93798 PHYS/QHP OP CAR RHAB W/ECG: CPT

## 2021-05-24 NOTE — TELEPHONE ENCOUNTER
Patient called stating that he had open heart surgery about 8 weeks ago and is now having pain on the "left side where the wires are"  He is asking if you could write an order for him to get a chest xray and he will go to [facility] now in [address] [place] [address] to have it done

## 2021-05-25 DIAGNOSIS — R07.81 PLEURODYNIA: Primary | ICD-10-CM

## 2021-05-26 ENCOUNTER — APPOINTMENT (OUTPATIENT)
Dept: RADIOLOGY | Age: 66
End: 2021-05-26
Payer: MEDICARE

## 2021-05-26 ENCOUNTER — APPOINTMENT (OUTPATIENT)
Dept: CARDIAC REHAB | Age: 66
End: 2021-05-26
Payer: MEDICARE

## 2021-05-26 ENCOUNTER — CLINICAL SUPPORT (OUTPATIENT)
Dept: CARDIAC REHAB | Age: 66
End: 2021-05-26
Payer: MEDICARE

## 2021-05-26 DIAGNOSIS — Z95.1 S/P CABG (CORONARY ARTERY BYPASS GRAFT): ICD-10-CM

## 2021-05-26 DIAGNOSIS — R07.81 PLEURODYNIA: ICD-10-CM

## 2021-05-26 PROCEDURE — 71046 X-RAY EXAM CHEST 2 VIEWS: CPT

## 2021-05-26 PROCEDURE — 93798 PHYS/QHP OP CAR RHAB W/ECG: CPT

## 2021-05-28 ENCOUNTER — APPOINTMENT (OUTPATIENT)
Dept: CARDIAC REHAB | Age: 66
End: 2021-05-28
Payer: MEDICARE

## 2021-05-28 ENCOUNTER — CLINICAL SUPPORT (OUTPATIENT)
Dept: CARDIAC REHAB | Age: 66
End: 2021-05-28
Payer: MEDICARE

## 2021-05-28 DIAGNOSIS — Z95.1 S/P CABG (CORONARY ARTERY BYPASS GRAFT): ICD-10-CM

## 2021-05-28 PROCEDURE — 93798 PHYS/QHP OP CAR RHAB W/ECG: CPT

## 2021-06-01 ENCOUNTER — TELEPHONE (OUTPATIENT)
Dept: CARDIAC SURGERY | Facility: CLINIC | Age: 66
End: 2021-06-01

## 2021-06-01 ENCOUNTER — CLINICAL SUPPORT (OUTPATIENT)
Dept: CARDIAC REHAB | Age: 66
End: 2021-06-01
Payer: MEDICARE

## 2021-06-01 DIAGNOSIS — Z95.1 S/P CABG (CORONARY ARTERY BYPASS GRAFT): ICD-10-CM

## 2021-06-01 PROCEDURE — 93798 PHYS/QHP OP CAR RHAB W/ECG: CPT

## 2021-06-01 NOTE — PROGRESS NOTES
Cardiac Rehabilitation Plan of Care   30 Day Reassessment          Today's date: 2021   # of Exercise Sessions Completed: 11   Patient name: Shelvy Shone      : 1955  Age: 63 y o  MRN: [id number]  Referring Physician: Khushbu Marte,*  Cardiologist: Lorie Molina MD  Provider: Kaylin Louis  Clinician: Angelica Rodriguez, MS, CEP, CCRP    Dx:   Encounter Diagnosis   Name Primary?  S/P CABG (coronary artery bypass graft)      Date of onset: 3/24/21      SUMMARY OF PROGRESS:  Aurora Resendiz is compliant attending cardiac rehab exercise sessions 3x/wk  He tolerates 40 mins at 2 4 - 3 6 METs plus wt training  Resting /70 - 124/64 with a rare high BP (142/86) with appropriate response to exercise reaching 138/86- 180/80  NSR on tele with occ PVCs and rare couplet observed  RHR 73 - 86 ExHR 113 - 150  He is tolerating progression of intensity levels to maintain RPE 4-6  No cardiac complaints  He is anxious to return to using his push mower  He is not progressing toward wt loss goals remaining at 224  Patient reports he has been working on  dietary modifications with the goal of rare red/processed meats, low fat dairy, reduced added sugars and refined flours  He continues to eat red/processed meats and is not avoiding added sugars  The patient is a non-smoker  Depression screening using the PHQ-9 was reassessed  The patient's score was 5-9 = Mild Depression showing an IMPROVEMENT  (score went from 20 to 9) DUANE-7 was reassessed  The patient's score was 0-4  = Not anxious showing an IMPROVEMENT  (score went from 11 to 4) Aurora Resendiz continues to feel depressed  He reports that he cannot find a therapist   He was given the number for [address] at his initial evaluation  He reports that he called but was not satisfied with the outcome  He reports good social/emotional support from his wife  Patient does not attend group educational classes on cardiac risk factor modification    He has not added home exercise  His exercise program will be progressed as tolerated to maintain RPE 4-6  The patient has the following personal goals he hopes to achieved by discharge: improved mental health, increased strength and stamina, return to regular exercise, wt loss of 20lbs  Pt will continue to be educated on lifestyle modifications and encouraged to supplement with a home exercise program to reach the following goals: add home exercise on his recumbent ergometer, reduce intake of red meat and added sugars, make contact with a therapist in the next 30 days             Medication compliance: Yes   Comments: Pt reports to be compliant with medications  Fall Risk: High   Comments: Patient uses walking assist device (walker/cane/rollator) - post CVA    EKG Interpretation: NSR, rare PVC      EXERCISE ASSESSMENT and PLAN    Current Exercise Program in Rehab:       Frequency: 3 days/week Supplement with home exercise 2+ days/wk as tolerated       Minutes: 40         METS: 2 0-3 6           HR: RHR+30   RPE: 4-5         Modalities: UBE, Lifecycle and NuStep      Exercise Progression 30 Day Goals :    Frequency: 3 days/week of cardiac rehab     Supplement with home exercise 2+ days/wk as tolerated    Minutes: 40                            >150 mins/wk of moderate intensity exercise   METS: 2 4-4 0   HR: RHR+30    RPE: 4-5   Modalities: UBE, Lifecycle and NuStep    Strength trainin-3 days / week  12-15 repetitions  1-2 sets per modality    Modalities: Leg Press, Chest Press, Lateral Raise, Arm Curl and Seated Row    Home Exercise: none    Goals: 10% improvement in functional capacity - based on max METs achieved in fitness assessment, improved DASI score by 10%, Increase in exercise capacity by 40% - based on peak METs tolerated in cardiac rehab exercise session, Exercise 5 days/wk, >150mins/wk of moderate intensity exercise, Resume ADLs with increased strength, Attend Rehab regularly, Decrease sitting time and start a home exercise program    Progression Toward Goals:  Pt is progressing and showing improvement  toward the following goals:  10% improvement in functional capacity - based on max METs achieved in fitness assessment, improved DASI score by 10%, Increase in exercise capacity by 40% - based on peak METs tolerated in cardiac rehab exercise session, Exercise 5 days/wk, >150mins/wk of moderate intensity exercise, Resume ADLs with increased strength, Attend Rehab regularly, Decrease sitting time and start a home exercise program   , Patient will add home exercise as tolerated 30 mins in the next 30 days, Will continue to educate and progress as tolerated  Education: benefit of exercise for CAD risk factors, AHA guidelines to achieve >150 mins/wk of moderate exercise and RPE scale   Plan:education on home exercise guidelines, home exercise 30+ mins 2 days opposite CR and Education class: Risk Factors for Heart Disease  Readiness to change: Preparation:  (Getting ready to change)       NUTRITION ASSESSMENT AND PLAN    Weight control:    Starting weight: 224 4   Current weight:   224  Waist circumference:    Startin   Current:      Diabetes: N/A  A1c: 5 7    last measured: 3/19/21    Lipid management: Discussed diet and lipid management and Last lipid profile 21  Chol 138  TRG 93  HDL 42  LDL 77    Goals:reduced BMI to < 25, reduced waist circumference <40 inches (M), Improved Rate Your Plate score  >39, Wt  loss 1-2 ppw,  goal of 20 lbs  , choose lean meat (93-95%), eliminate processed meats, reduce portion sizes of meat to 3oz or less, use low fat dairy, reduce cheese intake or use reduced-fat, Eat 4-5 cups of fruits and vegetables daily, eliminate butter, Increase intake of nuts and seeds, eliminate or choose low-fat sweets and daily saturated fat intake <7%/13g    Progression Toward Goals: Reviewed Pt goals and determined plan of care, Pt has not made progress toward the following goals: reduced BMI to < 25, reduced waist circumference <40 inches (M), Improved Rate Your Plate score  >48, Wt  loss 1-2 ppw,  goal of 20 lbs  , choose lean meat (93-95%), eliminate processed meats, reduce portion sizes of meat to 3oz or less, use low fat dairy, reduce cheese intake or use reduced-fat, Eat 4-5 cups of fruits and vegetables daily, eliminate butter, Increase intake of nuts and seeds, eliminate or choose low-fat sweets and daily saturated fat intake <7%/13g  , Patient will reduce intake of red/processed meats, eliminate chocolate milk and reduce added sugars in the next 30 days, Will continue to educate and progress as tolerated      Education: heart healthy eating  low sodium diet  nutrition for  lipid management  wt  loss   portion control  Plan: Education class: Reading Food Labels, Education Class: Heart Healthy Eating, switch to low fat cheeses, replace butter with soft spreads made with olive oil, canola or yogurt, replace unhealthy snacks with fruits & vegs, reduce red meat 1x/wk, switch to skim or 1% milk, eat fewer desserts and sweets, avoid processed foods, will replace sugar sweetened cereals with whole grain or oatmeal, drink more water, replace sugar with stevia or truvia and keep added daily sugar <25g/day  Readiness to change: Preparation:  (Getting ready to change)       PSYCHOSOCIAL ASSESSMENT AND PLAN    Emotional:  Depression assessment:  PHQ-9 = 5-9 = Mild Depression            Anxiety measure:  DUANE-7 = 0-4  = Not anxious  Self-reported stress level:  1  Social support: Very Good    Goals:  improved [facility] QOL < 27, PHQ-9 - reduced severity by one level, contact behavioral health , Feelings in [facility] Score < 3, Physical Fitness in [facility] Score < 3, Social Support in [facility] Score < 3, Daily Activity in [facility] Score < 3, Social Activities in [facility] Score < 3, Pain in [place] Score < 3, Overall Health in [facility] Score < 3, Quality of Life in [facility] Score < 3 , Increased interest in doing things, improved sleep, Improved appetite, improved concentration, improved positive thoughts of well being, increased energy, stop worrying, take time to relax, Feel less anxious and Handle anger/frustration better    Progression Toward Goals: Pt is progressing and showing improvement  toward the following goals:  improved [facility] QOL < 27, PHQ-9 - reduced severity by one level, contact behavioral health , Feelings in [place] Score < 3, Physical Fitness in [place] Score < 3, Social Support in [place] Score < 3, Daily Activity in [place] Score < 3, Social Activities in [place] Score < 3, Pain in [place] Score < 3, Overall Health in [facility] Score < 3, [facility] of [facility] in [facility] Score < 3 , Increased interest in doing things, improved sleep, Improved appetite, improved concentration, improved positive thoughts of well being, increased energy, stop worrying, take time to relax, Feel less anxious and Handle anger/frustration better  , Patient will make contact with a therapist to begin treatment in the next 30 days, Will continue to educate and progress as tolerated      Education: signs/sxs of depression, benefits of a positive support system, stress management techniques, depression and CAD and benefits of mental health counseling  Plan: Class: Stress and Your Health, Class: Relaxation, Refer to behavioral health/counseling, PHQ-9 >5 will refer to MD, Refer to Sana Kolb, Practice relaxation techniques, Enjoy a hobby, Join a support group , Return to previous social activity and Repeat PHQ-9 every 30 days if score >5  Readiness to change: Preparation:  (Getting ready to change)       OTHER CORE COMPONENTS     Tobacco:   Social History     Tobacco Use   Smoking Status Never Smoker   Smokeless Tobacco Never Used       Tobacco Use Intervention:   N/A:  Patient is a non-smoker     Anginal Symptoms:  JOE and excessive fatigue   NTG use: No prescription    Blood pressure:    Restin/70 - 124/64   Exercise: 138/86- 180/80    Goals: consistent BP < 130/80, reduced dietary sodium <2300mg, moderate intensity exercise >150 mins/wk and medication compliance    Progression Toward Goals: Pt is progressing and showing improvement  toward the following goals:  consistent BP < 130/80, reduced dietary sodium <2300mg   , Pt has not made progress toward the following goals:  moderate intensity exercise >150 mins/wk  , Patient will begin using home recumbent ergometer in the next 30 days, Will continue to educate and progress as tolerated      Education:  low sodium diet and HTN  Plan: Class: Understanding Heart Disease, Class: Common Heart Medications, Avoid Processed foods, engage in regular exercise, check labels for sodium content and monitor home BP  Readiness to change: Preparation:  (Getting ready to change)

## 2021-06-01 NOTE — TELEPHONE ENCOUNTER
Called patient back to explain his finding on CXR  Informed patient that he is seeing metal clip which is used to divide the LIMA  All questions answered and concerns addressed

## 2021-06-02 ENCOUNTER — CLINICAL SUPPORT (OUTPATIENT)
Dept: CARDIAC REHAB | Age: 66
End: 2021-06-02
Payer: MEDICARE

## 2021-06-02 ENCOUNTER — APPOINTMENT (OUTPATIENT)
Dept: CARDIAC REHAB | Age: 66
End: 2021-06-02
Payer: MEDICARE

## 2021-06-02 DIAGNOSIS — F41.9 ANXIETY: ICD-10-CM

## 2021-06-02 DIAGNOSIS — Z95.1 S/P CABG (CORONARY ARTERY BYPASS GRAFT): ICD-10-CM

## 2021-06-02 PROCEDURE — 93798 PHYS/QHP OP CAR RHAB W/ECG: CPT

## 2021-06-02 RX ORDER — CLONAZEPAM 2 MG/1
4 TABLET ORAL 2 TIMES DAILY PRN
Qty: 180 TABLET | Refills: 0 | Status: SHIPPED | OUTPATIENT
Start: 2021-06-02 | End: 2021-09-09 | Stop reason: SDUPTHER

## 2021-06-04 ENCOUNTER — APPOINTMENT (OUTPATIENT)
Dept: CARDIAC REHAB | Age: 66
End: 2021-06-04
Payer: MEDICARE

## 2021-06-04 ENCOUNTER — CLINICAL SUPPORT (OUTPATIENT)
Dept: CARDIAC REHAB | Age: 66
End: 2021-06-04
Payer: MEDICARE

## 2021-06-04 DIAGNOSIS — Z95.1 S/P CABG (CORONARY ARTERY BYPASS GRAFT): ICD-10-CM

## 2021-06-04 PROCEDURE — 93798 PHYS/QHP OP CAR RHAB W/ECG: CPT

## 2021-06-07 ENCOUNTER — CLINICAL SUPPORT (OUTPATIENT)
Dept: CARDIAC REHAB | Age: 66
End: 2021-06-07
Payer: MEDICARE

## 2021-06-07 ENCOUNTER — APPOINTMENT (OUTPATIENT)
Dept: CARDIAC REHAB | Age: 66
End: 2021-06-07
Payer: MEDICARE

## 2021-06-07 DIAGNOSIS — Z95.1 S/P CABG (CORONARY ARTERY BYPASS GRAFT): ICD-10-CM

## 2021-06-07 PROCEDURE — 93798 PHYS/QHP OP CAR RHAB W/ECG: CPT

## 2021-06-09 ENCOUNTER — APPOINTMENT (OUTPATIENT)
Dept: CARDIAC REHAB | Age: 66
End: 2021-06-09
Payer: MEDICARE

## 2021-06-09 ENCOUNTER — CLINICAL SUPPORT (OUTPATIENT)
Dept: CARDIAC REHAB | Age: 66
End: 2021-06-09
Payer: MEDICARE

## 2021-06-09 DIAGNOSIS — Z95.1 S/P CABG (CORONARY ARTERY BYPASS GRAFT): ICD-10-CM

## 2021-06-09 PROCEDURE — 93798 PHYS/QHP OP CAR RHAB W/ECG: CPT

## 2021-06-11 ENCOUNTER — APPOINTMENT (OUTPATIENT)
Dept: CARDIAC REHAB | Age: 66
End: 2021-06-11
Payer: MEDICARE

## 2021-06-11 ENCOUNTER — CLINICAL SUPPORT (OUTPATIENT)
Dept: CARDIAC REHAB | Age: 66
End: 2021-06-11
Payer: MEDICARE

## 2021-06-11 DIAGNOSIS — M54.50 LOW BACK PAIN WITHOUT SCIATICA, UNSPECIFIED BACK PAIN LATERALITY, UNSPECIFIED CHRONICITY: ICD-10-CM

## 2021-06-11 DIAGNOSIS — Z95.1 S/P CABG (CORONARY ARTERY BYPASS GRAFT): ICD-10-CM

## 2021-06-11 PROCEDURE — 93798 PHYS/QHP OP CAR RHAB W/ECG: CPT

## 2021-06-12 RX ORDER — TIZANIDINE 2 MG/1
2 TABLET ORAL EVERY 8 HOURS PRN
Qty: 270 TABLET | Refills: 0 | Status: SHIPPED | OUTPATIENT
Start: 2021-06-12 | End: 2021-09-09 | Stop reason: SDUPTHER

## 2021-06-14 ENCOUNTER — APPOINTMENT (OUTPATIENT)
Dept: CARDIAC REHAB | Age: 66
End: 2021-06-14
Payer: MEDICARE

## 2021-06-14 ENCOUNTER — CLINICAL SUPPORT (OUTPATIENT)
Dept: CARDIAC REHAB | Age: 66
End: 2021-06-14
Payer: MEDICARE

## 2021-06-14 DIAGNOSIS — Z95.1 S/P CABG (CORONARY ARTERY BYPASS GRAFT): ICD-10-CM

## 2021-06-14 PROCEDURE — 93798 PHYS/QHP OP CAR RHAB W/ECG: CPT

## 2021-06-16 ENCOUNTER — APPOINTMENT (OUTPATIENT)
Dept: CARDIAC REHAB | Age: 66
End: 2021-06-16
Payer: MEDICARE

## 2021-06-16 ENCOUNTER — CLINICAL SUPPORT (OUTPATIENT)
Dept: CARDIAC REHAB | Age: 66
End: 2021-06-16
Payer: MEDICARE

## 2021-06-16 ENCOUNTER — TELEPHONE (OUTPATIENT)
Dept: CARDIOLOGY CLINIC | Facility: CLINIC | Age: 66
End: 2021-06-16

## 2021-06-16 ENCOUNTER — TELEPHONE (OUTPATIENT)
Dept: FAMILY MEDICINE CLINIC | Facility: CLINIC | Age: 66
End: 2021-06-16

## 2021-06-16 DIAGNOSIS — Z95.1 S/P CABG (CORONARY ARTERY BYPASS GRAFT): ICD-10-CM

## 2021-06-16 PROCEDURE — 93798 PHYS/QHP OP CAR RHAB W/ECG: CPT

## 2021-06-16 NOTE — TELEPHONE ENCOUNTER
Andrea Velazquez left message on nurse line requesting a call back  I returned his call  Patient asking for a script to attend regular physical therapy  He is currently attending cardiac rehab  S/p CABG 3/2021  Cardiac rehab will end in August  Patient states he is not reaching his full potential and feels he needs to do core strength exercises which he can do not at cardiac rehab  Feels PT is the same as cardiac rehab  I tried to educate him on the difference but patient stated he knew the difference and knows his body and knows what he needs  Patient is aware you are on vacation  I will call him back once I hear back from you       C/b # [phone]

## 2021-06-16 NOTE — TELEPHONE ENCOUNTER
Patient is asking if you could order regular physical therapy to help with his core strength  He is currently doing cardio physical therapy and doesn't think he is working to his full potential  His surgeon said he needed to check with his primary doctor to get this switched  Please order if you think this is okay   Callback number [phone]

## 2021-06-17 NOTE — TELEPHONE ENCOUNTER
Call pt  He needs to complete his cardiac therapy first  It is  critical he complete that and then he can do any other Physical therapy he needs  I would not want to stop his cardiac rehab before it is completed

## 2021-06-17 NOTE — TELEPHONE ENCOUNTER
Luisito Packer MD  to Jorgito Sepulveda    7:10 AM  Note     Call pt  He needs to complete his cardiac therapy first  It is  critical he complete that and then he can do any other Physical therapy he needs  I would not want to stop his cardiac rehab before it is completed      Patient called PCP as well  These were his recommendations

## 2021-06-18 ENCOUNTER — CLINICAL SUPPORT (OUTPATIENT)
Dept: CARDIAC REHAB | Age: 66
End: 2021-06-18
Payer: MEDICARE

## 2021-06-18 ENCOUNTER — APPOINTMENT (OUTPATIENT)
Dept: CARDIAC REHAB | Age: 66
End: 2021-06-18
Payer: MEDICARE

## 2021-06-18 DIAGNOSIS — Z95.1 S/P CABG (CORONARY ARTERY BYPASS GRAFT): ICD-10-CM

## 2021-06-18 PROCEDURE — 93798 PHYS/QHP OP CAR RHAB W/ECG: CPT

## 2021-06-21 ENCOUNTER — CLINICAL SUPPORT (OUTPATIENT)
Dept: CARDIAC REHAB | Age: 66
End: 2021-06-21
Payer: MEDICARE

## 2021-06-21 ENCOUNTER — APPOINTMENT (OUTPATIENT)
Dept: CARDIAC REHAB | Age: 66
End: 2021-06-21
Payer: MEDICARE

## 2021-06-21 DIAGNOSIS — Z95.1 S/P CABG (CORONARY ARTERY BYPASS GRAFT): ICD-10-CM

## 2021-06-21 PROCEDURE — 93798 PHYS/QHP OP CAR RHAB W/ECG: CPT

## 2021-06-22 ENCOUNTER — APPOINTMENT (OUTPATIENT)
Dept: LAB | Facility: CLINIC | Age: 66
End: 2021-06-22
Payer: MEDICARE

## 2021-06-22 DIAGNOSIS — E03.9 HYPOTHYROIDISM, UNSPECIFIED TYPE: ICD-10-CM

## 2021-06-22 DIAGNOSIS — E78.2 MIXED HYPERLIPIDEMIA: ICD-10-CM

## 2021-06-22 LAB
ALBUMIN SERPL BCP-MCNC: 4.1 G/DL (ref 3.5–5)
ALP SERPL-CCNC: 88 U/L (ref 46–116)
ALT SERPL W P-5'-P-CCNC: 23 U/L (ref 12–78)
ANION GAP SERPL CALCULATED.3IONS-SCNC: 3 MMOL/L (ref 4–13)
AST SERPL W P-5'-P-CCNC: 19 U/L (ref 5–45)
BILIRUB SERPL-MCNC: 0.86 MG/DL (ref 0.2–1)
BUN SERPL-MCNC: 14 MG/DL (ref 5–25)
CALCIUM SERPL-MCNC: 9.3 MG/DL (ref 8.3–10.1)
CHLORIDE SERPL-SCNC: 103 MMOL/L (ref 100–108)
CHOLEST SERPL-MCNC: 143 MG/DL (ref 50–200)
CO2 SERPL-SCNC: 31 MMOL/L (ref 21–32)
CREAT SERPL-MCNC: 1.12 MG/DL (ref 0.6–1.3)
GFR SERPL CREATININE-BSD FRML MDRD: 70 ML/MIN/1.73SQ M
GLUCOSE P FAST SERPL-MCNC: 104 MG/DL (ref 65–99)
HDLC SERPL-MCNC: 42 MG/DL
LDLC SERPL CALC-MCNC: 71 MG/DL (ref 0–100)
POTASSIUM SERPL-SCNC: 4 MMOL/L (ref 3.5–5.3)
PROT SERPL-MCNC: 7.7 G/DL (ref 6.4–8.2)
SODIUM SERPL-SCNC: 137 MMOL/L (ref 136–145)
TRIGL SERPL-MCNC: 151 MG/DL
TSH SERPL DL<=0.05 MIU/L-ACNC: 0.5 UIU/ML (ref 0.36–3.74)

## 2021-06-22 PROCEDURE — 80053 COMPREHEN METABOLIC PANEL: CPT

## 2021-06-22 PROCEDURE — 80061 LIPID PANEL: CPT

## 2021-06-22 PROCEDURE — 84443 ASSAY THYROID STIM HORMONE: CPT

## 2021-06-22 PROCEDURE — 36415 COLL VENOUS BLD VENIPUNCTURE: CPT

## 2021-06-23 ENCOUNTER — TELEPHONE (OUTPATIENT)
Dept: FAMILY MEDICINE CLINIC | Facility: CLINIC | Age: 66
End: 2021-06-23

## 2021-06-23 ENCOUNTER — APPOINTMENT (OUTPATIENT)
Dept: CARDIAC REHAB | Age: 66
End: 2021-06-23
Payer: MEDICARE

## 2021-06-23 ENCOUNTER — CLINICAL SUPPORT (OUTPATIENT)
Dept: CARDIAC REHAB | Age: 66
End: 2021-06-23
Payer: MEDICARE

## 2021-06-23 ENCOUNTER — TELEPHONE (OUTPATIENT)
Dept: CARDIOLOGY CLINIC | Facility: CLINIC | Age: 66
End: 2021-06-23

## 2021-06-23 DIAGNOSIS — Z95.1 S/P CABG (CORONARY ARTERY BYPASS GRAFT): ICD-10-CM

## 2021-06-23 PROCEDURE — 93798 PHYS/QHP OP CAR RHAB W/ECG: CPT

## 2021-06-23 NOTE — TELEPHONE ENCOUNTER
P/c'd , is not happy with his cardiac rehab  He is having difficulty with [facility] at the facility he goes to   Advised he talk with the manager of that facility  He wants to do regular physical therapy  Advised that is something we do not order  He would have to call his PCP  He does not understand why he can not get order for PT  Again advised that comes from PCP who wants him to do cardiac rehab also

## 2021-06-23 NOTE — TELEPHONE ENCOUNTER
Call pt if it is alright  with his cariologist to  stop cardiac rehab and do physical therapy only I can do that for him

## 2021-06-24 NOTE — TELEPHONE ENCOUNTER
Patient called back frustrated when I relayed this message  He said it feels like "when your parents says go see mom go see dad"  Patient states that the cardiologist will not discontinue his cardiac rehab, only his family doctor  I explained that the cardiologist would need to be the one to discontinue it and if he's not doing it, he probably thinks the patient still needs it  Patient said its not doing anything for him and that he needs to do regular physical therapy and that his family dr has to be the one to okay that

## 2021-06-25 ENCOUNTER — APPOINTMENT (OUTPATIENT)
Dept: CARDIAC REHAB | Age: 66
End: 2021-06-25
Payer: MEDICARE

## 2021-06-25 ENCOUNTER — CLINICAL SUPPORT (OUTPATIENT)
Dept: CARDIAC REHAB | Age: 66
End: 2021-06-25
Payer: MEDICARE

## 2021-06-25 DIAGNOSIS — Z95.1 S/P CABG (CORONARY ARTERY BYPASS GRAFT): ICD-10-CM

## 2021-06-25 PROCEDURE — 93798 PHYS/QHP OP CAR RHAB W/ECG: CPT

## 2021-06-28 ENCOUNTER — APPOINTMENT (OUTPATIENT)
Dept: CARDIAC REHAB | Age: 66
End: 2021-06-28
Payer: MEDICARE

## 2021-06-28 ENCOUNTER — CLINICAL SUPPORT (OUTPATIENT)
Dept: CARDIAC REHAB | Age: 66
End: 2021-06-28
Payer: MEDICARE

## 2021-06-28 DIAGNOSIS — Z95.1 S/P CABG (CORONARY ARTERY BYPASS GRAFT): ICD-10-CM

## 2021-06-28 PROCEDURE — 93798 PHYS/QHP OP CAR RHAB W/ECG: CPT

## 2021-06-30 ENCOUNTER — CLINICAL SUPPORT (OUTPATIENT)
Dept: CARDIAC REHAB | Age: 66
End: 2021-06-30
Payer: MEDICARE

## 2021-06-30 ENCOUNTER — OFFICE VISIT (OUTPATIENT)
Dept: FAMILY MEDICINE CLINIC | Facility: CLINIC | Age: 66
End: 2021-06-30
Payer: MEDICARE

## 2021-06-30 ENCOUNTER — APPOINTMENT (OUTPATIENT)
Dept: CARDIAC REHAB | Age: 66
End: 2021-06-30
Payer: MEDICARE

## 2021-06-30 ENCOUNTER — TELEPHONE (OUTPATIENT)
Dept: FAMILY MEDICINE CLINIC | Facility: CLINIC | Age: 66
End: 2021-06-30

## 2021-06-30 VITALS
OXYGEN SATURATION: 97 % | HEIGHT: 69 IN | WEIGHT: 220 LBS | DIASTOLIC BLOOD PRESSURE: 82 MMHG | TEMPERATURE: 97.3 F | RESPIRATION RATE: 16 BRPM | HEART RATE: 77 BPM | BODY MASS INDEX: 32.58 KG/M2 | SYSTOLIC BLOOD PRESSURE: 134 MMHG

## 2021-06-30 DIAGNOSIS — F41.9 ANXIETY: ICD-10-CM

## 2021-06-30 DIAGNOSIS — E03.4 HYPOTHYROIDISM DUE TO ACQUIRED ATROPHY OF THYROID: Primary | ICD-10-CM

## 2021-06-30 DIAGNOSIS — Z95.1 S/P CABG (CORONARY ARTERY BYPASS GRAFT): ICD-10-CM

## 2021-06-30 DIAGNOSIS — N52.9 ERECTILE DYSFUNCTION, UNSPECIFIED ERECTILE DYSFUNCTION TYPE: ICD-10-CM

## 2021-06-30 DIAGNOSIS — G47.00 INSOMNIA, UNSPECIFIED TYPE: ICD-10-CM

## 2021-06-30 DIAGNOSIS — E78.2 MIXED HYPERLIPIDEMIA: ICD-10-CM

## 2021-06-30 DIAGNOSIS — I25.709 CORONARY ARTERY DISEASE INVOLVING CORONARY BYPASS GRAFT OF NATIVE HEART WITH ANGINA PECTORIS (HCC): ICD-10-CM

## 2021-06-30 DIAGNOSIS — I63.00 CEREBROVASCULAR ACCIDENT (CVA) DUE TO THROMBOSIS OF PRECEREBRAL ARTERY (HCC): ICD-10-CM

## 2021-06-30 DIAGNOSIS — I10 ESSENTIAL HYPERTENSION: ICD-10-CM

## 2021-06-30 DIAGNOSIS — I63.9 CEREBROVASCULAR ACCIDENT (CVA), UNSPECIFIED MECHANISM (HCC): ICD-10-CM

## 2021-06-30 DIAGNOSIS — I50.32 CHRONIC DIASTOLIC HEART FAILURE (HCC): ICD-10-CM

## 2021-06-30 PROCEDURE — 93798 PHYS/QHP OP CAR RHAB W/ECG: CPT

## 2021-06-30 PROCEDURE — 99214 OFFICE O/P EST MOD 30 MIN: CPT | Performed by: FAMILY MEDICINE

## 2021-06-30 RX ORDER — SILDENAFIL 100 MG/1
100 TABLET, FILM COATED ORAL DAILY PRN
Qty: 30 TABLET | Refills: 1 | Status: SHIPPED | OUTPATIENT
Start: 2021-06-30 | End: 2021-06-30 | Stop reason: SDUPTHER

## 2021-06-30 RX ORDER — SILDENAFIL 100 MG/1
100 TABLET, FILM COATED ORAL DAILY PRN
Qty: 30 TABLET | Refills: 1 | Status: SHIPPED | OUTPATIENT
Start: 2021-06-30 | End: 2021-09-02 | Stop reason: SDUPTHER

## 2021-06-30 NOTE — ASSESSMENT & PLAN NOTE
Patient is stable  and will continue present plan of care and reassess at next routine visit  All questions about this problem from patient were answered today  Pt had previously requested D/C of cardiac rehab and  Just  Do physical rehab for  Weakness  Told patient need to complete cardiac rehab as ordered to decrease post op mortality rate

## 2021-06-30 NOTE — TELEPHONE ENCOUNTER
Pt  called, he said he needs the Viagra to go to [facility] on [address] instead of [facility]

## 2021-06-30 NOTE — PROGRESS NOTES
Assessment/Plan:         Problem List Items Addressed This Visit        Endocrine    Hypothyroidism - Primary     Patient to continue current dose of thyroid medicine and recheck TSH in 6 months            Cardiovascular and Mediastinum    CAD (coronary artery disease)     Patient is stable  and will continue present plan of care and reassess at next routine visit  All questions about this problem from patient were answered today  Essential hypertension     Patient is stable with current anti-hypertensive medicine and continue to follow a low sodium diet and take current medication  All questions about this condition were answered today  Chronic diastolic heart failure (HCC)     Wt Readings from Last 3 Encounters:   05/07/21 101 kg (223 lb)   05/06/21 102 kg (225 lb)   04/30/21 104 kg (229 lb 4 8 oz)   Patient is stable  and will continue present plan of care and reassess at next routine visit  All questions about this problem from patient were answered today  Stroke [facility])     Patient is stable  and will continue present plan of care and reassess at next routine visit  All questions about this problem from patient were answered today  Pt had previously requested D/C of cardiac rehab and  Just  Do physical rehab for  Weakness  Told patient need to complete cardiac rehab as ordered to decrease post op mortality rate  Other    Mixed hyperlipidemia     Patient  is stable with current medication and we discussed a low fat low cholesterol diet  Weight loss also discussed for this will help lower cholesterol also  Recheck lipids in 6 months  Insomnia     Discussed with patient use of sedative  medications and good  sleep hygiene to maximize treatment for this problem  Pt  to use sedatives only prior to sleep and cautioned them about usage at any other time  All patient questions about this problem answered today                   Subjective:      Patient ID: Luciana Sam is a 61 y o  male  65-year-old white male here today for checkup on coronary artery disease hypertension history of coronary artery bypass grafting surgery and history of stroke  Patient also has hypothyroidism  Patient is looking to is today is to to some physical therapy in conjunction with his cardiac rehab I will order that for him for some strengthening patient also is doing well with his medications patient also with the ED looking for refill on his Viagra      The following portions of the patient's history were reviewed and updated as appropriate:   Past Medical History:  He has a past medical history of Anxiety, CAD (coronary artery disease), Depression, Dizziness, Eye problems, Hypothyroidism, Migraines, Stroke ([address] ) (11/17/2014), and Tremors of nervous system  ,  _______________________________________________________________________  Medical Problems:  does not have any pertinent problems on file ,  _______________________________________________________________________  Past Surgical History:   has a past surgical history that includes Coronary stent placement; Hernia repair; Colonoscopy (09/01/2017); Dental surgery; Colonoscopy; pr cabg, artery-vein, four (N/A, 3/24/2021); and pr echo transesophag montr cardiac pump functj (N/A, 3/24/2021)  ,  _______________________________________________________________________  Family History:  family history includes Cancer in his brother and mother; Heart attack in his father and sister; Heart disease in his father ,  _______________________________________________________________________  Social History:   reports that he has never smoked  He has never used smokeless tobacco  He reports that he does not drink alcohol and does not use drugs  ,  _______________________________________________________________________  Allergies:  is allergic to medical tape     _______________________________________________________________________  Current Outpatient Medications Medication Sig Dispense Refill    acetaminophen (TYLENOL) 325 mg tablet Take 3 tablets (975 mg total) by mouth every 6 (six) hours as needed for mild pain, moderate pain, headaches or fever  0    acetaminophen-codeine (TYLENOL #3) 300-30 mg per tablet Take 1 tablet by mouth every 4 (four) hours as needed for moderate pain 30 tablet 0    aspirin 81 MG tablet Take 81 mg by mouth      atorvastatin (LIPITOR) 40 mg tablet Take 2 tablets (80 mg total) by mouth daily 180 tablet 1    clonazePAM (KlonoPIN) 2 mg tablet Take 2 tablets (4 mg total) by mouth 2 (two) times a day as needed for seizures 180 tablet 0    clopidogrel (PLAVIX) 75 mg tablet Take 1 tablet (75 mg total) by mouth daily 90 tablet 1    Klor-Con M20 20 MEQ tablet TAKE 1 TABLET BY MOUTH ONCE DAILY FOR 7 DAYS      levothyroxine 100 mcg tablet TAKE 1 TABLET EVERY DAY 90 tablet 1    metoprolol succinate (TOPROL-XL) 100 mg 24 hr tablet Take 1 tablet (100 mg total) by mouth daily 90 tablet 1    omeprazole (PriLOSEC) 20 mg delayed release capsule Take 1 capsule (20 mg total) by mouth daily Take 30 minutes prior to breakfast 30 capsule 0    senna-docusate sodium (SENOKOT S) 8 6-50 mg per tablet Take 1 tablet by mouth 2 (two) times a day  0    tiZANidine (ZANAFLEX) 2 mg tablet Take 1 tablet (2 mg total) by mouth every 8 (eight) hours as needed for muscle spasms 270 tablet 0    torsemide (DEMADEX) 20 mg tablet TAKE 1 TABLET BY MOUTH ONCE DAILY FOR 7 DAYS      zolpidem (AMBIEN) 10 mg tablet 2 po  q hs 180 tablet 1     No current facility-administered medications for this visit      _______________________________________________________________________  Review of Systems   Constitutional: Negative for activity change, appetite change, chills, fatigue, fever and unexpected weight change  HENT: Negative for congestion, ear pain, hearing loss, mouth sores, postnasal drip, sinus pressure, sinus pain, sneezing and sore throat      Respiratory: Negative for apnea, cough, shortness of breath and wheezing  Cardiovascular: Negative for chest pain, palpitations and leg swelling  Gastrointestinal: Negative for abdominal pain, constipation, diarrhea, nausea and vomiting  Endocrine: Negative for cold intolerance and heat intolerance  Genitourinary: Negative for dysuria, frequency and hematuria  Musculoskeletal: Positive for gait problem  Negative for arthralgias, back pain, joint swelling and neck pain  Skin: Negative for rash  Neurological: Positive for weakness  Negative for dizziness and numbness  Hematological: Does not bruise/bleed easily  Psychiatric/Behavioral: Positive for sleep disturbance  Negative for agitation, behavioral problems, confusion and hallucinations  The patient is not nervous/anxious  Objective: There were no vitals filed for this visit  There is no height or weight on file to calculate BMI  Physical Exam  Vitals and nursing note reviewed  Constitutional:       Appearance: He is well-developed  HENT:      Head: Normocephalic and atraumatic  Nose: Nose normal       Mouth/Throat:      Mouth: Mucous membranes are moist    Eyes:      General: No scleral icterus  Conjunctiva/sclera: Conjunctivae normal       Pupils: Pupils are equal, round, and reactive to light  Neck:      Thyroid: No thyromegaly  Cardiovascular:      Rate and Rhythm: Normal rate and regular rhythm  Heart sounds: Normal heart sounds  Pulmonary:      Effort: Pulmonary effort is normal  No respiratory distress  Breath sounds: Normal breath sounds  No wheezing  Abdominal:      General: Bowel sounds are normal       Palpations: Abdomen is soft  Tenderness: There is no abdominal tenderness  There is no guarding or rebound  Musculoskeletal:         General: Normal range of motion  Cervical back: Normal range of motion and neck supple  Skin:     General: Skin is warm and dry  Findings: No rash     Neurological: Mental Status: He is alert and oriented to person, place, and time  Motor: Weakness present  Gait: Gait abnormal    Psychiatric:         Mood and Affect: Mood normal          Behavior: Behavior normal          Thought Content:  Thought content normal          Judgment: Judgment normal

## 2021-06-30 NOTE — ASSESSMENT & PLAN NOTE
Wt Readings from Last 3 Encounters:   05/07/21 101 kg (223 lb)   05/06/21 102 kg (225 lb)   04/30/21 104 kg (229 lb 4 8 oz)   Patient is stable  and will continue present plan of care and reassess at next routine visit  All questions about this problem from patient were answered today

## 2021-07-02 ENCOUNTER — APPOINTMENT (OUTPATIENT)
Dept: CARDIAC REHAB | Age: 66
End: 2021-07-02
Payer: MEDICARE

## 2021-07-02 ENCOUNTER — CLINICAL SUPPORT (OUTPATIENT)
Dept: CARDIAC REHAB | Age: 66
End: 2021-07-02
Payer: MEDICARE

## 2021-07-02 DIAGNOSIS — Z95.1 S/P CABG (CORONARY ARTERY BYPASS GRAFT): ICD-10-CM

## 2021-07-02 PROCEDURE — 93798 PHYS/QHP OP CAR RHAB W/ECG: CPT

## 2021-07-05 ENCOUNTER — APPOINTMENT (OUTPATIENT)
Dept: CARDIAC REHAB | Age: 66
End: 2021-07-05
Payer: MEDICARE

## 2021-07-07 ENCOUNTER — CLINICAL SUPPORT (OUTPATIENT)
Dept: CARDIAC REHAB | Age: 66
End: 2021-07-07
Payer: MEDICARE

## 2021-07-07 ENCOUNTER — APPOINTMENT (OUTPATIENT)
Dept: CARDIAC REHAB | Age: 66
End: 2021-07-07
Payer: MEDICARE

## 2021-07-07 DIAGNOSIS — Z95.1 S/P CABG (CORONARY ARTERY BYPASS GRAFT): ICD-10-CM

## 2021-07-07 PROCEDURE — 93798 PHYS/QHP OP CAR RHAB W/ECG: CPT

## 2021-07-08 ENCOUNTER — TELEPHONE (OUTPATIENT)
Dept: CARDIOLOGY CLINIC | Facility: CLINIC | Age: 66
End: 2021-07-08

## 2021-07-08 NOTE — TELEPHONE ENCOUNTER
P/C again to complain of cardiac rehab  Stating they are not letting him exercise to full potential    I explained this has been discussed with him 2 times previously and needs to complete cardia rehab prior to attending any additional therapy

## 2021-07-09 ENCOUNTER — APPOINTMENT (OUTPATIENT)
Dept: CARDIAC REHAB | Age: 66
End: 2021-07-09
Payer: MEDICARE

## 2021-07-09 ENCOUNTER — CLINICAL SUPPORT (OUTPATIENT)
Dept: CARDIAC REHAB | Age: 66
End: 2021-07-09
Payer: MEDICARE

## 2021-07-09 DIAGNOSIS — Z95.1 S/P CABG (CORONARY ARTERY BYPASS GRAFT): ICD-10-CM

## 2021-07-09 PROCEDURE — 93798 PHYS/QHP OP CAR RHAB W/ECG: CPT

## 2021-07-12 ENCOUNTER — APPOINTMENT (OUTPATIENT)
Dept: CARDIAC REHAB | Age: 66
End: 2021-07-12
Payer: MEDICARE

## 2021-07-12 ENCOUNTER — EVALUATION (OUTPATIENT)
Dept: PHYSICAL THERAPY | Age: 66
End: 2021-07-12
Payer: MEDICARE

## 2021-07-12 DIAGNOSIS — Z86.73 HISTORY OF CVA (CEREBROVASCULAR ACCIDENT): Primary | ICD-10-CM

## 2021-07-12 PROCEDURE — 97530 THERAPEUTIC ACTIVITIES: CPT | Performed by: PHYSICAL THERAPIST

## 2021-07-12 PROCEDURE — 97110 THERAPEUTIC EXERCISES: CPT | Performed by: PHYSICAL THERAPIST

## 2021-07-12 PROCEDURE — 97162 PT EVAL MOD COMPLEX 30 MIN: CPT | Performed by: PHYSICAL THERAPIST

## 2021-07-12 NOTE — PROGRESS NOTES
PT Evaluation     Today's date: 2021  Patient name: Savage Alvares  : 1955  MRN: [id number]  Referring provider: Alexander Roberts MD  Dx:   Encounter Diagnosis     ICD-10-CM    1  History of CVA (cerebrovascular accident)  Z86 73                   Assessment  Assessment details: Pt reports to PT with difficulty performing ADLs following CVA > 5 years ago  Pt has decreased LE strength, poor balance, and decreased gait speed that cause difficulty with ADLs and put him at risk for future falls  Pt will benefit from skilled PT do address these limitations     Impairments: abnormal coordination, abnormal gait, abnormal muscle firing, abnormal muscle tone, abnormal or restricted ROM, abnormal movement, activity intolerance, impaired physical strength, lacks appropriate home exercise program and pain with function    Goals  In 4 weeks pt will:  -Be independent with phase I of HEP  -Increase LE strength by 1/2 grade  -Decrease TUG time by 5s (was 33 at IE)    By discharge pt will:  -Be independent with Phase II of HEP  -Demonstrate full LE strength  -Decrease TUG time by 15s  -Report minimal pain with ADLs    Plan  Patient would benefit from: skilled physical therapy  Planned modality interventions: TENS, thermotherapy: hydrocollator packs, electrical stimulation/Russian stimulation and cryotherapy  Planned therapy interventions: abdominal trunk stabilization, manual therapy, muscle pump exercises, motor coordination training, neuromuscular re-education, patient education, strengthening, stretching, therapeutic activities, therapeutic exercise, body mechanics training, fine motor coordination training, functional ROM exercises and home exercise program  Frequency: 2x week  Duration in weeks: 6  Plan of Care beginning date: 2021  Plan of Care expiration date: 2021  Treatment plan discussed with: patient and PTA        Subjective Evaluation    History of Present Illness  Mechanism of injury: Pt reports to PT with difficulty performing ADLs following CVA > 5 years ago  Pt is currently in cardiac rehab due to CABG  Pt reports difficulty with ambulation, sit to stand transfers and rolling in bed     Pain  Current pain ratin  At best pain ratin  At worst pain rating: 3    Patient Goals  Patient goals for therapy: decreased pain, improved balance, increased motion, increased strength, independence with ADLs/IADLs and return to sport/leisure activities          Objective     Strength/Myotome Testing     Left Hip   Planes of Motion   Flexion: 4  Abduction: 3    Right Hip   Planes of Motion   Flexion: 2+  Abduction: 3    Left Knee   Flexion: 4-  Extension: 4    Right Knee   Flexion: 3+  Extension: 3+    Left Ankle/Foot   Dorsiflexion: 4  Plantar flexion: 4    Right Ankle/Foot   Dorsiflexion: 3  Plantar flexion: 3    Functional Assessment        Comments  Tandem stance  L forward: 15s  R forward:12s  Neuro Exam:     Sensation   Light touch LE: left WNL and right WNL             Precautions: History of CVA, CABG, fall risk      Manuals                                                                 Neuro Re-Ed                                                                                                        Ther Ex             Step ups/side steps 6" x20 ea            Std hip abd x10 ea                                                                                          Ther Activity             Rolling on mat 10'                         Gait Training             Ambulation without AD 2x50'                         Modalities

## 2021-07-14 ENCOUNTER — APPOINTMENT (OUTPATIENT)
Dept: CARDIAC REHAB | Age: 66
End: 2021-07-14
Payer: MEDICARE

## 2021-07-14 ENCOUNTER — CLINICAL SUPPORT (OUTPATIENT)
Dept: CARDIAC REHAB | Age: 66
End: 2021-07-14
Payer: MEDICARE

## 2021-07-14 DIAGNOSIS — Z95.1 S/P CABG (CORONARY ARTERY BYPASS GRAFT): ICD-10-CM

## 2021-07-14 PROCEDURE — 93798 PHYS/QHP OP CAR RHAB W/ECG: CPT

## 2021-07-16 ENCOUNTER — CLINICAL SUPPORT (OUTPATIENT)
Dept: CARDIAC REHAB | Age: 66
End: 2021-07-16
Payer: MEDICARE

## 2021-07-16 ENCOUNTER — APPOINTMENT (OUTPATIENT)
Dept: CARDIAC REHAB | Age: 66
End: 2021-07-16
Payer: MEDICARE

## 2021-07-16 DIAGNOSIS — Z95.1 S/P CABG (CORONARY ARTERY BYPASS GRAFT): ICD-10-CM

## 2021-07-16 PROCEDURE — 93798 PHYS/QHP OP CAR RHAB W/ECG: CPT

## 2021-07-19 ENCOUNTER — APPOINTMENT (OUTPATIENT)
Dept: CARDIAC REHAB | Age: 66
End: 2021-07-19
Payer: MEDICARE

## 2021-07-19 ENCOUNTER — OFFICE VISIT (OUTPATIENT)
Dept: PHYSICAL THERAPY | Age: 66
End: 2021-07-19
Payer: MEDICARE

## 2021-07-19 ENCOUNTER — CLINICAL SUPPORT (OUTPATIENT)
Dept: CARDIAC REHAB | Age: 66
End: 2021-07-19
Payer: MEDICARE

## 2021-07-19 DIAGNOSIS — Z86.73 HISTORY OF CVA (CEREBROVASCULAR ACCIDENT): Primary | ICD-10-CM

## 2021-07-19 DIAGNOSIS — Z95.1 S/P CABG (CORONARY ARTERY BYPASS GRAFT): ICD-10-CM

## 2021-07-19 DIAGNOSIS — I63.9 CEREBROVASCULAR ACCIDENT (CVA), UNSPECIFIED MECHANISM (HCC): ICD-10-CM

## 2021-07-19 PROCEDURE — 97110 THERAPEUTIC EXERCISES: CPT | Performed by: PHYSICAL THERAPIST

## 2021-07-19 PROCEDURE — 93798 PHYS/QHP OP CAR RHAB W/ECG: CPT

## 2021-07-19 PROCEDURE — 97112 NEUROMUSCULAR REEDUCATION: CPT | Performed by: PHYSICAL THERAPIST

## 2021-07-19 NOTE — PROGRESS NOTES
Daily Note     Today's date: 2021  Patient name: Yael Harris  : 1955  MRN: [id number]  Referring provider: Mary Morse MD  Dx:   Encounter Diagnosis     ICD-10-CM    1  History of CVA (cerebrovascular accident)  Z86 73                   Subjective: Pt reports minimal changes       Objective: See treatment diary below      Assessment: Tolerated treatment well  Patient demonstrated fatigue post treatment, would benefit from continued PT and pt tolerated first treatment well, needed CGA during ambulation and with balance exercises  Plan: Continue per plan of care  Progress treatment as tolerated         Precautions: History of CVA, CABG, fall risk      Manuals                                                                Neuro Re-Ed             P-ball press down  5"x10 2x L/R/C           Stance on half roll  3x30s                                                                            Ther Ex             Step ups/side steps 6" x20 ea            Std hip abd x10 ea 4x10 4#                                                                                         Ther Activity             Rolling on mat 10'                         Gait Training             Ambulation without AD 2x50'                         Modalities

## 2021-07-21 ENCOUNTER — CLINICAL SUPPORT (OUTPATIENT)
Dept: CARDIAC REHAB | Age: 66
End: 2021-07-21
Payer: MEDICARE

## 2021-07-21 ENCOUNTER — APPOINTMENT (OUTPATIENT)
Dept: CARDIAC REHAB | Age: 66
End: 2021-07-21
Payer: MEDICARE

## 2021-07-21 ENCOUNTER — OFFICE VISIT (OUTPATIENT)
Dept: PHYSICAL THERAPY | Age: 66
End: 2021-07-21
Payer: MEDICARE

## 2021-07-21 DIAGNOSIS — Z86.73 HISTORY OF CVA (CEREBROVASCULAR ACCIDENT): Primary | ICD-10-CM

## 2021-07-21 DIAGNOSIS — Z95.1 S/P CABG (CORONARY ARTERY BYPASS GRAFT): ICD-10-CM

## 2021-07-21 PROCEDURE — 93798 PHYS/QHP OP CAR RHAB W/ECG: CPT

## 2021-07-21 PROCEDURE — 97112 NEUROMUSCULAR REEDUCATION: CPT | Performed by: PHYSICAL THERAPIST

## 2021-07-21 PROCEDURE — 97110 THERAPEUTIC EXERCISES: CPT | Performed by: PHYSICAL THERAPIST

## 2021-07-21 NOTE — PROGRESS NOTES
Daily Note     Today's date: 2021  Patient name: Wilbert Sow  : 1955  MRN: [id number]  Referring provider: Kim Schulz MD  Dx:   Encounter Diagnosis     ICD-10-CM    1  History of CVA (cerebrovascular accident)  Z86 73                   Subjective:      Objective: See treatment diary below      Assessment: Tolerated treatment well  Patient demonstrated fatigue post treatment, would benefit from continued PT and pt is challenged with all strengthening exercises       Plan: Continue per plan of care  Progress treatment as tolerated         Precautions: History of CVA, CABG, fall risk      Manuals           Manual ankle PNF   5'                                                 Neuro Re-Ed             P-ball press down  5"x10 2x L/R/C 5" 2x10  L/R/C          Stance on half roll  3x30s           DL stance on foam, perturbations   2x1'          [place] carry: upper/lower   10# x2 b                                                  Ther Ex             Step ups/side steps 6" x20 ea            Std hip abd x10 ea 4x10 4#           Sled push   25# 4x 50'          Leg press   190# 3x10          Knee ext   30# 3x10          Hip abd   40# 3x10                                                                                         Ther Activity             Rolling on mat 10'                         Gait Training             Ambulation without AD 2x50'                         Modalities

## 2021-07-23 ENCOUNTER — APPOINTMENT (EMERGENCY)
Dept: RADIOLOGY | Facility: HOSPITAL | Age: 66
End: 2021-07-23
Payer: MEDICARE

## 2021-07-23 ENCOUNTER — CLINICAL SUPPORT (OUTPATIENT)
Dept: CARDIAC REHAB | Age: 66
End: 2021-07-23
Payer: MEDICARE

## 2021-07-23 ENCOUNTER — HOSPITAL ENCOUNTER (EMERGENCY)
Facility: HOSPITAL | Age: 66
Discharge: HOME | End: 2021-07-23
Attending: SURGERY | Admitting: SURGERY
Payer: MEDICARE

## 2021-07-23 ENCOUNTER — OFFICE VISIT (OUTPATIENT)
Dept: PHYSICAL THERAPY | Age: 66
End: 2021-07-23
Payer: MEDICARE

## 2021-07-23 ENCOUNTER — APPOINTMENT (OUTPATIENT)
Dept: CARDIAC REHAB | Age: 66
End: 2021-07-23
Payer: MEDICARE

## 2021-07-23 VITALS
OXYGEN SATURATION: 97 % | TEMPERATURE: 97.8 F | RESPIRATION RATE: 20 BRPM | WEIGHT: 235.45 LBS | HEART RATE: 82 BPM | SYSTOLIC BLOOD PRESSURE: 184 MMHG | DIASTOLIC BLOOD PRESSURE: 100 MMHG

## 2021-07-23 DIAGNOSIS — Z86.73 HISTORY OF CVA (CEREBROVASCULAR ACCIDENT): Primary | ICD-10-CM

## 2021-07-23 DIAGNOSIS — M25.521 RIGHT ELBOW PAIN: ICD-10-CM

## 2021-07-23 DIAGNOSIS — Z95.1 S/P CABG (CORONARY ARTERY BYPASS GRAFT): ICD-10-CM

## 2021-07-23 DIAGNOSIS — S00.01XA ABRASION OF SCALP, INITIAL ENCOUNTER: ICD-10-CM

## 2021-07-23 DIAGNOSIS — S09.90XA INJURY OF HEAD, INITIAL ENCOUNTER: Primary | ICD-10-CM

## 2021-07-23 LAB
BASE EXCESS BLDA CALC-SCNC: 7 MMOL/L (ref -2–3)
GLUCOSE SERPL-MCNC: 98 MG/DL (ref 65–140)
HCO3 BLDA-SCNC: 29.4 MMOL/L (ref 24–30)
HCT VFR BLD CALC: 25 % (ref 36.5–49.3)
HGB BLDA-MCNC: 8.5 G/DL (ref 12–17)
PCO2 BLD: 30 MMOL/L (ref 21–32)
PCO2 BLD: 33.5 MM HG (ref 42–50)
PH BLD: 7.55 [PH] (ref 7.3–7.4)
PO2 BLD: 61 MM HG (ref 35–45)
POTASSIUM BLD-SCNC: 5.6 MMOL/L (ref 3.5–5.3)
SAO2 % BLD FROM PO2: 94 % (ref 60–85)
SODIUM BLD-SCNC: 140 MMOL/L (ref 136–145)
SPECIMEN SOURCE: ABNORMAL

## 2021-07-23 PROCEDURE — 90715 TDAP VACCINE 7 YRS/> IM: CPT | Performed by: SURGERY

## 2021-07-23 PROCEDURE — 90471 IMMUNIZATION ADMIN: CPT

## 2021-07-23 PROCEDURE — 99283 EMERGENCY DEPT VISIT LOW MDM: CPT

## 2021-07-23 PROCEDURE — 82947 ASSAY GLUCOSE BLOOD QUANT: CPT

## 2021-07-23 PROCEDURE — 84295 ASSAY OF SERUM SODIUM: CPT

## 2021-07-23 PROCEDURE — 97110 THERAPEUTIC EXERCISES: CPT | Performed by: SPECIALIST/TECHNOLOGIST

## 2021-07-23 PROCEDURE — 93798 PHYS/QHP OP CAR RHAB W/ECG: CPT

## 2021-07-23 PROCEDURE — 85014 HEMATOCRIT: CPT

## 2021-07-23 PROCEDURE — 93308 TTE F-UP OR LMTD: CPT | Performed by: SURGERY

## 2021-07-23 PROCEDURE — 82803 BLOOD GASES ANY COMBINATION: CPT

## 2021-07-23 PROCEDURE — NC001 PR NO CHARGE: Performed by: EMERGENCY MEDICINE

## 2021-07-23 PROCEDURE — 70450 CT HEAD/BRAIN W/O DYE: CPT

## 2021-07-23 PROCEDURE — 99284 EMERGENCY DEPT VISIT MOD MDM: CPT | Performed by: SURGERY

## 2021-07-23 PROCEDURE — 73080 X-RAY EXAM OF ELBOW: CPT

## 2021-07-23 PROCEDURE — 97112 NEUROMUSCULAR REEDUCATION: CPT | Performed by: SPECIALIST/TECHNOLOGIST

## 2021-07-23 PROCEDURE — 97140 MANUAL THERAPY 1/> REGIONS: CPT | Performed by: SPECIALIST/TECHNOLOGIST

## 2021-07-23 PROCEDURE — 84132 ASSAY OF SERUM POTASSIUM: CPT

## 2021-07-23 PROCEDURE — 76705 ECHO EXAM OF ABDOMEN: CPT | Performed by: SURGERY

## 2021-07-23 RX ORDER — ACETAMINOPHEN 325 MG/1
650 TABLET ORAL EVERY 6 HOURS PRN
Refills: 0
Start: 2021-07-23 | End: 2022-06-27

## 2021-07-23 RX ORDER — HYDRALAZINE HYDROCHLORIDE 20 MG/ML
10 INJECTION INTRAMUSCULAR; INTRAVENOUS EVERY 6 HOURS PRN
Status: DISCONTINUED | OUTPATIENT
Start: 2021-07-23 | End: 2021-07-23 | Stop reason: HOSPADM

## 2021-07-23 RX ORDER — ACETAMINOPHEN 325 MG/1
975 TABLET ORAL EVERY 6 HOURS SCHEDULED
Status: DISCONTINUED | OUTPATIENT
Start: 2021-07-23 | End: 2021-07-23 | Stop reason: HOSPADM

## 2021-07-23 RX ORDER — LABETALOL 20 MG/4 ML (5 MG/ML) INTRAVENOUS SYRINGE
10 EVERY 4 HOURS PRN
Status: DISCONTINUED | OUTPATIENT
Start: 2021-07-23 | End: 2021-07-23 | Stop reason: HOSPADM

## 2021-07-23 RX ADMIN — TETANUS TOXOID, REDUCED DIPHTHERIA TOXOID AND ACELLULAR PERTUSSIS VACCINE, ADSORBED 0.5 ML: 5; 2.5; 8; 8; 2.5 SUSPENSION INTRAMUSCULAR at 14:22

## 2021-07-23 RX ADMIN — ACETAMINOPHEN 975 MG: 325 TABLET, FILM COATED ORAL at 15:42

## 2021-07-23 NOTE — ED PROVIDER NOTES
Brenda Daugherty is a 61 y o  male presenting as a Level B trauma after sustaining a fall while working in his yard  He tripped and fell, rolling down about 2-3 feet  Patient is on Aspirin and Plavix  No loss of consciousness  GCS 15  Has baseline left sided paresis after a prior stroke  Airway is intact, no indication for airway intervention  There were no vitals taken for this visit        Minal Raines MD  07/23/21 1209

## 2021-07-23 NOTE — H&P
H&P Exam - Trauma   Brenda Daugherty 61 y o  male MRN: [id number]  Unit/Bed#: ED 10 Encounter: [id number]     Assessment/Plan   Trauma Alert: Level B  Model of Arrival: Ambulance  Trauma Team: Attending Abhilash Garcia and Residents Keisha Andujar  Consultants: None    Trauma Active Problems:     1  Multiple abrasions  2  Right elbow pain    Trauma Plan:     1  Updated tetanus  - wounds irrigated    2  Xrays negative    Chief Complaint: right elbow abrasion    History of Present Illness   HPI:  Brenda Daugherty is a 61 y o  male prior hx of stroke with left sided deficits who presents as a mechanical trip and fall and then rolled off about 2 foot retaining wall  +Headstrike  No LOC  Complaining primarily of right elbow pain  Also has small occipital abrasion  Mechanism:Fall    Review of Systems   Constitutional: Negative for appetite change, chills, diaphoresis, fever and unexpected weight change  HENT: Negative for congestion and rhinorrhea  Eyes: Negative for photophobia and visual disturbance  Respiratory: Negative for cough, chest tightness and shortness of breath  Cardiovascular: Negative for chest pain, palpitations and leg swelling  Gastrointestinal: Negative for abdominal distention, abdominal pain, blood in stool, constipation, diarrhea, nausea and vomiting  Genitourinary: Negative for dysuria and hematuria  Musculoskeletal: Positive for arthralgias and myalgias  Negative for back pain, joint swelling, neck pain and neck stiffness  Skin: Positive for wound  Negative for color change, pallor and rash  Neurological: Positive for headaches  Negative for dizziness, syncope, weakness and light-headedness  Psychiatric/Behavioral: Negative for agitation  All other systems reviewed and are negative  12-point, complete review of systems was reviewed and negative except as stated above  Historical Information   History is unobtainable from the patient due to none    Efforts to obtain history included the following sources: other medical personnel, obtained from other records    No past medical history on file  No past surgical history on file  Social History   Social History     Substance and Sexual Activity   Alcohol Use Not on file     Social History     Substance and Sexual Activity   Drug Use Not on file     Social History     Tobacco Use   Smoking Status Not on file     No existing history information found  No existing history information found  Immunization History   Administered Date(s) Administered    Tdap 07/23/2021     Last Tetanus: 7/23/21  Family History: Non-contributory  Unable to obtain/limited by none      Meds/Allergies   all current active meds have been reviewed, current meds:   Current Facility-Administered Medications   Medication Dose Route Frequency    acetaminophen (TYLENOL) tablet 975 mg  975 mg Oral Q6H [address]    hydrALAZINE (APRESOLINE) injection 10 mg  10 mg Intravenous Q6H PRN    Labetalol HCl (NORMODYNE) injection 10 mg  10 mg Intravenous Q4H PRN    and PTA meds:   None       Not on File      PHYSICAL EXAM    PE limited by: none    Objective   Vitals:   First set: Temperature: 97 8 °F (36 6 °C) (07/23/21 1414)  Pulse: 97 (07/23/21 1414)  Respirations: 18 (07/23/21 1414)  Blood Pressure: (!) 187/135 (07/23/21 1414)    Primary Survey:   (A) Airway: patent  (B) Breathing: CTAB  (C) Circulation: Pulses:   normal, pedal  2/4 and radial  2/4  (D) Disabliity:  GCS Total:  15  (E) Expose:  Completed    Secondary Survey: (Click on Physical Exam tab above)  Physical Exam  Vitals and nursing note reviewed  Constitutional:       General: He is not in acute distress  Appearance: Normal appearance  He is well-developed  He is not ill-appearing, toxic-appearing or diaphoretic  HENT:      Head: Normocephalic  Comments: Scalp abrasion     Nose: Nose normal  No congestion or rhinorrhea        Mouth/Throat:      Mouth: Mucous membranes are moist       Pharynx: Oropharynx is clear  No oropharyngeal exudate or posterior oropharyngeal erythema  Eyes:      General: No scleral icterus  Right eye: No discharge  Left eye: No discharge  Extraocular Movements: Extraocular movements intact  Conjunctiva/sclera: Conjunctivae normal       Pupils: Pupils are equal, round, and reactive to light  Neck:      Vascular: No JVD  Trachea: No tracheal deviation  Cardiovascular:      Rate and Rhythm: Normal rate and regular rhythm  Heart sounds: Normal heart sounds  No murmur heard  No friction rub  No gallop  Comments: Normal rate and regular rhythm  Pulmonary:      Effort: Pulmonary effort is normal  No respiratory distress  Breath sounds: Normal breath sounds  No stridor  No wheezing or rales  Comments: Clear to auscultation bilaterally  Chest:      Chest wall: No tenderness  Abdominal:      General: Bowel sounds are normal  There is no distension  Palpations: Abdomen is soft  Tenderness: There is no abdominal tenderness  There is no right CVA tenderness, left CVA tenderness, guarding or rebound  Comments: Soft, nontender, nondistended  Normal bowel sounds throughout   Musculoskeletal:         General: No swelling, tenderness, deformity or signs of injury  Normal range of motion  Cervical back: Normal range of motion and neck supple  No rigidity  No muscular tenderness  Right lower leg: No edema  Left lower leg: No edema  Comments: Minimal overlying TTP right elbow   Lymphadenopathy:      Cervical: No cervical adenopathy  Skin:     General: Skin is warm and dry  Coloration: Skin is not pale  Findings: No erythema or rash  Comments: Right elbow abrasion   Neurological:      General: No focal deficit present  Mental Status: He is alert and oriented to person, place, and time  Mental status is at baseline  Cranial Nerves: No cranial nerve deficit  Sensory: No sensory deficit  Motor: No weakness or abnormal muscle tone  Coordination: Coordination normal       Gait: Gait normal       Comments: A&Ox3 to person, place, and time  CN 2-12 intact  Strength 5/5 throughout  Sensation grossly intact  Cerebellar exam including gait intact  Psychiatric:         Behavior: Behavior normal          Thought Content: Thought content normal          Invasive Devices     None                 Lab Results: Results: I have personally reviewed pertinent reports   , BMP/CMP:   Lab Results   Component Value Date    CO2 30 07/23/2021    GLUCOSE 98 07/23/2021   , CBC:   Lab Results   Component Value Date    HGB 8 5 (L) 07/23/2021    HCT 25 (L) 07/23/2021   , Coagulation: No results found for: PT, INR, APTT and ISTAT: No components found for: VBG  Imaging/EKG Studies: Results: I have personally reviewed pertinent reports         7/23 CT head: no acute process    7/23 R elbow XR: no acute process    Other Studies: FAST negative    Code Status: No Order  Advance Directive and Living Will:      Power of :    POLST:

## 2021-07-23 NOTE — DISCHARGE INSTRUCTIONS
Seek medical attention if you develop worsening headaches, dizziness, visual changes, persistent nausea/vomiting, numbness/weakness/tingling of the extremities  Slowly re-introduce regular activity otherwise as tolerated  Please call the office with any concerns

## 2021-07-23 NOTE — PROCEDURES
POC FAST US    Date/Time: 7/23/2021 2:21 PM  Performed by: Eddie Becker MD  Authorized by: Eddie Becker MD     Patient location:  Trauma  Other Assisting Provider: Yes (comment) (Dr Leonard Hassan)    Procedure details:     Exam Type:  Diagnostic    Indications: blunt abdominal trauma and blunt chest trauma      Assess for:  Intra-abdominal fluid and pericardial effusion    Technique: FAST      Views obtained:  Heart - Pericardial sac, RUQ - Barry's Pouch, LUQ - Splenorenal space and Suprapubic - Pouch of [place]    Image quality: limited diagnostic      Image availability:  Images available in PACS, still images obtained and video obtained  FAST Findings:     RUQ (Hepatorenal) free fluid: absent      LUQ (Splenorenal) free fluid: absent      Suprapubic free fluid: absent      Cardiac wall motion: identified      Pericardial effusion: absent    Interpretation:     Impressions: negative

## 2021-07-23 NOTE — CASE MANAGEMENT
CM responded to trauma alert  Pt was brought to the ED via [facility] s/p fall at home  Pt c/o +head strike but -LOC   Pt's family are in the family waiting room

## 2021-07-23 NOTE — PROGRESS NOTES
Daily Note     Today's date: 2021  Patient name: Nessa Madrid  : 1955  MRN: [id number]  Referring provider: Angel Holly MD  Dx:   Encounter Diagnosis     ICD-10-CM    1  History of CVA (cerebrovascular accident)  Z86 73                   Subjective: Pt reports he mowed his entire lawn yesterday which he was previously unable to do due to fatigue  Objective: See treatment diary below    Assessment: Pt challenged in SLS with foam marches, requires 1HHA for balance  Pt able to increase weight with sled push demonstrating improved strength and endurance  Tolerated treatment well  Patient demonstrated fatigue post treatment, exhibited good technique with therapeutic exercises and would benefit from continued PT    Plan: Continue per plan of care  Progress treatment as tolerated         Precautions: History of CVA, CABG, fall risk      Manuals          Manual ankle PNF   5' 10'                                                Neuro Re-Ed             P-ball press down  5"x10 2x L/R/C 5" 2x10  L/R/C 5" 2x10  L/R/C         Stance on half roll  3x30s           DL stance on foam, perturbations   2x1' 2x1 min         [place] carry: upper/lower   10# x2 b  12# x2 b         March on Foam    2x1 min 1HHA                                   Ther Ex             Step ups/side steps 6" x20 ea            Std hip abd x10 ea 4x10 4#           Sled push   25# 4x 50' 25# 2x 50# 2x         Leg press   190# 3x10          Knee ext   30# 3x10          Hip abd   40# 3x10  45# 3x10                                                                                        Ther Activity             Rolling on mat 10'                         Gait Training             Ambulation without AD 2x50'                         Modalities

## 2021-07-23 NOTE — PROGRESS NOTES
Cardiac Rehabilitation Plan of Care   90 Day Reassessment          Today's date: 2021   # of Exercise Sessions Completed: 32  Patient name: Merlin Goes      : 1955  Age: 61 y o  MRN: [id number]  Referring Physician: Lu Garrido,*  Cardiologist: Kevin Liang MD  Provider: Luciana Fall  Clinician: Stacey Craven, MS, CEP, CCRP    Dx:   Encounter Diagnosis   Name Primary?  S/P CABG (coronary artery bypass graft)      Date of onset: 3/24/21      SUMMARY OF PROGRESS:  Angie Brannon is compliant attending cardiac rehab exercise sessions 3x/wk  He tolerates 40 mins at 2 4 - 3 9 METs plus wt training  Angie Brannon has been better at following  professional advisement from the staff during his exercise sessions  He has started treatment with PT for strength and balance  Resting BP better controlled in the past 30 days 112/80 - 140/80 with appropriate response to exercise reaching 142/80 - 190/86  NSR on tele with occ PVCs and non-conducted beats  RHR   ExHR 121-162  He is progressing toward wt loss goals with a wt loss of 2lbs  He refused to attend education classes on heart healthy eating but was provided individualized counseling  The patient is a non-smoker  He reports good social/emotional support from his wife  Patient refuses to  attend group educational classes on cardiac risk factor modification  He has  added home exercise using his leg ergometer up to 30 mins 2-3 x/wk  His exercise program will be progressed as tolerated to maintain RPE 4-6  The patient has the following personal goals he hopes to achieved by discharge: improved mental health, increased strength and stamina, return to regular exercise, wt loss of 20lbs     Pt will continue to be educated on lifestyle modifications and encouraged to supplement with a home exercise program to reach the following goals: add home exercise on his recumbent ergometer, reduce intake of red meat and added sugars, make contact with a therapist in the next 30 days  Sandra Norbert has 4 exercise sessions remaining in cardiac rehab           Medication compliance: Yes   Comments: Pt reports to be compliant with medications  Fall Risk: High   Comments: Patient uses walking assist device (walker/cane/rollator) - post CVA    EKG Interpretation: NSR, rare PVC      EXERCISE ASSESSMENT and PLAN    Current Exercise Program in Rehab:       Frequency: 3 days/week Supplement with home exercise 2+ days/wk as tolerated       Minutes: 40         METS: 2 0-3 9          HR: RHR+30  (up to 160 when he increases w/l without advisement from staff)   RPE: 4-5         Modalities: UBE, Lifecycle and NuStep      Exercise Progression 30 Day Goals :    Frequency: 3 days/week of cardiac rehab     Supplement with home exercise 2+ days/wk as tolerated    Minutes: 40                            >150 mins/wk of moderate intensity exercise   METS: 2 4-4 0   HR: RHR+30    RPE: 4-5   Modalities: UBE, Lifecycle and NuStep    Strength trainin-3 days / week  12-15 repetitions  1-2 sets per modality    Modalities: Leg Press, Chest Press, Lateral Raise, Arm Curl and Seated Row    Home Exercise: none    Goals: 10% improvement in functional capacity - based on max METs achieved in fitness assessment, improved DASI score by 10%, Increase in exercise capacity by 40% - based on peak METs tolerated in cardiac rehab exercise session, Exercise 5 days/wk, >150mins/wk of moderate intensity exercise, Resume ADLs with increased strength, Attend Rehab regularly, Decrease sitting time and start a home exercise program    Progression Toward Goals:  Pt is progressing and showing improvement  toward the following goals:  10% improvement in functional capacity - based on max METs achieved in fitness assessment, improved DASI score by 10%, Increase in exercise capacity by 40% - based on peak METs tolerated in cardiac rehab exercise session, Exercise 5 days/wk, >150mins/wk of moderate intensity exercise, Resume ADLs with increased strength, Attend Rehab regularly, Decrease sitting time and start a home exercise program   , Patient will add home exercise as tolerated 30 mins in the next 30 days, Will continue to educate and progress as tolerated  Education: benefit of exercise for CAD risk factors, AHA guidelines to achieve >150 mins/wk of moderate exercise and RPE scale   Plan:education on home exercise guidelines, home exercise 30+ mins 2 days opposite CR and Education class: Risk Factors for Heart Disease  Readiness to change: Preparation:  (Getting ready to change)       NUTRITION ASSESSMENT AND PLAN    Weight control:    Starting weight: 224 4   Current weight:   224  Waist circumference:    Startin   Current:      Diabetes: N/A  A1c: 5 7    last measured: 3/19/21    Lipid management: Discussed diet and lipid management and Last lipid profile 21  Chol 138  TRG 93  HDL 42  LDL 77    Goals:reduced BMI to < 25, reduced waist circumference <40 inches (M), Improved Rate Your Plate score  >38, Wt  loss 1-2 ppw,  goal of 20 lbs  , choose lean meat (93-95%), eliminate processed meats, reduce portion sizes of meat to 3oz or less, use low fat dairy, reduce cheese intake or use reduced-fat, Eat 4-5 cups of fruits and vegetables daily, eliminate butter, Increase intake of nuts and seeds, eliminate or choose low-fat sweets and daily saturated fat intake <7%/13g    Progression Toward Goals: Reviewed Pt goals and determined plan of care, Pt has not made progress toward the following goals: reduced BMI to < 25, reduced waist circumference <40 inches (M), Improved Rate Your Plate score  >88, Wt  loss 1-2 ppw,  goal of 20 lbs  , choose lean meat (93-95%), eliminate processed meats, reduce portion sizes of meat to 3oz or less, use low fat dairy, reduce cheese intake or use reduced-fat, Eat 4-5 cups of fruits and vegetables daily, eliminate butter, Increase intake of nuts and seeds, eliminate or choose low-fat sweets and daily saturated fat intake <7%/13g  , Patient will reduce intake of red/processed meats, eliminate chocolate milk and reduce added sugars in the next 30 days, Will continue to educate and progress as tolerated      Education: heart healthy eating  low sodium diet  nutrition for  lipid management  wt  loss   portion control  Plan: Education class: Reading Food Labels, Education Class: Heart Healthy Eating, switch to low fat cheeses, replace butter with soft spreads made with olive oil, canola or yogurt, replace unhealthy snacks with fruits & vegs, reduce red meat 1x/wk, switch to skim or 1% milk, eat fewer desserts and sweets, avoid processed foods, will replace sugar sweetened cereals with whole grain or oatmeal, drink more water, replace sugar with stevia or truvia and keep added daily sugar <25g/day  Readiness to change: Preparation:  (Getting ready to change)       PSYCHOSOCIAL ASSESSMENT AND PLAN    Emotional:  Depression assessment:  PHQ-9 = 10-14 = Moderate Depression  (13)            Anxiety measure:  DUANE-7 = 5-9 = Mild anxiety   (6)  Self-reported stress level:  1  Social support: Very Good    Goals:  improved [place] QOL < 27, PHQ-9 - reduced severity by one level, contact behavioral health , Feelings in [place] Score < 3, Physical Fitness in [place] Score < 3, Social Support in [place] Score < 3, Daily Activity in [place] Score < 3, Social Activities in [place] Score < 3, Pain in [place] Score < 3, [facility] in [facility] Score < 3, [facility] in [facility] Score < 3 , Increased interest in doing things, improved sleep, Improved appetite, improved concentration, improved positive thoughts of well being, increased energy, stop worrying, take time to relax, Feel less anxious and Handle anger/frustration better    Progression Toward Goals: Pt is progressing and showing improvement  toward the following goals:  improved [place] QOL < 27, PHQ-9 - reduced severity by one level, contact behavioral health , Feelings in [place] Score < 3, Physical Fitness in [facility] Score < 3, Social Support in [facility] Score < 3, Daily Activity in [facility] Score < 3, Social Activities in [facility] Score < 3, Pain in [facility] Score < 3, Overall Health in [facility] Score < 3, Quality of Life in [facility] Score < 3 , Increased interest in doing things, improved sleep, Improved appetite, improved concentration, improved positive thoughts of well being, increased energy, stop worrying, take time to relax, Feel less anxious and Handle anger/frustration better  , Patient will make contact with a therapist to begin treatment in the next 30 days, Will continue to educate and progress as tolerated      Education: signs/sxs of depression, benefits of a positive support system, stress management techniques, depression and CAD and benefits of mental health counseling  Plan: Class: Stress and Your Health, Class: Relaxation, Refer to behavioral health/counseling, PHQ-9 >5 will refer to MD, Refer to [employer], Practice relaxation techniques, Enjoy a hobby, Join a support group , Return to previous social activity and Repeat PHQ-9 every 30 days if score >5  Readiness to change: Preparation:  (Getting ready to change)       OTHER CORE COMPONENTS     Tobacco:   Social History     Tobacco Use   Smoking Status Never Smoker   Smokeless Tobacco Never Used       Tobacco Use Intervention:   N/A:  Patient is a non-smoker     Anginal Symptoms:  JOE and excessive fatigue   NTG use: No prescription    Blood pressure:    Restin/80 - 140/80    Exercise: 152/80 - 186/96    Goals: consistent BP < 130/80, reduced dietary sodium <2300mg, moderate intensity exercise >150 mins/wk and medication compliance    Progression Toward Goals: Pt is progressing and showing improvement  toward the following goals:  consistent BP < 130/80, reduced dietary sodium <2300mg   , Pt has not made progress toward the following goals:  moderate intensity exercise >150 mins/wk  , Patient will begin using home recumbent ergometer in the next 30 days, Will continue to educate and progress as tolerated      Education:  low sodium diet and HTN  Plan: Class: Understanding Heart Disease, Class: Common Heart Medications, Avoid Processed foods, engage in regular exercise, check labels for sodium content and monitor home BP  Readiness to change: Preparation:  (Getting ready to change)

## 2021-07-26 ENCOUNTER — CLINICAL SUPPORT (OUTPATIENT)
Dept: CARDIAC REHAB | Age: 66
End: 2021-07-26
Payer: MEDICARE

## 2021-07-26 ENCOUNTER — APPOINTMENT (OUTPATIENT)
Dept: CARDIAC REHAB | Age: 66
End: 2021-07-26
Payer: MEDICARE

## 2021-07-26 ENCOUNTER — OFFICE VISIT (OUTPATIENT)
Dept: PHYSICAL THERAPY | Age: 66
End: 2021-07-26
Payer: MEDICARE

## 2021-07-26 DIAGNOSIS — Z95.1 S/P CABG (CORONARY ARTERY BYPASS GRAFT): ICD-10-CM

## 2021-07-26 DIAGNOSIS — Z86.73 HISTORY OF CVA (CEREBROVASCULAR ACCIDENT): Primary | ICD-10-CM

## 2021-07-26 PROCEDURE — 97110 THERAPEUTIC EXERCISES: CPT | Performed by: PHYSICAL THERAPIST

## 2021-07-26 PROCEDURE — 93798 PHYS/QHP OP CAR RHAB W/ECG: CPT

## 2021-07-26 PROCEDURE — 97140 MANUAL THERAPY 1/> REGIONS: CPT | Performed by: PHYSICAL THERAPIST

## 2021-07-26 NOTE — PROGRESS NOTES
Daily Note     Today's date: 2021  Patient name: Marisol Angeles  : 1955  MRN: [id number]  Referring provider: Geovanny Vigil MD  Dx:   Encounter Diagnosis     ICD-10-CM    1  History of CVA (cerebrovascular accident)  Z86 73                   Subjective: Pt reports falling over the weekend  Pt was seen in ED  Objective: See treatment diary below      Assessment: Tolerated treatment well  Patient demonstrated fatigue post treatment, would benefit from continued PT and due to soreness following fall, pt performed less therex during session  Pt denied headache, dizziness      Plan: Continue per plan of care  Progress treatment as tolerated         Precautions: History of CVA, CABG, fall risk      Manuals         Manual ankle PNF   5' 10' 10'                                               Neuro Re-Ed             P-ball press down  5"x10 2x L/R/C 5" 2x10  L/R/C 5" 2x10  L/R/C 5" 2x10  L/R/C        Stance on half roll  3x30s           DL stance on foam, perturbations   2x1' 2x1 min 3x1 min        [place] carry: upper/lower   10# x2 b  12# x2 b         March on Foam    2x1 min 1HHA         Ball toss     5'        Std TB ext     3x10 BTB        Ther Ex             Step ups/side steps 6" x20 ea            Std hip abd x10 ea 4x10 4#           Sled push   25# 4x 50' 25# 2x 50# 2x 15# 3x        Leg press   190# 3x10  90# 3x15        Knee ext   30# 3x10          Hip abd   40# 3x10  45# 3x10                                                                                        Ther Activity             Rolling on mat 10'                         Gait Training             Ambulation without AD 2x50'                         Modalities

## 2021-07-28 ENCOUNTER — APPOINTMENT (OUTPATIENT)
Dept: PHYSICAL THERAPY | Age: 66
End: 2021-07-28
Payer: MEDICARE

## 2021-07-28 ENCOUNTER — CLINICAL SUPPORT (OUTPATIENT)
Dept: CARDIAC REHAB | Age: 66
End: 2021-07-28
Payer: MEDICARE

## 2021-07-28 ENCOUNTER — TELEPHONE (OUTPATIENT)
Dept: FAMILY MEDICINE CLINIC | Facility: CLINIC | Age: 66
End: 2021-07-28

## 2021-07-28 ENCOUNTER — APPOINTMENT (OUTPATIENT)
Dept: CARDIAC REHAB | Age: 66
End: 2021-07-28
Payer: MEDICARE

## 2021-07-28 DIAGNOSIS — M54.50 ACUTE BILATERAL LOW BACK PAIN WITHOUT SCIATICA: Primary | ICD-10-CM

## 2021-07-28 DIAGNOSIS — Z95.1 S/P CABG (CORONARY ARTERY BYPASS GRAFT): ICD-10-CM

## 2021-07-28 PROCEDURE — 93798 PHYS/QHP OP CAR RHAB W/ECG: CPT

## 2021-07-28 NOTE — TELEPHONE ENCOUNTER
Patient fell into a gopher hole and fell on his bottom and is bruised up  He is requesting to refill oxycodone

## 2021-07-29 DIAGNOSIS — M25.579 ACUTE ANKLE PAIN, UNSPECIFIED LATERALITY: Primary | ICD-10-CM

## 2021-07-29 RX ORDER — OXYCODONE HYDROCHLORIDE 5 MG/1
5 TABLET ORAL EVERY 4 HOURS PRN
Qty: 16 TABLET | Refills: 0 | Status: SHIPPED | OUTPATIENT
Start: 2021-07-29 | End: 2021-09-09 | Stop reason: SDUPTHER

## 2021-07-30 ENCOUNTER — APPOINTMENT (OUTPATIENT)
Dept: CARDIAC REHAB | Age: 66
End: 2021-07-30
Payer: MEDICARE

## 2021-07-30 ENCOUNTER — OFFICE VISIT (OUTPATIENT)
Dept: PHYSICAL THERAPY | Age: 66
End: 2021-07-30
Payer: MEDICARE

## 2021-07-30 ENCOUNTER — CLINICAL SUPPORT (OUTPATIENT)
Dept: CARDIAC REHAB | Age: 66
End: 2021-07-30
Payer: MEDICARE

## 2021-07-30 DIAGNOSIS — Z86.73 HISTORY OF CVA (CEREBROVASCULAR ACCIDENT): Primary | ICD-10-CM

## 2021-07-30 DIAGNOSIS — Z95.1 S/P CABG (CORONARY ARTERY BYPASS GRAFT): ICD-10-CM

## 2021-07-30 PROCEDURE — 93798 PHYS/QHP OP CAR RHAB W/ECG: CPT

## 2021-07-30 PROCEDURE — 97110 THERAPEUTIC EXERCISES: CPT | Performed by: PHYSICAL THERAPIST

## 2021-07-30 NOTE — TELEPHONE ENCOUNTER
[facility] called asking if you are aware patient regularly fills zolpidem and clonazepam  There is a drug interaction between these and the oxycodone

## 2021-07-30 NOTE — PROGRESS NOTES
Daily Note     Today's date: 2021  Patient name: Amina Whittington  : 1955  MRN: [id number]  Referring provider: Rito Mcpherson MD  Dx:   Encounter Diagnosis     ICD-10-CM    1  History of CVA (cerebrovascular accident)  Z86 73                   Subjective: Pt reports minimal changes      Objective: See treatment diary below      Assessment: Tolerated treatment well  Patient demonstrated fatigue post treatment, would benefit from continued PT and pt continues to progress with activity tolerance  Plan: Continue per plan of care  Progress treatment as tolerated         Precautions: History of CVA, CABG, fall risk      Manuals        Manual ankle PNF   5' 10' 10'                                               Neuro Re-Ed             P-ball press down  5"x10 2x L/R/C 5" 2x10  L/R/C 5" 2x10  L/R/C 5" 2x10  L/R/C        Stance on half roll  3x30s           DL stance on foam, perturbations   2x1' 2x1 min 3x1 min 3x1 min       [place] carry: upper/lower   10# x2 b  12# x2 b         March on Foam    2x1 min 1HHA         Ball toss     5' 5'       Std TB ext     3x10 BTB jack[place] 1-#       Ther Ex             Step ups/side steps 6" x20 ea            Std hip abd x10 ea 4x10 4#           Sled push   25# 4x 50' 25# 2x 50# 2x 15# 3x 25# 5x       Leg press   190# 3x10  190# 3x15 200# 3x10       Knee ext   30# 3x10   30# 3x10       Hip abd   40# 3x10  45# 3x10   45# 3x10                                                                                     Ther Activity             Rolling on mat 10'                         Gait Training             Ambulation without AD 2x50'                         Modalities

## 2021-08-01 RX ORDER — OXYCODONE HYDROCHLORIDE 5 MG/1
5 TABLET ORAL EVERY 4 HOURS PRN
Qty: 20 TABLET | Refills: 0 | Status: SHIPPED | OUTPATIENT
Start: 2021-08-01 | End: 2021-10-01 | Stop reason: ALTCHOICE

## 2021-08-02 ENCOUNTER — CLINICAL SUPPORT (OUTPATIENT)
Dept: CARDIAC REHAB | Age: 66
End: 2021-08-02
Payer: MEDICARE

## 2021-08-02 ENCOUNTER — OFFICE VISIT (OUTPATIENT)
Dept: PHYSICAL THERAPY | Age: 66
End: 2021-08-02
Payer: MEDICARE

## 2021-08-02 ENCOUNTER — APPOINTMENT (OUTPATIENT)
Dept: CARDIAC REHAB | Age: 66
End: 2021-08-02
Payer: MEDICARE

## 2021-08-02 DIAGNOSIS — Z95.1 S/P CABG (CORONARY ARTERY BYPASS GRAFT): ICD-10-CM

## 2021-08-02 DIAGNOSIS — Z86.73 HISTORY OF CVA (CEREBROVASCULAR ACCIDENT): Primary | ICD-10-CM

## 2021-08-02 PROCEDURE — 97112 NEUROMUSCULAR REEDUCATION: CPT | Performed by: SPECIALIST/TECHNOLOGIST

## 2021-08-02 PROCEDURE — 93798 PHYS/QHP OP CAR RHAB W/ECG: CPT

## 2021-08-02 PROCEDURE — 97110 THERAPEUTIC EXERCISES: CPT | Performed by: SPECIALIST/TECHNOLOGIST

## 2021-08-02 NOTE — PROGRESS NOTES
Cardiac Rehabilitation Plan of Care   Discharge          Today's date: 2021   # of Exercise Sessions Completed: 36  Patient name: Margarito Rendon      : 1955  Age: 61 y o  MRN: [id number]  Referring Physician: Tameka Duran,*  Cardiologist: Fox Dominguez MD  Provider: Abhilash France  Clinician: Sophia Mckeon, MS, CEP, CCRP    Dx:   Encounter Diagnosis   Name Primary?  S/P CABG (coronary artery bypass graft)      Date of onset: 3/24/21      SUMMARY OF PROGRESS:  Discharge note for Harinder Mccloud  He had 7% improvement in functional capacity increasing His max METs in the submaximal NuStep ETT increased from 2 9 to 3 1 with test termination of maxing out the test   His exercise tolerance (max METs in tolerated in cardiac rehab) increased by 29%  He had a 11% improvement in the [facility] activity estimated MET level with ADLs and physical activity  His [facility] QOL improved by 12%  Harinder Mccloud refused to complete the final PHQ-9/DUANE-7  He included a letter in his discharge outcome questionnaire to support his reasoning  He admits to depression  He reports that he has searched for a therapist but is frustrated that he cannot find a therapist who has had a stroke  He feels he will receive better care from someone who can relate to his medical limitations  His weight did not change  Wt = 224lbs  Rate Your Plate score improved from 55 to 59  Reviewed Deshawn's recent lipid profile from 21 and in light of his increased TRG he was encouraged to focus on reducing refine flours and added sugars  Harinder Mccloud admits to over-indulging on sweets  Harinder Mccloud has been supplementing CR sessions with home exercise which includes resistance training, using a leg ergometer 10 mins and has been receiving PT for improved balance  He reports increased stamina, strength and reduced SOB  Deshawn tolerates 40 mins at 2 6  - 3 7 METs plus wt training  NSR on telemetry  RHR 73 - 85, ExHR    Resting /80 - 134/70 with appropriate response to exercise reaching 140/80 - 182/98  Reji Hughes refused to attend group education classes on cardiac risk factor modification  Discharge plans include using  [facility] [place] [facility] and supplementing with home exercise on his leg ergometer  Encouraged Pt to continue exercise  Frequency: 4-6 days/wk, Intensity: RPE 4-5, Time: 40-50 mins daily, 150-200 mins/wk  Pt was encouraged to continue eating heart healthy  Pt was encouraged to remain compliant with medications and f/u with cardiologist with any cardiac symptoms, medication management and updated lipid profile          Medication compliance: Yes   Comments: Pt reports to be compliant with medications  Fall Risk: High   Comments: Patient uses walking assist device (walker/cane/rollator) - post CVA    EKG Interpretation: NSR, rare PVC      EXERCISE ASSESSMENT and PLAN    Current Exercise Program in Rehab:       Frequency: 3 days/week Supplement with home exercise 2+ days/wk as tolerated       Minutes: 40         METS: 2 0-3 9          HR: RHR+30  (up to 160 when he increases w/l without advisement from staff)   RPE: 4-5         Modalities: UBE, Lifecycle and NuStep      Exercise  Goals :    Frequency: 3 - 6 days/wk   Minutes: 40                            >150 mins/wk of moderate intensity exercise   METS: 2 4-4 0   HR: RHR+30    RPE: 4-5   Modalities: UBE, Lifecycle and NuStep    Strength trainin-3 days / week  12-15 repetitions  1-2 sets per modality    Modalities: Leg Press, Chest Press, Lateral Raise, Arm Curl and Seated Row    Home Exercise: none    Goals:Exercise 5 days/wk, >150mins/wk of moderate intensity exercise    Progression Toward Goals:  Goals met: 10% improvement in functional capacity - based on max METs achieved in fitness assessment, improved DASI score by 10%, Increase in exercise capacity by 40% - based on peak METs tolerated in cardiac rehab exercise session, Exercise 5 days/wk, >150mins/wk of moderate intensity exercise, Resume ADLs with increased strength, Attend Rehab regularly, Decrease sitting time and start a home exercise program , Patient will be encouraged to focus on lifestyle modifications following discharge  Education: benefit of exercise for CAD risk factors, AHA guidelines to achieve >150 mins/wk of moderate exercise and RPE scale   Plan:education on home exercise guidelines, home exercise 30+ mins 2 days opposite CR and Education class: Risk Factors for Heart Disease  Readiness to change: Preparation:  (Getting ready to change)       NUTRITION ASSESSMENT AND PLAN    Weight control:    Starting weight: 224 4   Current weight:   224    Diabetes: N/A  A1c: 5 7    last measured: 3/19/21    Lipid management: Discussed diet and lipid management and Last lipid profile 6/22/21  Chol 145    HDL 42  LDL 71    Goals:reduced BMI to < 25, reduced waist circumference <40 inches (M), Improved Rate Your Plate score  >07, Wt  loss 1-2 ppw,  goal of 20 lbs  , choose lean meat (93-95%), eliminate processed meats, reduce portion sizes of meat to 3oz or less, use low fat dairy, reduce cheese intake or use reduced-fat, Eat 4-5 cups of fruits and vegetables daily, eliminate butter, Increase intake of nuts and seeds, eliminate or choose low-fat sweets and daily saturated fat intake <7%/13g    Progression Toward Goals: Goals not met: reduced BMI to < 25, reduced waist circumference <40 inches (M), Improved Rate Your Plate score  >05, Wt  loss 1-2 ppw,  goal of 20 lbs  , choose lean meat (93-95%), eliminate processed meats, reduce portion sizes of meat to 3oz or less, use low fat dairy, reduce cheese intake or use reduced-fat, Eat 4-5 cups of fruits and vegetables daily, eliminate butter, Increase intake of nuts and seeds, eliminate or choose low-fat sweets and daily saturated fat intake <7%/13g   , Patient will be encouraged to focus on lifestyle modifications following discharge      Education: heart healthy eating  low sodium diet  nutrition for  lipid management  wt  loss   portion control  Plan: switch to low fat cheeses, replace butter with soft spreads made with olive oil, canola or yogurt, replace refined grain bread with whole grain bread, replace unhealthy snacks with fruits & vegs, reduce portion sizes, reduce red meat 1x/wk, switch to skim or 1% milk, eat fewer desserts and sweets, avoid processed foods, will replace sugar sweetened cereals with whole grain or oatmeal, drink more water, replace sugar with stevia or truvia and keep added daily sugar <25g/day  Readiness to change: Preparation:  (Getting ready to change)       PSYCHOSOCIAL ASSESSMENT AND PLAN    Emotional:  Depression assessment:  PHQ-9 = Pt refused final PHQ-9            Anxiety measure:  DUANE-7 = Pt refused final DUANE-7  Self-reported stress level:  1  Social support: Very Good    Goals:  improved [facility] QOL < 27, PHQ-9 - reduced severity by one level, contact behavioral health , Feelings in [place] Score < 3, Physical Fitness in [place] Score < 3, Social Support in [place] Score < 3, Daily Activity in [place] Score < 3, Social Activities in [place] Score < 3, Pain in [place] Score < 3, Overall Health in [place] Score < 3, Quality of Life in [place] Score < 3 , Increased interest in doing things, improved sleep, Improved appetite, improved concentration, improved positive thoughts of well being, increased energy, stop worrying, take time to relax, Feel less anxious and Handle anger/frustration better    Progression Toward Goals: Goals not met:  contact behavioral health , Feelings in [place] Score < 3, Social Support in [place] Score < 3, Daily Activity in [place] Score < 3,  Pain in [place] Score < 3, Overall Health in [place] Score < 3, Quality of Life in [place] Score < 3 , Increased interest in doing things, improved sleep, Improved appetite, improved concentration, improved positive thoughts of well being, increased energy, stop worrying, take time to relax, Feel less anxious and Handle anger/frustration better   , Goals met: Social Activities in [facility] Score < 3,,  Physical Fitness in [facility] Score < 3,,  improved [facility] QOL < 27 , Patient will be encouraged to focus on lifestyle modifications following discharge  Education: signs/sxs of depression, benefits of a positive support system, stress management techniques, depression and CAD and benefits of mental health counseling  Plan: Practice relaxation techniques, Exercise, Enjoy a hobby, Join a support group , Enjoy family and Return to previous social activity  Readiness to change: Preparation:  (Getting ready to change)       OTHER CORE COMPONENTS     Tobacco:   Social History     Tobacco Use   Smoking Status Never Smoker   Smokeless Tobacco Never Used       Tobacco Use Intervention:   N/A:  Patient is a non-smoker     Anginal Symptoms:  JOE and excessive fatigue   NTG use: No prescription    Blood pressure:    Restin/80 - 134/70    Exercise: 152/80 - 186/96    Goals: consistent BP < 130/80, reduced dietary sodium <2300mg, moderate intensity exercise >150 mins/wk and medication compliance    Progression Toward Goals: Goals met: consistent BP < 130/80, reduced dietary sodium <2300mg, moderate intensity exercise >150 mins/wk and medication compliance , Patient will be encouraged to focus on lifestyle modifications following discharge      Education:  low sodium diet and HTN  Plan: Avoid Processed foods, engage in regular exercise, use salt substitutes, check labels for sodium content and monitor home BP  Readiness to change: Maintenance: (Maintaining the behavior change)

## 2021-08-02 NOTE — PROGRESS NOTES
Daily Note     Today's date: 2021  Patient name: Jaylin Alva  : 1955  MRN: [id number]  Referring provider: Kaden Tabares MD  Dx:   Encounter Diagnosis     ICD-10-CM    1  History of CVA (cerebrovascular accident)  Z86 73                   Subjective: Generalized fatigue following previous tx session  Pt reports PT exercises are helping him with tasks around his home ie mowing grass  Objective: See treatment diary below    Assessment: Pt doing well, improved strength and endurance overall  Tolerated treatment well  Patient demonstrated fatigue post treatment, exhibited good technique with therapeutic exercises and would benefit from continued PT    Plan: Continue per plan of care  Progress treatment as tolerated  Pt to transfer to [facility] location to shorten commute        Precautions: History of CVA, CABG, fall risk      Manuals  8      Manual ankle PNF   5' 10' 10'                                               Neuro Re-Ed             P-ball press down  5"x10 2x L/R/C 5" 2x10  L/R/C 5" 2x10  L/R/C 5" 2x10  L/R/C  5" 3x10 L/R/C      Stance on half roll  3x30s           DL stance on foam, perturbations   2x1' 2x1 min 3x1 min 3x1 min 3x1 min      [place] carry: upper/lower   10# x2 b  12# x2 b         March on Foam    2x1 min 1HHA         Ball toss     5' 5'       Std TB ext     3x10 BTB corey 1-# [place] Rows, ext 25#, 12# 30x ea      Ther Ex             Step ups/side steps 6" x20 ea            Std hip abd x10 ea 4x10 4#           Sled push/pull   25# 4x 50' 25# 2x 50# 2x 15# 3x 25# 5x 25# 3x 50ft      Leg press   190# 3x10  190# 3x15 200# 3x10 210# 3x10      Knee ext   30# 3x10   30# 3x10 30# 3x10      Hip abd   40# 3x10  45# 3x10   45# 3x10 45# 3x10                                                                                    Ther Activity             Rolling on mat 10'                         Gait Training             Ambulation without AD 2x50' Modalities

## 2021-08-04 ENCOUNTER — EVALUATION (OUTPATIENT)
Dept: PHYSICAL THERAPY | Facility: CLINIC | Age: 66
End: 2021-08-04
Payer: MEDICARE

## 2021-08-04 ENCOUNTER — APPOINTMENT (OUTPATIENT)
Dept: PHYSICAL THERAPY | Age: 66
End: 2021-08-04
Payer: MEDICARE

## 2021-08-04 ENCOUNTER — APPOINTMENT (OUTPATIENT)
Dept: CARDIAC REHAB | Age: 66
End: 2021-08-04
Payer: MEDICARE

## 2021-08-04 DIAGNOSIS — I63.9 CEREBROVASCULAR ACCIDENT (CVA), UNSPECIFIED MECHANISM (HCC): ICD-10-CM

## 2021-08-04 DIAGNOSIS — Z86.73 HISTORY OF CVA (CEREBROVASCULAR ACCIDENT): Primary | ICD-10-CM

## 2021-08-04 PROCEDURE — 97110 THERAPEUTIC EXERCISES: CPT

## 2021-08-04 PROCEDURE — 97140 MANUAL THERAPY 1/> REGIONS: CPT

## 2021-08-04 NOTE — PROGRESS NOTES
PT Re-Evaluation     Today's date: 2021  Patient name: Francy Chappell  : 1955  MRN: [id number]  Referring provider: Rosaline Patricia MD  Dx:   Encounter Diagnosis     ICD-10-CM    1  History of CVA (cerebrovascular accident)  Z86 73    2  Cerebrovascular accident (CVA), unspecified mechanism ([address] )  I63 9                   Subjective: Patient presents for re-evaluation today and new physical therapy clinic that is closer to his house  [place] has a full size basement and has difficulty with steps, stepping over the ledge to get in the shower, balance in general, and generalized strength  He has a torn RTC on the right  He had a fall a few weeks ago when his foot got stuck in the whole- went to [facility] scan was negative  He has been more tired during the day since the fall and feels that it's harder to keep his left eye open more than the right       Goals  In 4 weeks pt will:  -Be independent with phase I of HEP  -Increase LE strength by 1/2 grade  -Decrease TUG time by 5s (was 33 at IE)    By discharge pt will:  -Be independent with Phase II of HEP  -Demonstrate full LE strength  -Decrease TUG time by 15s  -Report minimal pain with ADLs      Objective:     Gait: shuffling gait, decreased speed,   Vitals 138/90    AROM   R shoulder flexion 90 deg, 60 deg     ER strength 5, flexion 3+, abduction 3+      Strength/Myotome Testing     Left Hip   Planes of Motion   Flexion: 4  Abduction: 3    Right Hip   Planes of Motion   Flexion: 2+  Abduction: 3    Left Knee   Flexion: 4-  Extension: 4    Right Knee   Flexion: 3+  Extension: 3+    Left Ankle/Foot   Dorsiflexion: 4  Plantar flexion: 4    Right Ankle/Foot   Dorsiflexion: 3  Plantar flexion: 3    Functional Assessment        Comments  Tandem stance  L forward: 30s semi tandem, unable to complete full tandem    R forward:30 semi tandem, unable to complete full tandem     5xSTS 49 seconds   TUG 29 seconds     Neuro Exam:     Sensation Light touch LE: left WNL and right WNL        Assessment: Pt presents to PT for re-evaluation at new location  Patient currently presents with decreased gait speed, decreased balance, decreased LE and UE strength R > L due to stroke  Patient has shown small improvements since initial evaluation with regards to strength and TUG score  Continue with skilled PT to work on such impairments  Plan: Continue per plan of care        Precautions: History of CVA, CABG, fall risk    1:1 7365-0098   Assessment minutes x20  Manuals 7/12 7/19 7/21 7/23 7/26 7/30 8/2 8/4     Manual ankle PNF   5' 10' 10'        Assessment minutes         x20                               Neuro Re-Ed             P-ball press down  5"x10 2x L/R/C 5" 2x10  L/R/C 5" 2x10  L/R/C 5" 2x10  L/R/C  5" 3x10 L/R/C      Stance on half roll  3x30s           DL stance on foam, perturbations   2x1' 2x1 min 3x1 min 3x1 min 3x1 min      [place] carry: upper/lower   10# x2 b  12# x2 b         March on Foam    2x1 min 1HHA         Ball toss     5' 5'       Std TB ext     3x10 BTB [place] 1-# [place] Rows, ext 25#, 12# 30x ea      Hurdles        Fwd and lateral x4 laps      Biodex        Static, hands on x2                  Ther Ex             Step ups/side steps 6" x20 ea            Std hip abd x10 ea 4x10 4#           Sled push/pull   25# 4x 50' 25# 2x 50# 2x 15# 3x 25# 5x 25# 3x 50ft      Leg press   190# 3x10  190# 3x15 200# 3x10 210# 3x10      Knee ext   30# 3x10   30# 3x10 30# 3x10      Hip abd   40# 3x10  45# 3x10   45# 3x10 45# 3x10      Bicep curls        2# 2x10 RUE only      Rows        15# 2x10     Tricep extension        10# 2x10                                            Ther Activity             Step ups        4" x10 holding on to bar                  Gait Training             Ambulation without AD 2x50'                         Modalities

## 2021-08-06 ENCOUNTER — APPOINTMENT (OUTPATIENT)
Dept: PHYSICAL THERAPY | Age: 66
End: 2021-08-06
Payer: MEDICARE

## 2021-08-06 ENCOUNTER — OFFICE VISIT (OUTPATIENT)
Dept: PHYSICAL THERAPY | Facility: CLINIC | Age: 66
End: 2021-08-06
Payer: MEDICARE

## 2021-08-06 ENCOUNTER — APPOINTMENT (OUTPATIENT)
Dept: CARDIAC REHAB | Age: 66
End: 2021-08-06
Payer: MEDICARE

## 2021-08-06 DIAGNOSIS — Z86.73 HISTORY OF CVA (CEREBROVASCULAR ACCIDENT): Primary | ICD-10-CM

## 2021-08-06 DIAGNOSIS — I63.9 CEREBROVASCULAR ACCIDENT (CVA), UNSPECIFIED MECHANISM (HCC): ICD-10-CM

## 2021-08-06 PROCEDURE — 97110 THERAPEUTIC EXERCISES: CPT

## 2021-08-06 PROCEDURE — 97112 NEUROMUSCULAR REEDUCATION: CPT

## 2021-08-06 NOTE — PROGRESS NOTES
Daily Note     Today's date: 2021  Patient name: Mason Romero  : 1955  MRN: [id number]  Referring provider: Aurelia Case MD  Dx: No diagnosis found  Subjective: Pt reports that he would like to be done with PT because he thinks he's getting weaker and that it takes too much out of him       Objective: See treatment diary below      Assessment: Patient did not want to take rest breaks today despite feeling like PT is too challenging and tires him out too much  Tried to educate patient on the importance of continued exercise both for cardiac reasons and for strength post stroke  Patient still requests discharge at this time and wants to do things around his house instead  Plan: Continue per plan of care        Precautions: History of CVA, CABG, fall risk    1:1 0615-1214  Manuals     Manual ankle PNF   5' 10' 10'        Assessment minutes                                        Neuro Re-Ed             P-ball press down  5"x10 2x L/R/C 5" 2x10  L/R/C 5" 2x10  L/R/C 5" 2x10  L/R/C  5" 3x10 L/R/C      Stance on half roll  3x30s           DL stance on foam, perturbations   2x1' 2x1 min 3x1 min 3x1 min 3x1 min      [place] carry: upper/lower   10# x2 b  12# x2 b         March on Foam    2x1 min 1HHA         Ball toss     5' 5'       Std TB ext     3x10 BTB [place] 1-# [place] Rows, ext 25#, 12# 30x ea      Hurdles        Fwd and lateral x4 laps  Fwd and lateral x4 laps     Biodex        Static, hands on x2 Static, hands on x2                 Ther Ex             Step ups/side steps 6" x20 ea            Std hip abd x10 ea 4x10 4#           Sled push/pull   25# 4x 50' 25# 2x 50# 2x 15# 3x 25# 5x 25# 3x 50ft      Leg press   190# 3x10  190# 3x15 200# 3x10 210# 3x10      Knee ext   30# 3x10   30# 3x10 30# 3x10  33# x10 B, 2x10 11# RLE only    Hip abd   40# 3x10  45# 3x10   45# 3x10 45# 3x10      Bicep curls        2# 2x10 RUE only  2# 2x10 RUE only Rows        15# 2x10 GTB 2x10    Tricep extension        10# 2x10     Overhead MB lifts         Red ball 2x10                              Ther Activity             Step ups        4" x10 holding on to bar                  Gait Training             Ambulation without AD 2x50'                         Modalities

## 2021-08-09 ENCOUNTER — APPOINTMENT (OUTPATIENT)
Dept: PHYSICAL THERAPY | Age: 66
End: 2021-08-09
Payer: MEDICARE

## 2021-08-09 ENCOUNTER — APPOINTMENT (OUTPATIENT)
Dept: PHYSICAL THERAPY | Facility: CLINIC | Age: 66
End: 2021-08-09
Payer: MEDICARE

## 2021-08-09 ENCOUNTER — TELEPHONE (OUTPATIENT)
Dept: FAMILY MEDICINE CLINIC | Facility: CLINIC | Age: 66
End: 2021-08-09

## 2021-08-11 ENCOUNTER — APPOINTMENT (OUTPATIENT)
Dept: PHYSICAL THERAPY | Age: 66
End: 2021-08-11
Payer: MEDICARE

## 2021-08-11 ENCOUNTER — APPOINTMENT (OUTPATIENT)
Dept: PHYSICAL THERAPY | Facility: CLINIC | Age: 66
End: 2021-08-11
Payer: MEDICARE

## 2021-08-13 ENCOUNTER — APPOINTMENT (OUTPATIENT)
Dept: PHYSICAL THERAPY | Facility: CLINIC | Age: 66
End: 2021-08-13
Payer: MEDICARE

## 2021-08-13 ENCOUNTER — APPOINTMENT (OUTPATIENT)
Dept: PHYSICAL THERAPY | Age: 66
End: 2021-08-13
Payer: MEDICARE

## 2021-08-16 ENCOUNTER — APPOINTMENT (OUTPATIENT)
Dept: PHYSICAL THERAPY | Facility: CLINIC | Age: 66
End: 2021-08-16
Payer: MEDICARE

## 2021-08-18 ENCOUNTER — APPOINTMENT (OUTPATIENT)
Dept: PHYSICAL THERAPY | Facility: CLINIC | Age: 66
End: 2021-08-18
Payer: MEDICARE

## 2021-08-20 ENCOUNTER — APPOINTMENT (OUTPATIENT)
Dept: PHYSICAL THERAPY | Facility: CLINIC | Age: 66
End: 2021-08-20
Payer: MEDICARE

## 2021-08-21 DIAGNOSIS — E03.9 HYPOTHYROIDISM, UNSPECIFIED TYPE: ICD-10-CM

## 2021-08-23 ENCOUNTER — APPOINTMENT (OUTPATIENT)
Dept: PHYSICAL THERAPY | Facility: CLINIC | Age: 66
End: 2021-08-23
Payer: MEDICARE

## 2021-08-23 RX ORDER — LEVOTHYROXINE SODIUM 0.1 MG/1
100 TABLET ORAL DAILY
Qty: 90 TABLET | Refills: 0 | Status: SHIPPED | OUTPATIENT
Start: 2021-08-23 | End: 2021-10-31 | Stop reason: SDUPTHER

## 2021-08-25 ENCOUNTER — APPOINTMENT (OUTPATIENT)
Dept: PHYSICAL THERAPY | Facility: CLINIC | Age: 66
End: 2021-08-25
Payer: MEDICARE

## 2021-08-27 ENCOUNTER — TELEPHONE (OUTPATIENT)
Dept: FAMILY MEDICINE CLINIC | Facility: CLINIC | Age: 66
End: 2021-08-27

## 2021-08-27 ENCOUNTER — APPOINTMENT (OUTPATIENT)
Dept: PHYSICAL THERAPY | Facility: CLINIC | Age: 66
End: 2021-08-27
Payer: MEDICARE

## 2021-08-27 NOTE — TELEPHONE ENCOUNTER
Dary Macdonald called, he did not want his wife to know but he states "I am slowing down a lot rapidly" he would like to see you earlier then October when he is scheduled for his next apt  There is no openings I did tell him to seek care if he is extremely fatigue or getting worse  Let me know what you think we should do?

## 2021-09-01 DIAGNOSIS — N52.9 ERECTILE DYSFUNCTION, UNSPECIFIED ERECTILE DYSFUNCTION TYPE: ICD-10-CM

## 2021-09-02 DIAGNOSIS — N52.9 ERECTILE DYSFUNCTION, UNSPECIFIED ERECTILE DYSFUNCTION TYPE: ICD-10-CM

## 2021-09-02 RX ORDER — SILDENAFIL 100 MG/1
100 TABLET, FILM COATED ORAL DAILY PRN
Qty: 30 TABLET | Refills: 1 | Status: SHIPPED | OUTPATIENT
Start: 2021-09-02 | End: 2021-10-26

## 2021-09-02 RX ORDER — SILDENAFIL 100 MG/1
100 TABLET, FILM COATED ORAL DAILY PRN
Qty: 30 TABLET | Refills: 0 | Status: SHIPPED | OUTPATIENT
Start: 2021-09-02 | End: 2021-10-01 | Stop reason: SDUPTHER

## 2021-09-09 DIAGNOSIS — M54.50 LOW BACK PAIN WITHOUT SCIATICA, UNSPECIFIED BACK PAIN LATERALITY, UNSPECIFIED CHRONICITY: ICD-10-CM

## 2021-09-09 DIAGNOSIS — F41.9 ANXIETY: ICD-10-CM

## 2021-09-09 DIAGNOSIS — M25.579 ACUTE ANKLE PAIN, UNSPECIFIED LATERALITY: ICD-10-CM

## 2021-09-09 RX ORDER — CLONAZEPAM 2 MG/1
4 TABLET ORAL 2 TIMES DAILY PRN
Qty: 180 TABLET | Refills: 0 | Status: SHIPPED | OUTPATIENT
Start: 2021-09-09 | End: 2021-10-23 | Stop reason: SDUPTHER

## 2021-09-09 RX ORDER — TIZANIDINE 2 MG/1
2 TABLET ORAL EVERY 8 HOURS PRN
Qty: 270 TABLET | Refills: 0 | Status: SHIPPED | OUTPATIENT
Start: 2021-09-09 | End: 2021-11-20 | Stop reason: SDUPTHER

## 2021-09-09 RX ORDER — OXYCODONE HYDROCHLORIDE 5 MG/1
5 TABLET ORAL EVERY 4 HOURS PRN
Qty: 16 TABLET | Refills: 0 | Status: SHIPPED | OUTPATIENT
Start: 2021-09-09 | End: 2021-10-23 | Stop reason: SDUPTHER

## 2021-09-10 ENCOUNTER — TELEPHONE (OUTPATIENT)
Dept: FAMILY MEDICINE CLINIC | Facility: CLINIC | Age: 66
End: 2021-09-10

## 2021-09-10 DIAGNOSIS — Z12.5 SCREENING FOR MALIGNANT NEOPLASM OF PROSTATE: ICD-10-CM

## 2021-09-10 DIAGNOSIS — E03.4 HYPOTHYROIDISM DUE TO ACQUIRED ATROPHY OF THYROID: ICD-10-CM

## 2021-09-10 DIAGNOSIS — N40.1 BENIGN PROSTATIC HYPERPLASIA WITH LOWER URINARY TRACT SYMPTOMS, SYMPTOM DETAILS UNSPECIFIED: ICD-10-CM

## 2021-09-10 DIAGNOSIS — E78.49 OTHER HYPERLIPIDEMIA: Primary | ICD-10-CM

## 2021-09-23 DIAGNOSIS — G47.00 INSOMNIA, UNSPECIFIED TYPE: ICD-10-CM

## 2021-09-23 RX ORDER — ZOLPIDEM TARTRATE 10 MG/1
TABLET ORAL
Qty: 180 TABLET | Refills: 0 | Status: SHIPPED | OUTPATIENT
Start: 2021-09-23 | End: 2021-12-14 | Stop reason: SDUPTHER

## 2021-09-24 ENCOUNTER — APPOINTMENT (EMERGENCY)
Dept: RADIOLOGY | Facility: HOSPITAL | Age: 66
End: 2021-09-24
Payer: MEDICARE

## 2021-09-24 ENCOUNTER — HOSPITAL ENCOUNTER (EMERGENCY)
Facility: HOSPITAL | Age: 66
Discharge: HOME | End: 2021-09-24
Attending: SURGERY | Admitting: SURGERY
Payer: MEDICARE

## 2021-09-24 ENCOUNTER — APPOINTMENT (EMERGENCY)
Dept: CT IMAGING | Facility: HOSPITAL | Age: 66
End: 2021-09-24
Payer: MEDICARE

## 2021-09-24 ENCOUNTER — APPOINTMENT (OUTPATIENT)
Dept: LAB | Facility: CLINIC | Age: 66
End: 2021-09-24
Payer: MEDICARE

## 2021-09-24 VITALS
HEART RATE: 62 BPM | WEIGHT: 225.97 LBS | OXYGEN SATURATION: 98 % | DIASTOLIC BLOOD PRESSURE: 109 MMHG | TEMPERATURE: 98.6 F | SYSTOLIC BLOOD PRESSURE: 178 MMHG | RESPIRATION RATE: 18 BRPM | BODY MASS INDEX: 33.37 KG/M2

## 2021-09-24 DIAGNOSIS — N40.1 BENIGN PROSTATIC HYPERPLASIA WITH LOWER URINARY TRACT SYMPTOMS, SYMPTOM DETAILS UNSPECIFIED: ICD-10-CM

## 2021-09-24 DIAGNOSIS — Z11.4 SCREENING FOR HIV (HUMAN IMMUNODEFICIENCY VIRUS): ICD-10-CM

## 2021-09-24 DIAGNOSIS — E03.4 HYPOTHYROIDISM DUE TO ACQUIRED ATROPHY OF THYROID: ICD-10-CM
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
## 2021-09-24 DIAGNOSIS — E78.49 OTHER HYPERLIPIDEMIA: ICD-10-CM

## 2021-09-24 DIAGNOSIS — Z12.5 SCREENING FOR MALIGNANT NEOPLASM OF PROSTATE: ICD-10-CM

## 2021-09-24 LAB
ALBUMIN SERPL BCP-MCNC: 3.8 G/DL (ref 3.5–5)
ALP SERPL-CCNC: 105 U/L (ref 46–116)
ALT SERPL W P-5'-P-CCNC: 27 U/L (ref 12–78)
ANION GAP SERPL CALCULATED.3IONS-SCNC: 3 MMOL/L (ref 4–13)
AST SERPL W P-5'-P-CCNC: 20 U/L (ref 5–45)
BASE EXCESS BLDA CALC-SCNC: 6 MMOL/L (ref -2–3)
BILIRUB SERPL-MCNC: 1.05 MG/DL (ref 0.2–1)
BUN SERPL-MCNC: 12 MG/DL (ref 5–25)
CALCIUM SERPL-MCNC: 9.1 MG/DL (ref 8.3–10.1)
CHLORIDE SERPL-SCNC: 102 MMOL/L (ref 100–108)
CHOLEST SERPL-MCNC: 125 MG/DL (ref 50–200)
CO2 SERPL-SCNC: 32 MMOL/L (ref 21–32)
CREAT SERPL-MCNC: 1.12 MG/DL (ref 0.6–1.3)
GFR SERPL CREATININE-BSD FRML MDRD: 69 ML/MIN/1.73SQ M
GLUCOSE P FAST SERPL-MCNC: 101 MG/DL (ref 65–99)
GLUCOSE SERPL-MCNC: 105 MG/DL (ref 65–140)
HCO3 BLDA-SCNC: 32.4 MMOL/L (ref 24–30)
HCT VFR BLD CALC: 44 % (ref 36.5–49.3)
HDLC SERPL-MCNC: 39 MG/DL
HGB BLDA-MCNC: 15 G/DL (ref 12–17)
LDLC SERPL CALC-MCNC: 66 MG/DL (ref 0–100)
PCO2 BLD: 34 MMOL/L (ref 21–32)
PCO2 BLD: 51.9 MM HG (ref 42–50)
PH BLD: 7.4 [PH] (ref 7.3–7.4)
PO2 BLD: 19 MM HG (ref 35–45)
POTASSIUM BLD-SCNC: 4 MMOL/L (ref 3.5–5.3)
POTASSIUM SERPL-SCNC: 4.1 MMOL/L (ref 3.5–5.3)
PROT SERPL-MCNC: 7.7 G/DL (ref 6.4–8.2)
PSA SERPL-MCNC: 0.5 NG/ML (ref 0–4)
SAO2 % BLD FROM PO2: 29 % (ref 60–85)
SODIUM BLD-SCNC: 140 MMOL/L (ref 136–145)
SODIUM SERPL-SCNC: 137 MMOL/L (ref 136–145)
SPECIMEN SOURCE: ABNORMAL
TRIGL SERPL-MCNC: 102 MG/DL
TSH SERPL DL<=0.05 MIU/L-ACNC: 0.65 UIU/ML (ref 0.36–3.74)

## 2021-09-24 PROCEDURE — 71045 X-RAY EXAM CHEST 1 VIEW: CPT

## 2021-09-24 PROCEDURE — 73110 X-RAY EXAM OF WRIST: CPT

## 2021-09-24 PROCEDURE — NC001 PR NO CHARGE: Performed by: EMERGENCY MEDICINE

## 2021-09-24 PROCEDURE — 36415 COLL VENOUS BLD VENIPUNCTURE: CPT

## 2021-09-24 PROCEDURE — 70450 CT HEAD/BRAIN W/O DYE: CPT

## 2021-09-24 PROCEDURE — G1004 CDSM NDSC: HCPCS

## 2021-09-24 PROCEDURE — 84443 ASSAY THYROID STIM HORMONE: CPT

## 2021-09-24 PROCEDURE — 82803 BLOOD GASES ANY COMBINATION: CPT

## 2021-09-24 PROCEDURE — 84132 ASSAY OF SERUM POTASSIUM: CPT

## 2021-09-24 PROCEDURE — 85014 HEMATOCRIT: CPT

## 2021-09-24 PROCEDURE — 82947 ASSAY GLUCOSE BLOOD QUANT: CPT

## 2021-09-24 PROCEDURE — 72125 CT NECK SPINE W/O DYE: CPT

## 2021-09-24 PROCEDURE — 84153 ASSAY OF PSA TOTAL: CPT

## 2021-09-24 PROCEDURE — 84295 ASSAY OF SERUM SODIUM: CPT

## 2021-09-24 PROCEDURE — 99284 EMERGENCY DEPT VISIT MOD MDM: CPT | Performed by: SURGERY

## 2021-09-24 PROCEDURE — 80053 COMPREHEN METABOLIC PANEL: CPT

## 2021-09-24 PROCEDURE — 80061 LIPID PANEL: CPT

## 2021-09-24 PROCEDURE — 87389 HIV-1 AG W/HIV-1&-2 AB AG IA: CPT

## 2021-09-24 PROCEDURE — 99284 EMERGENCY DEPT VISIT MOD MDM: CPT

## 2021-09-24 RX ORDER — ACETAMINOPHEN 325 MG/1
650 TABLET ORAL EVERY 6 HOURS PRN
Refills: 0
Start: 2021-09-24 | End: 2022-06-10

## 2021-09-24 NOTE — QUICK NOTE
Cervical Collar Clearance: The patient had a CT scan of the cervical spine demonstrating no acute injury  On exam, the patient had no midline point tenderness or paresthesias/numbness/weakness in the extremities  The patient had full range of motion (was then able to flex, extend, and rotate head laterally) without pain  There were no distracting injuries and the patient was not intoxicated  The patient's cervical spine was cleared radiologically and clinically  Cervical collar removed at this time       Layla Tyson PA-C  9/24/2021 6:27 PM

## 2021-09-24 NOTE — H&P
H&P Exam - Trauma   Sofie Overton 61 y o  male MRN: [id number]  Unit/Bed#: [place] [address] Encounter: [id number]    Assessment/Plan   Trauma Alert: Level B  Model of Arrival: Ambulance  Trauma Team: Attending Cruz You and NILAM Laws  [facility]  Time [address], Returned call: Yes 1929    Trauma Active Problems:   Fall with head strike on antiplatelet therapy  Left wrist suspected acute triquetral fracture without displacement    Trauma Plan:   Left wrist splint  Pain management with home oxycodone and tylenol  Follow up with hand surgery next week    Chief Complaint: Fall    History of Present Illness   HPI:  Sofie Overton is a 61 y o  male with PMH cva with residual right sided weakness, recent CABG, HTN, Chronic back pain, presents after falling while mowing the lawn  He reports he lost control of his lawnmower while walking down hill and fell to his left side  He does report striking his head and denies losing consciousness  He reports left wrist pain  He took some oxycodone after the fall for the pain  He denies chest pain, shortness of breath, abdominal pain, nausea, vomiting  Mechanism:Fall    Review of Systems   Constitutional: Negative for activity change, appetite change, chills, fatigue, fever and unexpected weight change  HENT: Negative for ear pain, facial swelling, nosebleeds, postnasal drip, rhinorrhea, sinus pressure, sinus pain and sore throat  Eyes: Negative for photophobia, pain and visual disturbance  Respiratory: Negative for cough, chest tightness, shortness of breath and wheezing  Cardiovascular: Negative for chest pain and palpitations  Gastrointestinal: Negative for abdominal pain, constipation, diarrhea, nausea and vomiting  Genitourinary: Negative for dysuria, flank pain, frequency, hematuria and urgency  Musculoskeletal: Positive for back pain (chronic)  Negative for arthralgias, neck pain and neck stiffness     Skin: Negative for color change and rash  Neurological: Positive for weakness (Chronic right sided)  Negative for dizziness, seizures, syncope, facial asymmetry and numbness  All other systems reviewed and are negative  12-point, complete review of systems was reviewed and negative except as stated above  Historical Information     Past Medical History:   Diagnosis Date    Anxiety     CAD (coronary artery disease)     Depression     Dizziness     Eye problems     Hypothyroidism     Migraines     Stroke ([facility] [address] ) 11/17/2014    Tremors of nervous system      Past Surgical History:   Procedure Laterality Date    COLONOSCOPY  09/01/2017    COLONOSCOPY      CORONARY STENT PLACEMENT      DENTAL SURGERY      entire top teeth removal    HERNIA REPAIR      [place] CABG, ARTERY-VEIN, FOUR N/A 3/24/2021    Procedure: CORONARY ARTERY BYPASS GRAFT (CABG) 3 VESSELS,  USING LEFT VANE-LAD AND LEFT GSV-RPL & RAMUS;  Surgeon: Leah Richardson MD;  [facility];  Service: Cardiac Surgery    [place] ECHO TRANSESOPHAG [address] N/A 3/24/2021    Procedure: TRANSESOPHAGEAL ECHOCARDIOGRAM (MARLEN);   Surgeon: Leah Richardson MD;  [facility];  Service: Cardiac Surgery     Social History   Social History     Substance and Sexual Activity   Alcohol Use No     Social History     Substance and Sexual Activity   Drug Use No     Social History     Tobacco Use   Smoking Status Never Smoker   Smokeless Tobacco Never Used     E-Cigarette/Vaping    E-Cigarette Use Never User      E-Cigarette/Vaping Substances    Nicotine No     THC No     CBD No     Flavoring No     Other No     Unknown No      Immunization History   Administered Date(s) Administered    INFLUENZA 12/20/2017, 11/28/2018    Influenza Injectable, MDCK, Preservative Free, Quadrivalent, 0 5 mL 11/20/2019, 11/03/2020    SARS-CoV-2 / COVID-19 mRNA IM ([employer]) 04/29/2021, 05/20/2021    Tdap 05/08/2014, 07/23/2021    Tuberculin Skin Test-PPD Intradermal 01/04/2001     Last Tetanus: n/a  Family History: Non-contributory      Meds/Allergies   all current active meds have been reviewed    Allergies   Allergen Reactions    Medical Tape Other (See Comments)     Other reaction(s): BURNS USE PAPER TAPE         PHYSICAL EXAM    Objective   Vitals:   First set: Temperature: 98 6 °F (37 °C) (09/24/21 1727)  Pulse: 66 (09/24/21 1725)  Respirations: 16 (09/24/21 1725)  Blood Pressure: 167/95 (09/24/21 1725)    Primary Survey:   (A) Airway: Intact  (B) Breathing: Equal bilaterally  (C) Circulation: Pulses:   pedal  2/4, radial  2/4 and femoral  2/4  (D) Disabliity:  GCS Total:  15  (E) Expose:  Completed    Secondary Survey: (Click on Physical Exam tab above)  Physical Exam  Vitals and nursing note reviewed  Constitutional:       General: He is not in acute distress  Appearance: Normal appearance  He is not ill-appearing or toxic-appearing  HENT:      Head: Normocephalic  Abrasion present  No raccoon eyes or laceration  Jaw: There is normal jaw occlusion  Nose: Nose normal  No congestion or rhinorrhea  Mouth/Throat:      Mouth: Mucous membranes are moist       Pharynx: Oropharynx is clear  No oropharyngeal exudate or posterior oropharyngeal erythema  Eyes:      Extraocular Movements: Extraocular movements intact  Conjunctiva/sclera: Conjunctivae normal       Pupils: Pupils are equal, round, and reactive to light  Cardiovascular:      Rate and Rhythm: Normal rate and regular rhythm  Heart sounds: No murmur heard  No friction rub  No gallop  Pulmonary:      Effort: Pulmonary effort is normal       Breath sounds: No wheezing, rhonchi or rales  Abdominal:      General: Bowel sounds are normal       Palpations: Abdomen is soft  Tenderness: There is no abdominal tenderness  There is no guarding or rebound     Musculoskeletal:      Right shoulder: Normal       Left shoulder: Normal       Right upper arm: Normal  Left upper arm: Normal       Right elbow: Normal       Left elbow: Normal       Right forearm: Normal       Left forearm: Normal       Right wrist: Normal       Left wrist: Swelling present  No deformity, tenderness or bony tenderness  Normal range of motion  Right hand: Normal       Left hand: Swelling present  Cervical back: Normal range of motion and neck supple  No deformity or tenderness  Thoracic back: No deformity or tenderness  Lumbar back: No deformity or tenderness  Right hip: Normal       Left hip: Normal       Right upper leg: Normal       Left upper leg: Normal       Right knee: Normal       Left knee: Normal       Right lower leg: Normal       Left lower leg: Normal       Right ankle: Normal       Left ankle: Normal       Right foot: Normal       Left foot: Normal    Skin:     General: Skin is warm and dry  Capillary Refill: Capillary refill takes less than 2 seconds  Findings: Bruising (left wrist) present  Neurological:      General: No focal deficit present  Mental Status: He is alert and oriented to person, place, and time  Motor: Weakness (mild right sided) present           Invasive Devices     Peripheral Intravenous Line            Peripheral IV 09/24/21 Left Antecubital <1 day                Lab Results:   BMP/CMP:   Lab Results   Component Value Date    SODIUM 137 09/24/2021    K 4 1 09/24/2021     09/24/2021    CO2 34 (H) 09/24/2021    BUN 12 09/24/2021    CREATININE 1 12 09/24/2021    GLUCOSE 105 09/24/2021    CALCIUM 9 1 09/24/2021    AST 20 09/24/2021    ALT 27 09/24/2021    ALKPHOS 105 09/24/2021    EGFR 69 09/24/2021   , CBC:   Lab Results   Component Value Date    HGB 15 0 09/24/2021    HCT 44 09/24/2021    and ISTAT: No components found for: VBG  Imaging/EKG Studies: Chest X-Ray: No acute traumatic injuries, Extremities: Left wrist: no fracture or traumatic dislocation, CT Scan Head: No intracranial hemorrhage or fracture, CT Scan C-Spine: no fracture or traumatic malalignment  Other Studies: none    Code Status: Prior

## 2021-09-24 NOTE — ED NOTES
Pt stating he doesn't need a splint and that he won't be following up with the ortho doctor because "it will heal on it's own"   Placed splint on pt's L damon Salas  09/24/21 1944

## 2021-09-24 NOTE — ED NOTES
Patient states "I dont need this thing on, I want to go  Can you please take this heplock out " patient removed BP cuff/SPO2  Says that he does not want it on  Provider at bedside to give diagnoses   IV removed     Suha Obrien RN  09/24/21 1942

## 2021-09-24 NOTE — ED PROVIDER NOTES
Emergency Department Airway Evaluation and Management Form    History  Obtained from:  Patient himself  Medical tape  Chief Complaint   Patient presents with    Fall     while mowing lawn -LOC, +head strike, +plavix     HPI patient moaning the lawn slipped fell struck his head, patient on Plavix and aspirin    Past Medical History:   Diagnosis Date    Anxiety     CAD (coronary artery disease)     Depression     Dizziness     Eye problems     Hypothyroidism     Migraines     Stroke ([address] ) 11/17/2014    Tremors of nervous system      Past Surgical History:   Procedure Laterality Date    COLONOSCOPY  09/01/2017    COLONOSCOPY      CORONARY STENT PLACEMENT      DENTAL SURGERY      entire top teeth removal    HERNIA REPAIR      [place] CABG, ARTERY-VEIN, FOUR N/A 3/24/2021    Procedure: CORONARY ARTERY BYPASS GRAFT (CABG) 3 VESSELS,  USING LEFT VANE-LAD AND LEFT GSV-RPL & RAMUS;  Surgeon: Christina Johnson MD;  [facility];  Service: Cardiac Surgery    [place] E[place] TRANSESOPHAG [address] N/A 3/24/2021    Procedure: TRANSESOPHAGEAL ECHOCARDIOGRAM (MARLEN); Surgeon: Christina Johnson MD;  [facility];  Service: Cardiac Surgery     Family History   Problem Relation Age of Onset    Cancer Mother     Heart attack Father     Heart disease Father     Heart attack Sister     Cancer Brother      Social History     Tobacco Use    Smoking status: Never Smoker    Smokeless tobacco: Never Used   Vaping Use    Vaping Use: Never used   Substance Use Topics    Alcohol use: No    Drug use: No     I have reviewed and agree with the history as documented      Review of Systems    Physical Exam  BP (!) 179/92   Pulse 63   Temp 98 6 °F (37 °C) (Tympanic)   Resp 18   Wt 102 kg (225 lb 15 5 oz)   SpO2 97%   BMI 33 37 kg/m²     Physical Exam  airway patent, clear bilateral breath sounds    ED Medications  Medications - No data to display    Intubation  Procedures    Notes  No acute airway intervention needed  , remainder of care per primary trauma team        Final Diagnosis  Final diagnoses:   None       ED Provider  Electronically Signed by     Ramiro Gann DO  09/24/21 0961

## 2021-09-25 NOTE — DISCHARGE INSTRUCTIONS
Discharge Instructions - Orthopedics      Weight Bearing Status:                                           - non weightbearing left upper extremity  - You may keep left arm in sling as needed for comfort    Pain:  - take tylenol as directed as needed for pain    Appt Instructions:   - If you do not have your appointment, please call the Orthopedic Surgery Clinic at [phone] to schedule an appointment as instructed  - Otherwise, followup as scheduled

## 2021-09-26 LAB — HIV 1+2 AB+HIV1 P24 AG SERPL QL IA: NORMAL

## 2021-10-01 ENCOUNTER — OFFICE VISIT (OUTPATIENT)
Dept: FAMILY MEDICINE CLINIC | Facility: CLINIC | Age: 66
End: 2021-10-01
Payer: MEDICARE

## 2021-10-01 VITALS
BODY MASS INDEX: 32.29 KG/M2 | TEMPERATURE: 98.3 F | HEART RATE: 62 BPM | OXYGEN SATURATION: 98 % | HEIGHT: 69 IN | WEIGHT: 218 LBS | DIASTOLIC BLOOD PRESSURE: 90 MMHG | RESPIRATION RATE: 16 BRPM | SYSTOLIC BLOOD PRESSURE: 144 MMHG

## 2021-10-01 DIAGNOSIS — I10 ESSENTIAL HYPERTENSION: ICD-10-CM

## 2021-10-01 DIAGNOSIS — F41.9 ANXIETY: ICD-10-CM

## 2021-10-01 DIAGNOSIS — I25.709 CORONARY ARTERY DISEASE INVOLVING CORONARY BYPASS GRAFT OF NATIVE HEART WITH ANGINA PECTORIS (HCC): Primary | ICD-10-CM

## 2021-10-01 DIAGNOSIS — I63.00 CEREBROVASCULAR ACCIDENT (CVA) DUE TO THROMBOSIS OF PRECEREBRAL ARTERY (HCC): ICD-10-CM

## 2021-10-01 DIAGNOSIS — E78.2 MIXED HYPERLIPIDEMIA: ICD-10-CM

## 2021-10-01 DIAGNOSIS — I50.32 CHRONIC DIASTOLIC HEART FAILURE (HCC): ICD-10-CM

## 2021-10-01 DIAGNOSIS — N52.9 ERECTILE DYSFUNCTION, UNSPECIFIED ERECTILE DYSFUNCTION TYPE: ICD-10-CM

## 2021-10-01 DIAGNOSIS — Z95.1 S/P CABG (CORONARY ARTERY BYPASS GRAFT): ICD-10-CM

## 2021-10-01 PROCEDURE — 99214 OFFICE O/P EST MOD 30 MIN: CPT | Performed by: FAMILY MEDICINE

## 2021-10-01 RX ORDER — SILDENAFIL 100 MG/1
100 TABLET, FILM COATED ORAL DAILY PRN
Qty: 30 TABLET | Refills: 5 | Status: SHIPPED | OUTPATIENT
Start: 2021-10-01 | End: 2022-04-11 | Stop reason: SDUPTHER

## 2021-10-23 DIAGNOSIS — M25.579 ACUTE ANKLE PAIN, UNSPECIFIED LATERALITY: ICD-10-CM

## 2021-10-23 DIAGNOSIS — F41.9 ANXIETY: ICD-10-CM

## 2021-10-23 DIAGNOSIS — R07.9 CHEST PAIN, UNSPECIFIED TYPE: ICD-10-CM

## 2021-10-25 RX ORDER — ACETAMINOPHEN AND CODEINE PHOSPHATE 300; 30 MG/1; MG/1
1 TABLET ORAL EVERY 4 HOURS PRN
Qty: 30 TABLET | Refills: 0 | Status: SHIPPED | OUTPATIENT
Start: 2021-10-25 | End: 2022-01-20 | Stop reason: SDUPTHER

## 2021-10-25 RX ORDER — CLONAZEPAM 2 MG/1
4 TABLET ORAL 2 TIMES DAILY PRN
Qty: 180 TABLET | Refills: 0 | Status: SHIPPED | OUTPATIENT
Start: 2021-10-25 | End: 2021-11-29 | Stop reason: SDUPTHER

## 2021-10-25 RX ORDER — OXYCODONE HYDROCHLORIDE 5 MG/1
5 TABLET ORAL EVERY 4 HOURS PRN
Qty: 16 TABLET | Refills: 0 | Status: SHIPPED | OUTPATIENT
Start: 2021-10-25 | End: 2022-01-20 | Stop reason: SDUPTHER

## 2021-10-26 ENCOUNTER — TELEPHONE (OUTPATIENT)
Dept: FAMILY MEDICINE CLINIC | Facility: CLINIC | Age: 66
End: 2021-10-26

## 2021-10-26 DIAGNOSIS — E78.2 MIXED HYPERLIPIDEMIA: ICD-10-CM

## 2021-10-26 DIAGNOSIS — I25.709 CORONARY ARTERY DISEASE INVOLVING CORONARY BYPASS GRAFT OF NATIVE HEART WITH ANGINA PECTORIS (HCC): Primary | ICD-10-CM

## 2021-10-26 RX ORDER — ATORVASTATIN CALCIUM 40 MG/1
80 TABLET, FILM COATED ORAL DAILY
Qty: 180 TABLET | Refills: 1 | Status: SHIPPED | OUTPATIENT
Start: 2021-10-26 | End: 2022-07-23 | Stop reason: SDUPTHER

## 2021-10-28 ENCOUNTER — TELEPHONE (OUTPATIENT)
Dept: FAMILY MEDICINE CLINIC | Facility: CLINIC | Age: 66
End: 2021-10-28

## 2021-10-31 DIAGNOSIS — E03.9 HYPOTHYROIDISM, UNSPECIFIED TYPE: ICD-10-CM

## 2021-10-31 DIAGNOSIS — Z95.1 S/P CABG (CORONARY ARTERY BYPASS GRAFT): ICD-10-CM

## 2021-11-01 RX ORDER — CLOPIDOGREL BISULFATE 75 MG/1
75 TABLET ORAL DAILY
Qty: 90 TABLET | Refills: 0 | Status: SHIPPED | OUTPATIENT
Start: 2021-11-01 | End: 2022-01-20 | Stop reason: SDUPTHER

## 2021-11-01 RX ORDER — LEVOTHYROXINE SODIUM 0.1 MG/1
100 TABLET ORAL DAILY
Qty: 90 TABLET | Refills: 0 | Status: SHIPPED | OUTPATIENT
Start: 2021-11-01 | End: 2022-01-20 | Stop reason: SDUPTHER

## 2021-11-20 DIAGNOSIS — M54.50 LOW BACK PAIN WITHOUT SCIATICA, UNSPECIFIED BACK PAIN LATERALITY, UNSPECIFIED CHRONICITY: ICD-10-CM

## 2021-11-22 RX ORDER — TIZANIDINE 2 MG/1
2 TABLET ORAL EVERY 8 HOURS PRN
Qty: 270 TABLET | Refills: 0 | Status: SHIPPED | OUTPATIENT
Start: 2021-11-22 | End: 2022-02-16 | Stop reason: SDUPTHER

## 2021-11-27 ENCOUNTER — IMMUNIZATIONS (OUTPATIENT)
Dept: FAMILY MEDICINE CLINIC | Facility: HOSPITAL | Age: 66
End: 2021-11-27

## 2021-11-27 DIAGNOSIS — Z23 ENCOUNTER FOR IMMUNIZATION: Primary | ICD-10-CM

## 2021-11-27 PROCEDURE — 0001A COVID-19 PFIZER VACC 0.3 ML: CPT

## 2021-11-27 PROCEDURE — 91300 COVID-19 PFIZER VACC 0.3 ML: CPT

## 2021-11-29 DIAGNOSIS — F41.9 ANXIETY: ICD-10-CM

## 2021-11-30 RX ORDER — CLONAZEPAM 2 MG/1
4 TABLET ORAL 2 TIMES DAILY PRN
Qty: 180 TABLET | Refills: 0 | Status: SHIPPED | OUTPATIENT
Start: 2021-11-30 | End: 2021-12-28 | Stop reason: SDUPTHER

## 2021-12-14 DIAGNOSIS — G47.00 INSOMNIA, UNSPECIFIED TYPE: ICD-10-CM

## 2021-12-14 RX ORDER — ZOLPIDEM TARTRATE 10 MG/1
TABLET ORAL
Qty: 180 TABLET | Refills: 0 | Status: SHIPPED | OUTPATIENT
Start: 2021-12-14 | End: 2022-02-22 | Stop reason: SDUPTHER

## 2021-12-22 ENCOUNTER — APPOINTMENT (EMERGENCY)
Dept: RADIOLOGY | Facility: HOSPITAL | Age: 66
DRG: 392 | End: 2021-12-22
Payer: MEDICARE

## 2021-12-22 ENCOUNTER — HOSPITAL ENCOUNTER (INPATIENT)
Facility: HOSPITAL | Age: 66
LOS: 1 days | Discharge: HOME | DRG: 392 | End: 2021-12-23
Attending: EMERGENCY MEDICINE | Admitting: INTERNAL MEDICINE
Payer: MEDICARE

## 2021-12-22 ENCOUNTER — OFFICE VISIT (OUTPATIENT)
Dept: FAMILY MEDICINE CLINIC | Facility: CLINIC | Age: 66
End: 2021-12-22
Payer: MEDICARE

## 2021-12-22 VITALS
SYSTOLIC BLOOD PRESSURE: 134 MMHG | WEIGHT: 220 LBS | OXYGEN SATURATION: 97 % | HEIGHT: 69 IN | DIASTOLIC BLOOD PRESSURE: 88 MMHG | HEART RATE: 74 BPM | TEMPERATURE: 98 F | RESPIRATION RATE: 16 BRPM | BODY MASS INDEX: 32.58 KG/M2

## 2021-12-22 DIAGNOSIS — K52.9 COLITIS: Primary | ICD-10-CM

## 2021-12-22 DIAGNOSIS — K92.2 LOWER GI BLEED: ICD-10-CM

## 2021-12-22 DIAGNOSIS — D64.9 ANEMIA, UNSPECIFIED TYPE: Primary | ICD-10-CM

## 2021-12-22 DIAGNOSIS — R19.7 DIARRHEA OF PRESUMED INFECTIOUS ORIGIN: ICD-10-CM

## 2021-12-22 DIAGNOSIS — K62.5 BRIGHT RED BLOOD PER RECTUM: ICD-10-CM

## 2021-12-22 DIAGNOSIS — K92.1 BLOOD IN STOOL: ICD-10-CM

## 2021-12-22 DIAGNOSIS — F11.20 CONTINUOUS OPIOID DEPENDENCE (HCC): ICD-10-CM

## 2021-12-22 LAB
ABO GROUP BLD: NORMAL
ALBUMIN SERPL BCP-MCNC: 4.1 G/DL (ref 3.5–5)
ALP SERPL-CCNC: 107 U/L (ref 46–116)
ALT SERPL W P-5'-P-CCNC: 28 U/L (ref 12–78)
ANION GAP SERPL CALCULATED.3IONS-SCNC: 8 MMOL/L (ref 4–13)
APTT PPP: 26 SECONDS (ref 23–37)
AST SERPL W P-5'-P-CCNC: 22 U/L (ref 5–45)
BASOPHILS # BLD AUTO: 0.04 THOUSANDS/ΜL (ref 0–0.1)
BASOPHILS NFR BLD AUTO: 1 % (ref 0–1)
BILIRUB SERPL-MCNC: 0.93 MG/DL (ref 0.2–1)
BLD GP AB SCN SERPL QL: NEGATIVE
BUN SERPL-MCNC: 12 MG/DL (ref 5–25)
CALCIUM SERPL-MCNC: 9.7 MG/DL (ref 8.3–10.1)
CHLORIDE SERPL-SCNC: 101 MMOL/L (ref 100–108)
CO2 SERPL-SCNC: 28 MMOL/L (ref 21–32)
CREAT SERPL-MCNC: 1.25 MG/DL (ref 0.6–1.3)
EOSINOPHIL # BLD AUTO: 0.17 THOUSAND/ΜL (ref 0–0.61)
EOSINOPHIL NFR BLD AUTO: 2 % (ref 0–6)
ERYTHROCYTE [DISTWIDTH] IN BLOOD BY AUTOMATED COUNT: 13.1 % (ref 11.6–15.1)
GFR SERPL CREATININE-BSD FRML MDRD: 60 ML/MIN/1.73SQ M
GLUCOSE SERPL-MCNC: 141 MG/DL (ref 65–140)
HCT VFR BLD AUTO: 46.7 % (ref 36.5–49.3)
HGB BLD-MCNC: 16.1 G/DL (ref 12–17)
IMM GRANULOCYTES # BLD AUTO: 0.03 THOUSAND/UL (ref 0–0.2)
IMM GRANULOCYTES NFR BLD AUTO: 0 % (ref 0–2)
INR PPP: 1 (ref 0.84–1.19)
LIPASE SERPL-CCNC: 172 U/L (ref 73–393)
LYMPHOCYTES # BLD AUTO: 1.16 THOUSANDS/ΜL (ref 0.6–4.47)
LYMPHOCYTES NFR BLD AUTO: 15 % (ref 14–44)
MCH RBC QN AUTO: 34 PG (ref 26.8–34.3)
MCHC RBC AUTO-ENTMCNC: 34.5 G/DL (ref 31.4–37.4)
MCV RBC AUTO: 99 FL (ref 82–98)
MONOCYTES # BLD AUTO: 0.48 THOUSAND/ΜL (ref 0.17–1.22)
MONOCYTES NFR BLD AUTO: 6 % (ref 4–12)
NEUTROPHILS # BLD AUTO: 5.67 THOUSANDS/ΜL (ref 1.85–7.62)
NEUTS SEG NFR BLD AUTO: 76 % (ref 43–75)
NRBC BLD AUTO-RTO: 0 /100 WBCS
PLATELET # BLD AUTO: 154 THOUSANDS/UL (ref 149–390)
PMV BLD AUTO: 9.7 FL (ref 8.9–12.7)
POTASSIUM SERPL-SCNC: 3.4 MMOL/L (ref 3.5–5.3)
PROT SERPL-MCNC: 8.1 G/DL (ref 6.4–8.2)
PROTHROMBIN TIME: 12.8 SECONDS (ref 11.6–14.5)
RBC # BLD AUTO: 4.74 MILLION/UL (ref 3.88–5.62)
RH BLD: POSITIVE
SODIUM SERPL-SCNC: 137 MMOL/L (ref 136–145)
SPECIMEN EXPIRATION DATE: NORMAL
WBC # BLD AUTO: 7.55 THOUSAND/UL (ref 4.31–10.16)

## 2021-12-22 PROCEDURE — 99223 1ST HOSP IP/OBS HIGH 75: CPT | Performed by: INTERNAL MEDICINE

## 2021-12-22 PROCEDURE — G1004 CDSM NDSC: HCPCS

## 2021-12-22 PROCEDURE — 99213 OFFICE O/P EST LOW 20 MIN: CPT | Performed by: FAMILY MEDICINE

## 2021-12-22 PROCEDURE — 99285 EMERGENCY DEPT VISIT HI MDM: CPT

## 2021-12-22 PROCEDURE — 82272 OCCULT BLD FECES 1-3 TESTS: CPT

## 2021-12-22 PROCEDURE — 85730 THROMBOPLASTIN TIME PARTIAL: CPT | Performed by: EMERGENCY MEDICINE

## 2021-12-22 PROCEDURE — 99285 EMERGENCY DEPT VISIT HI MDM: CPT | Performed by: EMERGENCY MEDICINE

## 2021-12-22 PROCEDURE — 83690 ASSAY OF LIPASE: CPT | Performed by: EMERGENCY MEDICINE

## 2021-12-22 PROCEDURE — 85610 PROTHROMBIN TIME: CPT | Performed by: EMERGENCY MEDICINE

## 2021-12-22 PROCEDURE — C9113 INJ PANTOPRAZOLE SODIUM, VIA: HCPCS | Performed by: INTERNAL MEDICINE

## 2021-12-22 PROCEDURE — 36415 COLL VENOUS BLD VENIPUNCTURE: CPT | Performed by: EMERGENCY MEDICINE

## 2021-12-22 PROCEDURE — 80053 COMPREHEN METABOLIC PANEL: CPT | Performed by: EMERGENCY MEDICINE

## 2021-12-22 PROCEDURE — 86850 RBC ANTIBODY SCREEN: CPT | Performed by: EMERGENCY MEDICINE

## 2021-12-22 PROCEDURE — 86900 BLOOD TYPING SEROLOGIC ABO: CPT | Performed by: EMERGENCY MEDICINE

## 2021-12-22 PROCEDURE — 74177 CT ABD & PELVIS W/CONTRAST: CPT

## 2021-12-22 PROCEDURE — 86901 BLOOD TYPING SEROLOGIC RH(D): CPT | Performed by: EMERGENCY MEDICINE

## 2021-12-22 PROCEDURE — 85025 COMPLETE CBC W/AUTO DIFF WBC: CPT | Performed by: EMERGENCY MEDICINE

## 2021-12-22 RX ORDER — METOPROLOL SUCCINATE 100 MG/1
100 TABLET, EXTENDED RELEASE ORAL DAILY
Status: DISCONTINUED | OUTPATIENT
Start: 2021-12-23 | End: 2021-12-23 | Stop reason: HOSPADM

## 2021-12-22 RX ORDER — ONDANSETRON 2 MG/ML
4 INJECTION INTRAMUSCULAR; INTRAVENOUS EVERY 6 HOURS PRN
Status: DISCONTINUED | OUTPATIENT
Start: 2021-12-22 | End: 2021-12-23 | Stop reason: HOSPADM

## 2021-12-22 RX ORDER — ZOLPIDEM TARTRATE 5 MG/1
10 TABLET ORAL
Status: DISCONTINUED | OUTPATIENT
Start: 2021-12-22 | End: 2021-12-23 | Stop reason: HOSPADM

## 2021-12-22 RX ORDER — PANTOPRAZOLE SODIUM 40 MG/1
40 INJECTION, POWDER, FOR SOLUTION INTRAVENOUS EVERY 12 HOURS
Status: DISCONTINUED | OUTPATIENT
Start: 2021-12-22 | End: 2021-12-23 | Stop reason: HOSPADM

## 2021-12-22 RX ORDER — MAGNESIUM HYDROXIDE/ALUMINUM HYDROXICE/SIMETHICONE 120; 1200; 1200 MG/30ML; MG/30ML; MG/30ML
30 SUSPENSION ORAL EVERY 6 HOURS PRN
Status: DISCONTINUED | OUTPATIENT
Start: 2021-12-22 | End: 2021-12-23 | Stop reason: HOSPADM

## 2021-12-22 RX ORDER — TIZANIDINE 2 MG/1
2 TABLET ORAL EVERY 8 HOURS PRN
Status: DISCONTINUED | OUTPATIENT
Start: 2021-12-22 | End: 2021-12-23 | Stop reason: HOSPADM

## 2021-12-22 RX ORDER — ACETAMINOPHEN 325 MG/1
650 TABLET ORAL EVERY 6 HOURS PRN
Status: DISCONTINUED | OUTPATIENT
Start: 2021-12-22 | End: 2021-12-23 | Stop reason: HOSPADM

## 2021-12-22 RX ORDER — OXYCODONE HYDROCHLORIDE 5 MG/1
5 TABLET ORAL EVERY 4 HOURS PRN
Status: DISCONTINUED | OUTPATIENT
Start: 2021-12-22 | End: 2021-12-23 | Stop reason: HOSPADM

## 2021-12-22 RX ORDER — LEVOTHYROXINE SODIUM 0.1 MG/1
100 TABLET ORAL
Status: DISCONTINUED | OUTPATIENT
Start: 2021-12-23 | End: 2021-12-23 | Stop reason: HOSPADM

## 2021-12-22 RX ORDER — ATORVASTATIN CALCIUM 20 MG/1
80 TABLET, FILM COATED ORAL DAILY
Status: DISCONTINUED | OUTPATIENT
Start: 2021-12-23 | End: 2021-12-23 | Stop reason: HOSPADM

## 2021-12-22 RX ORDER — CLONAZEPAM 1 MG/1
4 TABLET ORAL 2 TIMES DAILY PRN
Status: DISCONTINUED | OUTPATIENT
Start: 2021-12-22 | End: 2021-12-23 | Stop reason: HOSPADM

## 2021-12-22 RX ADMIN — IOHEXOL 65 ML: 350 INJECTION, SOLUTION INTRAVENOUS at 21:59

## 2021-12-22 RX ADMIN — PANTOPRAZOLE SODIUM 40 MG: 40 INJECTION, POWDER, FOR SOLUTION INTRAVENOUS at 22:40

## 2021-12-22 RX ADMIN — IOHEXOL 85 ML: 350 INJECTION, SOLUTION INTRAVENOUS at 22:10

## 2021-12-23 ENCOUNTER — TELEPHONE (OUTPATIENT)
Dept: GASTROENTEROLOGY | Facility: CLINIC | Age: 66
End: 2021-12-23

## 2021-12-23 ENCOUNTER — TELEPHONE (OUTPATIENT)
Dept: RADIOLOGY | Facility: HOSPITAL | Age: 66
End: 2021-12-23

## 2021-12-23 ENCOUNTER — TELEPHONE (OUTPATIENT)
Dept: FAMILY MEDICINE CLINIC | Facility: CLINIC | Age: 66
End: 2021-12-23

## 2021-12-23 VITALS
BODY MASS INDEX: 32.49 KG/M2 | DIASTOLIC BLOOD PRESSURE: 119 MMHG | HEART RATE: 89 BPM | RESPIRATION RATE: 17 BRPM | WEIGHT: 220 LBS | SYSTOLIC BLOOD PRESSURE: 191 MMHG | TEMPERATURE: 98.3 F | OXYGEN SATURATION: 96 %

## 2021-12-23 LAB
ALBUMIN SERPL BCP-MCNC: 4.1 G/DL (ref 3.5–5)
ALP SERPL-CCNC: 116 U/L (ref 46–116)
ALT SERPL W P-5'-P-CCNC: 28 U/L (ref 12–78)
ANION GAP SERPL CALCULATED.3IONS-SCNC: 7 MMOL/L (ref 4–13)
AST SERPL W P-5'-P-CCNC: 22 U/L (ref 5–45)
BASOPHILS # BLD AUTO: 0.05 THOUSANDS/ΜL (ref 0–0.1)
BASOPHILS NFR BLD AUTO: 1 % (ref 0–1)
BILIRUB SERPL-MCNC: 1.6 MG/DL (ref 0.2–1)
BILIRUB UR QL STRIP: NEGATIVE
BUN SERPL-MCNC: 10 MG/DL (ref 5–25)
CALCIUM SERPL-MCNC: 9.6 MG/DL (ref 8.3–10.1)
CHLORIDE SERPL-SCNC: 100 MMOL/L (ref 100–108)
CLARITY UR: CLEAR
CO2 SERPL-SCNC: 30 MMOL/L (ref 21–32)
COLOR UR: YELLOW
CREAT SERPL-MCNC: 1.28 MG/DL (ref 0.6–1.3)
EOSINOPHIL # BLD AUTO: 0.15 THOUSAND/ΜL (ref 0–0.61)
EOSINOPHIL NFR BLD AUTO: 2 % (ref 0–6)
ERYTHROCYTE [DISTWIDTH] IN BLOOD BY AUTOMATED COUNT: 13.3 % (ref 11.6–15.1)
GFR SERPL CREATININE-BSD FRML MDRD: 58 ML/MIN/1.73SQ M
GLUCOSE SERPL-MCNC: 99 MG/DL (ref 65–140)
GLUCOSE UR STRIP-MCNC: NEGATIVE MG/DL
HCT VFR BLD AUTO: 45.4 % (ref 36.5–49.3)
HGB BLD-MCNC: 15.5 G/DL (ref 12–17)
HGB BLD-MCNC: 15.8 G/DL (ref 12–17)
HGB UR QL STRIP.AUTO: NEGATIVE
IMM GRANULOCYTES # BLD AUTO: 0.03 THOUSAND/UL (ref 0–0.2)
IMM GRANULOCYTES NFR BLD AUTO: 0 % (ref 0–2)
KETONES UR STRIP-MCNC: NEGATIVE MG/DL
LEUKOCYTE ESTERASE UR QL STRIP: NEGATIVE
LYMPHOCYTES # BLD AUTO: 1.47 THOUSANDS/ΜL (ref 0.6–4.47)
LYMPHOCYTES NFR BLD AUTO: 18 % (ref 14–44)
MAGNESIUM SERPL-MCNC: 1.8 MG/DL (ref 1.6–2.6)
MCH RBC QN AUTO: 33.9 PG (ref 26.8–34.3)
MCHC RBC AUTO-ENTMCNC: 34.8 G/DL (ref 31.4–37.4)
MCV RBC AUTO: 97 FL (ref 82–98)
MONOCYTES # BLD AUTO: 0.68 THOUSAND/ΜL (ref 0.17–1.22)
MONOCYTES NFR BLD AUTO: 8 % (ref 4–12)
NEUTROPHILS # BLD AUTO: 5.75 THOUSANDS/ΜL (ref 1.85–7.62)
NEUTS SEG NFR BLD AUTO: 71 % (ref 43–75)
NITRITE UR QL STRIP: NEGATIVE
NRBC BLD AUTO-RTO: 0 /100 WBCS
PH UR STRIP.AUTO: 5 [PH]
PHOSPHATE SERPL-MCNC: 3.6 MG/DL (ref 2.3–4.1)
PLATELET # BLD AUTO: 153 THOUSANDS/UL (ref 149–390)
PMV BLD AUTO: 9.3 FL (ref 8.9–12.7)
POTASSIUM SERPL-SCNC: 3.5 MMOL/L (ref 3.5–5.3)
PROT SERPL-MCNC: 8 G/DL (ref 6.4–8.2)
PROT UR STRIP-MCNC: NEGATIVE MG/DL
RBC # BLD AUTO: 4.66 MILLION/UL (ref 3.88–5.62)
SODIUM SERPL-SCNC: 137 MMOL/L (ref 136–145)
SP GR UR STRIP.AUTO: >1.045 (ref 1–1.03)
UROBILINOGEN UR QL STRIP.AUTO: 0.2 E.U./DL
WBC # BLD AUTO: 8.13 THOUSAND/UL (ref 4.31–10.16)

## 2021-12-23 PROCEDURE — 84100 ASSAY OF PHOSPHORUS: CPT | Performed by: INTERNAL MEDICINE

## 2021-12-23 PROCEDURE — 85018 HEMOGLOBIN: CPT | Performed by: INTERNAL MEDICINE

## 2021-12-23 PROCEDURE — 83735 ASSAY OF MAGNESIUM: CPT | Performed by: INTERNAL MEDICINE

## 2021-12-23 PROCEDURE — 85025 COMPLETE CBC W/AUTO DIFF WBC: CPT | Performed by: INTERNAL MEDICINE

## 2021-12-23 PROCEDURE — 99239 HOSP IP/OBS DSCHRG MGMT >30: CPT | Performed by: PHYSICIAN ASSISTANT

## 2021-12-23 PROCEDURE — 36415 COLL VENOUS BLD VENIPUNCTURE: CPT | Performed by: INTERNAL MEDICINE

## 2021-12-23 PROCEDURE — 80053 COMPREHEN METABOLIC PANEL: CPT | Performed by: INTERNAL MEDICINE

## 2021-12-23 PROCEDURE — NC001 PR NO CHARGE: Performed by: INTERNAL MEDICINE

## 2021-12-23 PROCEDURE — 81003 URINALYSIS AUTO W/O SCOPE: CPT | Performed by: INTERNAL MEDICINE

## 2021-12-23 RX ORDER — LIDOCAINE 50 MG/G
1 PATCH TOPICAL ONCE
Status: DISCONTINUED | OUTPATIENT
Start: 2021-12-23 | End: 2021-12-23 | Stop reason: HOSPADM

## 2021-12-23 RX ORDER — AZITHROMYCIN 500 MG/1
500 TABLET, FILM COATED ORAL DAILY
Qty: 3 TABLET | Refills: 0 | Status: SHIPPED | OUTPATIENT
Start: 2021-12-23 | End: 2021-12-26

## 2021-12-23 RX ADMIN — METOPROLOL SUCCINATE 100 MG: 100 TABLET, EXTENDED RELEASE ORAL at 09:04

## 2021-12-23 RX ADMIN — ATORVASTATIN CALCIUM 80 MG: 20 TABLET, FILM COATED ORAL at 09:04

## 2021-12-23 RX ADMIN — LEVOTHYROXINE SODIUM 100 MCG: 100 TABLET ORAL at 05:12

## 2021-12-23 RX ADMIN — ZOLPIDEM TARTRATE 10 MG: 5 TABLET, COATED ORAL at 00:19

## 2021-12-23 RX ADMIN — LIDOCAINE 5% 1 PATCH: 700 PATCH TOPICAL at 03:30

## 2021-12-23 NOTE — ASSESSMENT & PLAN NOTE
Confirmed patient is on Ambien, Klonopin, oxycodone and acetaminophen/codeine  Will continue with oxycodone 5 mg every 4 hours as needed for moderate pain

## 2021-12-23 NOTE — ASSESSMENT & PLAN NOTE
CT scan of the abdomen pelvis pending  Update:  CT scan of the abdomen reveals the following:   "Mural thickening of a long segment of the descending colon with surrounding inflammation consistent with nonspecific colitis i e   infectious, inflammatory or ischemic "     Clinically, the patient does not have any excruciating pain, is comfortable despite the mild discomfort; no fever  No white count  Would continue to watch  In the event that patient has fever, will get blood cultures and possibly start antibiotics?    GI consult  NPO  Pantoprazole  Hemoglobin hematocrit monitoring  CBC with metabolic profile in the morning  Hold aspirin Plavix

## 2021-12-23 NOTE — ED PROVIDER NOTES
History  Chief Complaint   Patient presents with    GI Bleeding     pt reports rectal bleeding since 0500  pt reports diarrhea starting at 0100 and again at 0400, then blood started at 0500 and hasn't stopped     62 y/o male with history of HTN and CAD presents the ER for evaluation of rectal bleeding that started this morning  He states that his symptoms started with diarrhea at 0100 this morning, with another episode of diarrhea at 0400  He then had another bowel movement at 0500, where he noticed bright red blood per rectum  He reports persistent leaking of small amounts of bright red blood since this morning  He has never had rectal bleeding in the past   He currently reports mild left lower quadrant pain as well  No fever, chills, cough, shortness of breath, chest pain, nausea, vomiting, or urinary symptoms  Prior to Admission Medications   Prescriptions Last Dose Informant Patient Reported?  Taking?   acetaminophen (TYLENOL) 325 mg tablet   No No   Sig: Take 2 tablets (650 mg total) by mouth every 6 (six) hours as needed for mild pain   acetaminophen (TYLENOL) 325 mg tablet   No No   Sig: Take 2 tablets (650 mg total) by mouth every 6 (six) hours as needed for mild pain   acetaminophen-codeine (TYLENOL #3) 300-30 mg per tablet   No No   Sig: Take 1 tablet by mouth every 4 (four) hours as needed for moderate pain   aspirin 81 MG tablet 12/21/2021 at Unknown time Self Yes Yes   Sig: Take 81 mg by mouth   atorvastatin (LIPITOR) 40 mg tablet 12/21/2021 at Unknown time  No Yes   Sig: Take 2 tablets (80 mg total) by mouth daily   clopidogrel (PLAVIX) 75 mg tablet 12/21/2021 at Unknown time  No Yes   Sig: Take 1 tablet (75 mg total) by mouth daily   levothyroxine 100 mcg tablet 12/21/2021 at Unknown time  No Yes   Sig: Take 1 tablet (100 mcg total) by mouth daily   metoprolol succinate (TOPROL-XL) 100 mg 24 hr tablet 12/21/2021 at Unknown time  No Yes   Sig: Take 1 tablet (100 mg total) by mouth daily oxyCODONE (ROXICODONE) 5 immediate release tablet   No No   Sig: Take 1 tablet (5 mg total) by mouth every 4 (four) hours as needed for moderate painMax Daily Amount: 30 mg   sildenafil (VIAGRA) 100 mg tablet   No No   Sig: Take 1 tablet (100 mg total) by mouth daily as needed for erectile dysfunction   tiZANidine (ZANAFLEX) 2 mg tablet 2021 at Unknown time  No Yes   Sig: Take 1 tablet (2 mg total) by mouth every 8 (eight) hours as needed for muscle spasms   zolpidem (AMBIEN) 10 mg tablet 2021 at Unknown time  No Yes   Si po  q hs      Facility-Administered Medications: None       Past Medical History:   Diagnosis Date    Anxiety     CAD (coronary artery disease)     Coronary artery disease     Depression     Disease of thyroid gland     Dizziness     Eye problems     Hypertension     Hypothyroidism     Migraines     Stroke ([facility] [address] ) 2014    Tremors of nervous system        Past Surgical History:   Procedure Laterality Date    COLONOSCOPY  2017    COLONOSCOPY      CORONARY ARTERY BYPASS GRAFT      CORONARY STENT PLACEMENT      DENTAL SURGERY      entire top teeth removal    HERNIA REPAIR      MN CABG, ARTERY-VEIN, FOUR N/A 3/24/2021    Procedure: CORONARY ARTERY BYPASS GRAFT (CABG) 3 VESSELS,  USING LEFT VANE-LAD AND LEFT GSV-RPL & RAMUS;  Surgeon: Chata Harding MD;  [facility];  Service: Cardiac Surgery    MN ECHO TRANSESOPHAG [address] N/A 3/24/2021    Procedure: TRANSESOPHAGEAL ECHOCARDIOGRAM (MARLEN); Surgeon: Chata Harding MD;  [facility];  Service: Cardiac Surgery       Family History   Problem Relation Age of Onset    Cancer Mother     Heart attack Father     Heart disease Father     Heart attack Sister     Cancer Brother      I have reviewed and agree with the history as documented      E-Cigarette/Vaping    E-Cigarette Use Never User      E-Cigarette/Vaping Substances    Nicotine No     THC No     CBD No     Flavoring No     Other No     Unknown No      Social History     Tobacco Use    Smoking status: Never Smoker    Smokeless tobacco: Never Used   Vaping Use    Vaping Use: Never used   Substance Use Topics    Alcohol use: No    Drug use: No        Review of Systems   Constitutional: Negative for chills and fever  HENT: Negative for congestion, rhinorrhea and sore throat  Respiratory: Negative for cough and shortness of breath  Cardiovascular: Negative for chest pain and palpitations  Gastrointestinal: Positive for blood in stool and diarrhea  Negative for abdominal pain, nausea and vomiting  Genitourinary: Negative for dysuria and hematuria  Musculoskeletal: Negative for back pain and neck pain  Neurological: Negative for weakness, light-headedness, numbness and headaches  All other systems reviewed and are negative  Physical Exam  ED Triage Vitals   Temperature Pulse Respirations Blood Pressure SpO2   12/22/21 1919 12/22/21 1919 12/22/21 1919 12/22/21 1921 12/22/21 1919   98 3 °F (36 8 °C) 78 18 (!) 210/105 96 %      Temp Source Heart Rate Source Patient Position - Orthostatic VS BP Location FiO2 (%)   12/22/21 1919 12/22/21 1919 12/22/21 1919 12/22/21 1919 --   Tympanic Monitor Sitting Left arm       Pain Score       --                    Orthostatic Vital Signs  Vitals:    12/22/21 2145 12/22/21 2315 12/23/21 0230 12/23/21 0737   BP: (!) 174/87 (!) 185/99 (!) 164/112 (!) 191/119   Pulse: 70 72 86 89   Patient Position - Orthostatic VS: Lying   Lying       Physical Exam  Vitals and nursing note reviewed  Constitutional:       General: He is not in acute distress  Appearance: Normal appearance  He is normal weight  HENT:      Head: Normocephalic and atraumatic  Right Ear: External ear normal       Left Ear: External ear normal       Nose: Nose normal       Mouth/Throat:      Mouth: Mucous membranes are moist       Pharynx: Oropharynx is clear   No oropharyngeal exudate or posterior oropharyngeal erythema  Eyes:      Extraocular Movements: Extraocular movements intact  Conjunctiva/sclera: Conjunctivae normal       Pupils: Pupils are equal, round, and reactive to light  Cardiovascular:      Rate and Rhythm: Normal rate and regular rhythm  Pulses: Normal pulses  Heart sounds: Normal heart sounds  Pulmonary:      Effort: Pulmonary effort is normal  No respiratory distress  Breath sounds: Normal breath sounds  No wheezing or rales  Abdominal:      General: Abdomen is flat  Bowel sounds are normal  There is no distension  Palpations: Abdomen is soft  Tenderness: There is no right CVA tenderness, left CVA tenderness or guarding  Comments: Mild left lower quadrant tenderness  Genitourinary:     Rectum: Guaiac result positive  Comments: External examination of the rectum reveals small amount of bright red blood  No obvious anal fissures or hemorrhoids  On digital rectal exam mucus is heme-positive, QC okay  Musculoskeletal:         General: No swelling or tenderness  Normal range of motion  Cervical back: Normal range of motion and neck supple  No tenderness  Skin:     General: Skin is warm and dry  Neurological:      General: No focal deficit present  Mental Status: He is alert and oriented to person, place, and time           ED Medications  Medications   iohexol (OMNIPAQUE) 350 MG/ML injection (SINGLE-DOSE) 100 mL (65 mL Intravenous Given 12/22/21 2159)   iohexol (OMNIPAQUE) 350 MG/ML injection (SINGLE-DOSE) 100 mL (85 mL Intravenous Given 12/22/21 2210)       Diagnostic Studies  Results Reviewed     Procedure Component Value Units Date/Time    Comprehensive metabolic panel [[id number]]  (Abnormal) Collected: 12/23/21 0511    Lab Status: Final result Specimen: Blood from Arm, Left Updated: 12/23/21 0604     Sodium 137 mmol/L      Potassium 3 5 mmol/L      Chloride 100 mmol/L      CO2 30 mmol/L ANION GAP 7 mmol/L      BUN 10 mg/dL      Creatinine 1 28 mg/dL      Glucose 99 mg/dL      Calcium 9 6 mg/dL      AST 22 U/L      ALT 28 U/L      Alkaline Phosphatase 116 U/L      Total Protein 8 0 g/dL      Albumin 4 1 g/dL      Total Bilirubin 1 60 mg/dL      eGFR 58 ml/min/1 73sq m     Narrative:      [place] guidelines for Chronic Kidney Disease (CKD):     Stage 1 with normal or high GFR (GFR > 90 mL/min/1 73 square meters)    Stage 2 Mild CKD (GFR = 60-89 mL/min/1 73 square meters)    Stage 3A Moderate CKD (GFR = 45-59 mL/min/1 73 square meters)    Stage 3B Moderate CKD (GFR = 30-44 mL/min/1 73 square meters)    Stage 4 Severe CKD (GFR = 15-29 mL/min/1 73 square meters)    Stage 5 End Stage CKD (GFR <15 mL/min/1 73 square meters)  Note: GFR calculation is accurate only with a steady state creatinine    Magnesium [[id number]]  (Normal) Collected: 12/23/21 0511    Lab Status: Final result Specimen: Blood from Arm, Left Updated: 12/23/21 0557     Magnesium 1 8 mg/dL     Phosphorus [[id number]]  (Normal) Collected: 12/23/21 0511    Lab Status: Final result Specimen: Blood from Arm, Left Updated: 12/23/21 0557     Phosphorus 3 6 mg/dL     Serial Hemoglobin Q8hrs [[id number]]  (Normal) Collected: 12/23/21 0511    Lab Status: Final result Specimen: Blood from Arm, Left Updated: 12/23/21 0528     Hemoglobin 15 5 g/dL     CBC and differential [[id number]] Collected: 12/23/21 0511    Lab Status: Final result Specimen: Blood from Arm, Left Updated: 12/23/21 0527     WBC 8 13 Thousand/uL      RBC 4 66 Million/uL      Hemoglobin 15 8 g/dL      Hematocrit 45 4 %      MCV 97 fL      MCH 33 9 pg      MCHC 34 8 g/dL      RDW 13 3 %      MPV 9 3 fL      Platelets 531 Thousands/uL      nRBC 0 /100 WBCs      Neutrophils Relative 71 %      Immat GRANS % 0 %      Lymphocytes Relative 18 %      Monocytes Relative 8 %      Eosinophils Relative 2 %      Basophils Relative 1 %      Neutrophils Absolute 5 75 Thousands/µL      Immature Grans Absolute 0 03 Thousand/uL      Lymphocytes Absolute 1 47 Thousands/µL      Monocytes Absolute 0 68 Thousand/µL      Eosinophils Absolute 0 15 Thousand/µL      Basophils Absolute 0 05 Thousands/µL     UA (URINE) with reflex to Scope [[id number]]  (Abnormal) Resulted: 12/23/21 0413    Lab Status: Final result Specimen: Urine Updated: 12/23/21 0413     Color, UA Yellow     Clarity, UA Clear     Specific Gravity, UA >1 045     pH, UA 5 0     Leukocytes, UA Negative     Nitrite, UA Negative     Protein, UA Negative mg/dl      Glucose, UA Negative mg/dl      Ketones, UA Negative mg/dl      Urobilinogen, UA 0 2 E U /dl      Bilirubin, UA Negative     Blood, UA Negative    Protime-INR [[id number]]  (Normal) Collected: 12/22/21 2117    Lab Status: Final result Specimen: Blood from Arm, Right Updated: 12/22/21 2138     Protime 12 8 seconds      INR 1 00    APTT [[id number]]  (Normal) Collected: 12/22/21 2117    Lab Status: Final result Specimen: Blood from Arm, Right Updated: 12/22/21 2138     PTT 26 seconds     Comprehensive metabolic panel [[id number]]  (Abnormal) Collected: 12/22/21 2045    Lab Status: Final result Specimen: Blood from Arm, Right Updated: 12/22/21 2111     Sodium 137 mmol/L      Potassium 3 4 mmol/L      Chloride 101 mmol/L      CO2 28 mmol/L      ANION GAP 8 mmol/L      BUN 12 mg/dL      Creatinine 1 25 mg/dL      Glucose 141 mg/dL      Calcium 9 7 mg/dL      AST 22 U/L      ALT 28 U/L      Alkaline Phosphatase 107 U/L      Total Protein 8 1 g/dL      Albumin 4 1 g/dL      Total Bilirubin 0 93 mg/dL      eGFR 60 ml/min/1 73sq m     Narrative:      [place] guidelines for Chronic Kidney Disease (CKD):     Stage 1 with normal or high GFR (GFR > 90 mL/min/1 73 square meters)    Stage 2 Mild CKD (GFR = 60-89 mL/min/1 73 square meters)    Stage 3A Moderate CKD (GFR = 45-59 mL/min/1 73 square meters)    Stage 3B Moderate CKD (GFR = 30-44 mL/min/1 73 square meters)    Stage 4 Severe CKD (GFR = 15-29 mL/min/1 73 square meters)    Stage 5 End Stage CKD (GFR <15 mL/min/1 73 square meters)  Note: GFR calculation is accurate only with a steady state creatinine    Lipase [[id number]]  (Normal) Collected: 12/22/21 2045    Lab Status: Final result Specimen: Blood from Arm, Right Updated: 12/22/21 2111     Lipase 172 u/L     CBC and differential [[id number]]  (Abnormal) Collected: 12/22/21 2045    Lab Status: Final result Specimen: Blood from Arm, Right Updated: 12/22/21 2055     WBC 7 55 Thousand/uL      RBC 4 74 Million/uL      Hemoglobin 16 1 g/dL      Hematocrit 46 7 %      MCV 99 fL      MCH 34 0 pg      MCHC 34 5 g/dL      RDW 13 1 %      MPV 9 7 fL      Platelets 163 Thousands/uL      nRBC 0 /100 WBCs      Neutrophils Relative 76 %      Immat GRANS % 0 %      Lymphocytes Relative 15 %      Monocytes Relative 6 %      Eosinophils Relative 2 %      Basophils Relative 1 %      Neutrophils Absolute 5 67 Thousands/µL      Immature Grans Absolute 0 03 Thousand/uL      Lymphocytes Absolute 1 16 Thousands/µL      Monocytes Absolute 0 48 Thousand/µL      Eosinophils Absolute 0 17 Thousand/µL      Basophils Absolute 0 04 Thousands/µL                  CT abdomen pelvis with contrast   Final Result by Nii Pham MD (12/22 2239)      Mural thickening of a long segment of the descending colon with surrounding inflammation consistent with nonspecific colitis i e   infectious, inflammatory or ischemic  The study was marked in [facility] for immediate notification  Workstation performed: [id number]               Procedures  Procedures      ED Course                                       MDM  Number of Diagnoses or Management Options  Bright red blood per rectum  Colitis  Diagnosis management comments: This is a 53-year-old male with history of hypertension coronary artery disease presenting for evaluation of bright red blood per rectum today    Associated diarrhea and mild left lower quadrant pain  On exam the patient is afebrile, hypertensive, otherwise VSS  No acute distress  Heart regular rate and rhythm, lungs are clear to auscultation bilaterally  Mild left lower quadrant tenderness to palpation  External examination of the rectum reveals small amount of bright red blood  No obvious anal fissures or hemorrhoids  On digital rectal exam mucus is heme-positive, QC okay  Differential includes colon cancer diverticulitis, colitis, and less likely high volume upper GI bleed  Will obtain labs and CT imaging  CT results show signs of colitis  Discussed indications for admission with the patient and he understands and agrees  Case discussed with [facility] IM for admission  Disposition  Final diagnoses:   Colitis   Bright red blood per rectum     Time reflects when diagnosis was documented in both MDM as applicable and the Disposition within this note     Time User Action Codes Description Comment    12/22/2021 10:18 PM Stephan Lie Add [K92 2] Lower GI bleed     12/23/2021 12:11 AM Lysle Quiet Add [K52 9] Colitis     12/23/2021 12:11 AM Lysle Quiet Modify [K92 2] Lower GI bleed     12/23/2021 12:11 AM Lysle Quiet Modify [K52 9] Colitis     12/23/2021 12:12 AM Lysle Quiet Add [K62 5] Bright red blood per rectum     12/23/2021  8:58 AM PasaliSara ibarra Add [R19 7] Diarrhea of presumed infectious origin        ED Disposition     ED Disposition Condition Date/Time Comment    Admit Stable Thu Dec 23, 2021 12:11 AM Case was discussed with Dr Alvin Villegas, and the patient's admission status was agreed to be Admission Status: inpatient status to the service of [facility]z          Follow-up Information     Follow up With Specialties Details Why Contact Info    Yogi Darby MD Family Medicine Follow up in 2 week(s)  [address] [facility] [address] [facility] [address]  [address] [facility], [address] [facility] Gastroenterology Follow up  [address] [place] [address]  [phone]            Discharge Medication List as of 12/23/2021  9:17 AM      START taking these medications    Details   azithromycin (ZITHROMAX) 500 MG tablet Take 1 tablet (500 mg total) by mouth daily for 3 days, Starting Thu 12/23/2021, Until Sun 12/26/2021, Normal         CONTINUE these medications which have NOT CHANGED    Details   !! acetaminophen (TYLENOL) 325 mg tablet Take 2 tablets (650 mg total) by mouth every 6 (six) hours as needed for mild pain, Starting Fri 7/23/2021, No Print      !! acetaminophen (TYLENOL) 325 mg tablet Take 2 tablets (650 mg total) by mouth every 6 (six) hours as needed for mild pain, Starting Fri 9/24/2021, No Print      acetaminophen-codeine (TYLENOL #3) 300-30 mg per tablet Take 1 tablet by mouth every 4 (four) hours as needed for moderate pain, Starting Mon 10/25/2021, Normal      aspirin 81 MG tablet Take 81 mg by mouth, Historical Med      atorvastatin (LIPITOR) 40 mg tablet Take 2 tablets (80 mg total) by mouth daily, Starting Tue 10/26/2021, Normal      clopidogrel (PLAVIX) 75 mg tablet Take 1 tablet (75 mg total) by mouth daily, Starting Mon 11/1/2021, Normal      levothyroxine 100 mcg tablet Take 1 tablet (100 mcg total) by mouth daily, Starting Mon 11/1/2021, Normal      metoprolol succinate (TOPROL-XL) 100 mg 24 hr tablet Take 1 tablet (100 mg total) by mouth daily, Starting Mon 10/25/2021, Normal      oxyCODONE (ROXICODONE) 5 immediate release tablet Take 1 tablet (5 mg total) by mouth every 4 (four) hours as needed for moderate painMax Daily Amount: 30 mg, Starting Mon 10/25/2021, Normal      sildenafil (VIAGRA) 100 mg tablet Take 1 tablet (100 mg total) by mouth daily as needed for erectile dysfunction, Starting Fri 10/1/2021, Normal      tiZANidine (ZANAFLEX) 2 mg tablet Take 1 tablet (2 mg total) by mouth every 8 (eight) hours as needed for muscle spasms, Starting Mon 11/22/2021, Normal      zolpidem (AMBIEN) 10 mg tablet 2 po  q hs, Normal      clonazePAM (KlonoPIN) 2 mg tablet Take 2 tablets (4 mg total) by mouth 2 (two) times a day as needed for seizures, Starting Tue 11/30/2021, Normal       !! - Potential duplicate medications found  Please discuss with provider  Outpatient Discharge Orders   Stool Enteric Bacterial Panel by PCR   Standing Status: Future Standing Exp  Date: 12/23/22     Giardia, EIA, Ova/Parasite   Standing Status: Future Standing Exp  Date: 12/23/22     Ambulatory referral to Gastroenterology   Standing Status: Future Number of Occurrences: 1 Standing Exp  Date: 12/23/22      Discharge Diet     Call provider for:  persistent nausea or vomiting     Call provider for: active or persistent bleeding     Call provider for:  difficulty breathing, headache or visual disturbances     Call provider for:  severe uncontrolled pain     Call provider for:  persistent dizziness or light-headedness       PDMP Review       Value Time User    PDMP Reviewed  Yes 12/29/2021  9:37 PM Jimy Lewis MD           ED Provider  Attending physically available and evaluated Monico Raymond I managed the patient along with the ED Attending      Electronically Signed by         Jalen Kirby MD  01/03/22 4245

## 2021-12-23 NOTE — DISCHARGE SUMMARY
[address] [facility]  Discharge- Marylee Aly 1955, 61 y o  male MRN: [id number]  Unit/Bed#: ED 08 Encounter: [id number]  Primary Care Provider: John Mcdermott MD   Date and time admitted to hospital: 12/22/2021  7:56 PM    * Lower GI bleed  Assessment & Plan  ·  CT scan of the abdomen reveals the following:  ·  "Mural thickening of a long segment of the descending colon with surrounding inflammation consistent with nonspecific colitis i e   infectious, inflammatory or ischemic  · Discussed with GI  · Patient is stable for discharge from their perspective  · likley infectious bleeding  · Will schedule for outpatient colonoscopy,/stool studies  Number provided to patient at discharge to call for GI follow-up  · Will discharge with 3 days of 500 mg of azithromycin  · Rx sent to pharmacy    CAD (coronary artery disease)  Assessment & Plan  · Without chest discomfort  Continue metoprolol  Chronic diastolic heart failure (HCC)  Assessment & Plan  Wt Readings from Last 3 Encounters:   12/22/21 99 8 kg (220 lb)   12/22/21 99 8 kg (220 lb)   10/01/21 98 9 kg (218 lb)     · Currently stable  Continue metoprolol  Continuous opioid dependence ([facility] [address] )  Assessment & Plan  · Confirmed patient is on Ambien, Klonopin, oxycodone and acetaminophen/codeine  · Continue outpatient pain management    Mixed hyperlipidemia  Assessment & Plan  On Lipitor 80 mg daily  Hypothyroidism  Assessment & Plan  · Patient on levothyroxine 100 mcg daily        Medical Problems             Resolved Problems  Date Reviewed: 12/22/2021    None              Discharging Physician / Practitioner: Tung Rose PA-C  PCP: John Mcdermott MD  Admission Date:   Admission Orders (From admission, onward)     Ordered        12/22/21 2220  Inpatient Admission  Once                      Discharge Date: 12/23/21    Consultations During Hospital Stay:  · GI    Procedures Performed:   · CT abdomen and pelvis: Mural thickening of long segment of the descending colon with surrounding inflammation consistent with nonspecific colitis I infectious, inflammatory, or ischemic    Significant Findings / Test Results:   · None    Incidental Findings:   · As above     Test Results Pending at Discharge (will require follow up): · None      Outpatient Tests Requested:  · Stool studies including stool enteric pathogens, and ova and parasite  · Colonoscopy  Please call GI to schedule this  Complications:  None     Reason for Admission: gi bleeding/     Hospital Course:   Javier Valladares is a 61 y o  male patient who originally presented to the hospital on 12/22/2021 due to GI bleeding  Patient initially had CT scan of the abdomen and pelvis done which showed mural thickening suspicious for possible inflammatory/infectious/ischemic colitis  Patient was having no pain to suggest ischemic colitis  Hemoglobin remains stable x2 and was actually above patient's baseline on last draw  He did not have any repeat episodes of bleeding  He was evaluated by GI given his rectal bleeding  Discussed case in detail with GI who recommended patient be discharged to home with outpatient follow-up with GI clinic  He will be written script for stool enteric pathogens, and ova and parasite  He will be discharged with 3 day course of azithromycin 500 mg  Discussed this information with the patient in detail, he is okay with the plan  Strict return instructions including worsening rectal bleeding, severe abdominal pain, fevers, lightheadedness, or dizziness  The patient, initially admitted to the hospital as inpatient, was discharged earlier than expected given the following: discussion with GI patient deemed stable for discharge  Please see above list of diagnoses and related plan for additional information  Condition at Discharge: good    Discharge Day Visit / Exam:   Subjective:  patient doing well   He has not had another bowel movement since being admitted  Vitals: Blood Pressure: (!) 191/119 (12/23/21 0737)  Pulse: 89 (12/23/21 0737)  Temperature: 98 3 °F (36 8 °C) (12/22/21 1919)  Temp Source: Tympanic (12/22/21 1919)  Respirations: 17 (12/23/21 0230)  Weight - Scale: 99 8 kg (220 lb) (12/22/21 1919)  SpO2: 96 % (12/23/21 0737)  Exam:   Physical Exam  Constitutional:       General: He is not in acute distress  HENT:      Head: Normocephalic and atraumatic  Mouth/Throat:      Mouth: Mucous membranes are moist       Pharynx: Oropharynx is clear  No oropharyngeal exudate  Eyes:      General:         Right eye: No discharge  Left eye: No discharge  Conjunctiva/sclera: Conjunctivae normal       Pupils: Pupils are equal, round, and reactive to light  Cardiovascular:      Rate and Rhythm: Normal rate and regular rhythm  Pulses: Normal pulses  Heart sounds: Normal heart sounds  No murmur heard  Pulmonary:      Effort: Pulmonary effort is normal  No respiratory distress  Breath sounds: Normal breath sounds  No wheezing or rales  Abdominal:      General: Abdomen is flat  There is no distension  Palpations: Abdomen is soft  Tenderness: There is abdominal tenderness (mild left lower quadrant )  Musculoskeletal:         General: Normal range of motion  Cervical back: No muscular tenderness  Right lower leg: No edema  Left lower leg: No edema  Skin:     General: Skin is warm and dry  Coloration: Skin is not jaundiced  Neurological:      General: No focal deficit present  Mental Status: He is alert and oriented to person, place, and time  Cranial Nerves: No cranial nerve deficit  Psychiatric:         Mood and Affect: Mood normal           Discussion with Family: Patient declined call to   Discharge instructions/Information to patient and family:   See after visit summary for information provided to patient and family        Provisions for Follow-Up Care:  See after visit summary for information related to follow-up care and any pertinent home health orders  Disposition:   Home    Planned Readmission: none      Discharge Statement:  I spent 60 minutes discharging the patient  This time was spent on the day of discharge  I had direct contact with the patient on the day of discharge  Greater than 50% of the total time was spent examining patient, answering all patient questions, arranging and discussing plan of care with patient as well as directly providing post-discharge instructions  Additional time then spent on discharge activities  Discharge Medications:  See after visit summary for reconciled discharge medications provided to patient and/or family        **Please Note: This note may have been constructed using a voice recognition system**

## 2021-12-23 NOTE — ASSESSMENT & PLAN NOTE
Wt Readings from Last 3 Encounters:   12/22/21 99 8 kg (220 lb)   12/22/21 99 8 kg (220 lb)   10/01/21 98 9 kg (218 lb)     · Currently stable  Continue metoprolol

## 2021-12-23 NOTE — H&P
[facility]P S[facility]chez No 1955, 61 y o  male MRN: [id number]  Unit/Bed#: ED 08 Encounter: [id number]  Primary Care Provider: Bebeto Camacho MD   Date and time admitted to hospital: 12/22/2021  7:56 PM    * Lower GI bleed  Assessment & Plan  CT scan of the abdomen pelvis pending  Update:  CT scan of the abdomen reveals the following:   "Mural thickening of a long segment of the descending colon with surrounding inflammation consistent with nonspecific colitis i e   infectious, inflammatory or ischemic "     Clinically, the patient does not have any excruciating pain, is comfortable despite the mild discomfort; no fever  No white count  Would continue to watch  In the event that patient has fever, will get blood cultures and possibly start antibiotics?    GI consult  NPO  Pantoprazole  Hemoglobin hematocrit monitoring  CBC with metabolic profile in the morning  Hold aspirin Plavix  Mixed hyperlipidemia  Assessment & Plan  On Lipitor 80 mg daily  Chronic diastolic heart failure (HCC)  Assessment & Plan  Wt Readings from Last 3 Encounters:   12/22/21 99 8 kg (220 lb)   12/22/21 99 8 kg (220 lb)   10/01/21 98 9 kg (218 lb)     Currently stable  Continue metoprolol  CAD (coronary artery disease)  Assessment & Plan  Without chest discomfort  Continue metoprolol  Continuous opioid dependence ([facility] [address] )  Assessment & Plan  Confirmed patient is on Ambien, Klonopin, oxycodone and acetaminophen/codeine  Will continue with oxycodone 5 mg every 4 hours as needed for moderate pain  Hypothyroidism  Assessment & Plan  Patient on levothyroxine 100 mcg daily          VTE Prophylaxis: Pharmacologic VTE Prophylaxis contraindicated due to GI bleeding  / sequential compression device   Code Status: Level 1 - Full Code   POLST: There is no POLST form on file for this patient (pre-hospital)    Anticipated Length of Stay:  Patient will be admitted on an Inpatient basis with an anticipated length of stay of  greater than 2 midnights  Justification for Hospital Stay: Please see detailed plans noted above  Chief Complaint:     Blood per stool  History of Present Illness:  Margarita Anaya is a 61 y o  male who has past medical history significant for CAD, on Plavix and aspirin  He also has hypothyroidism, hypertension, migraines, radiculopathy on opioids, chronic diastolic heart failure and hyperlipidemia  Patient was seen by primary care physician this afternoon and was brought to his attention that he had bloody stools this evening  Patient states that earlier in the afternoon he had 2 bouts of watery stool  Non mucoid  No fever  Noted was that during the visit, the patient had lower abdominal quadrant discomfort with some nausea but no vomiting  And at that time, blood pressure was deemed stable  He was advised to see gastroenterology as an outpatient however advised that if the bleeding should continue or if he begins to become symptomatic such as dizziness that he needs to go to the emergency room for evaluation  The patient was however was very concerned about this and does presented to the emergency room for evaluation  Currently patient is communicative and without any tenderness  He complains however that there is left lower quadrant discomfort  Not very bothersome  No epigastric discomfort      Review of Systems:    Constitutional:  Denies fever or chills   Eyes:  Denies change in visual acuity   HENT:  Denies nasal congestion or sore throat   Respiratory:  Denies cough or shortness of breath   Cardiovascular:  Denies chest pain or edema   GI:  Denies abdominal pain, but with left lower quadrant abdominal discomfort, nausea, vomiting, bloody stools or diarrhea   :  Denies dysuria   Musculoskeletal:  Denies back pain or joint pain   Integument:  Denies rash   Neurologic:  Denies headache, focal weakness or sensory changes   Endocrine:  Denies polyuria or polydipsia   Lymphatic:  Denies swollen glands   Psychiatric:  Denies depression or anxiety     Past Medical and Surgical History:   Past Medical History:   Diagnosis Date    Anxiety     CAD (coronary artery disease)     Coronary artery disease     Depression     Disease of thyroid gland     Dizziness     Eye problems     Hypertension     Hypothyroidism     Migraines     Stroke ([facility] [address] ) 11/17/2014    Tremors of nervous system      Past Surgical History:   Procedure Laterality Date    COLONOSCOPY  09/01/2017    COLONOSCOPY      CORONARY ARTERY BYPASS GRAFT  March 24,20201    CORONARY STENT PLACEMENT      DENTAL SURGERY      entire top teeth removal    HERNIA REPAIR      MS CABG, ARTERY-VEIN, FOUR N/A 3/24/2021    Procedure: CORONARY ARTERY BYPASS GRAFT (CABG) 3 VESSELS,  USING LEFT VANE-LAD AND LEFT GSV-RPL & RAMUS;  Surgeon: Evonne Gupta MD;  [facility]: [facility];  Service: Cardiac Surgery    MS ECHO TRANSESOPHAG [address] N/A 3/24/2021    Procedure: TRANSESOPHAGEAL ECHOCARDIOGRAM (MARLEN); Surgeon: Evonne Gupta MD;  Location: [facility];  Service: Cardiac Surgery       Meds/Allergies:  (Not in a hospital admission)      Allergies:    Allergies   Allergen Reactions    Medical Tape Other (See Comments)     Other reaction(s): BURNS USE PAPER TAPE     History:  Marital Status: /Civil Union   Occupation:  Worked as a   Patient Pre-hospital Living Situation:  Lives at home with wife  Patient Pre-hospital Level of Mobility:  Ambulatory  Patient Pre-hospital Diet Restrictions:  Regular  Substance Use History:   Social History     Substance and Sexual Activity   Alcohol Use No     Social History     Tobacco Use   Smoking Status Never Smoker   Smokeless Tobacco Never Used     Social History     Substance and Sexual Activity   Drug Use No       Family History:  Family History   Problem Relation Age of Onset    Cancer Mother     Heart attack Father     Heart disease Father     Heart attack Sister     Cancer Brother        Physical Exam:     Vitals:   Blood Pressure: (!) 174/87 (12/22/21 2145)  Pulse: 70 (12/22/21 2145)  Temperature: 98 3 °F (36 8 °C) (12/22/21 1919)  Temp Source: Tympanic (12/22/21 1919)  Respirations: 16 (12/22/21 2145)  Weight - Scale: 99 8 kg (220 lb) (12/22/21 1919)  SpO2: 97 % (12/22/21 2145)    Constitutional:  Well developed, well nourished, no acute distress, non-toxic appearance   Eyes:  PERRL, conjunctiva normal   HENT:  Atraumatic, external ears normal, nose normal, oropharynx moist, no pharyngeal exudates  Neck- normal range of motion, no tenderness, supple   Respiratory:  No respiratory distress, normal breath sounds, no rales, no wheezing   Cardiovascular:  Normal rate, normal rhythm, no murmurs, no gallops, no rubs   GI:  Soft, nondistended, normal bowel sounds, nontender but discomfort left lower quadrant,, no organomegaly, no mass, no rebound, no guarding   :  No costovertebral angle tenderness   Musculoskeletal:  No edema, no tenderness, no deformities  Back- no tenderness  Integument:  Well hydrated, no rash   Lymphatic:  No lymphadenopathy noted   Neurologic:  Alert &awake, communicative, CN 2-12 normal, normal motor function, normal sensory function, no focal deficits noted   Psychiatric:  Speech and behavior appropriate       Lab Results: I have personally reviewed pertinent reports        Results from last 7 days   Lab Units 12/22/21 2045   WBC Thousand/uL 7 55   HEMOGLOBIN g/dL 16 1   HEMATOCRIT % 46 7   PLATELETS Thousands/uL 154   NEUTROS PCT % 76*   LYMPHS PCT % 15   MONOS PCT % 6   EOS PCT % 2     Results from last 7 days   Lab Units 12/22/21 2045   POTASSIUM mmol/L 3 4*   CHLORIDE mmol/L 101   CO2 mmol/L 28   BUN mg/dL 12   CREATININE mg/dL 1 25   CALCIUM mg/dL 9 7   ALK PHOS U/L 107   ALT U/L 28   AST U/L 22     Results from last 7 days   Lab Units 12/22/21  2117   INR  1 00           Imaging: I have personally reviewed pertinent reports  CT ABDOMEN AND PELVIS WITH IV CONTRAST     INDICATION:   GI bleed  LLQ abdominal pain  LLQ pain, rectal bleeding      COMPARISON:  7/27/2016      TECHNIQUE:  CT examination of the abdomen and pelvis was performed  Axial, sagittal, and coronal 2D reformatted images were created from the source data and submitted for interpretation      Radiation dose length product (DLP) for this visit:  591 08 mGy-cm   This examination, like all CT scans performed in the [facility], was performed utilizing techniques to minimize radiation dose exposure, including the use of iterative   reconstruction and automated exposure control      IV Contrast:  65 mL of iohexol (OMNIPAQUE) 85 mL of iohexol (OMNIPAQUE)  Enteric Contrast:  Enteric contrast was not administered      FINDINGS:     ABDOMEN     LOWER CHEST:  Minimal right basilar atelectasis  Status post CABG      LIVER/BILIARY TREE:  Unremarkable      GALLBLADDER:  No calcified gallstones  No pericholecystic inflammatory change      SPLEEN:  Unremarkable      PANCREAS:  Unremarkable      ADRENAL GLANDS:  Unremarkable      KIDNEYS/URETERS:  No hydronephrosis  Residual contrast material in the renal collecting systems limits evaluation for urinary tract calculi  Small bilateral renal cysts and subcentimeter renal hypodensities too small to accurately characterize  Right   renal cortical scarring      STOMACH AND BOWEL:  Limited evaluation of GI tract without enteric contrast   There is mural thickening of a long segment of the descending colon with surrounding inflammation  Small volume of liquid stool in the sigmoid colon and rectum  No bowel   obstruction      APPENDIX:  A normal appendix was visualized      ABDOMINOPELVIC CAVITY:  No ascites  No pneumoperitoneum  No lymphadenopathy      VESSELS:  Atherosclerotic changes are present    No evidence of aneurysm      PELVIS     REPRODUCTIVE ORGANS:  Unremarkable for patient's age      URINARY BLADDER:  Partially filled with layering residual contrast material      ABDOMINAL WALL/INGUINAL REGIONS:  Small fat-containing umbilical hernia  Small right inguinal hernia containing fat and distal portion of normal caliber appendix      OSSEOUS STRUCTURES:  No acute fracture or destructive osseous lesion      IMPRESSION:     Mural thickening of a long segment of the descending colon with surrounding inflammation consistent with nonspecific colitis i e   infectious, inflammatory or ischemic      The study was marked in EPIC for immediate notification  No results found  ** Please Note: Dragon 360 Dictation voice to text software was used in the creation of this document   **

## 2021-12-23 NOTE — ASSESSMENT & PLAN NOTE
·  CT scan of the abdomen reveals the following:  ·  "Mural thickening of a long segment of the descending colon with surrounding inflammation consistent with nonspecific colitis i e   infectious, inflammatory or ischemic  · Discussed with GI  · Patient is stable for discharge from their perspective  · sandra infectious bleeding  · Will schedule for outpatient colonoscopy,/stool studies    Number provided to patient at discharge to call for GI follow-up  · Will discharge with 3 days of 500 mg of azithromycin  · Rx sent to pharmacy

## 2021-12-23 NOTE — ASSESSMENT & PLAN NOTE
· Confirmed patient is on Ambien, Klonopin, oxycodone and acetaminophen/codeine    · Continue outpatient pain management

## 2021-12-23 NOTE — CONSULTS
Consult Service: Gastroenterology      PATIENT INFORMATION      Joan Records 61 y o  male MRN: [id number]  Unit/Bed#: ED 08 Encounter: [id number]  PCP: Neto Sanchez MD  Date of Admission:  12/22/2021  Date of Consultation: 12/23/21  Requesting Physician: Som Emanuel MD       ASSESSMENTS & PLAN   Joan Records is a 61 y o  old male with PMH of CAD on DAPT who presents with BRBPR    Gastroenterology has been consulted for assistance with management of colitis    Bright red blood per rectum  · CT scan showing colitis  · Patient with 1 day history of diarrhea  · Likely infectious colitis  · Hemoglobin is 15 5  · Aspirin and plavix can be continued  · Patient can be discharged from a GI standpoint  · Will need outpatient colonoscopy  · Stool studies ordered  · Last colonoscopy was in 2017 with removal of 2 polyps    CAD   · On DAPT  Needs to be held 5 days prior to colonoscopy if okay with his cardiologist         HISTORY OF PRESENT ILLNESS      Joan Records is a 61 y o  male who is originally admitted for bright red blood per rectum and is being consulted for diarrhea and hematochezia  Patient reports that this started about 30 hours ago  He started having bright red blood out of his rectum along diarrhea  His last episode was just a few hours ago  He denies any stool mixed with it  He has not eaten anything in the last 24 hours  He denies any abdominal pain, nausea, vomiting, heartburn, weight loss, dysphagia  His last colonoscopy was in 2017  REVIEW OF SYSTEMS     A thorough 12-point review of systems has been conducted  Pertinent positives and negatives are mentioned in the history of present illness         PAST MEDICAL & SURGICAL HISTORY      Past Medical History:   Diagnosis Date    Anxiety     CAD (coronary artery disease)     Coronary artery disease     Depression     Disease of thyroid gland     Dizziness     Eye problems     Hypertension     Hypothyroidism     Migraines  [facility]roke [facility]) 11/17/2014    Tremors of nervous system        Past Surgical History:   Procedure Laterality Date    COLONOSCOPY  09/01/2017    COLONOSCOPY      CORONARY ARTERY BYPASS GRAFT  March 24,20201    CORONARY STENT PLACEMENT      DENTAL SURGERY      entire top teeth removal    HERNIA REPAIR      [place] CABG, ARTERY-VEIN, FOUR N/A 3/24/2021    Procedure: CORONARY ARTERY BYPASS GRAFT (CABG) 3 VESSELS,  USING LEFT VANE-LAD AND LEFT GSV-RPL & RAMUS;  Surgeon: Richie Pacheco MD;  [facility];  Service: Cardiac Surgery    AL ECHO TRANSESOPHAG [address] N/A 3/24/2021    Procedure: TRANSESOPHAGEAL ECHOCARDIOGRAM (MARLEN); Surgeon: Richie Pacheco MD;  [facility];  Service: Cardiac Surgery         MEDICATIONS & ALLERGIES       Medications:   Prior to Admission medications    Medication Sig Start Date End Date Taking?  Authorizing Provider   aspirin 81 MG tablet Take 81 mg by mouth   Yes Historical Provider, MD   atorvastatin (LIPITOR) 40 mg tablet Take 2 tablets (80 mg total) by mouth daily 10/26/21  Yes Nargis Leong MD   clonazePAM (KlonoPIN) 2 mg tablet Take 2 tablets (4 mg total) by mouth 2 (two) times a day as needed for seizures 11/30/21  Yes Nargis Leong MD   clopidogrel (PLAVIX) 75 mg tablet Take 1 tablet (75 mg total) by mouth daily 11/1/21  Yes Nargis Leong MD   levothyroxine 100 mcg tablet Take 1 tablet (100 mcg total) by mouth daily 11/1/21  Yes Nargis Leong MD   metoprolol succinate (TOPROL-XL) 100 mg 24 hr tablet Take 1 tablet (100 mg total) by mouth daily 10/25/21  Yes Sedrick Steward DO   tiZANidine (ZANAFLEX) 2 mg tablet Take 1 tablet (2 mg total) by mouth every 8 (eight) hours as needed for muscle spasms 11/22/21  Yes Nargis Leong MD   zolpidem (AMBIEN) 10 mg tablet 2 po  q hs 12/14/21  Yes Nargis Leong MD   acetaminophen (TYLENOL) 325 mg tablet Take 2 tablets (650 mg total) by mouth every 6 (six) hours as needed for mild pain 7/23/21   Roberto Gonzales MD   acetaminophen (TYLENOL) 325 mg tablet Take 2 tablets (650 mg total) by mouth every 6 (six) hours as needed for mild pain 9/24/21   Alicia Keller PA-C   acetaminophen-codeine (TYLENOL #3) 300-30 mg per tablet Take 1 tablet by mouth every 4 (four) hours as needed for moderate pain 10/25/21   Andry Rodriguez MD   azithromycin (ZITHROMAX) 500 MG tablet Take 1 tablet (500 mg total) by mouth daily for 3 days 12/23/21 12/26/21  Sonal Bland PA-C   oxyCODONE (ROXICODONE) 5 immediate release tablet Take 1 tablet (5 mg total) by mouth every 4 (four) hours as needed for moderate painMax Daily Amount: 30 mg 10/25/21   Andry Rodriguez MD   sildenafil (VIAGRA) 100 mg tablet Take 1 tablet (100 mg total) by mouth daily as needed for erectile dysfunction 10/1/21   Andry Rodriguez MD       Allergies: Allergies   Allergen Reactions    Medical Tape Other (See Comments)     Other reaction(s): BURNS USE PAPER TAPE         SOCIAL HISTORY      Marital Status: /Civil Union    Substance Use History:   Social History     Substance and Sexual Activity   Alcohol Use No     Social History     Tobacco Use   Smoking Status Never Smoker   Smokeless Tobacco Never Used     Social History     Substance and Sexual Activity   Drug Use No         FAMILY HISTORY      Non-Contributory      PHYSICAL EXAM     Vitals:   Blood Pressure: (!) 191/119 (12/23/21 0737)  Pulse: 89 (12/23/21 0737)  Temperature: 98 3 °F (36 8 °C) (12/22/21 1919)  Temp Source: Tympanic (12/22/21 1919)  Respirations: 17 (12/23/21 0230)  Weight - Scale: 99 8 kg (220 lb) (12/22/21 1919)  SpO2: 96 % (12/23/21 0737)    Physical Exam:   GENERAL: NAD  HEENT:  NC/AT, MMM  CARDIAC:  RRR, +S1/S2, no S3/S4 heard  PULMONARY:  CTA B/L, no wheezing/rales/rhonci, non-labored breathing  ABDOMEN:  Soft, NT/ND, +BS, no rebound/guarding/rigidity  NEUROLOGIC:  Alert/oriented x3     SKIN:  No rashes or erythema       ADDITIONAL DATA     Lab Results: Results from last 7 days   Lab Units 12/23/21  0511   WBC Thousand/uL 8 13   HEMOGLOBIN g/dL 15 5  15 8   HEMATOCRIT % 45 4   PLATELETS Thousands/uL 153   NEUTROS PCT % 71   LYMPHS PCT % 18   MONOS PCT % 8   EOS PCT % 2     Results from last 7 days   Lab Units 12/23/21  0511   POTASSIUM mmol/L 3 5   CHLORIDE mmol/L 100   CO2 mmol/L 30   BUN mg/dL 10   CREATININE mg/dL 1 28   CALCIUM mg/dL 9 6   ALK PHOS U/L 116   ALT U/L 28   AST U/L 22     Results from last 7 days   Lab Units 12/22/21  2117   INR  1 00       Imaging:    CT abdomen pelvis with contrast    Result Date: 12/22/2021  Narrative: CT ABDOMEN AND PELVIS WITH IV CONTRAST INDICATION:   GI bleed LLQ abdominal pain LLQ pain, rectal bleeding  COMPARISON:  7/27/2016  TECHNIQUE:  CT examination of the abdomen and pelvis was performed  Axial, sagittal, and coronal 2D reformatted images were created from the source data and submitted for interpretation  Radiation dose length product (DLP) for this visit:  591 08 mGy-cm   This examination, like all CT scans performed in the [facility], was performed utilizing techniques to minimize radiation dose exposure, including the use of iterative  reconstruction and automated exposure control  IV Contrast:  65 mL of iohexol (OMNIPAQUE) 85 mL of iohexol (OMNIPAQUE) Enteric Contrast:  Enteric contrast was not administered  FINDINGS: ABDOMEN LOWER CHEST:  Minimal right basilar atelectasis  Status post CABG  LIVER/BILIARY TREE:  Unremarkable  GALLBLADDER:  No calcified gallstones  No pericholecystic inflammatory change  SPLEEN:  Unremarkable  PANCREAS:  Unremarkable  ADRENAL GLANDS:  Unremarkable  KIDNEYS/URETERS:  No hydronephrosis  Residual contrast material in the renal collecting systems limits evaluation for urinary tract calculi  Small bilateral renal cysts and subcentimeter renal hypodensities too small to accurately characterize  Right renal cortical scarring   STOMACH AND BOWEL:  Limited evaluation of GI tract without enteric contrast   There is mural thickening of a long segment of the descending colon with surrounding inflammation  Small volume of liquid stool in the sigmoid colon and rectum  No bowel obstruction  APPENDIX:  A normal appendix was visualized  ABDOMINOPELVIC CAVITY:  No ascites  No pneumoperitoneum  No lymphadenopathy  VESSELS:  Atherosclerotic changes are present  No evidence of aneurysm  PELVIS REPRODUCTIVE ORGANS:  Unremarkable for patient's age  URINARY BLADDER:  Partially filled with layering residual contrast material  ABDOMINAL WALL/INGUINAL REGIONS:  Small fat-containing umbilical hernia  Small right inguinal hernia containing fat and distal portion of normal caliber appendix  OSSEOUS STRUCTURES:  No acute fracture or destructive osseous lesion  Impression: Mural thickening of a long segment of the descending colon with surrounding inflammation consistent with nonspecific colitis i e   infectious, inflammatory or ischemic  The study was marked in [facility] for immediate notification  Workstation performed: [id number]       EKG, Pathology, and Other Studies Reviewed on Admission:   · EKG: Reviewed      Counseling / Coordination of Care Time: 30 total mins spent n consult  Greater than 50% of total time spent on patient counseling and coordination of care  Zelalem Blakely MD   PGY-4,   Gastroenterology         ** Please Note: This note is constructed using a voice recognition dictation system   **

## 2021-12-23 NOTE — DISCHARGE INSTR - AVS FIRST PAGE
Please follow-up with Gastroenterology doctor within 2 weeks  Number has been given for you to call and schedule an appointment for colonoscopy  Every new prescription for stool studies including stool enteric pathogens, and ova and parasites  Please follow-up with these and send these in as soon as possible for exam     I prescribed you azithromycin 500 mg to be taken for a total of 3 days  Please finish antibiotic course to its completion  Follow-up with PCP within 2 weeks  Please return to the emergency department few having worsening bleeding, shortness of breath, lightheadedness, dizziness

## 2021-12-23 NOTE — ASSESSMENT & PLAN NOTE
Wt Readings from Last 3 Encounters:   12/22/21 99 8 kg (220 lb)   12/22/21 99 8 kg (220 lb)   10/01/21 98 9 kg (218 lb)     Currently stable  Continue metoprolol

## 2021-12-24 NOTE — ED ATTENDING ATTESTATION
12/22/2021  IReid MD, saw and evaluated the patient  I have discussed the patient with the resident/non-physician practitioner and agree with the resident's/non-physician practitioner's findings, Plan of Care, and MDM as documented in the resident's/non-physician practitioner's note, except where noted  All available labs and Radiology studies were reviewed  I was present for key portions of any procedure(s) performed by the resident/non-physician practitioner and I was immediately available to provide assistance  At this point I agree with the current assessment done in the Emergency Department  I have conducted an independent evaluation of this patient a history and physical is as follows:    1d BRBPR, never had before  Started with some diarrhea, now no stool, just blood  Slight LLQ pain  VS and nursing notes reviewed  General: Appears in NAD, awake, alert, speaking normally in full sentences  Well-nourished, well-developed  Appears stated age  Head: Normocephalic, atraumatic  Eyes: EOMI  Vision grossly normal  No subconjunctival hemorrhages or occular discharge noted  Symmetrical lids  ENT: Atraumatic external nose and ears  No stridor  Normal phonation  No drooling  Normal swallowing  Neck: No JVD  FROM  No goiter  CV: No pallor  Normal rate  Lungs: No tachypnea  No respiratory distress  ABD: Soft, mild LLQ tenderness  MSK: Moving all extremities equally, no peripheral edema  Skin: Dry, intact  No cyanosis  Neuro: Awake, alert, GCS15  CN II-XII grossly intact  Grossly normal gait  Psychiatric/Behavioral: Appropriate mood and affect  Rectal: per resident exam    A/P: This is a 61 y o  male who presents to the ED for evaluation of rectal bleeding  Labs, CT scan  Admit      ED Course       Critical Care Time  Procedures

## 2021-12-27 ENCOUNTER — TELEPHONE (OUTPATIENT)
Dept: GASTROENTEROLOGY | Facility: CLINIC | Age: 66
End: 2021-12-27

## 2021-12-27 ENCOUNTER — OFFICE VISIT (OUTPATIENT)
Dept: GASTROENTEROLOGY | Facility: CLINIC | Age: 66
End: 2021-12-27
Payer: MEDICARE

## 2021-12-27 VITALS
HEIGHT: 69 IN | BODY MASS INDEX: 33.83 KG/M2 | DIASTOLIC BLOOD PRESSURE: 82 MMHG | TEMPERATURE: 98.3 F | SYSTOLIC BLOOD PRESSURE: 124 MMHG | WEIGHT: 228.4 LBS

## 2021-12-27 DIAGNOSIS — K62.5 RECTAL BLEEDING: Primary | ICD-10-CM

## 2021-12-27 DIAGNOSIS — R19.7 DIARRHEA OF PRESUMED INFECTIOUS ORIGIN: ICD-10-CM

## 2021-12-27 DIAGNOSIS — K62.5 RECTAL BLEEDING: ICD-10-CM

## 2021-12-27 DIAGNOSIS — K52.9 COLITIS: ICD-10-CM

## 2021-12-27 PROCEDURE — 99214 OFFICE O/P EST MOD 30 MIN: CPT | Performed by: INTERNAL MEDICINE

## 2021-12-27 RX ORDER — POLYETHYLENE GLYCOL 3350 17 G/17G
POWDER, FOR SOLUTION ORAL
Qty: 238 G | Refills: 0 | Status: SHIPPED | OUTPATIENT
Start: 2021-12-27 | End: 2021-12-27

## 2021-12-27 RX ORDER — POLYETHYLENE GLYCOL 3350 17 G/17G
POWDER, FOR SOLUTION ORAL
Qty: 238 G | Refills: 1 | Status: SHIPPED | OUTPATIENT
Start: 2021-12-27 | End: 2022-02-24 | Stop reason: HOSPADM

## 2021-12-28 DIAGNOSIS — F41.9 ANXIETY: ICD-10-CM

## 2021-12-29 RX ORDER — CLONAZEPAM 2 MG/1
4 TABLET ORAL 2 TIMES DAILY PRN
Qty: 180 TABLET | Refills: 0 | Status: SHIPPED | OUTPATIENT
Start: 2021-12-29 | End: 2022-02-16 | Stop reason: SDUPTHER

## 2021-12-31 ENCOUNTER — APPOINTMENT (OUTPATIENT)
Dept: LAB | Facility: CLINIC | Age: 66
End: 2021-12-31
Payer: MEDICARE

## 2021-12-31 DIAGNOSIS — K62.5 RECTAL BLEEDING: ICD-10-CM

## 2021-12-31 DIAGNOSIS — R19.7 DIARRHEA OF PRESUMED INFECTIOUS ORIGIN: ICD-10-CM

## 2021-12-31 DIAGNOSIS — K52.9 COLITIS: ICD-10-CM

## 2021-12-31 PROCEDURE — 87505 NFCT AGENT DETECTION GI: CPT

## 2022-01-01 LAB
C DIFF TOX GENS STL QL NAA+PROBE: NEGATIVE
CAMPYLOBACTER DNA SPEC NAA+PROBE: NORMAL
SALMONELLA DNA SPEC QL NAA+PROBE: NORMAL
SHIGA TOXIN STX GENE SPEC NAA+PROBE: NORMAL
SHIGELLA DNA SPEC QL NAA+PROBE: NORMAL

## 2022-01-04 LAB — G LAMBLIA AG STL QL IA: NEGATIVE

## 2022-01-06 NOTE — PHYSICIAN ADVISOR
Current patient class: Inpatient  The patient is currently on Hospital Day: 2 at [address] [place] [address]      The patient was admitted to the hospital  on 12/22/21 at 10:18 PM for the following diagnosis:  Rectal bleeding [K62 5]     After review of the relevant documentation, labs, vital signs and test results, this is a PROVIDER LIABLE case  In this particular case the patient was admitted to the hospital as an inpatient  The patient however failed to satisfy the 2 midnight benchmark and closer scrutiny of the case was warranted  After review of the patient presentation and relevant labs the patient was most appropriate for observation or outpatient class at the time of admission  Given that this patient has already been discharged prior to this review they become a provider liable case  Rationale is as follows: The patient is a 79-year-old male who presente to the hospital with bloody stool  CT showed mural thickening of a long segment of the descending colon consistent with nonspecific colitis  It was noted that the patient had only mild discomfort and no fever  He was discharged after 13 hours in the hospital to take a three day course of azithromycin and obtain stool analysis in the outpatient setting  Based on his length of stay and general stability, observation class would have been appropriate to determine if he had persistent or uncontrolled pain, continued gastrointestinal bleeding that required further hospital management      The patients vitals on arrival were   ED Triage Vitals   Temperature Pulse Respirations Blood Pressure SpO2   12/22/21 1919 12/22/21 1919 12/22/21 1919 12/22/21 1921 12/22/21 1919   98 3 °F (36 8 °C) 78 18 (!) 210/105 96 %      Temp Source Heart Rate Source Patient Position - Orthostatic VS BP Location FiO2 (%)   12/22/21 1919 12/22/21 1919 12/22/21 1919 12/22/21 1919 --   Tympanic Monitor Sitting Left arm       Pain Score       -- Past Medical History:   Diagnosis Date    Anxiety     CAD (coronary artery disease)     Coronary artery disease     Depression     Disease of thyroid gland     Dizziness     Eye problems     Hypertension     Hypothyroidism     Migraines     Stroke ([facility] [address] ) 11/17/2014    Tremors of nervous system      Past Surgical History:   Procedure Laterality Date    COLONOSCOPY  09/01/2017    COLONOSCOPY      CORONARY ARTERY BYPASS GRAFT  March 24,20201    CORONARY STENT PLACEMENT      DENTAL SURGERY      entire top teeth removal    HERNIA REPAIR      [place] CABG, ARTERY-VEIN, FOUR N/A 3/24/2021    Procedure: CORONARY ARTERY BYPASS GRAFT (CABG) 3 VESSELS,  USING LEFT VANE-LAD AND LEFT GSV-RPL & RAMUS;  Surgeon: Leonardo Pardo MD;  [facility];  Service: Cardiac Surgery    [place] ECHO TRANSESOPHAG [address] N/A 3/24/2021    Procedure: TRANSESOPHAGEAL ECHOCARDIOGRAM (MARLEN); Surgeon: Leonardo Pardo MD;  [facility];  Service: Cardiac Surgery           Consults have been placed to:   IP CONSULT TO GASTROENTEROLOGY    Vitals:    12/22/21 2145 12/22/21 2315 12/23/21 0230 12/23/21 0737   BP: (!) 174/87 (!) 185/99 (!) 164/112 (!) 191/119   BP Location: Left arm   Left arm   Pulse: 70 72 86 89   Resp: 16 16 17    Temp:       TempSrc:       SpO2: 97% 95% 94% 96%   Weight:           Most recent labs:    No results for input(s): WBC, HGB, HCT, PLT, K, NA, CALCIUM, BUN, CREATININE, LIPASE, AMYLASE, INR, TROPONINI, CKTOTAL, AST, ALT, ALKPHOS, BILITOT in the last 72 hours  Scheduled Meds:  Continuous Infusions:No current facility-administered medications for this encounter  PRN Meds:      Surgical procedures (if appropriate):

## 2022-01-17 ENCOUNTER — TELEPHONE (OUTPATIENT)
Dept: GASTROENTEROLOGY | Facility: CLINIC | Age: 67
End: 2022-01-17

## 2022-01-17 ENCOUNTER — TELEPHONE (OUTPATIENT)
Dept: FAMILY MEDICINE CLINIC | Facility: CLINIC | Age: 67
End: 2022-01-17

## 2022-01-17 DIAGNOSIS — K52.9 COLITIS: Primary | ICD-10-CM

## 2022-01-17 DIAGNOSIS — R19.7 DIARRHEA OF PRESUMED INFECTIOUS ORIGIN: ICD-10-CM

## 2022-01-17 NOTE — TELEPHONE ENCOUNTER
Please Advise  PMH: colitis, rectal bleeding, diarrhea  Last seen Provider: Dr Darlin Olson  Last office visit: 12/27/21  Procedure: colonoscopy scheduled for 2/24  Recent Imaging: CT abd pelvis 12/22 displaying "Mural thickening of a long segment of the descending colon with surrounding inflammation consistent with nonspecific colitis i e   infectious, inflammatory or ischemic "  Labs: stool enteric panel, c diff, giardia all WNL, hgb 15 8 on 12/23  HPI: Spoke with patient  He reports that he is having continued rectal bleeding and would like to be tested for parasites as he feels strongly he may have a parasite  He also feels that his bacterial panels were negative because he was placed on azithromycin from ED, and is worried something was missed  He reports having intermittent blood and mucous mixed with his stool, stating that about 50% of the time he passes just stool, the other half of the time it's mixed with a small amount of BRB and mucous  He reports some days he will have 1 bowel movement, others it will be 3-4 and he has urgency and fecal incontinence   He also complains of rectal itching  Denies: nausea, abdominal pain and melena  Recommendations:   -ova/parasite test was ordered by hospitalist and still has not been completed, pt can have this done although parasites unlikely as cause of this  -will reach out to provider if sooner colonoscopy indicated  -Advised patient to present to the nearest ER should he develop worsening rectal bleeding that persists, turns the water in toilet bowl red, or develops abdominal pain

## 2022-01-17 NOTE — TELEPHONE ENCOUNTER
Spoke to patient, he's having a mixture of hard stool/diarrhea, no abdominal pain, no nausea/vomiting, no fevers/chills  Some days he has rectal bleeding with bowel movements and other days he does not  He picked up his ova and parasite kit from the lab  Suspect rectal bleeding may hemorrhoidal given it's intermittent nature and report of hard stools  Last colonoscopy 2017 and he is scheduled for one on 2/24  Recommended he stay hydrated and take the OTC stool softener he has at home as needed to soften his stool  If he develops fevers, chills, abdominal pain, or persistent rectal bleeding advised him to present to the ED for further evaluation  Colonoscopy probably okay to stay scheduled for 2/24 unless he develops persistent bleeding, he is denying any abdominal pain

## 2022-01-17 NOTE — TELEPHONE ENCOUNTER
Patients GI provider:  Dr Darlin Olson    Number to return call: [phone]    Reason for call: Pt called stating that he has been having bloody stool  Pt wants to see if he can get tested for a parasite  He believes he may have a worm somewhere  Above is his number       Scheduled procedure/appointment date if applicable: procedure 8/09/01

## 2022-01-17 NOTE — TELEPHONE ENCOUNTER
Patient has concern that he may have a parasite or pinworm  He has not heard back from his GI doctor but would like to have testing done for this if it can be ordered?

## 2022-01-19 ENCOUNTER — APPOINTMENT (OUTPATIENT)
Dept: LAB | Facility: CLINIC | Age: 67
End: 2022-01-19
Payer: MEDICARE

## 2022-01-19 ENCOUNTER — TELEPHONE (OUTPATIENT)
Dept: GASTROENTEROLOGY | Facility: MEDICAL CENTER | Age: 67
End: 2022-01-19

## 2022-01-19 DIAGNOSIS — K52.9 COLITIS: ICD-10-CM

## 2022-01-19 DIAGNOSIS — R19.7 DIARRHEA OF PRESUMED INFECTIOUS ORIGIN: ICD-10-CM

## 2022-01-19 PROCEDURE — 87505 NFCT AGENT DETECTION GI: CPT

## 2022-01-19 PROCEDURE — 87209 SMEAR COMPLEX STAIN: CPT

## 2022-01-19 PROCEDURE — 87177 OVA AND PARASITES SMEARS: CPT

## 2022-01-19 NOTE — TELEPHONE ENCOUNTER
Received call from patient  He was very upset as he had returned from the lab where he dropped off his stool samples, and told me that we ordered "the wrong test"  He states that when he dropped his samples off, he asked if the test included a test for pinworms, and a girl at the lab told him that was different then the tests he had ordered  I advised him, and verified with provider, that the ova and parasite test ordered includes the test for pinworms  Patient verbalized understanding   I advised once we have results of stool studies we will contact him

## 2022-01-19 NOTE — TELEPHONE ENCOUNTER
Thanks for your evaluation  Will need to keep colonoscopy procedure for 2/24 (at least 8 weeks out from his CT) to allow for resolution of acute colitis if applicable  Agree with stool softeners like colace or dulcolax over the counter  He can also use prep H at bedtime

## 2022-01-20 DIAGNOSIS — Z95.1 S/P CABG (CORONARY ARTERY BYPASS GRAFT): ICD-10-CM

## 2022-01-20 DIAGNOSIS — E03.9 HYPOTHYROIDISM, UNSPECIFIED TYPE: ICD-10-CM

## 2022-01-20 DIAGNOSIS — M25.579 ACUTE ANKLE PAIN, UNSPECIFIED LATERALITY: ICD-10-CM

## 2022-01-20 DIAGNOSIS — R07.9 CHEST PAIN, UNSPECIFIED TYPE: ICD-10-CM

## 2022-01-20 LAB
CAMPYLOBACTER DNA SPEC NAA+PROBE: NORMAL
G LAMBLIA AG STL QL IA: NEGATIVE
SALMONELLA DNA SPEC QL NAA+PROBE: NORMAL
SHIGA TOXIN STX GENE SPEC NAA+PROBE: NORMAL
SHIGELLA DNA SPEC QL NAA+PROBE: NORMAL

## 2022-01-20 RX ORDER — OXYCODONE HYDROCHLORIDE 5 MG/1
5 TABLET ORAL EVERY 4 HOURS PRN
Qty: 16 TABLET | Refills: 0 | Status: SHIPPED | OUTPATIENT
Start: 2022-01-20 | End: 2022-03-21 | Stop reason: SDUPTHER

## 2022-01-20 RX ORDER — CLOPIDOGREL BISULFATE 75 MG/1
75 TABLET ORAL DAILY
Qty: 90 TABLET | Refills: 0 | Status: SHIPPED | OUTPATIENT
Start: 2022-01-20 | End: 2022-05-28 | Stop reason: SDUPTHER

## 2022-01-20 RX ORDER — ACETAMINOPHEN AND CODEINE PHOSPHATE 300; 30 MG/1; MG/1
1 TABLET ORAL EVERY 4 HOURS PRN
Qty: 30 TABLET | Refills: 0 | Status: SHIPPED | OUTPATIENT
Start: 2022-01-20 | End: 2022-03-21 | Stop reason: SDUPTHER

## 2022-01-20 RX ORDER — LEVOTHYROXINE SODIUM 0.1 MG/1
100 TABLET ORAL DAILY
Qty: 90 TABLET | Refills: 0 | Status: SHIPPED | OUTPATIENT
Start: 2022-01-20 | End: 2022-05-28 | Stop reason: SDUPTHER

## 2022-01-21 NOTE — TELEPHONE ENCOUNTER
Patients GI provider:  Dr Denver Ohara to return call: [phone]     Reason for call: Pt calling requesting to speak with you regarding results of his parasite test      Scheduled procedure/appointment date if applicable: Apt/procedure

## 2022-01-21 NOTE — TELEPHONE ENCOUNTER
Called and advised patient results are not in yet, advised that we will contact him once we have results of test

## 2022-01-24 LAB — O+P STL CONC: NORMAL

## 2022-02-02 ENCOUNTER — TELEPHONE (OUTPATIENT)
Dept: GASTROENTEROLOGY | Facility: CLINIC | Age: 67
End: 2022-02-02

## 2022-02-02 ENCOUNTER — OFFICE VISIT (OUTPATIENT)
Dept: FAMILY MEDICINE CLINIC | Facility: CLINIC | Age: 67
End: 2022-02-02
Payer: MEDICARE

## 2022-02-02 VITALS
DIASTOLIC BLOOD PRESSURE: 80 MMHG | BODY MASS INDEX: 34.21 KG/M2 | WEIGHT: 231 LBS | SYSTOLIC BLOOD PRESSURE: 130 MMHG | OXYGEN SATURATION: 98 % | TEMPERATURE: 97.5 F | HEIGHT: 69 IN | HEART RATE: 66 BPM

## 2022-02-02 DIAGNOSIS — G47.00 INSOMNIA, UNSPECIFIED TYPE: ICD-10-CM

## 2022-02-02 DIAGNOSIS — Z00.00 WELL ADULT EXAM: Primary | ICD-10-CM

## 2022-02-02 DIAGNOSIS — E78.2 MIXED HYPERLIPIDEMIA: ICD-10-CM

## 2022-02-02 DIAGNOSIS — D68.9 COAGULATION DEFECT, UNSPECIFIED (HCC): ICD-10-CM

## 2022-02-02 DIAGNOSIS — F41.9 ANXIETY: ICD-10-CM

## 2022-02-02 DIAGNOSIS — I50.32 CHRONIC DIASTOLIC (CONGESTIVE) HEART FAILURE (HCC): ICD-10-CM

## 2022-02-02 DIAGNOSIS — K92.2 GASTROINTESTINAL HEMORRHAGE, UNSPECIFIED GASTROINTESTINAL HEMORRHAGE TYPE: ICD-10-CM

## 2022-02-02 DIAGNOSIS — I63.00 CEREBROVASCULAR ACCIDENT (CVA) DUE TO THROMBOSIS OF PRECEREBRAL ARTERY (HCC): ICD-10-CM

## 2022-02-02 DIAGNOSIS — E03.4 HYPOTHYROIDISM DUE TO ACQUIRED ATROPHY OF THYROID: ICD-10-CM

## 2022-02-02 DIAGNOSIS — F11.20 OPIOID DEPENDENCE, UNCOMPLICATED (HCC): ICD-10-CM

## 2022-02-02 DIAGNOSIS — I73.9 CLAUDICATION (HCC): ICD-10-CM

## 2022-02-02 DIAGNOSIS — I10 ESSENTIAL HYPERTENSION: ICD-10-CM

## 2022-02-02 DIAGNOSIS — N18.31 STAGE 3A CHRONIC KIDNEY DISEASE (HCC): ICD-10-CM

## 2022-02-02 DIAGNOSIS — I25.709 CORONARY ARTERY DISEASE INVOLVING CORONARY BYPASS GRAFT OF NATIVE HEART WITH ANGINA PECTORIS (HCC): ICD-10-CM

## 2022-02-02 PROCEDURE — 99214 OFFICE O/P EST MOD 30 MIN: CPT | Performed by: FAMILY MEDICINE

## 2022-02-02 PROCEDURE — G0439 PPPS, SUBSEQ VISIT: HCPCS | Performed by: FAMILY MEDICINE

## 2022-02-02 PROCEDURE — 1123F ACP DISCUSS/DSCN MKR DOCD: CPT | Performed by: FAMILY MEDICINE

## 2022-02-02 NOTE — TELEPHONE ENCOUNTER
Patients GI provider:  Dr Ghada Marcus    Number to return call: [phone]     Reason for call: Pt calling inquiring about his results and if he has pin worms or not?     Scheduled procedure/appointment date if applicable: Procedure: 2/68/2544

## 2022-02-02 NOTE — PROGRESS NOTES
Assessment and Plan:     Problem List Items Addressed This Visit        Endocrine    Hypothyroidism     Patient to continue current dose of thyroid medicine and recheck TSH in 6 months            Cardiovascular and Mediastinum    CAD (coronary artery disease)     Patient to continue  with current cardiac meds to decrease risk of re stenosis  Patient to follow up with cardiology for scheduled appointments to decrease risk for further cardiac problems with appropriate diagnostic testing to reassess cardiac status  Patient had alll questions about this problem answered today  Essential hypertension     Patient is stable with current anti-hypertensive medicine and continue to follow a low sodium diet and take current medication  All questions about this condition were answered today  Stroke [facility])     Patient is stable  and will continue present plan of care and reassess at next routine visit  All questions about this problem from patient were answered today  Other    Mixed hyperlipidemia     Patient  is stable with current medication and we discussed a low fat low cholesterol diet  Weight loss also discussed for this will help lower cholesterol also  Recheck lipids in 6 months  Insomnia     Discussed with patient use of sedative  medications and good  sleep hygiene to maximize treatment for this problem  Pt  to use sedatives only prior to sleep and cautioned them about usage at any other time  All patient questions about this problem answered today  Anxiety     Patient to continue utilizing medical therapy as well as counseling sources as applicable to condition  If suicidal thought or fear of suicide attempt, to call 911 and seek help immediately  Medications and therapy reviewed today and all patient  questions answered today             Other Visit Diagnoses     Well adult exam    -  Primary           Preventive health issues were discussed with patient, and age appropriate screening tests were ordered as noted in patient's After Visit Summary  Personalized health advice and appropriate referrals for health education or preventive services given if needed, as noted in patient's After Visit Summary  History of Present Illness:     Patient presents for Welcome to Medicare visit       Patient Care Team:  Jareth Spear MD as PCP - General (Family Medicine)  Jareth Spear MD (Family Medicine)     Review of Systems:     Review of Systems   Problem List:     Patient Active Problem List   Diagnosis    CAD (coronary artery disease)    Chest pain    [facility])    Coronary atherosclerosis    Essential hypertension    Mixed hyperlipidemia    Hypothyroidism    Myofascial pain syndrome    Radiculopathy, lumbar region    Bilateral lower extremity edema    Palpitation    History of CVA (cerebrovascular accident)    History of percutaneous coronary intervention    Chronic diastolic heart failure ([facility] [address] [facility]    Encounter for screening colonoscopy    S/P CABG (coronary artery bypass graft)    Thrombocytopenia ([facility] [address] [facility]    Hyperchloremia    Hypocalcemia    Encounter for postoperative care    Stroke ([facility] [address] [facility]    Insomnia    BMI 33 0-33 9,adult    Anxiety    Continuous opioid dependence ([facility] [address] [facility]    Lower GI bleed      Past Medical and Surgical History:     Past Medical History:   Diagnosis Date    Anxiety     CAD (coronary artery disease)     Coronary artery disease     Depression     Disease of thyroid gland     Dizziness     Eye problems     Hypertension     Hypothyroidism     Migraines     Stroke ([address] ) 11/17/2014    Tremors of nervous system      Past Surgical History:   Procedure Laterality Date    COLONOSCOPY  09/01/2017    COLONOSCOPY      CORONARY ARTERY BYPASS GRAFT  March 24,20201    CORONARY STENT PLACEMENT      DENTAL SURGERY      entire top teeth removal    HERNIA REPAIR      VT CABG, ARTERY-VEIN, FOUR N/A 3/24/2021    Procedure: CORONARY ARTERY BYPASS GRAFT (CABG) 3 VESSELS,  USING LEFT VANE-LAD AND LEFT GSV-RPL & RAMUS;  Surgeon: Jeanmarie Allred MD;  [facility];  Service: Cardiac Surgery    MO ECHO TRANSESOPHAG [address] N/A 3/24/2021    Procedure: TRANSESOPHAGEAL ECHOCARDIOGRAM (MARLEN);   Surgeon: Jeanmarie Allred MD;  [facility];  Service: Cardiac Surgery      Family History:     Family History   Problem Relation Age of Onset    Cancer Mother     Heart attack Father     Heart disease Father     Heart attack Sister     Cancer Brother       Social History:     Social History     Socioeconomic History    Marital status: /Civil Union     Spouse name: None    Number of children: None    Years of education: None    Highest education level: None   Occupational History    None   Tobacco Use    Smoking status: Never Smoker    Smokeless tobacco: Never Used   Vaping Use    Vaping Use: Never used   Substance and Sexual Activity    Alcohol use: No    Drug use: No    Sexual activity: Not Currently     Partners: Female   Other Topics Concern    None   Social History Narrative    Most recent tobacco use screenin2019      Do you currently or have you served in the [employer]:   No      Were you activated, into active duty, as a member of the [employer] or as a Reservist:   No      Occupation:   retired      Education:   15      Marital status:         Sexual orientation:   Heterosexual      Exercise level:   Occasional      Diet:   Regular      General stress level:   Medium      Alcohol intake:   Occasional      Caffeine intake:   Occasional      Chewing tobacco:   none      Illicit drugs:   none      Guns present in home:   No      Seat belts used routinely:   Yes      Smoke alarm in home:   Yes      Advance directive:   No      Live alone or with others:   with others      Are there stairs in your home:   Yes  only in front of house     Pets:   No      Social Determinants of Health     Financial Resource Strain: Not on file   Food Insecurity: Not on file   Transportation Needs: Not on file   Physical Activity: Not on file   Stress: Not on file   Social Connections: Not on file   Intimate Partner Violence: Not on file   Housing Stability: Not on file      Medications and Allergies:     Current Outpatient Medications   Medication Sig Dispense Refill    acetaminophen (TYLENOL) 325 mg tablet Take 2 tablets (650 mg total) by mouth every 6 (six) hours as needed for mild pain  0    acetaminophen-codeine (TYLENOL #3) 300-30 mg per tablet Take 1 tablet by mouth every 4 (four) hours as needed for moderate pain 30 tablet 0    aspirin 81 MG tablet Take 81 mg by mouth      atorvastatin (LIPITOR) 40 mg tablet Take 2 tablets (80 mg total) by mouth daily 180 tablet 1    clonazePAM (KlonoPIN) 2 mg tablet Take 2 tablets (4 mg total) by mouth 2 (two) times a day as needed for seizures 180 tablet 0    clopidogrel (PLAVIX) 75 mg tablet Take 1 tablet (75 mg total) by mouth daily 90 tablet 0    levothyroxine 100 mcg tablet Take 1 tablet (100 mcg total) by mouth daily 90 tablet 0    metoprolol succinate (TOPROL-XL) 100 mg 24 hr tablet Take 1 tablet (100 mg total) by mouth daily 90 tablet 3    oxyCODONE (ROXICODONE) 5 immediate release tablet Take 1 tablet (5 mg total) by mouth every 4 (four) hours as needed for moderate pain Max Daily Amount: 30 mg 16 tablet 0    sildenafil (VIAGRA) 100 mg tablet Take 1 tablet (100 mg total) by mouth daily as needed for erectile dysfunction 30 tablet 5    tiZANidine (ZANAFLEX) 2 mg tablet Take 1 tablet (2 mg total) by mouth every 8 (eight) hours as needed for muscle spasms 270 tablet 0    zolpidem (AMBIEN) 10 mg tablet 2 po  q hs 180 tablet 0    acetaminophen (TYLENOL) 325 mg tablet Take 2 tablets (650 mg total) by mouth every 6 (six) hours as needed for mild pain  0    polyethylene glycol (GLYCOLAX) 17 GM/SCOOP powder AT 5PM TAKE 5MGX2 DULCOLAX   AT [place] [facility]  [address]  DRINK 8OZ GLASS EVERY 5 MINS UNTIL 32OZ FINISHED  DRINK REMAINING  g 1     No current facility-administered medications for this visit  Allergies   Allergen Reactions    Medical Tape Other (See Comments)     Other reaction(s): BURNS USE PAPER TAPE      Immunizations:     Immunization History   Administered Date(s) Administered    COVID-19 PFIZER VACCINE 0 3 ML IM 04/29/2021, 05/20/2021, 11/27/2021    INFLUENZA 12/20/2017, 11/28/2018, 11/03/2020, 10/18/2021    Influenza Injectable, MDCK, Preservative Free, Quadrivalent, 0 5 mL 11/20/2019, 11/03/2020    Tdap 05/08/2014, 07/23/2021    Tuberculin Skin Test-PPD Intradermal 01/04/2001      Health Maintenance:         Topic Date Due    Colorectal Cancer Screening  10/04/2027    HIV Screening  Completed    Hepatitis C Screening  Completed         Topic Date Due    Pneumococcal Vaccine: Pediatrics (0 to 5 Years) and At-Risk Patients (6 to 59 Years) (1 of 2 - PPSV23) Never done      Medicare Screening Tests and Risk Assessments:         Health Risk Assessment:   Patient rates overall health as fair  Patient feels that their physical health rating is slightly worse  Patient is dissatisfied with their life  Eyesight was rated as same  Hearing was rated as same  Patient feels that their emotional and mental health rating is same  Patients states they are never, rarely angry  Patient states they are sometimes unusually tired/fatigued  Pain experienced in the last 7 days has been some  Patient's pain rating has been 5/10  Patient states that he has experienced no weight loss or gain in last 6 months  Fall Risk Screening: In the past year, patient has experienced: history of falling in past year      Home Safety:  Patient does not have trouble with stairs inside or outside of their home  Patient has working smoke alarms and has working carbon monoxide detector  Home safety hazards include: none       Nutrition:   Current diet is Regular  Medications:   Patient is currently taking over-the-counter supplements  OTC medications include: aspirin 81MG, Tylenol 325mg  Patient is able to manage medications  Activities of Daily Living (ADLs)/Instrumental Activities of Daily Living (IADLs):   Walk and transfer into and out of bed and chair?: Yes  Dress and groom yourself?: Yes    Bathe or shower yourself?: Yes    Feed yourself? Yes  Do your laundry/housekeeping?: Yes  Manage your money, pay your bills and track your expenses?: Yes  Make your own meals?: Yes    Do your own shopping?: Yes    Durable Medical Equipment Suppliers  walker or cane    Previous Hospitalizations:   Any hospitalizations or ED visits within the last 12 months?: Yes    How many hospitalizations have you had in the last year?: 3-4    Advance Care Planning:   Living will: Yes    Durable POA for healthcare: Yes    Advanced directive: Yes      PREVENTIVE SCREENINGS      Cardiovascular Screening:    General: Screening Not Indicated and History Lipid Disorder      Diabetes Screening:     General: Screening Current      Colorectal Cancer Screening:     General: Screening Current      Prostate Cancer Screening:    General: Screening Current      Lung Cancer Screening:     General: Screening Not Indicated      Hepatitis C Screening:    General: Screening Current    Screening, Brief Intervention, and Referral to Treatment (SBIRT)    Screening  Typical number of drinks in a day: 0  Typical number of drinks in a week: 0  Interpretation: Low risk drinking behavior      AUDIT-C Screenin) How often did you have a drink containing alcohol in the past year? never  2) How many drinks did you have on a typical day when you were drinking in the past year? 0  3) How often did you have 6 or more drinks on one occasion in the past year? never    AUDIT-C Score: 0  Interpretation: Score 0-3 (male): Negative screen for alcohol misuse    Single Item Drug Screening:  How often have you used an illegal drug (including marijuana) or a prescription medication for non-medical reasons in the past year? never    Single Item Drug Screen Score: 0  Interpretation: Negative screen for possible drug use disorder    Review of Current Opioid Use    Opioid Risk Tool (ORT) Interpretation: Complete Opioid Risk Tool (ORT)    No exam data present     Physical Exam:     /80 (BP Location: Left arm, Patient Position: Sitting, Cuff Size: Standard)   Pulse 66   Temp 97 5 °F (36 4 °C) (Skin)   Ht 5' 9" (1 753 m)   Wt 105 kg (231 lb) Comment: per pt 226 at home  SpO2 98%   BMI 34 11 kg/m²     Physical Exam     Phi Gonzalez MD

## 2022-02-02 NOTE — PATIENT INSTRUCTIONS
Medicare Preventive Visit Patient Instructions  Thank you for completing your Welcome to Medicare Visit or Medicare Annual Wellness Visit today  Your next wellness visit will be due in one year (2/3/2023)  The screening/preventive services that you may require over the next 5-10 years are detailed below  Some tests may not apply to you based off risk factors and/or age  Screening tests ordered at today's visit but not completed yet may show as past due  Also, please note that scanned in results may not display below  Preventive Screenings:  Service Recommendations Previous Testing/Comments   Colorectal Cancer Screening  · Colonoscopy    · Fecal Occult Blood Test (FOBT)/Fecal Immunochemical Test (FIT)  · Fecal DNA/Cologuard Test  · Flexible Sigmoidoscopy Age: 54-65 years old   Colonoscopy: every 10 years (May be performed more frequently if at higher risk)  OR  FOBT/FIT: every 1 year  OR  Cologuard: every 3 years  OR  Sigmoidoscopy: every 5 years  Screening may be recommended earlier than age 48 if at higher risk for colorectal cancer  Also, an individualized decision between you and your healthcare provider will decide whether screening between the ages of 74-80 would be appropriate   Colonoscopy: 10/04/2017  FOBT/FIT: Not on file  Cologuard: Not on file  Sigmoidoscopy: Not on file    Screening Current     Prostate Cancer Screening Individualized decision between patient and health care provider in men between ages of 53-78   Medicare will cover every 12 months beginning on the day after your 50th birthday PSA: 0 5 ng/mL     Screening Current     Hepatitis C Screening Once for adults born between 1945 and 1965  More frequently in patients at high risk for Hepatitis C Hep C Antibody: 10/05/2017    Screening Current   Diabetes Screening 1-2 times per year if you're at risk for diabetes or have pre-diabetes Fasting glucose: 101 mg/dL   A1C: 5 7 %    Screening Current   Cholesterol Screening Once every 5 years if you don't have a lipid disorder  May order more often based on risk factors  Lipid panel: 09/24/2021    Screening Not Indicated  History Lipid Disorder      Other Preventive Screenings Covered by Medicare:  1  Abdominal Aortic Aneurysm (AAA) Screening: covered once if your at risk  You're considered to be at risk if you have a family history of AAA or a male between the age of 73-68 who smoking at least 100 cigarettes in your lifetime  2  Lung Cancer Screening: covers low dose CT scan once per year if you meet all of the following conditions: (1) Age 50-69; (2) No signs or symptoms of lung cancer; (3) Current smoker or have quit smoking within the last 15 years; (4) You have a tobacco smoking history of at least 30 pack years (packs per day x number of years you smoked); (5) You get a written order from a healthcare provider  3  Glaucoma Screening: covered annually if you're considered high risk: (1) You have diabetes OR (2) Family history of glaucoma OR (3)  aged 48 and older OR (3)  American aged 72 and older  3  Osteoporosis Screening: covered every 2 years if you meet one of the following conditions: (1) Have a vertebral abnormality; (2) On glucocorticoid therapy for more than 3 months; (3) Have primary hyperparathyroidism; (4) On osteoporosis medications and need to assess response to drug therapy  5  HIV Screening: covered annually if you're between the age of 12-76  Also covered annually if you are younger than 13 and older than 72 with risk factors for HIV infection  For pregnant patients, it is covered up to 3 times per pregnancy      Immunizations:  Immunization Recommendations   Influenza Vaccine Annual influenza vaccination during flu season is recommended for all persons aged >= 6 months who do not have contraindications   Pneumococcal Vaccine (Prevnar and Pneumovax)  * Prevnar = PCV13  * Pneumovax = PPSV23 Adults 25-60 years old: 1-3 doses may be recommended based on certain risk factors  Adults 72 years old: Prevnar (PCV13) vaccine recommended followed by Pneumovax (PPSV23) vaccine  If already received PPSV23 since turning 65, then PCV13 recommended at least one year after PPSV23 dose  Hepatitis B Vaccine 3 dose series if at intermediate or high risk (ex: diabetes, end stage renal disease, liver disease)   Tetanus (Td) Vaccine - COST NOT COVERED BY MEDICARE PART B Following completion of primary series, a booster dose should be given every 10 years to maintain immunity against tetanus  Td may also be given as tetanus wound prophylaxis  Tdap Vaccine - COST NOT COVERED BY MEDICARE PART B Recommended at least once for all adults  For pregnant patients, recommended with each pregnancy  Shingles Vaccine (Shingrix) - COST NOT COVERED BY MEDICARE PART B  2 shot series recommended in those aged 48 and above     Health Maintenance Due:      Topic Date Due    Colorectal Cancer Screening  10/04/2027    HIV Screening  Completed    Hepatitis C Screening  Completed     Immunizations Due:      Topic Date Due    Pneumococcal Vaccine: Pediatrics (0 to 5 Years) and At-Risk Patients (6 to 59 Years) (1 of 2 - PPSV23) Never done     Advance Directives   What are advance directives? Advance directives are legal documents that state your wishes and plans for medical care  These plans are made ahead of time in case you lose your ability to make decisions for yourself  Advance directives can apply to any medical decision, such as the treatments you want, and if you want to donate organs  What are the types of advance directives? There are many types of advance directives, and each state has rules about how to use them  You may choose a combination of any of the following:  · Living will: This is a written record of the treatment you want  You can also choose which treatments you do not want, which to limit, and which to stop at a certain time   This includes surgery, medicine, IV fluid, and tube feedings  · Durable power of  for healthcare [facility]): This is a written record that states who you want to make healthcare choices for you when you are unable to make them for yourself  This person, called a proxy, is usually a family member or a friend  You may choose more than 1 proxy  · Do not resuscitate (DNR) order:  A DNR order is used in case your heart stops beating or you stop breathing  It is a request not to have certain forms of treatment, such as CPR  A DNR order may be included in other types of advance directives  · Medical directive: This covers the care that you want if you are in a coma, near death, or unable to make decisions for yourself  You can list the treatments you want for each condition  Treatment may include pain medicine, surgery, blood transfusions, dialysis, IV or tube feedings, and a ventilator (breathing machine)  · Values history: This document has questions about your views, beliefs, and how you feel and think about life  This information can help others choose the care that you would choose  Why are advance directives important? An advance directive helps you control your care  Although spoken wishes may be used, it is better to have your wishes written down  Spoken wishes can be misunderstood, or not followed  Treatments may be given even if you do not want them  An advance directive may make it easier for your family to make difficult choices about your care  Weight Management   Why it is important to manage your weight:  Being overweight increases your risk of health conditions such as heart disease, high blood pressure, type 2 diabetes, and certain types of cancer  It can also increase your risk for osteoarthritis, sleep apnea, and other respiratory problems  Aim for a slow, steady weight loss  Even a small amount of weight loss can lower your risk of health problems    How to lose weight safely:  A safe and healthy way to lose weight is to eat fewer calories and get regular exercise  You can lose up about 1 pound a week by decreasing the number of calories you eat by 500 calories each day  Healthy meal plan for weight management:  A healthy meal plan includes a variety of foods, contains fewer calories, and helps you stay healthy  A healthy meal plan includes the following:  · Eat whole-grain foods more often  A healthy meal plan should contain fiber  Fiber is the part of grains, fruits, and vegetables that is not broken down by your body  Whole-grain foods are healthy and provide extra fiber in your diet  Some examples of whole-grain foods are whole-wheat breads and pastas, oatmeal, brown rice, and bulgur  · Eat a variety of vegetables every day  Include dark, leafy greens such as spinach, kale, jacques greens, and mustard greens  Eat yellow and orange vegetables such as carrots, sweet potatoes, and winter squash  · Eat a variety of fruits every day  Choose fresh or canned fruit (canned in its own juice or light syrup) instead of juice  Fruit juice has very little or no fiber  · Eat low-fat dairy foods  Drink fat-free (skim) milk or 1% milk  Eat fat-free yogurt and low-fat cottage cheese  Try low-fat cheeses such as mozzarella and other reduced-fat cheeses  · Choose meat and other protein foods that are low in fat  Choose beans or other legumes such as split peas or lentils  Choose fish, skinless poultry (chicken or turkey), or lean cuts of red meat (beef or pork)  Before you cook meat or poultry, cut off any visible fat  · Use less fat and oil  Try baking foods instead of frying them  Add less fat, such as margarine, sour cream, regular salad dressing and mayonnaise to foods  Eat fewer high-fat foods  Some examples of high-fat foods include french fries, doughnuts, ice cream, and cakes  · Eat fewer sweets  Limit foods and drinks that are high in sugar  This includes candy, cookies, regular soda, and sweetened drinks    Exercise:  Exercise at least 30 minutes per day on most days of the week  Some examples of exercise include walking, biking, dancing, and swimming  You can also fit in more physical activity by taking the stairs instead of the elevator or parking farther away from stores  Ask your healthcare provider about the best exercise plan for you  Narcotic (Opioid) Safety    Use narcotics safely:  · Take prescribed narcotics exactly as directed  · Do not give narcotics to others or take narcotics that belong to someone else  · Do not mix narcotics without medicines or alcohol  · Do not drive or operate heavy machinery after you take the narcotic  · Monitor for side effects and notify your healthcare provider if you experienced side effects such as nausea, sleepiness, itching, or trouble thinking clearly  Manage constipation:    Constipation is the most common side effect of narcotic medicine  Constipation is when you have hard, dry bowel movements, or you go longer than usual between bowel movements  Tell your healthcare provider about all changes in your bowel movements while you are taking narcotics  He or she may recommend laxative medicine to help you have a bowel movement  He or she may also change the kind of narcotic you are taking, or change when you take it  The following are more ways you can prevent or relieve constipation:    · Drink liquids as directed  You may need to drink extra liquids to help soften and move your bowels  Ask how much liquid to drink each day and which liquids are best for you  · Eat high-fiber foods  This may help decrease constipation by adding bulk to your bowel movements  High-fiber foods include fruits, vegetables, whole-grain breads and cereals, and beans  Your healthcare provider or dietitian can help you create a high-fiber meal plan  Your provider may also recommend a fiber supplement if you cannot get enough fiber from food  · Exercise regularly    Regular physical activity can help stimulate your intestines  Walking is a good exercise to prevent or relieve constipation  Ask which exercises are best for you  · Schedule a time each day to have a bowel movement  This may help train your body to have regular bowel movements  Bend forward while you are on the toilet to help move the bowel movement out  Sit on the toilet for at least 10 minutes, even if you do not have a bowel movement  Store narcotics safely:   · Store narcotics where others cannot easily get them  Keep them in a locked cabinet or secure area  Do not  keep them in a purse or other bag you carry with you  A person may be looking for something else and find the narcotics  · Make sure narcotics are stored out of the reach of children  A child can easily overdose on narcotics  Narcotics may look like candy to a small child  The best way to dispose of narcotics: The laws vary by country and area  In the [place], the best way is to return the narcotics through a take-back program  This program is offered by the [employer])  The following are options for using the program:  · Take the narcotics to a DONNIE collection site  The site is often a law enforcement center  Call your local law enforcement center for scheduled take-back days in your area  You will be given information on where to go if the collection site is in a different location  · Take the narcotics to an approved pharmacy or hospital   A pharmacy or hospital may be set up as a collection site  You will need to ask if it is a DONNIE collection site if you were not directed there  A pharmacy or doctor's office may not be able to take back narcotics unless it is a DONNIE site  · Use a mail-back system  This means you are given containers to put the narcotics into  You will then mail them in the containers  · Use a take-back drop box  This is a place to leave the narcotics at any time  People and animals will not be able to get into the box   Your local law enforcement agency can tell you where to find a drop box in your area  Other ways to manage pain:   · Ask your healthcare provider about non-narcotic medicines to control pain  Nonprescription medicines include NSAIDs (such as ibuprofen) and acetaminophen  Prescription medicines include muscle relaxers, antidepressants, and steroids  · Pain may be managed without any medicines  Some ways to relieve pain include massage, aromatherapy, or meditation  Physical or occupational therapy may also help  For more information:   · Drug Enforcement Administration  [facility] [address]  Phone: 0- 019 - [phone]  Web Address: [facility]/drug_disposal/    · [address]  and Drug Administration  [facility] , [address] [facility]  Phone: 8- 757 - [phone]  Web Address: http://tuta.co/     © Copyright [employer] 2018 Information is for End User's use only and may not be sold, redistributed or otherwise used for commercial purposes   All illustrations and images included in CareNotes® are the copyrighted property of A D A M , Inc  or [address] [place] [address] [facility]

## 2022-02-03 NOTE — TELEPHONE ENCOUNTER
Pt calling requesting to speak with someone regarding his results, needs help with interpreting what results mean  Pt can be reached at [phone]  Pt also asking to speak with Kd Bansal, if possible

## 2022-02-04 NOTE — TELEPHONE ENCOUNTER
Called and discussed negative ova and parasite test results with patient  I advised his rectal itching is likely due to hemorrhoids as previously discussed and this will be evaluated further at upcoming colonoscopy this month   No further questions

## 2022-02-16 ENCOUNTER — TELEPHONE (OUTPATIENT)
Dept: GASTROENTEROLOGY | Facility: CLINIC | Age: 67
End: 2022-02-16

## 2022-02-16 DIAGNOSIS — M54.50 LOW BACK PAIN WITHOUT SCIATICA, UNSPECIFIED BACK PAIN LATERALITY, UNSPECIFIED CHRONICITY: ICD-10-CM

## 2022-02-16 RX ORDER — TIZANIDINE 2 MG/1
2 TABLET ORAL EVERY 8 HOURS PRN
Qty: 270 TABLET | Refills: 0 | Status: SHIPPED | OUTPATIENT
Start: 2022-02-16 | End: 2022-04-21 | Stop reason: SDUPTHER

## 2022-02-16 NOTE — TELEPHONE ENCOUNTER
Patients GI provider:  Dr Mulu Lovelace    Number to return call: (  [phone]    Reason for call: Pt calling to ask which medications he needs to stop prior to colon on 2-24-22    Scheduled procedure/appointment date if applicable: procedure [id number]

## 2022-02-16 NOTE — TELEPHONE ENCOUNTER
Called pt and reviewed colonoscopy prep instructions and medication instructions  Pt verbalized understanding of directions and had no further questions

## 2022-02-17 DIAGNOSIS — F41.9 ANXIETY: ICD-10-CM

## 2022-02-17 RX ORDER — CLONAZEPAM 2 MG/1
4 TABLET ORAL 2 TIMES DAILY PRN
Qty: 180 TABLET | Refills: 0 | Status: SHIPPED | OUTPATIENT
Start: 2022-02-17 | End: 2022-03-21 | Stop reason: SDUPTHER

## 2022-02-22 DIAGNOSIS — G47.00 INSOMNIA, UNSPECIFIED TYPE: ICD-10-CM

## 2022-02-22 RX ORDER — ZOLPIDEM TARTRATE 10 MG/1
TABLET ORAL
Qty: 180 TABLET | Refills: 1 | Status: SHIPPED | OUTPATIENT
Start: 2022-02-22 | End: 2022-02-23 | Stop reason: SDUPTHER

## 2022-02-23 ENCOUNTER — TELEPHONE (OUTPATIENT)
Dept: GASTROENTEROLOGY | Facility: HOSPITAL | Age: 67
End: 2022-02-23

## 2022-02-23 DIAGNOSIS — G47.00 INSOMNIA, UNSPECIFIED TYPE: ICD-10-CM

## 2022-02-23 RX ORDER — ZOLPIDEM TARTRATE 10 MG/1
TABLET ORAL
Qty: 180 TABLET | Refills: 1 | Status: SHIPPED | OUTPATIENT
Start: 2022-02-23 | End: 2022-02-27 | Stop reason: SDUPTHER

## 2022-02-24 ENCOUNTER — HOSPITAL ENCOUNTER (OUTPATIENT)
Dept: GASTROENTEROLOGY | Facility: HOSPITAL | Age: 67
Setting detail: OUTPATIENT SURGERY
Discharge: HOME | End: 2022-02-24
Attending: INTERNAL MEDICINE
Payer: MEDICARE

## 2022-02-24 ENCOUNTER — ANESTHESIA EVENT (OUTPATIENT)
Dept: GASTROENTEROLOGY | Facility: HOSPITAL | Age: 67
End: 2022-02-24

## 2022-02-24 ENCOUNTER — ANESTHESIA (OUTPATIENT)
Dept: GASTROENTEROLOGY | Facility: HOSPITAL | Age: 67
End: 2022-02-24

## 2022-02-24 VITALS
HEART RATE: 82 BPM | DIASTOLIC BLOOD PRESSURE: 105 MMHG | HEIGHT: 69 IN | RESPIRATION RATE: 16 BRPM | OXYGEN SATURATION: 96 % | SYSTOLIC BLOOD PRESSURE: 148 MMHG | BODY MASS INDEX: 34.11 KG/M2 | TEMPERATURE: 97.8 F

## 2022-02-24 DIAGNOSIS — K62.5 RECTAL BLEEDING: ICD-10-CM

## 2022-02-24 DIAGNOSIS — R19.7 DIARRHEA OF PRESUMED INFECTIOUS ORIGIN: ICD-10-CM

## 2022-02-24 DIAGNOSIS — K52.9 COLITIS: ICD-10-CM

## 2022-02-24 PROCEDURE — 88305 TISSUE EXAM BY PATHOLOGIST: CPT | Performed by: SPECIALIST

## 2022-02-24 PROCEDURE — 45380 COLONOSCOPY AND BIOPSY: CPT | Performed by: INTERNAL MEDICINE

## 2022-02-24 RX ORDER — LIDOCAINE HYDROCHLORIDE 10 MG/ML
0.5 INJECTION, SOLUTION EPIDURAL; INFILTRATION; INTRACAUDAL; PERINEURAL ONCE AS NEEDED
Status: CANCELLED | OUTPATIENT
Start: 2022-02-24

## 2022-02-24 RX ORDER — SODIUM CHLORIDE, SODIUM LACTATE, POTASSIUM CHLORIDE, CALCIUM CHLORIDE 600; 310; 30; 20 MG/100ML; MG/100ML; MG/100ML; MG/100ML
INJECTION, SOLUTION INTRAVENOUS CONTINUOUS PRN
Status: DISCONTINUED | OUTPATIENT
Start: 2022-02-24 | End: 2022-02-24

## 2022-02-24 RX ORDER — DIPHENHYDRAMINE HYDROCHLORIDE 50 MG/ML
12.5 INJECTION INTRAMUSCULAR; INTRAVENOUS ONCE AS NEEDED
Status: CANCELLED | OUTPATIENT
Start: 2022-02-24

## 2022-02-24 RX ORDER — METOCLOPRAMIDE HYDROCHLORIDE 5 MG/ML
10 INJECTION INTRAMUSCULAR; INTRAVENOUS ONCE AS NEEDED
Status: CANCELLED | OUTPATIENT
Start: 2022-02-24

## 2022-02-24 RX ORDER — ONDANSETRON 2 MG/ML
4 INJECTION INTRAMUSCULAR; INTRAVENOUS ONCE AS NEEDED
Status: CANCELLED | OUTPATIENT
Start: 2022-02-24

## 2022-02-24 RX ORDER — PROPOFOL 10 MG/ML
INJECTION, EMULSION INTRAVENOUS AS NEEDED
Status: DISCONTINUED | OUTPATIENT
Start: 2022-02-24 | End: 2022-02-24

## 2022-02-24 RX ADMIN — PROPOFOL 30 MG: 10 INJECTION, EMULSION INTRAVENOUS at 12:57

## 2022-02-24 RX ADMIN — PROPOFOL 30 MG: 10 INJECTION, EMULSION INTRAVENOUS at 12:51

## 2022-02-24 RX ADMIN — PROPOFOL 30 MG: 10 INJECTION, EMULSION INTRAVENOUS at 12:48

## 2022-02-24 RX ADMIN — Medication 40 MG: at 12:50

## 2022-02-24 RX ADMIN — PROPOFOL 20 MG: 10 INJECTION, EMULSION INTRAVENOUS at 12:45

## 2022-02-24 RX ADMIN — PROPOFOL 80 MG: 10 INJECTION, EMULSION INTRAVENOUS at 12:44

## 2022-02-24 RX ADMIN — SODIUM CHLORIDE, SODIUM LACTATE, POTASSIUM CHLORIDE, AND CALCIUM CHLORIDE: .6; .31; .03; .02 INJECTION, SOLUTION INTRAVENOUS at 12:44

## 2022-02-24 RX ADMIN — PROPOFOL 30 MG: 10 INJECTION, EMULSION INTRAVENOUS at 12:54

## 2022-02-24 NOTE — DISCHARGE INSTRUCTIONS
Colonoscopy   WHAT YOU NEED TO KNOW:   A colonoscopy is a procedure to examine the inside of your colon (intestine) with a scope  Polyps or tissue growths may have been removed during your colonoscopy  It is normal to feel bloated and to have some abdominal discomfort  You should be passing gas  If you have hemorrhoids or you had polyps removed, you may have a small amount of bleeding  DISCHARGE INSTRUCTIONS:   Seek care immediately if:   · You have a large amount of bright red blood in your bowel movements  · Your abdomen is hard and firm and you have severe pain  · You have sudden trouble breathing  Contact your healthcare provider if:   · You develop a rash or hives  · You have a fever within 24 hours of your procedure       · You have not had a bowel movement for 3 days after your procedure  · You have questions or concerns about your condition or care  Activity:   · Do not lift, strain, or run  for 3 days after your procedure  · Rest after your procedure  You have been given medicine to relax you  Do not  drive or make important decisions until the day after your procedure  Return to your normal activity as directed  · Relieve gas and discomfort from bloating  by lying on your right side with a heating pad on your abdomen  You may need to take short walks to help the gas move out  Eat small meals until bloating is relieved  If you had polyps removed: For 7 days after your procedure:  · Do not  take aspirin  · Do not  go on long car rides  Follow up with your healthcare provider as directed:  Write down your questions so you remember to ask them during your visits  © 2017 [address] is for End User's use only and may not be sold, redistributed or otherwise used for commercial purposes  All illustrations and images included in CareNotes® are the copyrighted property of [employer]  or Cruzito Hector    The above information is an  only  It is not intended as medical advice for individual conditions or treatments  Talk to your doctor, nurse or pharmacist before following any medical regimen to see if it is safe and effective for you

## 2022-02-24 NOTE — H&P
History and Physical -  [facility]  Alexx Mojica 61 y o  male MRN: [id number]                  HPI: Alexx Mojica is a 61y o  year old male who presents for colitis, rectal bleeding  REVIEW OF SYSTEMS: Per the HPI, and otherwise unremarkable  Historical Information   Past Medical History:   Diagnosis Date    Anxiety     CAD (coronary artery disease)     Coronary artery disease     Depression     Disease of thyroid gland     Dizziness     Eye problems     Hypertension     Hypothyroidism     Migraines     Stroke ([address] ) 11/17/2014    Tremors of nervous system      Past Surgical History:   Procedure Laterality Date    COLONOSCOPY  09/01/2017    COLONOSCOPY      CORONARY ARTERY BYPASS GRAFT  March 24,20201    CORONARY STENT PLACEMENT      DENTAL SURGERY      entire top teeth removal    HERNIA REPAIR      [place] CABG, ARTERY-VEIN, FOUR N/A 3/24/2021    Procedure: CORONARY ARTERY BYPASS GRAFT (CABG) 3 VESSELS,  USING LEFT VANE-LAD AND LEFT GSV-RPL & RAMUS;  Surgeon: Calvin Guillen MD;  [facility];  Service: Cardiac Surgery    [place] ECHO TRANSESOPHAG [address] N/A 3/24/2021    Procedure: TRANSESOPHAGEAL ECHOCARDIOGRAM (MARLEN);   Surgeon: Calvin Guillen MD;  [facility];  Service: Cardiac Surgery     Social History   Social History     Substance and Sexual Activity   Alcohol Use No     Social History     Substance and Sexual Activity   Drug Use No     Social History     Tobacco Use   Smoking Status Never Smoker   Smokeless Tobacco Never Used     Family History   Problem Relation Age of Onset    Cancer Mother     Heart attack Father     Heart disease Father     Heart attack Sister     Cancer Brother        Meds/Allergies       Current Outpatient Medications:     acetaminophen (TYLENOL) 325 mg tablet    acetaminophen-codeine (TYLENOL #3) 300-30 mg per tablet    clopidogrel (PLAVIX) 75 mg tablet    acetaminophen (TYLENOL) 325 mg tablet    aspirin 81 MG tablet    atorvastatin (LIPITOR) 40 mg tablet    clonazePAM (KlonoPIN) 2 mg tablet    levothyroxine 100 mcg tablet    metoprolol succinate (TOPROL-XL) 100 mg 24 hr tablet    oxyCODONE (ROXICODONE) 5 immediate release tablet    polyethylene glycol (GLYCOLAX) 17 GM/SCOOP powder    sildenafil (VIAGRA) 100 mg tablet    tiZANidine (ZANAFLEX) 2 mg tablet    zolpidem (AMBIEN) 10 mg tablet    Allergies   Allergen Reactions    Medical Tape Other (See Comments)     Other reaction(s): BURNS USE PAPER TAPE       Objective     BP (!) 192/100   Pulse 90   Temp 97 6 °F (36 4 °C) (Temporal)   Resp 18   Ht 5' 9" (1 753 m)   SpO2 97%   BMI 34 11 kg/m²       PHYSICAL EXAM    Gen: NAD  Head: NCAT  CV: RRR  CHEST: Clear  ABD: soft, NT/ND  EXT: no edema      ASSESSMENT/PLAN:  This is a 61y o  year old male here for colonoscopy, and he is stable and optimized for his procedure

## 2022-02-24 NOTE — ANESTHESIA POSTPROCEDURE EVALUATION
Post-Op Assessment Note    CV Status:  Stable  Pain Score: 0    Pain management: adequate     Mental Status:  Arousable and sleepy   Hydration Status:  Euvolemic and stable   PONV Controlled:  Controlled   Airway Patency:  Patent and adequate      Post Op Vitals Reviewed: Yes      Staff: CRNA         No complications documented      BP  134/71   Temp      Pulse 87   Resp 16   SpO2 98

## 2022-02-24 NOTE — ANESTHESIA PREPROCEDURE EVALUATION
Procedure:  COLONOSCOPY    Relevant Problems   ANESTHESIA (within normal limits)      CARDIO   (+) CAD (coronary artery disease)   (+) Chest pain   (+) Coronary atherosclerosis   (+) Essential hypertension   (+) Mixed hyperlipidemia   (+) S/P CABG (coronary artery bypass graft)      ENDO   (+) Hypothyroidism      GI/HEPATIC   (+) Lower GI bleed      /RENAL   (+) Stage 3a chronic kidney disease (HCC)      HEMATOLOGY   (+) Thrombocytopenia (HCC)      MUSCULOSKELETAL   (+) Myofascial pain syndrome      NEURO/PSYCH   (+) Anxiety   (+) Claudication (HCC)   (+) Continuous opioid dependence (HCC)   (+) History of CVA (cerebrovascular accident)   (+) Myofascial pain syndrome   (+) Stroke (HCC)      PULMONARY (within normal limits)      Other   (+) Chronic diastolic heart failure (HCC)   (+) Encounter for screening colonoscopy   (+) History of percutaneous coronary intervention   (+) Radiculopathy, lumbar region        Physical Exam    Airway    Mallampati score: II  TM Distance: >3 FB  Neck ROM: full     Dental   No notable dental hx     Cardiovascular  Rhythm: regular, Rate: normal, Cardiovascular exam normal    Pulmonary  Pulmonary exam normal Breath sounds clear to auscultation,     Other Findings        Anesthesia Plan  ASA Score- 3     Anesthesia Type- IV sedation with anesthesia with ASA Monitors  Additional Monitors:   Airway Plan:           Plan Factors-    Chart reviewed  Existing labs reviewed  Patient summary reviewed  Induction- intravenous  Postoperative Plan-     Informed Consent- Anesthetic plan and risks discussed with patient  I personally reviewed this patient with the CRNA  Discussed and agreed on the Anesthesia Plan with the CRNA  Natali Hawley

## 2022-02-27 DIAGNOSIS — G47.00 INSOMNIA, UNSPECIFIED TYPE: ICD-10-CM

## 2022-02-28 RX ORDER — ZOLPIDEM TARTRATE 10 MG/1
TABLET ORAL
Qty: 180 TABLET | Refills: 0 | Status: SHIPPED | OUTPATIENT
Start: 2022-02-28 | End: 2022-05-28 | Stop reason: SDUPTHER

## 2022-03-11 ENCOUNTER — OFFICE VISIT (OUTPATIENT)
Dept: CARDIAC SURGERY | Facility: CLINIC | Age: 67
End: 2022-03-11
Payer: MEDICARE

## 2022-03-11 VITALS
DIASTOLIC BLOOD PRESSURE: 97 MMHG | RESPIRATION RATE: 16 BRPM | BODY MASS INDEX: 33.03 KG/M2 | WEIGHT: 223 LBS | HEIGHT: 69 IN | HEART RATE: 68 BPM | SYSTOLIC BLOOD PRESSURE: 162 MMHG | OXYGEN SATURATION: 97 %

## 2022-03-11 DIAGNOSIS — I10 HYPERTENSION, UNSPECIFIED TYPE: ICD-10-CM

## 2022-03-11 DIAGNOSIS — I25.709 CORONARY ARTERY DISEASE INVOLVING CORONARY BYPASS GRAFT OF NATIVE HEART WITH ANGINA PECTORIS (HCC): Primary | ICD-10-CM

## 2022-03-11 DIAGNOSIS — Z86.73 HISTORY OF CVA (CEREBROVASCULAR ACCIDENT): ICD-10-CM

## 2022-03-11 DIAGNOSIS — Z98.61 HISTORY OF PERCUTANEOUS CORONARY INTERVENTION: ICD-10-CM

## 2022-03-11 DIAGNOSIS — I25.10 CAD IN NATIVE ARTERY: ICD-10-CM

## 2022-03-11 DIAGNOSIS — Z95.1 S/P CABG (CORONARY ARTERY BYPASS GRAFT): ICD-10-CM

## 2022-03-11 PROCEDURE — 93000 ELECTROCARDIOGRAM COMPLETE: CPT | Performed by: INTERNAL MEDICINE

## 2022-03-11 PROCEDURE — 99214 OFFICE O/P EST MOD 30 MIN: CPT | Performed by: INTERNAL MEDICINE

## 2022-03-11 RX ORDER — AMLODIPINE BESYLATE 5 MG/1
5 TABLET ORAL DAILY
Qty: 90 TABLET | Refills: 3 | Status: SHIPPED | OUTPATIENT
Start: 2022-03-11 | End: 2022-05-25 | Stop reason: SINTOL

## 2022-03-11 NOTE — PROGRESS NOTES
Cardiology Follow Up Visit    Monico Raymond  1955  [id number]  [address] [facility] [address]  [address] [place] [address] [phone]    1  Coronary artery disease involving coronary bypass graft of native heart with angina pectoris (HCC)  POCT ECG   2  CAD in native artery  POCT ECG   3  History of percutaneous coronary intervention     4  History of CVA (cerebrovascular accident)     5  Hypertension, unspecified type  amLODIPine (NORVASC) 5 mg tablet   6  S/P CABG March 2021 LIMA-LAD;SVG to right PLV; SVG to RI           Discussion/Summary:    3  62 yo with CAD, multiple prior stent procedures (Sheridan Zhao) ultimately March 2021 CABG x3, doing well    2  History of stroke with ambulatory dysfunction    3  Essential hypertension, blood pressure elevated    4  Hyperlipidemia, LDL goal less than 70 on atorvastatin 40, 66 September 2021    Plan:  From cardiac standpoint patient doing well  Encouraged as much activity as possible realizing ambulatory dysfunction with prior stroke  Does do physical therapy  Made no changes in his medical regimen with the exception of adding amlodipine 5 mg daily to his antihypertensive regimen  Encouraged home blood pressure checks with digital cuff  Avoidance of excessive salt  Interval History:    37-year-old male coronary artery disease multiple stent procedures pending 16 years prior to seeing at [facility]  Ultimately, cardiac catheterization here March 2021 with CABG x3    Has done well since    Ambulatory dysfunction post stroke which he states was approximately 8 years ago    Blood pressure elevated today on intake    Hyperlipidemia with LDL goal less than 70   Recently 66 September 2021    Patient Active Problem List   Diagnosis    CAD (coronary artery disease)    Chest pain    Claudication [facility])    Coronary atherosclerosis    Essential hypertension  Mixed hyperlipidemia    Hypothyroidism    Myofascial pain syndrome    Radiculopathy, lumbar region    Bilateral lower extremity edema    Palpitation    History of CVA (cerebrovascular accident)    History of percutaneous coronary intervention    Chronic diastolic heart failure ([facility] )    Encounter for screening colonoscopy    S/P CABG 2021 LIMA-LAD;SVG to right PLV; SVG to RI    Thrombocytopenia (HCC)    Hyperchloremia    Hypocalcemia    Encounter for postoperative care    Stroke ([facility] )    Insomnia    BMI 33 0-33 9,adult    Anxiety    Continuous opioid dependence (HCC)    Lower GI bleed    Stage 3a chronic kidney disease ([facility])     Past Medical History:   Diagnosis Date    Anxiety     CAD (coronary artery disease)     Coronary artery disease     Depression     Disease of thyroid gland     Dizziness     Eye problems     Hypertension     Hypothyroidism     Migraines     Stroke ([place] [address] ) 2014    Tremors of nervous system      Social History     Socioeconomic History    Marital status: /Civil Union     Spouse name: Not on file    Number of children: Not on file    Years of education: Not on file    Highest education level: Not on file   Occupational History    Not on file   Tobacco Use    Smoking status: Never Smoker    Smokeless tobacco: Never Used   Vaping Use    Vaping Use: Never used   Substance and Sexual Activity    Alcohol use: No    Drug use: No    Sexual activity: Not Currently     Partners: Female   Other Topics Concern    Not on file   Social History Narrative    Most recent tobacco use screenin2019      Do you currently or have you served in the [employer]:   No      Were you activated, into active duty, as a member of the [employer] or as a Reservist:   No      Occupation:   retired      Education:   15      Marital status:         Sexual orientation:   Heterosexual      Exercise level:   Occasional      Diet:   Regular General stress level:   Medium      Alcohol intake:   Occasional      Caffeine intake:   Occasional      Chewing tobacco:   none      Illicit drugs:   none      Guns present in home:   No      Seat belts used routinely:   Yes      Smoke alarm in home:   Yes      Advance directive:   No      Live alone or with others:   with others      Are there stairs in your home:   Yes  only in front of house     Pets:   No      Social Determinants of Health     Financial Resource Strain: Not on file   Food Insecurity: Not on file   Transportation Needs: Not on file   Physical Activity: Not on file   Stress: Not on file   Social Connections: Not on file   Intimate Partner Violence: Not on file   Housing Stability: Not on file      Family History   Problem Relation Age of Onset    Cancer Mother     Heart attack Father     Heart disease Father     Heart attack Sister     Cancer Brother      Past Surgical History:   Procedure Laterality Date    COLONOSCOPY  09/01/2017    COLONOSCOPY      CORONARY ARTERY BYPASS GRAFT  March 24,20201    CORONARY STENT PLACEMENT      DENTAL SURGERY      entire top teeth removal    HERNIA REPAIR      [place] CABG, ARTERY-VEIN, FOUR N/A 3/24/2021    Procedure: CORONARY ARTERY BYPASS GRAFT (CABG) 3 VESSELS,  USING LEFT VANE-LAD AND LEFT GSV-RPL & RAMUS;  Surgeon: Jeanmarie Allred MD;  [facility];  Service: Cardiac Surgery    NC ECHO TRANSESOPHAG [address] N/A 3/24/2021    Procedure: TRANSESOPHAGEAL ECHOCARDIOGRAM (MARLEN);   Surgeon: Jeanmarie Allred MD;  [facility];  Service: Cardiac Surgery       Current Outpatient Medications:     acetaminophen (TYLENOL) 325 mg tablet, Take 2 tablets (650 mg total) by mouth every 6 (six) hours as needed for mild pain, Disp: , Rfl: 0    acetaminophen-codeine (TYLENOL #3) 300-30 mg per tablet, Take 1 tablet by mouth every 4 (four) hours as needed for moderate pain, Disp: 30 tablet, Rfl: 0    aspirin 81 MG tablet, Take 81 mg by mouth, Disp: , Rfl:     atorvastatin (LIPITOR) 40 mg tablet, Take 2 tablets (80 mg total) by mouth daily, Disp: 180 tablet, Rfl: 1    clonazePAM (KlonoPIN) 2 mg tablet, Take 2 tablets (4 mg total) by mouth 2 (two) times a day as needed for seizures, Disp: 180 tablet, Rfl: 0    clopidogrel (PLAVIX) 75 mg tablet, Take 1 tablet (75 mg total) by mouth daily, Disp: 90 tablet, Rfl: 0    levothyroxine 100 mcg tablet, Take 1 tablet (100 mcg total) by mouth daily, Disp: 90 tablet, Rfl: 0    metoprolol succinate (TOPROL-XL) 100 mg 24 hr tablet, Take 1 tablet (100 mg total) by mouth daily, Disp: 90 tablet, Rfl: 3    oxyCODONE (ROXICODONE) 5 immediate release tablet, Take 1 tablet (5 mg total) by mouth every 4 (four) hours as needed for moderate pain Max Daily Amount: 30 mg, Disp: 16 tablet, Rfl: 0    sildenafil (VIAGRA) 100 mg tablet, Take 1 tablet (100 mg total) by mouth daily as needed for erectile dysfunction, Disp: 30 tablet, Rfl: 5    tiZANidine (ZANAFLEX) 2 mg tablet, Take 1 tablet (2 mg total) by mouth every 8 (eight) hours as needed for muscle spasms, Disp: 270 tablet, Rfl: 0    zolpidem (AMBIEN) 10 mg tablet, 2 po  q hs, Disp: 180 tablet, Rfl: 0    acetaminophen (TYLENOL) 325 mg tablet, Take 2 tablets (650 mg total) by mouth every 6 (six) hours as needed for mild pain (Patient not taking: Reported on 3/11/2022 ), Disp: , Rfl: 0    amLODIPine (NORVASC) 5 mg tablet, Take 1 tablet (5 mg total) by mouth daily, Disp: 90 tablet, Rfl: 3  Allergies   Allergen Reactions    Medical Tape Other (See Comments)     Other reaction(s): BURNS USE PAPER TAPE         Social, Family, Medication history reviewed and updated as necessary      Labs:     Lab Results   Component Value Date    K 3 5 12/23/2021     12/23/2021    CO2 30 12/23/2021    BUN 10 12/23/2021    CREATININE 1 28 12/23/2021    CREATININE 1 25 12/22/2021    GLUCOSE 105 09/24/2021    CALCIUM 9 6 12/23/2021       Lab Results   Component Value Date    WBC 8 13 12/23/2021    HGB 15 5 12/23/2021    HGB 15 8 12/23/2021    HCT 45 4 12/23/2021    HCT 46 7 12/22/2021     12/23/2021     12/22/2021       No results found for: CHOL  Lab Results   Component Value Date    HDL 39 (L) 09/24/2021    HDL 42 06/22/2021     Lab Results   Component Value Date    LDLCALC 66 09/24/2021    LDLCALC 71 06/22/2021     No results found for: LDLDIRECT          Lab Results   Component Value Date    TRIG 102 09/24/2021    TRIG 151 (H) 06/22/2021       Lab Results   Component Value Date    ALT 28 12/23/2021    ALT 28 12/22/2021    AST 22 12/23/2021    AST 22 12/22/2021       Lab Results   Component Value Date    INR 1 00 12/22/2021    INR 0 96 03/19/2021       No results found for: NTBNP    Lab Results   Component Value Date    HGBA1C 5 7 (H) 03/19/2021           Imaging: Reviewed in epic        Review of Systems:  14 systems reviewed and negative with exception of the above        PHYSICAL EXAM:      Vitals:    03/11/22 1003   BP: 162/97   Pulse: 68   Resp: 16   SpO2: 97%     Body mass index is 32 93 kg/m²  Weight (last 2 days)     Date/Time Weight    03/11/22 1003 101 (223)            Gen: No acute distress  HEENT: anicteric, mucous membranes moist  Neck: supple, no jugular venous distention, or carotid bruit  Heart: regular, normal s1 and s2, no murmur/rub or gallop  Lungs :clear to auscultation bilaterally, no rales/rhonchi or wheeze  Abdomen: soft nontender, normoactive bowel sounds, no organomegaly  Ext: warm and perfused, normal femoral pulses, no edema, or clubbing  Skin: warm, no rashes  Neuro: AAO x 3  Psychiatric: normal affect  Musculoskeletal: no obvious joint deformities  This note was completed in part utilizing m-REPP fluency direct voice recognition software     Grammatical errors, random word insertion, spelling mistakes, and incomplete sentences may be an occasional consequence of the system secondary to software limitations, ambient noise and hardware issues  At the time of dictation, efforts were made to edit, clarify and /or correct errors  Please read the chart carefully and recognize, using context, where substitutions have occurred  If you have any questions or concerns about the context, text or information contained within the body of this dictation, please contact myself, the provider, for further clarification

## 2022-03-21 DIAGNOSIS — M25.579 ACUTE ANKLE PAIN, UNSPECIFIED LATERALITY: ICD-10-CM

## 2022-03-21 DIAGNOSIS — R07.9 CHEST PAIN, UNSPECIFIED TYPE: ICD-10-CM

## 2022-03-21 DIAGNOSIS — F41.9 ANXIETY: ICD-10-CM

## 2022-03-21 RX ORDER — OXYCODONE HYDROCHLORIDE 5 MG/1
5 TABLET ORAL EVERY 4 HOURS PRN
Qty: 16 TABLET | Refills: 0 | Status: SHIPPED | OUTPATIENT
Start: 2022-03-21 | End: 2022-05-17 | Stop reason: SDUPTHER

## 2022-03-21 RX ORDER — CLONAZEPAM 2 MG/1
4 TABLET ORAL 2 TIMES DAILY PRN
Qty: 180 TABLET | Refills: 0 | Status: SHIPPED | OUTPATIENT
Start: 2022-03-21 | End: 2022-05-17 | Stop reason: SDUPTHER

## 2022-03-21 RX ORDER — ACETAMINOPHEN AND CODEINE PHOSPHATE 300; 30 MG/1; MG/1
1 TABLET ORAL EVERY 4 HOURS PRN
Qty: 30 TABLET | Refills: 0 | Status: SHIPPED | OUTPATIENT
Start: 2022-03-21 | End: 2022-05-17 | Stop reason: SDUPTHER

## 2022-03-22 ENCOUNTER — OFFICE VISIT (OUTPATIENT)
Dept: GASTROENTEROLOGY | Facility: CLINIC | Age: 67
End: 2022-03-22
Payer: MEDICARE

## 2022-03-22 VITALS
TEMPERATURE: 97.9 F | HEART RATE: 65 BPM | OXYGEN SATURATION: 97 % | BODY MASS INDEX: 33.18 KG/M2 | SYSTOLIC BLOOD PRESSURE: 136 MMHG | DIASTOLIC BLOOD PRESSURE: 80 MMHG | WEIGHT: 224 LBS | HEIGHT: 69 IN

## 2022-03-22 DIAGNOSIS — K64.8 INTERNAL HEMORRHOID, BLEEDING: ICD-10-CM

## 2022-03-22 DIAGNOSIS — K64.4 EXTERNAL HEMORRHOID, BLEEDING: ICD-10-CM

## 2022-03-22 DIAGNOSIS — K64.8 INTERNAL HEMORRHOID: Primary | ICD-10-CM

## 2022-03-22 PROCEDURE — 99214 OFFICE O/P EST MOD 30 MIN: CPT | Performed by: PHYSICIAN ASSISTANT

## 2022-03-22 RX ORDER — HYDROCORTISONE ACETATE 25 MG/1
25 SUPPOSITORY RECTAL 2 TIMES DAILY
Qty: 30 SUPPOSITORY | Refills: 2 | Status: SHIPPED | OUTPATIENT
Start: 2022-03-22 | End: 2022-05-06

## 2022-03-22 RX ORDER — HYDROCORTISONE 25 MG/G
CREAM TOPICAL 2 TIMES DAILY
Qty: 28 G | Refills: 2 | Status: SHIPPED | OUTPATIENT
Start: 2022-03-22 | End: 2022-05-25 | Stop reason: ALTCHOICE

## 2022-03-22 NOTE — PATIENT INSTRUCTIONS
1  Start daily fiber supplement every morning  2  If the fiber does not help keep you regular, start over-the-counter colace twice/day as needed for constipation   3  Start anusol as needed for hemorrhoids               [employer]   WHAT YOU NEED TO KNOW:   A sitz bath is when you soak in a warm or hot water tub to promote wound healing  It is often done after surgeries on the rectal or genital area  The heat from the water will clean the area, improve blood flow for healing, and decrease swelling and pain  DISCHARGE INSTRUCTIONS:   Gather your sitz bath supplies:   · A bathtub thermometer to check the water temperature    · Towels to cover your upper body during the bath and to dry off after    · A footstool to help you enter the bathtub if needed    · Bath mats to prevent slips in the tub and after you get out of the tub    · Clean clothes to wear after the bath    Fill the bathtub with water:  Fill the bathtub with water until it is at about the level of your belly button  Check the temperature of the water before you get in  For a warm water sitz bath, the water should be 94° to 98° Fahrenheit  A hot water sitz bath should be between 105° to 110° Fahrenheit  Stay in the bath for about 15 to 20 minutes  Portable sitz bath: You may be sent home with a portable sitz bath if you cannot get in and out of the bathtub  This is a small tub that will fit under your toilet seat  You will fill the tub with water as directed once it is under the toilet seat  Check the temperature of the water  You will also have a squirt bottle or water bag filled with the warm water  These are used to let the water flow out over the wound area during the sitz bath  This is also done for 15 to 20 minutes  Important tips when taking a sitz bath:   · You may have some discomfort when at first when you sit in the warm water  This should go away shortly after entering the tub       · Check the water temperature a few times during the bath  You may need to add more water to keep it at the right temperature  · Take a sitz bath when someone is close by to help you if needed  The heat from the water may cause you to feel weak or lightheaded  If this happens, call for help to get out of the tub  Do not stand up on your own in case you lose your balance  © Copyright [address] 2022 Information is for End User's use only and may not be sold, redistributed or otherwise used for commercial purposes  All illustrations and images included in CareNotes® are the copyrighted property of [employer]  or [address] [place], [place] [address]pez   The above information is an  only  It is not intended as medical advice for individual conditions or treatments  Talk to your doctor, nurse or pharmacist before following any medical regimen to see if it is safe and effective for you

## 2022-03-22 NOTE — PROGRESS NOTES
[facility] - Outpatient Follow-up Note  Nilesh Davila 61 y o  male MRN: [id number]  Encounter: [id number]          ASSESSMENT AND PLAN:      Sohail Gomez is a 62 y/o male with CAD s/p CABG and hx of CVA on plavix, CKD 3, CHF, HTN, HLD, hypothyroid who presents for follow-up  1  Hemorrhoids  2  Chronic Constipation   3  Fecal incontinence   Pt presented with diarrhea and hematochezia, and underwent colonoscopy that noted protruding external and internal hemorrhoids; random colon bx did not depict any signs of microscopic colitis of IBD  Infectious stool studies WNL  Pt says he no longer has diarrhea and denies any further bleeding but does get rectal itching with BM often  Pt also notes that his constipation has returned in that he moves his bowels 2-3 times/week; pt says he has not tried colace but has tried miralax and was not able to keep it down  Pt notes that sometimes after he finally moves his bowels, he has fecal incontinence the next day  -start anusol cream for external hem and suppository for internal hem   -start daily OTC fiber supplement to help with both the constipation and overflow incontinence   -SITZ bath; handout printed for pt  -start colace BID on days of constipation   -increase daily water intake to at least 64 ounces/day and increase daily activity  -pt defers colo-rectal referral at this time but I explained if the anusol does not help with his hemorrhoid symptoms and/or his fecal incontinence continues, I would again suggest referral to colo-rectal   -pt may benefit from linzess if above recs do not help   ______________________________________________________________________    SUBJECTIVE:  Sohail Gomez is a 62 y/o male with CAD s/p CABG and hx of CVA on plavix, CKD 3, CHF, HTN, HLD, hypothyroid who presents for follow-up  Pt says that he has not had any further rectal bleeding  Pt does admit to he does get rectal itching with BM often   He says that he has not had any further diarrhea and his baseline chronic constipation has returned  Pt notes he is back to his "norm" in that he is only moving his bowels 2-3 times/week  Pt says he only tried colace once or twice and notes he was not able to tolerate miralax  Pt also notes that when he does have a BM, the day after this, he gets fecal incontinence  Pt otherwise denies bloody or black BMs  Pt denies abdominal pain, n/v ,heartburn, dysphagia  REVIEW OF SYSTEMS IS OTHERWISE NEGATIVE  Historical Information   Past Medical History:   Diagnosis Date    Anxiety     CAD (coronary artery disease)     Coronary artery disease     Depression     Disease of thyroid gland     Dizziness     Eye problems     Hypertension     Hypothyroidism     Migraines     Stroke ([facility] ) 11/17/2014    Tremors of nervous system      Past Surgical History:   Procedure Laterality Date    COLONOSCOPY  09/01/2017    COLONOSCOPY      CORONARY ARTERY BYPASS GRAFT  March 24,20201    CORONARY STENT PLACEMENT      DENTAL SURGERY      entire top teeth removal    HERNIA REPAIR      [place] CABG, ARTERY-VEIN, FOUR N/A 3/24/2021    Procedure: CORONARY ARTERY BYPASS GRAFT (CABG) 3 VESSELS,  USING LEFT VANE-LAD AND LEFT GSV-RPL & RAMUS;  Surgeon: Molly Francisco MD;  [facility];  Service: Cardiac Surgery    ND ECHO TRANSESOPHAG [address] N/A 3/24/2021    Procedure: TRANSESOPHAGEAL ECHOCARDIOGRAM (MARLEN);   Surgeon: Molly Francisco MD;  [facility];  Service: Cardiac Surgery     Social History   Social History     Substance and Sexual Activity   Alcohol Use No     Social History     Substance and Sexual Activity   Drug Use No     Social History     Tobacco Use   Smoking Status Never Smoker   Smokeless Tobacco Never Used     Family History   Problem Relation Age of Onset    Cancer Mother     Heart attack Father     Heart disease Father     Heart attack Sister     Cancer Brother        Meds/Allergies Current Outpatient Medications:     acetaminophen (TYLENOL) 325 mg tablet    acetaminophen (TYLENOL) 325 mg tablet    acetaminophen-codeine (TYLENOL #3) 300-30 mg per tablet    amLODIPine (NORVASC) 5 mg tablet    aspirin 81 MG tablet    atorvastatin (LIPITOR) 40 mg tablet    clonazePAM (KlonoPIN) 2 mg tablet    clopidogrel (PLAVIX) 75 mg tablet    levothyroxine 100 mcg tablet    metoprolol succinate (TOPROL-XL) 100 mg 24 hr tablet    oxyCODONE (ROXICODONE) 5 immediate release tablet    sildenafil (VIAGRA) 100 mg tablet    tiZANidine (ZANAFLEX) 2 mg tablet    zolpidem (AMBIEN) 10 mg tablet    Allergies   Allergen Reactions    Medical Tape Other (See Comments)     Other reaction(s): BURNS USE PAPER TAPE           Objective     There were no vitals taken for this visit  There is no height or weight on file to calculate BMI  PHYSICAL EXAM:      General Appearance:   Alert, cooperative, no distress   HEENT:   Normocephalic, atraumatic, anicteric      Neck:  Supple, symmetrical, trachea midline   Lungs:   Clear to auscultation bilaterally; no rales, rhonchi or wheezing; respirations unlabored    Heart[de-identified]   Regular rate and rhythm; no murmur, rub, or gallop  Abdomen:   Soft, non-tender, non-distended; normal bowel sounds; no masses, no organomegaly    Genitalia:   Deferred    Rectal:   Deferred    Extremities:  No cyanosis, clubbing or edema    Pulses:  2+ and symmetric    Skin:  No jaundice, rashes, or lesions    Lymph nodes:  No palpable cervical lymphadenopathy        Lab Results:   No visits with results within 1 Day(s) from this visit     Latest known visit with results is:   Hospital Outpatient Visit on 02/24/2022   Component Date Value    Case Report 02/24/2022                      Value:Surgical Pathology Report                         Case: [id number]                                   Authorizing Provider:  Sarai Obregon MD          Collected:           02/24/2022 0517 Ordering Location:     [facility]        Received:            02/24/2022 [facility]                                                           Pathologist:           Brice Garrido MD                                                     Specimens:   A) - Colon, cold bx, random                                                                         B) - Polyp, Colorectal, cold bx, descending colon                                          Final Diagnosis 02/24/2022                      Value: This result contains rich text formatting which cannot be displayed here   Additional Information 02/24/2022                      Value: This result contains rich text formatting which cannot be displayed here  Rachel Aguilar Description 02/24/2022                      Value: This result contains rich text formatting which cannot be displayed here   Clinical Information 02/24/2022                      Value:· The terminal ileum appeared normal   · The entire colon appeared normal  Performed random biopsy  · Protruding, external, internal hemorrhoids         Radiology Results:   Colonoscopy    Result Date: 2/24/2022  Narrative: [address] [id number] [phone] DATE OF SERVICE: 2/24/22 PHYSICIAN(S): Jaclyn Zavala MD Proceduralist INDICATION: Diarrhea of presumed infectious origin, Colitis, Rectal bleeding POST-OP DIAGNOSIS: See the impression below  HISTORY: Prior colonoscopy: 10 years ago  BOWEL PREPARATION: Miralax/Dulcolax PREPROCEDURE: Informed consent was obtained for the procedure, including sedation  Risks including but not limited to bleeding, infection, perforation, adverse drug reaction and aspiration were explained in detail  Also explained about less than 100% sensitivity with the exam and other alternatives  The patient was placed in the left lateral decubitus position   DETAILS OF PROCEDURE: Patient was taken to the procedure room where a time out was performed to confirm correct patient and correct procedure  The patient underwent monitored anesthesia care, which was administered by an anesthesia professional  The patient's blood pressure, heart rate, level of consciousness, oxygen and respirations were monitored throughout the procedure  A digital rectal exam was performed  The scope was introduced through the anus and advanced to the terminal ileum  Retroflexion was performed in the rectum  The quality of bowel preparation was evaluated using the Sarmad Bowel Preparation Scale with scores of: right colon = 2, transverse colon = 2, left colon = 2  The total BBPS score was 6  Bowel prep was adequate  The patient experienced no blood loss  The procedure was not difficult  The patient tolerated the procedure well  There were no apparent complications  ANESTHESIA INFORMATION: ASA: III Anesthesia Type: IV Sedation with Anesthesia MEDICATIONS: simethicone (MYLICON) 40 mg in sterile water 60 mL 40 mg (Totals for administrations occurring from 1224 to 1307 on 02/24/22) FINDINGS: The terminal ileum appeared normal  The entire colon appeared normal  Performed random biopsy  Protruding, external, internal hemorrhoids EVENTS: Procedure Events Event Event Time ENDO CECUM REACHED 2/24/2022 12:50 PM ENDO SCOPE OUT TIME 2/24/2022  1:03 PM SPECIMENS: ID Type Source Tests Collected by Time Destination 1 : cold bx, random Tissue Colon TISSUE EXAM Ada Ybarra MD 2/24/2022 12:54 PM  2 : cold bx, descending colon Tissue Polyp, Colorectal TISSUE EXAM Ada Ybarra MD 2/24/2022  1:00 PM  EQUIPMENT: Colonoscope -[id number]     Impression: The terminal ileum appeared normal  The entire colon appeared normal  Performed random biopsy  Protruding, external, internal hemorrhoids RECOMMENDATION: Await pathology results Repeat colonoscopy in 5 years due to a personal history of colon polyps   Resume regular diet Discharge home  Nadia Melinda Gilmore MD

## 2022-04-11 DIAGNOSIS — N52.9 ERECTILE DYSFUNCTION, UNSPECIFIED ERECTILE DYSFUNCTION TYPE: ICD-10-CM

## 2022-04-11 RX ORDER — SILDENAFIL 100 MG/1
100 TABLET, FILM COATED ORAL DAILY PRN
Qty: 30 TABLET | Refills: 0 | Status: SHIPPED | OUTPATIENT
Start: 2022-04-11 | End: 2022-05-06 | Stop reason: SDUPTHER

## 2022-04-21 DIAGNOSIS — M54.50 LOW BACK PAIN WITHOUT SCIATICA, UNSPECIFIED BACK PAIN LATERALITY, UNSPECIFIED CHRONICITY: ICD-10-CM

## 2022-04-21 RX ORDER — TIZANIDINE 2 MG/1
2 TABLET ORAL EVERY 8 HOURS PRN
Qty: 270 TABLET | Refills: 0 | Status: SHIPPED | OUTPATIENT
Start: 2022-04-21 | End: 2022-06-04

## 2022-04-22 ENCOUNTER — TELEPHONE (OUTPATIENT)
Dept: FAMILY MEDICINE CLINIC | Facility: CLINIC | Age: 67
End: 2022-04-22

## 2022-04-22 NOTE — TELEPHONE ENCOUNTER
Pt would like a referral to pain management   Dr Brian Moya  [phone] phone  [phone] fax    For his back pain  Please review and advise

## 2022-04-23 DIAGNOSIS — M54.50 LOW BACK PAIN WITHOUT SCIATICA, UNSPECIFIED BACK PAIN LATERALITY, UNSPECIFIED CHRONICITY: Primary | ICD-10-CM

## 2022-04-25 ENCOUNTER — APPOINTMENT (OUTPATIENT)
Dept: LAB | Facility: CLINIC | Age: 67
End: 2022-04-25
Payer: MEDICARE

## 2022-04-25 DIAGNOSIS — K92.2 GASTROINTESTINAL HEMORRHAGE, UNSPECIFIED GASTROINTESTINAL HEMORRHAGE TYPE: ICD-10-CM

## 2022-04-25 DIAGNOSIS — D64.9 ANEMIA, UNSPECIFIED TYPE: ICD-10-CM

## 2022-04-25 LAB
BASOPHILS # BLD AUTO: 0.06 THOUSANDS/ΜL (ref 0–0.1)
BASOPHILS NFR BLD AUTO: 1 % (ref 0–1)
EOSINOPHIL # BLD AUTO: 0.13 THOUSAND/ΜL (ref 0–0.61)
EOSINOPHIL NFR BLD AUTO: 3 % (ref 0–6)
ERYTHROCYTE [DISTWIDTH] IN BLOOD BY AUTOMATED COUNT: 12.9 % (ref 11.6–15.1)
HCT VFR BLD AUTO: 45.8 % (ref 36.5–49.3)
HGB BLD-MCNC: 15.5 G/DL (ref 12–17)
IMM GRANULOCYTES # BLD AUTO: 0.01 THOUSAND/UL (ref 0–0.2)
IMM GRANULOCYTES NFR BLD AUTO: 0 % (ref 0–2)
LYMPHOCYTES # BLD AUTO: 1.29 THOUSANDS/ΜL (ref 0.6–4.47)
LYMPHOCYTES NFR BLD AUTO: 24 % (ref 14–44)
MCH RBC QN AUTO: 34.1 PG (ref 26.8–34.3)
MCHC RBC AUTO-ENTMCNC: 33.8 G/DL (ref 31.4–37.4)
MCV RBC AUTO: 101 FL (ref 82–98)
MONOCYTES # BLD AUTO: 0.44 THOUSAND/ΜL (ref 0.17–1.22)
MONOCYTES NFR BLD AUTO: 8 % (ref 4–12)
NEUTROPHILS # BLD AUTO: 3.35 THOUSANDS/ΜL (ref 1.85–7.62)
NEUTS SEG NFR BLD AUTO: 64 % (ref 43–75)
NRBC BLD AUTO-RTO: 0 /100 WBCS
PLATELET # BLD AUTO: 172 THOUSANDS/UL (ref 149–390)
PMV BLD AUTO: 10 FL (ref 8.9–12.7)
RBC # BLD AUTO: 4.55 MILLION/UL (ref 3.88–5.62)
WBC # BLD AUTO: 5.28 THOUSAND/UL (ref 4.31–10.16)

## 2022-04-25 PROCEDURE — 36415 COLL VENOUS BLD VENIPUNCTURE: CPT

## 2022-04-25 PROCEDURE — 85025 COMPLETE CBC W/AUTO DIFF WBC: CPT

## 2022-04-29 ENCOUNTER — RA CDI HCC (OUTPATIENT)
Dept: OTHER | Facility: HOSPITAL | Age: 67
End: 2022-04-29

## 2022-04-29 NOTE — PROGRESS NOTES
[facility]  coding opportunities          Chart Reviewed number of suggestions sent to Provider: 1   [phone]    Patients Insurance     Medicare Insurance: [employer]

## 2022-05-06 ENCOUNTER — OFFICE VISIT (OUTPATIENT)
Dept: FAMILY MEDICINE CLINIC | Facility: CLINIC | Age: 67
End: 2022-05-06
Payer: MEDICARE

## 2022-05-06 VITALS
BODY MASS INDEX: 33.18 KG/M2 | TEMPERATURE: 97.1 F | HEIGHT: 69 IN | WEIGHT: 224 LBS | HEART RATE: 60 BPM | DIASTOLIC BLOOD PRESSURE: 100 MMHG | SYSTOLIC BLOOD PRESSURE: 140 MMHG | OXYGEN SATURATION: 98 %

## 2022-05-06 DIAGNOSIS — E03.4 HYPOTHYROIDISM DUE TO ACQUIRED ATROPHY OF THYROID: Primary | ICD-10-CM

## 2022-05-06 DIAGNOSIS — D68.9 COAGULATION DEFECT, UNSPECIFIED (HCC): ICD-10-CM

## 2022-05-06 DIAGNOSIS — N18.31 STAGE 3A CHRONIC KIDNEY DISEASE (HCC): ICD-10-CM

## 2022-05-06 DIAGNOSIS — N52.9 ERECTILE DYSFUNCTION, UNSPECIFIED ERECTILE DYSFUNCTION TYPE: ICD-10-CM

## 2022-05-06 DIAGNOSIS — E78.2 MIXED HYPERLIPIDEMIA: ICD-10-CM

## 2022-05-06 DIAGNOSIS — F41.9 ANXIETY: ICD-10-CM

## 2022-05-06 DIAGNOSIS — I25.709 CORONARY ARTERY DISEASE INVOLVING CORONARY BYPASS GRAFT OF NATIVE HEART WITH ANGINA PECTORIS (HCC): ICD-10-CM

## 2022-05-06 DIAGNOSIS — G47.00 INSOMNIA, UNSPECIFIED TYPE: ICD-10-CM

## 2022-05-06 DIAGNOSIS — I10 ESSENTIAL HYPERTENSION: ICD-10-CM

## 2022-05-06 DIAGNOSIS — I63.00 CEREBROVASCULAR ACCIDENT (CVA) DUE TO THROMBOSIS OF PRECEREBRAL ARTERY (HCC): ICD-10-CM

## 2022-05-06 PROCEDURE — 99214 OFFICE O/P EST MOD 30 MIN: CPT | Performed by: FAMILY MEDICINE

## 2022-05-06 RX ORDER — SILDENAFIL 100 MG/1
100 TABLET, FILM COATED ORAL DAILY PRN
Qty: 30 TABLET | Refills: 3 | Status: SHIPPED | OUTPATIENT
Start: 2022-05-06 | End: 2022-06-02 | Stop reason: SDUPTHER

## 2022-05-06 RX ORDER — SILDENAFIL 100 MG/1
100 TABLET, FILM COATED ORAL DAILY PRN
Qty: 30 TABLET | Refills: 3 | Status: SHIPPED | OUTPATIENT
Start: 2022-05-06 | End: 2022-05-06 | Stop reason: SDUPTHER

## 2022-05-06 NOTE — ASSESSMENT & PLAN NOTE
Lab Results   Component Value Date    EGFR 58 12/23/2021    EGFR 60 12/22/2021    EGFR 69 09/24/2021    CREATININE 1 28 12/23/2021    CREATININE 1 25 12/22/2021    CREATININE 1 12 09/24/2021   Pt to avoid NSAIDs and any IV dyes  Patient to follow up eoither with nephrology or  with us for  further  monitoring of  renal function

## 2022-05-06 NOTE — PROGRESS NOTES
Depression Screening and Follow-up Plan: Patient was screened for depression during today's encounter  They screened negative with a PHQ-2 score of 0  Assessment/Plan:         Problem List Items Addressed This Visit        Endocrine    Hypothyroidism - Primary     Patient to continue current dose of thyroid medicine and recheck TSH in 6 months         Relevant Orders    TSH, 3rd generation with Free T4 reflex       Cardiovascular and Mediastinum    CAD (coronary artery disease)     Patient to continue  with current cardiac meds to decrease risk of re stenosis  Patient to follow up with cardiology for scheduled appointments to decrease risk for further cardiac problems with appropriate diagnostic testing to reassess cardiac status  Patient had alll questions about this problem answered today  Relevant Medications    sildenafil (VIAGRA) 100 mg tablet    Essential hypertension     Patient is stable with current anti-hypertensive medicine and continue to follow a low sodium diet and take current medication  All questions about this condition were answered today  Stroke [facility])     Patient is stable  and will continue present plan of care and reassess at next routine visit  All questions about this problem from patient were answered today  Genitourinary    RESOLVED: Stage 3a chronic kidney disease (HCC)     Lab Results   Component Value Date    EGFR 58 12/23/2021    EGFR 60 12/22/2021    EGFR 69 09/24/2021    CREATININE 1 28 12/23/2021    CREATININE 1 25 12/22/2021    CREATININE 1 12 09/24/2021   Pt to avoid NSAIDs and any IV dyes  Patient to follow up eoither with nephrology or  with us for  further  monitoring of  renal function  Other    Mixed hyperlipidemia     Patient  is stable with current medication and we discussed a low fat low cholesterol diet  Weight loss also discussed for this will help lower cholesterol also  Recheck lipids in 6 months           Relevant Orders Comprehensive metabolic panel    Lipid Panel with Direct LDL reflex    Insomnia     Discussed with patient use of sedative  medications and good  sleep hygiene to maximize treatment for this problem  Pt  to use sedatives only prior to sleep and cautioned them about usage at any other time  All patient questions about this problem answered today  Anxiety     Patient to continue utilizing medical therapy as well as counseling sources as applicable to condition  If suicidal thought or fear of suicide attempt, to call 911 and seek help immediately  Medications and therapy reviewed today and all patient  questions answered today  Other Visit Diagnoses     Coagulation defect, unspecified ([address] )        Erectile dysfunction, unspecified erectile dysfunction type        Relevant Medications    sildenafil (VIAGRA) 100 mg tablet            Subjective:      Patient ID: Cory Avina is a 61 y o  male  This 77-year-old male here today for checkup for multiple medical problems per patient with hypertension history of CVAs anxiety coronary artery disease hypothyroidism  and history of stroke  Patient states that this past Monday is now Friday he thinks he had another mini stroke where he had a lot of paralysis  He states a lot has recovered since then his voice sounds okay he still walks with a walker which is unchanged for him and he is back to his baseline  Patient also with issues with bleeding but he is also taking anticoagulants which are working  Discussed with patient about his elevated blood pressure today he states he did take his medicine yet today and he will take a when he gets home        The following portions of the patient's history were reviewed and updated as appropriate:   Past Medical History:  He has a past medical history of Anxiety, CAD (coronary artery disease), Coronary artery disease, Depression, Disease of thyroid gland, Dizziness, Eye problems, Hypertension, Hypothyroidism, Migraines, Stroke ([address] ) (11/17/2014), and Tremors of nervous system  ,  _______________________________________________________________________  Medical Problems:  does not have any pertinent problems on file ,  _______________________________________________________________________  Past Surgical History:   has a past surgical history that includes Coronary stent placement; Hernia repair; Colonoscopy (09/01/2017); Dental surgery; Colonoscopy; pr cabg, artery-vein, four (N/A, 3/24/2021); pr echo transesophag montr cardiac pump functj (N/A, 3/24/2021); and Coronary artery bypass graft (March 24,20201)  ,  _______________________________________________________________________  Family History:  family history includes Cancer in his brother and mother; Heart attack in his father and sister; Heart disease in his father ,  _______________________________________________________________________  Social History:   reports that he has never smoked  He has never used smokeless tobacco  He reports that he does not drink alcohol and does not use drugs  ,  _______________________________________________________________________  Allergies:  is allergic to medical tape     _______________________________________________________________________  Current Outpatient Medications   Medication Sig Dispense Refill    acetaminophen (TYLENOL) 325 mg tablet Take 2 tablets (650 mg total) by mouth every 6 (six) hours as needed for mild pain  0    acetaminophen-codeine (TYLENOL #3) 300-30 mg per tablet Take 1 tablet by mouth every 4 (four) hours as needed for moderate pain 30 tablet 0    amLODIPine (NORVASC) 5 mg tablet Take 1 tablet (5 mg total) by mouth daily 90 tablet 3    aspirin 81 MG tablet Take 81 mg by mouth      atorvastatin (LIPITOR) 40 mg tablet Take 2 tablets (80 mg total) by mouth daily 180 tablet 1    clonazePAM (KlonoPIN) 2 mg tablet Take 2 tablets (4 mg total) by mouth 2 (two) times a day as needed for seizures 180 tablet 0  clopidogrel (PLAVIX) 75 mg tablet Take 1 tablet (75 mg total) by mouth daily 90 tablet 0    levothyroxine 100 mcg tablet Take 1 tablet (100 mcg total) by mouth daily 90 tablet 0    metoprolol succinate (TOPROL-XL) 100 mg 24 hr tablet Take 1 tablet (100 mg total) by mouth daily 90 tablet 3    oxyCODONE (ROXICODONE) 5 immediate release tablet Take 1 tablet (5 mg total) by mouth every 4 (four) hours as needed for moderate pain Max Daily Amount: 30 mg 16 tablet 0    sildenafil (VIAGRA) 100 mg tablet Take 1 tablet (100 mg total) by mouth daily as needed for erectile dysfunction 30 tablet 3    tiZANidine (ZANAFLEX) 2 mg tablet Take 1 tablet (2 mg total) by mouth every 8 (eight) hours as needed for muscle spasms 270 tablet 0    zolpidem (AMBIEN) 10 mg tablet 2 po  q hs 180 tablet 0    acetaminophen (TYLENOL) 325 mg tablet Take 2 tablets (650 mg total) by mouth every 6 (six) hours as needed for mild pain (Patient not taking: Reported on 3/11/2022 )  0    hydrocortisone (ANUSOL-HC) 2 5 % rectal cream Apply topically 2 (two) times a day 28 g 2     No current facility-administered medications for this visit      _______________________________________________________________________  Review of Systems   Constitutional: Negative for activity change, appetite change, chills, fatigue, fever and unexpected weight change  HENT: Negative for congestion, ear pain, hearing loss, mouth sores, postnasal drip, sinus pressure, sinus pain, sneezing and sore throat  Respiratory: Negative for apnea, cough, shortness of breath and wheezing  Cardiovascular: Negative for chest pain, palpitations and leg swelling  Gastrointestinal: Negative for abdominal pain, constipation, diarrhea, nausea and vomiting  Endocrine: Negative for cold intolerance and heat intolerance  Genitourinary: Negative for dysuria, frequency and hematuria  Musculoskeletal: Positive for gait problem   Negative for arthralgias, back pain, joint swelling and neck pain  Skin: Negative for rash  Neurological: Positive for weakness  Negative for dizziness and numbness  Hematological: Does not bruise/bleed easily  Psychiatric/Behavioral: Negative for agitation, behavioral problems, confusion, hallucinations and sleep disturbance  The patient is not nervous/anxious  Objective:  Vitals:    05/06/22 0840   BP: 140/100   BP Location: Left arm   Patient Position: Sitting   Cuff Size: Standard   Pulse: 60   Temp: (!) 97 1 °F (36 2 °C)   TempSrc: Skin   SpO2: 98%   Weight: 102 kg (224 lb)   Height: 5' 9" (1 753 m)     Body mass index is 33 08 kg/m²  Physical Exam  Vitals and nursing note reviewed  Constitutional:       Appearance: He is well-developed  HENT:      Head: Normocephalic and atraumatic  Nose: Nose normal       Mouth/Throat:      Mouth: Mucous membranes are moist    Eyes:      General: No scleral icterus  Conjunctiva/sclera: Conjunctivae normal       Pupils: Pupils are equal, round, and reactive to light  Neck:      Thyroid: No thyromegaly  Cardiovascular:      Rate and Rhythm: Normal rate and regular rhythm  Heart sounds: Normal heart sounds  Pulmonary:      Effort: Pulmonary effort is normal  No respiratory distress  Breath sounds: Normal breath sounds  No wheezing  Abdominal:      General: Bowel sounds are normal       Palpations: Abdomen is soft  Tenderness: There is no abdominal tenderness  There is no guarding or rebound  Musculoskeletal:         General: Normal range of motion  Cervical back: Normal range of motion and neck supple  Skin:     General: Skin is warm and dry  Findings: No rash  Neurological:      Mental Status: He is alert and oriented to person, place, and time  Motor: Weakness present  Gait: Gait abnormal    Psychiatric:         Mood and Affect: Mood normal          Behavior: Behavior normal          Thought Content:  Thought content normal  Judgment: Judgment normal

## 2022-05-17 DIAGNOSIS — F41.9 ANXIETY: ICD-10-CM

## 2022-05-17 DIAGNOSIS — R07.9 CHEST PAIN, UNSPECIFIED TYPE: ICD-10-CM

## 2022-05-17 DIAGNOSIS — M25.579 ACUTE ANKLE PAIN, UNSPECIFIED LATERALITY: ICD-10-CM

## 2022-05-17 RX ORDER — CLONAZEPAM 2 MG/1
4 TABLET ORAL 2 TIMES DAILY PRN
Qty: 180 TABLET | Refills: 0 | Status: SHIPPED | OUTPATIENT
Start: 2022-05-17 | End: 2022-06-24 | Stop reason: SDUPTHER

## 2022-05-17 RX ORDER — OXYCODONE HYDROCHLORIDE 5 MG/1
5 TABLET ORAL EVERY 4 HOURS PRN
Qty: 16 TABLET | Refills: 0 | Status: SHIPPED | OUTPATIENT
Start: 2022-05-17 | End: 2022-06-24 | Stop reason: SDUPTHER

## 2022-05-17 RX ORDER — ACETAMINOPHEN AND CODEINE PHOSPHATE 300; 30 MG/1; MG/1
1 TABLET ORAL EVERY 4 HOURS PRN
Qty: 30 TABLET | Refills: 0 | Status: SHIPPED | OUTPATIENT
Start: 2022-05-17 | End: 2022-06-10

## 2022-05-19 ENCOUNTER — TELEPHONE (OUTPATIENT)
Dept: CARDIOLOGY CLINIC | Facility: CLINIC | Age: 67
End: 2022-05-19

## 2022-05-19 DIAGNOSIS — I10 ESSENTIAL HYPERTENSION: Primary | ICD-10-CM

## 2022-05-19 NOTE — TELEPHONE ENCOUNTER
P/c'd , would like to know if he can come off of blood thinners  He said he nicks himself shaving and can not stop the bleeding        Please advise

## 2022-05-23 NOTE — TELEPHONE ENCOUNTER
Calling today about feet and ankle edema  He said lasix and potassium does not help with the edema  He had blockage in heart before when this happened  He thinks he needs a cath  He thinks he had mini stroke 3 weeks ago  Please advise

## 2022-05-25 DIAGNOSIS — I10 ESSENTIAL HYPERTENSION: Primary | ICD-10-CM

## 2022-05-25 RX ORDER — VALSARTAN 160 MG/1
160 TABLET ORAL DAILY
Qty: 90 TABLET | Refills: 1 | Status: SHIPPED | OUTPATIENT
Start: 2022-05-25

## 2022-05-25 NOTE — TELEPHONE ENCOUNTER
Forget change to torsemide  swelling could be from amlodipine  Stop amlodipine and start Valsartan 160mg daily  BMP in 7 days  Message text       DO Corrina Singh, RN 11 hours ago (10:11 PM)         Doesn't need a cath  Suggest change to torsemide 20mg bid  Stop lasix  Check bmp in 5 days switching  Message text           S/w Destiny Lipoma and Mrs Terrell advised recommendations-- verbally understood  BMP ordered for 1 week  Medication sent to pharmacy in separate encounter

## 2022-05-28 DIAGNOSIS — E03.9 HYPOTHYROIDISM, UNSPECIFIED TYPE: ICD-10-CM

## 2022-05-28 DIAGNOSIS — G47.00 INSOMNIA, UNSPECIFIED TYPE: ICD-10-CM

## 2022-05-28 DIAGNOSIS — Z95.1 S/P CABG (CORONARY ARTERY BYPASS GRAFT): ICD-10-CM

## 2022-05-31 RX ORDER — LEVOTHYROXINE SODIUM 0.1 MG/1
100 TABLET ORAL DAILY
Qty: 90 TABLET | Refills: 0 | Status: SHIPPED | OUTPATIENT
Start: 2022-05-31

## 2022-05-31 RX ORDER — CLOPIDOGREL BISULFATE 75 MG/1
75 TABLET ORAL DAILY
Qty: 90 TABLET | Refills: 0 | Status: SHIPPED | OUTPATIENT
Start: 2022-05-31 | End: 2022-08-07 | Stop reason: SDUPTHER

## 2022-05-31 RX ORDER — ZOLPIDEM TARTRATE 10 MG/1
TABLET ORAL
Qty: 180 TABLET | Refills: 0 | Status: SHIPPED | OUTPATIENT
Start: 2022-05-31

## 2022-06-02 DIAGNOSIS — N52.9 ERECTILE DYSFUNCTION, UNSPECIFIED ERECTILE DYSFUNCTION TYPE: ICD-10-CM

## 2022-06-02 RX ORDER — SILDENAFIL 100 MG/1
100 TABLET, FILM COATED ORAL DAILY PRN
Qty: 30 TABLET | Refills: 0 | Status: SHIPPED | OUTPATIENT
Start: 2022-06-02 | End: 2022-07-21 | Stop reason: SDUPTHER

## 2022-06-03 DIAGNOSIS — M54.50 LOW BACK PAIN WITHOUT SCIATICA, UNSPECIFIED BACK PAIN LATERALITY, UNSPECIFIED CHRONICITY: ICD-10-CM

## 2022-06-04 RX ORDER — TIZANIDINE 2 MG/1
2 TABLET ORAL EVERY 8 HOURS PRN
Qty: 270 TABLET | Refills: 0 | Status: SHIPPED | OUTPATIENT
Start: 2022-06-04

## 2022-06-06 ENCOUNTER — TELEPHONE (OUTPATIENT)
Dept: FAMILY MEDICINE CLINIC | Facility: CLINIC | Age: 67
End: 2022-06-06

## 2022-06-06 NOTE — TELEPHONE ENCOUNTER
Patient states about 1 month ago he may have had another mini stroke he is slurring his words worse and worse every day  He was told that if he has another episode not to go to the hospital I am not sure by who  I did say I would make this message urgent I dont know if you would like to have him come in to be evaluated or have something you would like to order for the patient let me know

## 2022-06-06 NOTE — TELEPHONE ENCOUNTER
I had told him that  He needs to go to the hospital for evaluation for a stroke if he has more slurring of his speech  Pt  had told me after this had happened to him  and his symptoms had resolved  He has had a stroke in the past  nad sounds jose he had another TIA  This may be a warning of another stroke coming that is  not a TIA

## 2022-06-08 ENCOUNTER — APPOINTMENT (EMERGENCY)
Dept: RADIOLOGY | Facility: HOSPITAL | Age: 67
DRG: 057 | End: 2022-06-08
Payer: MEDICARE

## 2022-06-08 ENCOUNTER — HOSPITAL ENCOUNTER (INPATIENT)
Facility: HOSPITAL | Age: 67
LOS: 2 days | Discharge: HOME | DRG: 057 | End: 2022-06-10
Attending: EMERGENCY MEDICINE | Admitting: INTERNAL MEDICINE
Payer: MEDICARE

## 2022-06-08 ENCOUNTER — APPOINTMENT (EMERGENCY)
Dept: CT IMAGING | Facility: HOSPITAL | Age: 67
DRG: 057 | End: 2022-06-08
Payer: MEDICARE

## 2022-06-08 DIAGNOSIS — R53.1 ACUTE RIGHT-SIDED WEAKNESS: ICD-10-CM

## 2022-06-08 DIAGNOSIS — R07.9 CHEST PAIN, UNSPECIFIED TYPE: ICD-10-CM

## 2022-06-08 DIAGNOSIS — R47.81 SLURRING OF SPEECH: Primary | ICD-10-CM

## 2022-06-08 DIAGNOSIS — R29.90 STROKE-LIKE SYMPTOMS: ICD-10-CM

## 2022-06-08 LAB
2HR DELTA HS TROPONIN: 0 NG/L
4HR DELTA HS TROPONIN: 0 NG/L
ALBUMIN SERPL BCP-MCNC: 4.1 G/DL (ref 3.5–5)
ALP SERPL-CCNC: 75 U/L (ref 34–104)
ALT SERPL W P-5'-P-CCNC: 12 U/L (ref 7–52)
ANION GAP SERPL CALCULATED.3IONS-SCNC: 5 MMOL/L (ref 4–13)
AST SERPL W P-5'-P-CCNC: 17 U/L (ref 13–39)
BASOPHILS # BLD AUTO: 0.06 THOUSANDS/ΜL (ref 0–0.1)
BASOPHILS NFR BLD AUTO: 1 % (ref 0–1)
BILIRUB SERPL-MCNC: 0.83 MG/DL (ref 0.2–1)
BILIRUB UR QL STRIP: NEGATIVE
BUN SERPL-MCNC: 14 MG/DL (ref 5–25)
CALCIUM SERPL-MCNC: 9.1 MG/DL (ref 8.4–10.2)
CARDIAC TROPONIN I PNL SERPL HS: 6 NG/L
CHLORIDE SERPL-SCNC: 101 MMOL/L (ref 96–108)
CLARITY UR: CLEAR
CO2 SERPL-SCNC: 31 MMOL/L (ref 21–32)
COLOR UR: YELLOW
CREAT SERPL-MCNC: 1.11 MG/DL (ref 0.6–1.3)
EOSINOPHIL # BLD AUTO: 0.13 THOUSAND/ΜL (ref 0–0.61)
EOSINOPHIL NFR BLD AUTO: 2 % (ref 0–6)
ERYTHROCYTE [DISTWIDTH] IN BLOOD BY AUTOMATED COUNT: 13.2 % (ref 11.6–15.1)
FOLATE SERPL-MCNC: 4.2 NG/ML (ref 3.1–17.5)
GFR SERPL CREATININE-BSD FRML MDRD: 69 ML/MIN/1.73SQ M
GLUCOSE SERPL-MCNC: 89 MG/DL (ref 65–140)
GLUCOSE UR STRIP-MCNC: NEGATIVE MG/DL
HCT VFR BLD AUTO: 42.8 % (ref 36.5–49.3)
HGB BLD-MCNC: 14.8 G/DL (ref 12–17)
HGB UR QL STRIP.AUTO: NEGATIVE
IMM GRANULOCYTES # BLD AUTO: 0.02 THOUSAND/UL (ref 0–0.2)
IMM GRANULOCYTES NFR BLD AUTO: 0 % (ref 0–2)
KETONES UR STRIP-MCNC: NEGATIVE MG/DL
LEUKOCYTE ESTERASE UR QL STRIP: NEGATIVE
LYMPHOCYTES # BLD AUTO: 1.41 THOUSANDS/ΜL (ref 0.6–4.47)
LYMPHOCYTES NFR BLD AUTO: 21 % (ref 14–44)
MAGNESIUM SERPL-MCNC: 1.7 MG/DL (ref 1.9–2.7)
MCH RBC QN AUTO: 34.6 PG (ref 26.8–34.3)
MCHC RBC AUTO-ENTMCNC: 34.6 G/DL (ref 31.4–37.4)
MCV RBC AUTO: 100 FL (ref 82–98)
MONOCYTES # BLD AUTO: 0.54 THOUSAND/ΜL (ref 0.17–1.22)
MONOCYTES NFR BLD AUTO: 8 % (ref 4–12)
NEUTROPHILS # BLD AUTO: 4.62 THOUSANDS/ΜL (ref 1.85–7.62)
NEUTS SEG NFR BLD AUTO: 68 % (ref 43–75)
NITRITE UR QL STRIP: NEGATIVE
NRBC BLD AUTO-RTO: 0 /100 WBCS
PH UR STRIP.AUTO: 5.5 [PH] (ref 4.5–8)
PLATELET # BLD AUTO: 160 THOUSANDS/UL (ref 149–390)
PLATELET # BLD AUTO: 173 THOUSANDS/UL (ref 149–390)
PMV BLD AUTO: 9.7 FL (ref 8.9–12.7)
PMV BLD AUTO: 9.7 FL (ref 8.9–12.7)
POTASSIUM SERPL-SCNC: 3.8 MMOL/L (ref 3.5–5.3)
POTASSIUM SERPL-SCNC: 4.1 MMOL/L (ref 3.5–5.3)
PROT SERPL-MCNC: 7 G/DL (ref 6.4–8.4)
PROT UR STRIP-MCNC: NEGATIVE MG/DL
RBC # BLD AUTO: 4.28 MILLION/UL (ref 3.88–5.62)
SODIUM SERPL-SCNC: 137 MMOL/L (ref 135–147)
SP GR UR STRIP.AUTO: 1.02 (ref 1–1.03)
UROBILINOGEN UR QL STRIP.AUTO: 0.2 E.U./DL
VIT B12 SERPL-MCNC: 385 PG/ML (ref 100–900)
WBC # BLD AUTO: 6.78 THOUSAND/UL (ref 4.31–10.16)

## 2022-06-08 PROCEDURE — 81003 URINALYSIS AUTO W/O SCOPE: CPT

## 2022-06-08 PROCEDURE — 99223 1ST HOSP IP/OBS HIGH 75: CPT | Performed by: PSYCHIATRY & NEUROLOGY

## 2022-06-08 PROCEDURE — 99285 EMERGENCY DEPT VISIT HI MDM: CPT

## 2022-06-08 PROCEDURE — 83519 RIA NONANTIBODY: CPT | Performed by: PHYSICIAN ASSISTANT

## 2022-06-08 PROCEDURE — 99223 1ST HOSP IP/OBS HIGH 75: CPT | Performed by: INTERNAL MEDICINE

## 2022-06-08 PROCEDURE — 99285 EMERGENCY DEPT VISIT HI MDM: CPT | Performed by: EMERGENCY MEDICINE

## 2022-06-08 PROCEDURE — 85049 AUTOMATED PLATELET COUNT: CPT | Performed by: PHYSICIAN ASSISTANT

## 2022-06-08 PROCEDURE — G1004 CDSM NDSC: HCPCS

## 2022-06-08 PROCEDURE — 84484 ASSAY OF TROPONIN QUANT: CPT | Performed by: PHYSICIAN ASSISTANT

## 2022-06-08 PROCEDURE — 84132 ASSAY OF SERUM POTASSIUM: CPT | Performed by: NURSE PRACTITIONER

## 2022-06-08 PROCEDURE — 80053 COMPREHEN METABOLIC PANEL: CPT | Performed by: EMERGENCY MEDICINE

## 2022-06-08 PROCEDURE — 93005 ELECTROCARDIOGRAM TRACING: CPT

## 2022-06-08 PROCEDURE — 36415 COLL VENOUS BLD VENIPUNCTURE: CPT | Performed by: EMERGENCY MEDICINE

## 2022-06-08 PROCEDURE — 85025 COMPLETE CBC W/AUTO DIFF WBC: CPT | Performed by: EMERGENCY MEDICINE

## 2022-06-08 PROCEDURE — 83735 ASSAY OF MAGNESIUM: CPT | Performed by: NURSE PRACTITIONER

## 2022-06-08 PROCEDURE — 71045 X-RAY EXAM CHEST 1 VIEW: CPT

## 2022-06-08 PROCEDURE — 82746 ASSAY OF FOLIC ACID SERUM: CPT | Performed by: PHYSICIAN ASSISTANT

## 2022-06-08 PROCEDURE — 70450 CT HEAD/BRAIN W/O DYE: CPT

## 2022-06-08 PROCEDURE — 83918 ORGANIC ACIDS TOTAL QUANT: CPT | Performed by: PHYSICIAN ASSISTANT

## 2022-06-08 PROCEDURE — 82607 VITAMIN B-12: CPT | Performed by: PHYSICIAN ASSISTANT

## 2022-06-08 PROCEDURE — 84484 ASSAY OF TROPONIN QUANT: CPT | Performed by: EMERGENCY MEDICINE

## 2022-06-08 RX ORDER — ATORVASTATIN CALCIUM 40 MG/1
80 TABLET, FILM COATED ORAL DAILY
Status: DISCONTINUED | OUTPATIENT
Start: 2022-06-08 | End: 2022-06-10 | Stop reason: HOSPADM

## 2022-06-08 RX ORDER — LOSARTAN POTASSIUM 50 MG/1
100 TABLET ORAL DAILY
Status: DISCONTINUED | OUTPATIENT
Start: 2022-06-08 | End: 2022-06-10 | Stop reason: HOSPADM

## 2022-06-08 RX ORDER — TIZANIDINE 2 MG/1
2 TABLET ORAL EVERY 8 HOURS PRN
Status: DISCONTINUED | OUTPATIENT
Start: 2022-06-08 | End: 2022-06-10 | Stop reason: HOSPADM

## 2022-06-08 RX ORDER — LEVOTHYROXINE SODIUM 0.1 MG/1
100 TABLET ORAL
Status: DISCONTINUED | OUTPATIENT
Start: 2022-06-09 | End: 2022-06-10 | Stop reason: HOSPADM

## 2022-06-08 RX ORDER — ENOXAPARIN SODIUM 100 MG/ML
40 INJECTION SUBCUTANEOUS DAILY
Status: DISCONTINUED | OUTPATIENT
Start: 2022-06-08 | End: 2022-06-10 | Stop reason: HOSPADM

## 2022-06-08 RX ORDER — METOPROLOL SUCCINATE 100 MG/1
100 TABLET, EXTENDED RELEASE ORAL DAILY
Status: DISCONTINUED | OUTPATIENT
Start: 2022-06-08 | End: 2022-06-10 | Stop reason: HOSPADM

## 2022-06-08 RX ORDER — OXYCODONE HYDROCHLORIDE 5 MG/1
5 TABLET ORAL EVERY 4 HOURS PRN
Status: DISCONTINUED | OUTPATIENT
Start: 2022-06-08 | End: 2022-06-10 | Stop reason: HOSPADM

## 2022-06-08 RX ORDER — ASPIRIN 81 MG/1
81 TABLET ORAL DAILY
Status: DISCONTINUED | OUTPATIENT
Start: 2022-06-09 | End: 2022-06-10 | Stop reason: HOSPADM

## 2022-06-08 RX ORDER — CLOPIDOGREL BISULFATE 75 MG/1
75 TABLET ORAL DAILY
Status: DISCONTINUED | OUTPATIENT
Start: 2022-06-08 | End: 2022-06-10 | Stop reason: HOSPADM

## 2022-06-08 RX ORDER — ACETAMINOPHEN 325 MG/1
650 TABLET ORAL EVERY 6 HOURS PRN
Status: DISCONTINUED | OUTPATIENT
Start: 2022-06-08 | End: 2022-06-10 | Stop reason: HOSPADM

## 2022-06-08 RX ORDER — ZOLPIDEM TARTRATE 5 MG/1
10 TABLET ORAL
Status: DISCONTINUED | OUTPATIENT
Start: 2022-06-08 | End: 2022-06-10 | Stop reason: HOSPADM

## 2022-06-08 RX ORDER — LANOLIN ALCOHOL/MO/W.PET/CERES
3 CREAM (GRAM) TOPICAL
Status: DISCONTINUED | OUTPATIENT
Start: 2022-06-08 | End: 2022-06-10 | Stop reason: HOSPADM

## 2022-06-08 RX ORDER — HYDRALAZINE HYDROCHLORIDE 20 MG/ML
5 INJECTION INTRAMUSCULAR; INTRAVENOUS ONCE
Status: COMPLETED | OUTPATIENT
Start: 2022-06-08 | End: 2022-06-08

## 2022-06-08 RX ADMIN — ATORVASTATIN CALCIUM 80 MG: 40 TABLET, FILM COATED ORAL at 15:59

## 2022-06-08 RX ADMIN — LOSARTAN POTASSIUM 100 MG: 50 TABLET, FILM COATED ORAL at 15:59

## 2022-06-08 RX ADMIN — ZOLPIDEM TARTRATE 10 MG: 5 TABLET ORAL at 21:49

## 2022-06-08 RX ADMIN — HYDRALAZINE HYDROCHLORIDE 5 MG: 20 INJECTION INTRAMUSCULAR; INTRAVENOUS at 13:39

## 2022-06-08 RX ADMIN — CLOPIDOGREL BISULFATE 75 MG: 75 TABLET ORAL at 15:59

## 2022-06-08 RX ADMIN — Medication 3 MG: at 21:49

## 2022-06-08 RX ADMIN — METOPROLOL SUCCINATE 100 MG: 100 TABLET, EXTENDED RELEASE ORAL at 15:59

## 2022-06-08 RX ADMIN — ENOXAPARIN SODIUM 40 MG: 40 INJECTION SUBCUTANEOUS at 16:05

## 2022-06-08 NOTE — ASSESSMENT & PLAN NOTE
· POA, progressively worsening right sided weakness, new dysarthria, right eyelid ptosis, and dysphagia x1 month  Concern for new stroke vs recrudescence of old stroke    · CT Head negative   · Admit patient to med/surg under inpatient status   · Stroke Pathway   · Neurology consult   · MRI, MRA  · ECHO  · A1c, FLP  · ASA, Plavix, Lipitor - continued from home   · Euglycemia, normotension   · Telemetry   · PT/OT   · Neuro checks   · Myasthenia work up as well   · B12, folate   · MMA  · Acetylcholine binding, blocking   · MUSK

## 2022-06-08 NOTE — ASSESSMENT & PLAN NOTE
Charmaine Ruiz is a 61 y o  male with prior stroke with residual right sided weakness, migraines, HTN, HLD, CAD s/p CABG and stent (3/2021), hypothyroidism, and chronic pain who presented to [facility] on 6/8/2022 due to progressively worsening right sided weakness, new dysarthria, right eyelid ptosis, and dysphagia x1 month  Patient states all of these symptoms have slowly worsened over the last month  Of note, over the past 6 months, patient has had episodes of urinary and fecal incontinence, and states that he intermittently cannot feel the urine stream     Upon arrival to the ED, /79  CT head shows encephalomalacia of the left anterior inferior frontal lobe, but no acute infarct or hemorrhage  Labs revealed , but remainder of CBC and CMP unremarkable  On initial exam, patient had 4- to 4+/5 strength in the right UE/LE, upgoing right toe, and brisk reflexes on the right  Exam findings may be from chronic infarct  MRI brain and MRA head and neck unremarkable  Progressively worsening/fluctuating symptoms likely secondary to recrudescence of prior stroke symptoms, possibly related to BP as patient reports elevated pressures at home with SBP in the 160s  Also reported ptosis and dysaphagia, myasthenia gravis labs pending  Etiology for intermittent incontinence and inability to feel urine stream is unclear  Plan:  - Echo: EF 60%, wall motion normal; mild tricuspid regurg  - Labs pending:  MMA, acetylcholine binding and blocking receptors, MUSK antibody  - Continue home Aspirin 81 mg and Plavix 75 mg daily  - Fasting Lipid Panel: Total cholesterol 133, triglycerides 138, HDL 34, LDL 71  - Continue home Atorvastatin 40 mg daily  - Hemoglobin A1c:  5 6  - Normotension, euglycemic, normothermia  - PT/OT/Speech  - Continue to monitor and notify neurology with any changes  - Medical management and supportive care per primary team  Correction of any metabolic or infectious disturbances     - Follow-up with outpatient Neurology in 4-6 weeks

## 2022-06-08 NOTE — ASSESSMENT & PLAN NOTE
Wt Readings from Last 3 Encounters:   05/06/22 102 kg (224 lb)   03/22/22 102 kg (224 lb)   03/11/22 101 kg (223 lb)     · Appears euvolemic

## 2022-06-08 NOTE — ASSESSMENT & PLAN NOTE
· Patient reports prior [place] stroke 8 years ago with residual right sided weakness   · Management as above

## 2022-06-08 NOTE — ED NOTES
Pt notified about urine sample and feels he doesn't have to go at this time, will notify staff when he does  Pt requesting fluids       Prudy Elvira  06/08/22 8812

## 2022-06-08 NOTE — ED PROVIDER NOTES
History  Chief Complaint   Patient presents with    Slurred Speech     Pt states he had a major stroke in 2014 that affected R side of body  Pt states slurred speech started on Monday but has increased drastically since this morning  Pt has L tongue deviation and R arm drift but no other neuro deficits noted in triage       History provided by:  Patient   used: No      Patient is a 68-year-old male presenting to emergency department with slurring of speech  Patient with history of stroke  Has right-sided weakness from the stroke  Per wife and patient started with worsening slurring of speech since 2 days ago  Also progressively worsening right-sided weakness  Patient was hyper historians and difficult to get exact timeline of events  Denying any trauma  Patient out of window for stroke alert  MDM will CT head, workup for stroke      Prior to Admission Medications   Prescriptions Last Dose Informant Patient Reported? Taking?   acetaminophen (TYLENOL) 325 mg tablet  Self No No   Sig: Take 2 tablets (650 mg total) by mouth every 6 (six) hours as needed for mild pain   Patient not taking: Reported on 3/11/2022    acetaminophen (TYLENOL) 325 mg tablet  Self No No   Sig: Take 2 tablets (650 mg total) by mouth every 6 (six) hours as needed for mild pain   acetaminophen-codeine (TYLENOL #3) 300-30 mg per tablet   No No   Sig: Take 1 tablet by mouth every 4 (four) hours as needed for moderate pain   aspirin 81 MG tablet  Self Yes No   Sig: Take 81 mg by mouth   atorvastatin (LIPITOR) 40 mg tablet  Self No No   Sig: Take 2 tablets (80 mg total) by mouth daily   clonazePAM (KlonoPIN) 2 mg tablet   No No   Sig: Take 2 tablets (4 mg total) by mouth as needed in the morning and 2 tablets (4 mg total) as needed in the evening for seizures     clopidogrel (PLAVIX) 75 mg tablet   No No   Sig: Take 1 tablet (75 mg total) by mouth daily   levothyroxine 100 mcg tablet   No No   Sig: Take 1 tablet (100 mcg total) by mouth daily   metoprolol succinate (TOPROL-XL) 100 mg 24 hr tablet  Self No No   Sig: Take 1 tablet (100 mg total) by mouth daily   oxyCODONE (ROXICODONE) 5 immediate release tablet   No No   Sig: Take 1 tablet (5 mg total) by mouth every 4 (four) hours as needed for moderate pain Max Daily Amount: 30 mg   sildenafil (VIAGRA) 100 mg tablet   No No   Sig: Take 1 tablet (100 mg total) by mouth daily as needed for erectile dysfunction   tiZANidine (ZANAFLEX) 2 mg tablet   No No   Sig: TAKE 1 TABLET (2 MG TOTAL) BY MOUTH EVERY 8 (EIGHT) HOURS AS NEEDED FOR MUSCLE SPASMS   valsartan (DIOVAN) 160 mg tablet   No No   Sig: Take 1 tablet (160 mg total) by mouth in the morning  zolpidem (AMBIEN) 10 mg tablet   No No   Si po  q hs      Facility-Administered Medications: None       Past Medical History:   Diagnosis Date    Anxiety     CAD (coronary artery disease)     Coronary artery disease     Depression     Disease of thyroid gland     Dizziness     Eye problems     Hypertension     Hypothyroidism     Migraines     [facility]    Tremors of nervous system        Past Surgical History:   Procedure Laterality Date    COLONOSCOPY  2017    COLONOSCOPY      CORONARY ARTERY BYPASS GRAFT      CORONARY STENT PLACEMENT      DENTAL SURGERY      entire top teeth removal    HERNIA REPAIR      [place] CABG, ARTERY-VEIN, FOUR N/A 3/24/2021    Procedure: CORONARY ARTERY BYPASS GRAFT (CABG) 3 VESSELS,  USING LEFT VANE-LAD AND LEFT GSV-RPL & RAMUS;  Surgeon: Aniket Curry MD;  [facility]: [facility];  Service: Cardiac Surgery    [place] ECHO TRANSESOPHAG [address] N/A 3/24/2021    Procedure: TRANSESOPHAGEAL ECHOCARDIOGRAM (MARLEN);   Surgeon: Aniket Curry MD;  [facility];  Service: Cardiac Surgery       Family History   Problem Relation Age of Onset    Cancer Mother     Heart attack Father     Heart disease Father     Heart attack Sister  Cancer Brother      I have reviewed and agree with the history as documented  E-Cigarette/Vaping    E-Cigarette Use Never User      E-Cigarette/Vaping Substances    Nicotine No     THC No     CBD No     Flavoring No     Other No     Unknown No      Social History     Tobacco Use    Smoking status: Never Smoker    Smokeless tobacco: Never Used   Vaping Use    Vaping Use: Never used   Substance Use Topics    Alcohol use: No    Drug use: No       Review of Systems   Constitutional: Negative for chills, diaphoresis and fever  HENT: Negative for congestion and sore throat  Respiratory: Negative for cough, shortness of breath, wheezing and stridor  Cardiovascular: Negative for chest pain, palpitations and leg swelling  Gastrointestinal: Negative for abdominal pain, blood in stool, diarrhea, nausea and vomiting  Genitourinary: Negative for dysuria, frequency and urgency  Musculoskeletal: Negative for neck pain and neck stiffness  Skin: Negative for pallor and rash  Neurological: Positive for speech difficulty and weakness  Negative for dizziness, syncope, light-headedness and headaches  All other systems reviewed and are negative  Physical Exam  Physical Exam  Vitals reviewed  Constitutional:       Appearance: Normal appearance  He is well-developed  HENT:      Head: Normocephalic and atraumatic  Eyes:      Extraocular Movements: Extraocular movements intact  Pupils: Pupils are equal, round, and reactive to light  Cardiovascular:      Rate and Rhythm: Normal rate and regular rhythm  Heart sounds: Normal heart sounds  Pulmonary:      Effort: Pulmonary effort is normal  No respiratory distress  Breath sounds: Normal breath sounds  Abdominal:      General: Bowel sounds are normal       Palpations: Abdomen is soft  Tenderness: There is no abdominal tenderness  Musculoskeletal:         General: No swelling or tenderness  Normal range of motion  Cervical back: Normal range of motion and neck supple  Skin:     General: Skin is warm and dry  Capillary Refill: Capillary refill takes less than 2 seconds  Neurological:      General: No focal deficit present  Mental Status: He is alert and oriented to person, place, and time  Comments: Right-sided facial weakness noted  Slurring of speech  Right upper lower weakness           Vital Signs  ED Triage Vitals   Temperature Pulse Respirations Blood Pressure SpO2   06/08/22 1125 06/08/22 1125 06/08/22 1125 06/08/22 1125 06/08/22 1125   98 6 °F (37 °C) 65 16 161/79 95 %      Temp Source Heart Rate Source Patient Position - Orthostatic VS BP Location FiO2 (%)   06/08/22 1125 06/08/22 1125 06/08/22 1125 06/08/22 1125 --   Oral Monitor Sitting Left arm       Pain Score       06/08/22 1445       No Pain           Vitals:    06/08/22 1400 06/08/22 1445 06/08/22 1545 06/08/22 1645   BP: 150/92 (!) 184/91 162/94 162/64   Pulse: 58 59 67 61   Patient Position - Orthostatic VS: Lying Sitting Sitting Sitting         Visual Acuity  Visual Acuity    Flowsheet Row Most Recent Value   L Pupil Size (mm) 4   R Pupil Size (mm) 4   L Pupil Shape Round   R Pupil Shape Round          ED Medications  Medications   acetaminophen (TYLENOL) tablet 650 mg (has no administration in time range)   aspirin (ECOTRIN LOW STRENGTH) EC tablet 81 mg (has no administration in time range)   atorvastatin (LIPITOR) tablet 80 mg (80 mg Oral Given 6/8/22 1559)   clopidogrel (PLAVIX) tablet 75 mg (75 mg Oral Given 6/8/22 1559)   levothyroxine tablet 100 mcg (has no administration in time range)   metoprolol succinate (TOPROL-XL) 24 hr tablet 100 mg (100 mg Oral Given 6/8/22 1559)   oxyCODONE (ROXICODONE) IR tablet 5 mg (has no administration in time range)   tiZANidine (ZANAFLEX) tablet 2 mg (has no administration in time range)   losartan (COZAAR) tablet 100 mg (100 mg Oral Given 6/8/22 1559)   zolpidem (AMBIEN) tablet 10 mg (has no administration in time range)   enoxaparin (LOVENOX) subcutaneous injection 40 mg (40 mg Subcutaneous Given 6/8/22 1605)   melatonin tablet 3 mg (has no administration in time range)   hydrALAZINE (APRESOLINE) injection 5 mg (5 mg Intravenous Given 6/8/22 1339)       Diagnostic Studies  Results Reviewed     Procedure Component Value Units Date/Time    HS Troponin I 4hr [[id number]]  (Normal) Collected: 06/08/22 1614    Lab Status: Final result Specimen: Blood from Arm, Left Updated: 06/08/22 1649     hs TnI 4hr 6 ng/L      Delta 4hr hsTnI 0 ng/L     Vitamin B12 [[id number]] Collected: 06/08/22 1159    Lab Status: In process Specimen: Blood from Arm, Left Updated: 06/08/22 1539    Folate [[id number]] Collected: 06/08/22 1159    Lab Status:  In process Specimen: Blood from Arm, Left Updated: 06/08/22 1539    HS Troponin I 2hr [[id number]]  (Normal) Collected: 06/08/22 1430    Lab Status: Final result Specimen: Blood Updated: 06/08/22 1509     hs TnI 2hr 6 ng/L      Delta 2hr hsTnI 0 ng/L     Urine Macroscopic, POC [[id number]] Collected: 06/08/22 1337    Lab Status: Final result Specimen: Urine Updated: 06/08/22 1339     Color, UA Yellow     Clarity, UA Clear     pH, UA 5 5     Leukocytes, UA Negative     Nitrite, UA Negative     Protein, UA Negative mg/dl      Glucose, UA Negative mg/dl      Ketones, UA Negative mg/dl      Urobilinogen, UA 0 2 E U /dl      Bilirubin, UA Negative     Blood, UA Negative     Specific Gravity, UA 1 020    Narrative:      CLINITEK RESULT    HS Troponin 0hr (reflex protocol) [[id number]]  (Normal) Collected: 06/08/22 1159    Lab Status: Final result Specimen: Blood from Arm, Left Updated: 06/08/22 1255     hs TnI 0hr 6 ng/L     Comprehensive metabolic panel [[id number]] Collected: 06/08/22 1159    Lab Status: Final result Specimen: Blood from Arm, Left Updated: 06/08/22 1250     Sodium 137 mmol/L      Potassium 4 1 mmol/L      Chloride 101 mmol/L      CO2 31 mmol/L      ANION GAP 5 mmol/L      BUN 14 mg/dL      Creatinine 1 11 mg/dL      Glucose 89 mg/dL      Calcium 9 1 mg/dL      AST 17 U/L      ALT 12 U/L      Alkaline Phosphatase 75 U/L      Total Protein 7 0 g/dL      Albumin 4 1 g/dL      Total Bilirubin 0 83 mg/dL      eGFR 69 ml/min/1 73sq m     Narrative:      [place] guidelines for Chronic Kidney Disease (CKD):     Stage 1 with normal or high GFR (GFR > 90 mL/min/1 73 square meters)    Stage 2 Mild CKD (GFR = 60-89 mL/min/1 73 square meters)    Stage 3A Moderate CKD (GFR = 45-59 mL/min/1 73 square meters)    Stage 3B Moderate CKD (GFR = 30-44 mL/min/1 73 square meters)    Stage 4 Severe CKD (GFR = 15-29 mL/min/1 73 square meters)    Stage 5 End Stage CKD (GFR <15 mL/min/1 73 square meters)  Note: GFR calculation is accurate only with a steady state creatinine    CBC and differential [[id number]]  (Abnormal) Collected: 06/08/22 1159    Lab Status: Final result Specimen: Blood from Arm, Left Updated: 06/08/22 1221     WBC 6 78 Thousand/uL      RBC 4 28 Million/uL      Hemoglobin 14 8 g/dL      Hematocrit 42 8 %       fL      MCH 34 6 pg      MCHC 34 6 g/dL      RDW 13 2 %      MPV 9 7 fL      Platelets 412 Thousands/uL      nRBC 0 /100 WBCs      Neutrophils Relative 68 %      Immat GRANS % 0 %      Lymphocytes Relative 21 %      Monocytes Relative 8 %      Eosinophils Relative 2 %      Basophils Relative 1 %      Neutrophils Absolute 4 62 Thousands/µL      Immature Grans Absolute 0 02 Thousand/uL      Lymphocytes Absolute 1 41 Thousands/µL      Monocytes Absolute 0 54 Thousand/µL      Eosinophils Absolute 0 13 Thousand/µL      Basophils Absolute 0 06 Thousands/µL                  CT head without contrast   Final Result by David Leblanc DO (06/08 1255)      No acute intracranial abnormality  Microangiopathic changes  Encephalomalacia left anterior inferior frontal lobe likely sequela of prior trauma                    Workstation performed: [id number] XR chest 1 view portable   ED Interpretation by Ciro Mota MD (06/08 1204)   No acute findings      Final Result by Sofi Nowak MD (06/08 1316)   Status post CABG      No acute cardiopulmonary disease  Findings are stable            Workstation performed: [id number]         MRI Inpatient Order    (Results Pending)              Procedures  Procedures         ED Course  ED Course as of 06/08/22 1823   Wed Jun 08, 2022   1144 Per neurologist Dr Cristina Sen, no cta at this time, just dry scan   1144 ECG shows rate of 64, sinus, normal axis, normal QRS, no significant ST or T-wave changes normal intervals, independently interpreted by me                                             MDM     Per neurology will be admitted to stroke pathway  Orders will be placed by Neurology team     Disposition  Final diagnoses:   Slurring of speech   Acute right-sided weakness     Time reflects when diagnosis was documented in both MDM as applicable and the Disposition within this note     Time User Action Codes Description Comment    6/8/2022 12:22 PM Greta Tracey Add [R47 81] Slurring of speech     6/8/2022  1:25 PM Greta Tracey Add [R53 1] Acute right-sided weakness     6/8/2022  2:42 PM Ana Kirby Add [R29 90] Stroke-like symptoms       ED Disposition     ED Disposition   Admit    Condition   Stable    Date/Time   Wed Jun 8, 2022  1:25 PM    Comment   Case was discussed with Christi Coe and the patient's admission status was agreed to be Admission Status: inpatient status to the service of Dr Payton Pratt              Follow-up Information    None         Current Discharge Medication List      CONTINUE these medications which have NOT CHANGED    Details   acetaminophen-codeine (TYLENOL #3) 300-30 mg per tablet Take 1 tablet by mouth every 4 (four) hours as needed for moderate pain  Qty: 30 tablet, Refills: 0    Associated Diagnoses: Chest pain, unspecified type      clonazePAM (KlonoPIN) 2 mg tablet Take 2 tablets (4 mg total) by mouth as needed in the morning and 2 tablets (4 mg total) as needed in the evening for seizures  Qty: 180 tablet, Refills: 0    Associated Diagnoses: Anxiety      oxyCODONE (ROXICODONE) 5 immediate release tablet Take 1 tablet (5 mg total) by mouth every 4 (four) hours as needed for moderate pain Max Daily Amount: 30 mg  Qty: 16 tablet, Refills: 0    Associated Diagnoses: Acute ankle pain, unspecified laterality      !! acetaminophen (TYLENOL) 325 mg tablet Take 2 tablets (650 mg total) by mouth every 6 (six) hours as needed for mild pain  Refills: 0    Associated Diagnoses: Injury of head, initial encounter      !! acetaminophen (TYLENOL) 325 mg tablet Take 2 tablets (650 mg total) by mouth every 6 (six) hours as needed for mild pain  Refills: 0    Associated Diagnoses: Fall, initial encounter      aspirin 81 MG tablet Take 81 mg by mouth      atorvastatin (LIPITOR) 40 mg tablet Take 2 tablets (80 mg total) by mouth daily  Qty: 180 tablet, Refills: 1    Associated Diagnoses: Coronary artery disease involving coronary bypass graft of native heart with angina pectoris ([facility] );  Mixed hyperlipidemia      clopidogrel (PLAVIX) 75 mg tablet Take 1 tablet (75 mg total) by mouth daily  Qty: 90 tablet, Refills: 0    Associated Diagnoses: S/P CABG (coronary artery bypass graft)      levothyroxine 100 mcg tablet Take 1 tablet (100 mcg total) by mouth daily  Qty: 90 tablet, Refills: 0    Associated Diagnoses: Hypothyroidism, unspecified type      metoprolol succinate (TOPROL-XL) 100 mg 24 hr tablet Take 1 tablet (100 mg total) by mouth daily  Qty: 90 tablet, Refills: 3    Associated Diagnoses: Essential hypertension      sildenafil (VIAGRA) 100 mg tablet Take 1 tablet (100 mg total) by mouth daily as needed for erectile dysfunction  Qty: 30 tablet, Refills: 0    Associated Diagnoses: Erectile dysfunction, unspecified erectile dysfunction type      tiZANidine (ZANAFLEX) 2 mg tablet TAKE 1 TABLET (2 MG TOTAL) BY MOUTH EVERY 8 (EIGHT) HOURS AS NEEDED FOR MUSCLE SPASMS  Qty: 270 tablet, Refills: 0    Associated Diagnoses: Low back pain without sciatica, unspecified back pain laterality, unspecified chronicity      valsartan (DIOVAN) 160 mg tablet Take 1 tablet (160 mg total) by mouth in the morning  Qty: 90 tablet, Refills: 1    Associated Diagnoses: Essential hypertension      zolpidem (AMBIEN) 10 mg tablet 2 po  q hs  Qty: 180 tablet, Refills: 0    Associated Diagnoses: Insomnia, unspecified type       !! - Potential duplicate medications found  Please discuss with provider  No discharge procedures on file      PDMP Review       Value Time User    PDMP Reviewed  Yes 5/31/2022  8:27 PM Aaron Navarro MD          ED Provider  Electronically Signed by           Hillary Lima MD  06/08/22 [id number]

## 2022-06-08 NOTE — CONSULTS
Consultation - Neurology   Will Philippe 61 y o  male MRN: [id number]  Unit/Bed#: ED 24 Encounter: [id number]      Assessment/Plan     * Stroke-like symptoms  Assessment & Plan  61 y o  male with prior stroke with residual right sided weakness, migraines, HTN, HLD, CAD s/p CABG and stent (3/2021), hypothyroidism, and chronic pain who presented to [facility] on 6/8/2022 due to progressively worsening right sided weakness, new dysarthria, right eyelid ptosis, and dysphagia x1 month  Patient states all of these symptoms have slowly worsened over the last month  Of note, over the past 6 months, patient has had episodes of urinary and fecal incontinence, and states that he intermittently cannot feel the urine stream     Upon arrival to the ED, /79  CT head shows encephalomalacia of the left anterior inferior frontal lobe, but no acute infarct or hemorrhage  Labs revealed , but remainder of CBC and CMP unremarkable  Etiology for progressively worsening symptoms remains unclear  Concern for CVA vs recrudescence of symptoms  Given ptosis and dysaphagia, will also check myasthenia gravis labs  Etiology for intermittent incontinence and inability to feel urine stream is unclear  On exam, patient has 4- to 4+/5 strength in the right UE/LE, upgoing right toe, and brisk reflexes on the right  No sensory abnormalities other than peripheral neuropathy and no weakness on the left side  Plan:  - Stroke pathway  - MRI brain wo contrast and MRA head/neck pending  - Echo pending  - Labs pending: lipid panel, Hemoglobin A1c, B12, folate, MMA, TSH  - Continue home Aspirin 81 mg and Plavix 75 mg daily  - Continue home Atorvastatin 40 mg daily  - Normotensive goal (symptoms have been present >1 month)  - Euglycemic, normothermic goal  - Continue telemetry  - PT/OT/ST  - Continue to monitor and notify neurology with any changes    - Medical management and supportive care per primary team  Correction of any metabolic or infectious disturbances  Lizette Bradley will need follow up in 6-8 weeks with neurovascular attending or advance practitioner  He will not require outpatient neurological testing  History of Present Illness     Reason for Consult / Principal Problem: worsening right sided weakness, dysarthria, right eyelid ptosis, dysphagia  Hx and PE limited by: N/A  HPI: Lizette Bradley is a 61 y o  male with prior stroke with residual right sided weakness, migraines, HTN, HLD, CAD s/p CABG and stent (3/2021), hypothyroidism, and chronic pain who presented to [facility] on 6/8/2022 due to progressively worsening right sided weakness, new dysarthria, right eyelid ptosis, and dysphagia x1 month  History obtained per patient and wife at bedside  Patient states that he had a pontine stroke approximately 8 years ago with residual right-sided weakness  On May 2, 2022, patient took an 8 hour nap and when he awoke he had difficulty ambulating and has since required a walker  Family also noted that his speech was slurred  Patient did not follow-up with any medical provider when symptoms began  Since then, he has noted progressive worsening of the right-sided weakness as well as more slurring of the speech  He has also noted that his right eyelid droops and he occasionally has difficulty swallowing  He also states that for the past 6 months, he has had difficulty urinating and occasionally does not feel the stream of urine  This comes and goes  Denies any persistent saddle anesthesia  He is able to feel stool passing  Patient has had poor libido and difficulty becoming erect despite using Viagra  Due to the decline in his strength, patient has been unable to perform his hobbies, making him more depressed  Upon arrival to the ED, /79  CT head shows encephalomalacia of the left anterior inferior frontal lobe, but no acute infarct or hemorrhage    Labs revealed , but remainder of CBC and CMP unremarkable  On exam, patient is lying comfortably in bed in no acute distress  He continues to have right-sided weakness, right eyelid ptosis, slurred speech, and intermittent dysphagia  He has chronic back pain, but does not have any point tenderness on exam   Denies any headache, visual disturbances, numbness, tingling, chest pain, shortness of breath, and abdominal pain  Patient states that when he had a stroke 8 years ago, they did a MARLEN which was unremarkable  He has been maintained on aspirin, Plavix, and Lipitor  He has not missed any doses of his medications  Consult to neurology  Consult performed by: Elie Carrasco PA-C  Consult ordered by: Amanda Caban PA-C          Review of Systems   Constitutional: Positive for fatigue  Negative for fever  HENT: Negative for trouble swallowing and voice change  Eyes: Negative for photophobia and visual disturbance  Respiratory: Negative for chest tightness and shortness of breath  Cardiovascular: Negative for chest pain and palpitations  Gastrointestinal: Negative for abdominal pain, constipation, diarrhea, nausea and vomiting  Genitourinary: Positive for difficulty urinating  Intermittently has difficulty feeling urine stream  Unable to obtain an erection   Musculoskeletal: Positive for back pain and gait problem  Negative for neck stiffness  Neurological: Positive for speech difficulty and weakness  Negative for dizziness, tremors, facial asymmetry, light-headedness, numbness and headaches  Psychiatric/Behavioral: Negative for confusion and decreased concentration         Historical Information   Past Medical History:   Diagnosis Date    Anxiety     CAD (coronary artery disease)     Coronary artery disease     Depression     Disease of thyroid gland     Dizziness     Eye problems     Hypertension     Hypothyroidism     Migraines     [facility]    Tremors of nervous system      Past Surgical History:   Procedure Laterality Date    COLONOSCOPY  09/01/2017    COLONOSCOPY      CORONARY ARTERY BYPASS GRAFT  March 24,20201    CORONARY STENT PLACEMENT      DENTAL SURGERY      entire top teeth removal    HERNIA REPAIR      CA CABG, ARTERY-VEIN, FOUR N/A 3/24/2021    Procedure: CORONARY ARTERY BYPASS GRAFT (CABG) 3 VESSELS,  USING LEFT VANE-LAD AND LEFT GSV-RPL & RAMUS;  Surgeon: Mariajose Last MD;  Location: [facility];  Service: Cardiac Surgery    CA ECHO TRANSESOPHAG [address] N/A 3/24/2021    Procedure: TRANSESOPHAGEAL ECHOCARDIOGRAM (MARLEN); Surgeon: Mariajose Last MD;  Location: [facility];  Service: Cardiac Surgery     Social History   Social History     Substance and Sexual Activity   Alcohol Use No     Social History     Substance and Sexual Activity   Drug Use No     E-Cigarette/Vaping    E-Cigarette Use Never User      E-Cigarette/Vaping Substances    Nicotine No     THC No     CBD No     Flavoring No     Other No     Unknown No      Social History     Tobacco Use   Smoking Status Never Smoker   Smokeless Tobacco Never Used     Family History:   Family History   Problem Relation Age of Onset    Cancer Mother     Heart attack Father     Heart disease Father     Heart attack Sister     Cancer Brother        Review of previous medical records was completed  Reviewed prior notes, labs, CT head    Meds/Allergies   all current active meds have been reviewed, current meds:   No current facility-administered medications for this encounter  and PTA meds:   Prior to Admission Medications   Prescriptions Last Dose Informant Patient Reported?  Taking?   acetaminophen (TYLENOL) 325 mg tablet  Self No No   Sig: Take 2 tablets (650 mg total) by mouth every 6 (six) hours as needed for mild pain   Patient not taking: Reported on 3/11/2022    acetaminophen (TYLENOL) 325 mg tablet  Self No No   Sig: Take 2 tablets (650 mg total) by mouth every 6 (six) hours as needed for mild pain   acetaminophen-codeine (TYLENOL #3) 300-30 mg per tablet   No No   Sig: Take 1 tablet by mouth every 4 (four) hours as needed for moderate pain   aspirin 81 MG tablet  Self Yes No   Sig: Take 81 mg by mouth   atorvastatin (LIPITOR) 40 mg tablet  Self No No   Sig: Take 2 tablets (80 mg total) by mouth daily   clonazePAM (KlonoPIN) 2 mg tablet   No No   Sig: Take 2 tablets (4 mg total) by mouth as needed in the morning and 2 tablets (4 mg total) as needed in the evening for seizures  clopidogrel (PLAVIX) 75 mg tablet   No No   Sig: Take 1 tablet (75 mg total) by mouth daily   levothyroxine 100 mcg tablet   No No   Sig: Take 1 tablet (100 mcg total) by mouth daily   metoprolol succinate (TOPROL-XL) 100 mg 24 hr tablet  Self No No   Sig: Take 1 tablet (100 mg total) by mouth daily   oxyCODONE (ROXICODONE) 5 immediate release tablet   No No   Sig: Take 1 tablet (5 mg total) by mouth every 4 (four) hours as needed for moderate pain Max Daily Amount: 30 mg   sildenafil (VIAGRA) 100 mg tablet   No No   Sig: Take 1 tablet (100 mg total) by mouth daily as needed for erectile dysfunction   tiZANidine (ZANAFLEX) 2 mg tablet   No No   Sig: TAKE 1 TABLET (2 MG TOTAL) BY MOUTH EVERY 8 (EIGHT) HOURS AS NEEDED FOR MUSCLE SPASMS   valsartan (DIOVAN) 160 mg tablet   No No   Sig: Take 1 tablet (160 mg total) by mouth in the morning  zolpidem (AMBIEN) 10 mg tablet   No No   Si po  q hs      Facility-Administered Medications: None       Allergies   Allergen Reactions    Medical Tape Other (See Comments)     Other reaction(s): BURNS USE PAPER TAPE       Objective   Vitals:Blood pressure 161/79, pulse 65, temperature 98 6 °F (37 °C), temperature source Oral, resp  rate 16, SpO2 95 %  ,There is no height or weight on file to calculate BMI  No intake or output data in the 24 hours ending 22 1224    Invasive Devices:    Invasive Devices  Report    None                 Physical Exam  Vitals and nursing note reviewed  Constitutional:       Appearance: Normal appearance  Comments: Patient lying comfortably in bed in no acute distress   HENT:      Head: Normocephalic and atraumatic  Mouth/Throat:      Mouth: Mucous membranes are moist       Pharynx: Oropharynx is clear  Eyes:      Extraocular Movements: Extraocular movements intact  Conjunctiva/sclera: Conjunctivae normal       Pupils: Pupils are equal, round, and reactive to light  Comments: Right eyelid ptosis   Pulmonary:      Effort: Pulmonary effort is normal    Musculoskeletal:         General: No tenderness  Cervical back: Normal range of motion and neck supple  Comments: Decreased strength in right UE/LE   Skin:     General: Skin is warm and dry  Neurological:      Mental Status: He is alert and oriented to person, place, and time  Deep Tendon Reflexes:      Reflex Scores:       Bicep reflexes are 3+ on the right side and 1+ on the left side  Brachioradialis reflexes are 3+ on the right side and 1+ on the left side  Patellar reflexes are 3+ on the right side and 1+ on the left side  Achilles reflexes are 2+ on the right side  Comments: See full neuro exam below       Neurologic Exam     Mental Status   Oriented to person, place, and time  Patient awake and alert  Oriented to person, place, month, and year  Mild dysarthria, but speech is intelligible  No aphasia  Able to follow simple midline and appendicular commands  Cranial Nerves     CN III, IV, VI   Pupils are equal, round, and reactive to light  Pupils  4 mm, round, reactive to light bilaterally  EOMs intact without nystagmus   No visual field deficits  Right eyelid ptosis, but can maintain eyelid closure against resistance  Facial sensation to light touch intact throughout  Flattening of right nasolabial fold with asymmetric smile  Tongue midline  Mild tremor noted on the tongue, but no fasciculations      Soft palate unable to be visualized  Hearing grossly intact  Motor Exam   Muscle bulk: normal  Pronator drift in right upper extremity  Approximately 4-/5 right deltoid, 4/5 right biceps/triceps, and 5/5 right  strength  4+/5 right iliopsoas muscle, 4/5 right hamstrings, 4+/5 right quadriceps, 4+/5 right dorsiflexion/plantar flexion  5/5 strength in left UE/LE  Sensory Exam   Sensation to light touch intact throughout  Temperature diminished in bilateral distal LEs     Gait, Coordination, and Reflexes     Gait  Gait: (deferred for patient's safety)    Reflexes   Right brachioradialis: 3+  Left brachioradialis: 1+  Right biceps: 3+  Left biceps: 1+  Right patellar: 3+  Left patellar: 1+  Right achilles: 2+  Left achilles reflex grade: trace  No ataxia with finger to nose bilaterally  No resting or action tremor  No ankle clonus bilaterally  Upgoing right toe  Mute left toe       Lab Results:   I have personally reviewed pertinent reports    , CBC:   Results from last 7 days   Lab Units 06/08/22  1159   WBC Thousand/uL 6 78   RBC Million/uL 4 28   HEMOGLOBIN g/dL 14 8   HEMATOCRIT % 42 8   MCV fL 100*   PLATELETS Thousands/uL 173   , BMP/CMP:   Results from last 7 days   Lab Units 06/08/22  1159   SODIUM mmol/L 137   POTASSIUM mmol/L 4 1   CHLORIDE mmol/L 101   CO2 mmol/L 31   BUN mg/dL 14   CREATININE mg/dL 1 11   CALCIUM mg/dL 9 1   AST U/L 17   ALT U/L 12   ALK PHOS U/L 75   EGFR ml/min/1 73sq m 69   , Vitamin B12:   , HgBA1C:   , TSH:   , Coagulation:   , Lipid Profile:   , Ammonia:   , Urinalysis:   Results from last 7 days   Lab Units 06/08/22  1337   COLOR UA  Yellow   CLARITY UA  Clear   SPEC GRAV UA  1 020   PH UA  5 5   LEUKOCYTES UA  Negative   NITRITE UA  Negative   GLUCOSE UA mg/dl Negative   KETONES UA mg/dl Negative   BILIRUBIN UA  Negative   BLOOD UA  Negative   , Drug Screen:   , Medication Drug Levels:       Invalid input(s): CARBAMAZEPINE,  PHENOBARB, LACOSAMIDE, OXCARBAZEPINE  Imaging Studies: I have personally reviewed pertinent reports  and I have personally reviewed pertinent films in PACS  EKG, Pathology, and Other Studies: I have personally reviewed pertinent reports  and I have personally reviewed pertinent films in PACS  VTE Prophylaxis: Sequential compression device Naveed Mejias)     Code Status: Prior  Advance Directive and Living Will:      Power of :    POLST:      Counseling / Coordination of Care  Total time spent today 62 minutes  Greater than 50% of total time was spent with the patient and/or family counseling and/or coordination of care  A description of the counseling/coordination of care:  Patient was seen and evaluated  Discussed with attending  Chart reviewed thoroughly including laboratory and imaging studies    Plan of care discussed with patient and primary team

## 2022-06-08 NOTE — ASSESSMENT & PLAN NOTE
· Reports chest "twinges", but this has been going on for some time   Troponin negative   · Telemetry per stroke pathway   · Continue medical management

## 2022-06-08 NOTE — H&P
[facility]  H&P- Tariq Cook 1955, 61 y o  male MRN: [id number]  Unit/Bed#: S -01 Encounter: [id number]  Primary Care Provider: Sarai Encarnacion MD   Date and time admitted to hospital: 6/8/2022 11:33 AM    * Stroke-like symptoms  Assessment & Plan  · POA, progressively worsening right sided weakness, new dysarthria, right eyelid ptosis, and dysphagia x1 month  Concern for new stroke vs recrudescence of old stroke  · CT Head negative   · Admit patient to med/surg under inpatient status   · Stroke Pathway   · Neurology consult   · MRI, MRA  · ECHO  · A1c, FLP  · ASA, Plavix, Lipitor - continued from home   · Euglycemia, normotension   · Telemetry   · PT/OT   · Neuro checks   · Myasthenia work up as well   · B12, folate   · MMA  · Acetylcholine binding, blocking   · MUSK    History of CVA (cerebrovascular accident)  Assessment & Plan  · Patient reports prior [place] stroke 8 years ago with residual right sided weakness   · Management as above     S/P CABG March 2021 LIMA-LAD;SVG to right PLV; SVG to [address]  · Reports chest "twinges", but this has been going on for some time  Troponin negative   · Telemetry per stroke pathway   · Continue medical management     Chronic diastolic heart failure (HCC)  Assessment & Plan  Wt Readings from Last 3 Encounters:   05/06/22 102 kg (224 lb)   03/22/22 102 kg (224 lb)   03/11/22 101 kg (223 lb)     · Appears euvolemic         Essential hypertension  Assessment & Plan  · BP acceptable at this time   · Goal normotension   · Continue ARB, Toprol-XL      VTE Pharmacologic Prophylaxis: VTE Score: 8 High Risk (Score >/= 5) - Pharmacological DVT Prophylaxis Ordered: enoxaparin (Lovenox)  Sequential Compression Devices Ordered  Code Status: Level 1 - Full Code   Discussion with family: Updated  (wife) at bedside      Anticipated Length of Stay: Patient will be admitted on an inpatient basis with an anticipated length of stay of greater than 2 midnights secondary to as per above assessment and plan   Total Time for Visit, including Counseling / Coordination of Care: 70 minutes Greater than 50% of this total time spent on direct patient counseling and coordination of care  Chief Complaint: Right side weakness     History of Present Illness:  Johnathan James is a 61 y o  male with a PMH of prior CVA, CABG/CAD, HTN, HLD who presents with right sided weakness  Patient reports that his prior stroke 8 years ago left him with right sided weakness  He reports that this has worsened over the last few weeks  He reports increased slurred speech and states that he has had difficulty swallowing  His wife reports that he has now had to use a walker full time since May  He denies headaches or changes in vision  Denies dizziness  Denies chest pain or shortness of breath  However, he does report having twinges in his chest for some time that he attributes to open heart surgery itself  Denies falls at home  Wife reports that she makes sure he takes his medications as directed faithfully  Review of Systems:  Review of Systems   Constitutional: Negative for appetite change, chills, diaphoresis, fatigue and fever  HENT: Positive for trouble swallowing  Negative for congestion, rhinorrhea and sore throat  Eyes: Negative for visual disturbance  Respiratory: Negative for cough, chest tightness, shortness of breath and wheezing  Cardiovascular: Negative for chest pain, palpitations and leg swelling  Gastrointestinal: Negative for abdominal pain, constipation, diarrhea, nausea and vomiting  Genitourinary: Negative for dysuria  Musculoskeletal: Positive for back pain and gait problem  Negative for arthralgias and myalgias  Neurological: Positive for speech difficulty and weakness  Negative for dizziness, syncope, light-headedness, numbness and headaches  All other systems reviewed and are negative        Past Medical and Surgical History:   Past Medical History:   Diagnosis Date    Anxiety     CAD (coronary artery disease)     Coronary artery disease     Depression     Disease of thyroid gland     Dizziness     Eye problems     Hypertension     Hypothyroidism     Migraines     Stroke ([facility] [address] ) 11/17/2014    Tremors of nervous system        Past Surgical History:   Procedure Laterality Date    COLONOSCOPY  09/01/2017    COLONOSCOPY      CORONARY ARTERY BYPASS GRAFT  March 24,20201    CORONARY STENT PLACEMENT      DENTAL SURGERY      entire top teeth removal    HERNIA REPAIR      [place] CABG, ARTERY-VEIN, FOUR N/A 3/24/2021    Procedure: CORONARY ARTERY BYPASS GRAFT (CABG) 3 VESSELS,  USING LEFT VANE-LAD AND LEFT GSV-RPL & RAMUS;  Surgeon: Obey Muhammad MD;  [facility];  Service: Cardiac Surgery    [place] ECHO TRANSESOPHAG [address] N/A 3/24/2021    Procedure: TRANSESOPHAGEAL ECHOCARDIOGRAM (MARLEN); Surgeon: Obey Muhammad MD;  [facility];  Service: Cardiac Surgery       Meds/Allergies:  Prior to Admission medications    Medication Sig Start Date End Date Taking? Authorizing Provider   acetaminophen-codeine (TYLENOL #3) 300-30 mg per tablet Take 1 tablet by mouth every 4 (four) hours as needed for moderate pain 5/17/22   Yoanna Chavez MD   clonazePAM (KlonoPIN) 2 mg tablet Take 2 tablets (4 mg total) by mouth as needed in the morning and 2 tablets (4 mg total) as needed in the evening for seizures   5/17/22   Yoanna Chavez MD   oxyCODONE (ROXICODONE) 5 immediate release tablet Take 1 tablet (5 mg total) by mouth every 4 (four) hours as needed for moderate pain Max Daily Amount: 30 mg 5/17/22   Yoanna Chavez MD   acetaminophen (TYLENOL) 325 mg tablet Take 2 tablets (650 mg total) by mouth every 6 (six) hours as needed for mild pain  Patient not taking: Reported on 3/11/2022  7/23/21   Janet Juarez MD   acetaminophen (TYLENOL) 325 mg tablet Take 2 tablets (650 mg total) by mouth every 6 (six) hours as needed for mild pain 9/24/21   Greta Clements PA-C   aspirin 81 MG tablet Take 81 mg by mouth    Historical Provider, MD   atorvastatin (LIPITOR) 40 mg tablet Take 2 tablets (80 mg total) by mouth daily 10/26/21   Kevyn Thompson MD   clopidogrel (PLAVIX) 75 mg tablet Take 1 tablet (75 mg total) by mouth daily 5/31/22   Kevyn Thompson MD   levothyroxine 100 mcg tablet Take 1 tablet (100 mcg total) by mouth daily 5/31/22   Kevyn Thompson MD   metoprolol succinate (TOPROL-XL) 100 mg 24 hr tablet Take 1 tablet (100 mg total) by mouth daily 10/25/21   Donna Miles DO   sildenafil (VIAGRA) 100 mg tablet Take 1 tablet (100 mg total) by mouth daily as needed for erectile dysfunction 6/2/22   Kevyn Thompson MD   tiZANidine (ZANAFLEX) 2 mg tablet TAKE 1 TABLET (2 MG TOTAL) BY MOUTH EVERY 8 (EIGHT) HOURS AS NEEDED FOR MUSCLE SPASMS 6/4/22   Kevyn Thompson MD   valsartan (DIOVAN) 160 mg tablet Take 1 tablet (160 mg total) by mouth in the morning  5/25/22   Donna Miles DO   zolpidem (AMBIEN) 10 mg tablet 2 po  q hs 5/31/22   Kevyn Thompson MD     I have reviewed home medications using recent Epic encounter  Allergies:    Allergies   Allergen Reactions    Medical Tape Other (See Comments)     Other reaction(s): BURNS USE PAPER TAPE       Social History:  Marital Status: /Civil Union   Occupation: Noncontributory   Patient Pre-hospital Living Situation: Home  Patient Pre-hospital Level of Mobility: walks with walker  Patient Pre-hospital Diet Restrictions: None  Substance Use History:   Social History     Substance and Sexual Activity   Alcohol Use No     Social History     Tobacco Use   Smoking Status Never Smoker   Smokeless Tobacco Never Used     Social History     Substance and Sexual Activity   Drug Use No       Family History:  Family History   Problem Relation Age of Onset    Cancer Mother     Heart attack Father     Heart disease Father     Heart attack Sister     Cancer Brother        Physical Exam:     Vitals:   Blood Pressure: (!) 184/91 (06/08/22 1445)  Pulse: 59 (06/08/22 1445)  Temperature: 98 °F (36 7 °C) (06/08/22 1445)  Temp Source: Oral (06/08/22 1445)  Respirations: 20 (06/08/22 1445)  SpO2: 99 % (06/08/22 1445)    Physical Exam  Constitutional:       General: He is not in acute distress  Appearance: Normal appearance  He is overweight  He is not ill-appearing or diaphoretic  HENT:      Head: Normocephalic and atraumatic  Mouth/Throat:      Mouth: Mucous membranes are moist    Eyes:      General: No scleral icterus  Pupils: Pupils are equal, round, and reactive to light  Cardiovascular:      Rate and Rhythm: Normal rate and regular rhythm  Pulses: Normal pulses  Heart sounds: Normal heart sounds, S1 normal and S2 normal  No murmur heard  No systolic murmur is present  No diastolic murmur is present  No gallop  No S3 or S4 sounds  Comments: Sternotomy scar   Pulmonary:      Effort: Pulmonary effort is normal  No accessory muscle usage or respiratory distress  Breath sounds: Normal breath sounds  No stridor  No decreased breath sounds, wheezing, rhonchi or rales  Chest:      Chest wall: No tenderness  Abdominal:      General: Bowel sounds are normal  There is no distension  Palpations: Abdomen is soft  Tenderness: There is no abdominal tenderness  There is no guarding  Musculoskeletal:      Right lower leg: No edema  Left lower leg: No edema  Skin:     General: Skin is warm and dry  Coloration: Skin is not jaundiced  Neurological:      General: No focal deficit present  Mental Status: He is alert  Mental status is at baseline  Cranial Nerves: Dysarthria present  Motor: Weakness present  No tremor or seizure activity  Psychiatric:         Behavior: Behavior is cooperative            Additional Data:     Lab Results:  Results from last 7 days   Lab Units 06/08/22  1159   WBC Thousand/uL 6 78   HEMOGLOBIN g/dL 14 8   HEMATOCRIT % 42 8   PLATELETS Thousands/uL 173   NEUTROS PCT % 68   LYMPHS PCT % 21   MONOS PCT % 8   EOS PCT % 2     Results from last 7 days   Lab Units 06/08/22  1159   SODIUM mmol/L 137   POTASSIUM mmol/L 4 1   CHLORIDE mmol/L 101   CO2 mmol/L 31   BUN mg/dL 14   CREATININE mg/dL 1 11   ANION GAP mmol/L 5   CALCIUM mg/dL 9 1   ALBUMIN g/dL 4 1   TOTAL BILIRUBIN mg/dL 0 83   ALK PHOS U/L 75   ALT U/L 12   AST U/L 17   GLUCOSE RANDOM mg/dL 89                       Imaging: Reviewed radiology reports from this admission including: CT head  CT head without contrast   Final Result by David Leblanc DO (06/08 1255)      No acute intracranial abnormality  Microangiopathic changes  Encephalomalacia left anterior inferior frontal lobe likely sequela of prior trauma  Workstation performed: [id number]         XR chest 1 view portable   ED Interpretation by Bairon Muñoz MD (06/08 1204)   No acute findings      Final Result by Harlow Boas, MD (06/08 1316)   Status post CABG      No acute cardiopulmonary disease  Findings are stable            Workstation performed: [id number]         MRI Inpatient Order    (Results Pending)       EKG and Other Studies Reviewed on Admission:   · EKG: No EKG obtained  · CT head: No acute intracranial abnormality  Microangiopathic changes  Encephalomalacia left anterior inferior frontal lobe likely sequela or prior trauma  · CXR: No acute pulmonary disease  Status post CABG  ** Please Note: This note has been constructed using a voice recognition system   **

## 2022-06-09 ENCOUNTER — APPOINTMENT (INPATIENT)
Dept: MRI IMAGING | Facility: HOSPITAL | Age: 67
DRG: 057 | End: 2022-06-09
Payer: MEDICARE

## 2022-06-09 ENCOUNTER — APPOINTMENT (INPATIENT)
Dept: NON INVASIVE DIAGNOSTICS | Facility: HOSPITAL | Age: 67
DRG: 057 | End: 2022-06-09
Payer: MEDICARE

## 2022-06-09 LAB
ANION GAP SERPL CALCULATED.3IONS-SCNC: 10 MMOL/L (ref 4–13)
AORTIC ROOT: 3.8 CM
AORTIC VALVE MEAN VELOCITY: 7.8 M/S
APICAL FOUR CHAMBER EJECTION FRACTION: 63 %
ATRIAL RATE: 64 BPM
AV LVOT MEAN GRADIENT: 2 MMHG
AV LVOT PEAK GRADIENT: 3 MMHG
AV MEAN GRADIENT: 3 MMHG
AV PEAK GRADIENT: 5 MMHG
AV VELOCITY RATIO: 0.8
BUN SERPL-MCNC: 13 MG/DL (ref 5–25)
CALCIUM SERPL-MCNC: 9.1 MG/DL (ref 8.4–10.2)
CHLORIDE SERPL-SCNC: 102 MMOL/L (ref 96–108)
CHOLEST SERPL-MCNC: 133 MG/DL
CO2 SERPL-SCNC: 22 MMOL/L (ref 21–32)
CREAT SERPL-MCNC: 1.1 MG/DL (ref 0.6–1.3)
DOP CALC AO PEAK VEL: 1.16 M/S
DOP CALC AO VTI: 22.41 CM
DOP CALC LVOT PEAK VEL VTI: 19.29 CM
DOP CALC LVOT PEAK VEL: 0.93 M/S
DOP CALC MV VTI: 27.79 CM
E WAVE DECELERATION TIME: 303 MS
ERYTHROCYTE [DISTWIDTH] IN BLOOD BY AUTOMATED COUNT: 13.2 % (ref 11.6–15.1)
EST. AVERAGE GLUCOSE BLD GHB EST-MCNC: 114 MG/DL
FRACTIONAL SHORTENING: 35 % (ref 28–44)
GFR SERPL CREATININE-BSD FRML MDRD: 70 ML/MIN/1.73SQ M
GLUCOSE SERPL-MCNC: 103 MG/DL (ref 65–140)
HBA1C MFR BLD: 5.6 %
HCT VFR BLD AUTO: 44.1 % (ref 36.5–49.3)
HDLC SERPL-MCNC: 34 MG/DL
HGB BLD-MCNC: 15.7 G/DL (ref 12–17)
INTERVENTRICULAR SEPTUM IN DIASTOLE (PARASTERNAL SHORT AXIS VIEW): 1.2 CM
INTERVENTRICULAR SEPTUM: 1.2 CM (ref 0.6–1.1)
LAAS-AP2: 23.4 CM2
LAAS-AP4: 19.3 CM2
LDLC SERPL CALC-MCNC: 71 MG/DL (ref 0–100)
LEFT ATRIUM SIZE: 4.2 CM
LEFT INTERNAL DIMENSION IN SYSTOLE: 3.1 CM (ref 2.1–4)
LEFT VENTRICLE DIASTOLIC VOLUME (MOD BIPLANE): 104 ML
LEFT VENTRICLE SYSTOLIC VOLUME (MOD BIPLANE): 37 ML
LEFT VENTRICULAR INTERNAL DIMENSION IN DIASTOLE: 4.8 CM (ref 3.5–6)
LEFT VENTRICULAR POSTERIOR WALL IN END DIASTOLE: 1.1 CM
LEFT VENTRICULAR STROKE VOLUME: 69 ML
LV EF: 65 %
LVSV (TEICH): 69 ML
MCH RBC QN AUTO: 34.7 PG (ref 26.8–34.3)
MCHC RBC AUTO-ENTMCNC: 35.6 G/DL (ref 31.4–37.4)
MCV RBC AUTO: 97 FL (ref 82–98)
MV E'TISSUE VEL-LAT: 10 CM/S
MV E'TISSUE VEL-SEP: 6 CM/S
MV MEAN GRADIENT: 1 MMHG
MV PEAK A VEL: 0.57 M/S
MV PEAK E VEL: 58 CM/S
MV PEAK GRADIENT: 2 MMHG
MV STENOSIS PRESSURE HALF TIME: 88 MS
MV VALVE AREA P 1/2 METHOD: 2.5 CM2
P AXIS: 63 DEGREES
PA SYSTOLIC PRESSURE: 27 MMHG
PLATELET # BLD AUTO: 195 THOUSANDS/UL (ref 149–390)
PMV BLD AUTO: 9.5 FL (ref 8.9–12.7)
POTASSIUM SERPL-SCNC: 4.5 MMOL/L (ref 3.5–5.3)
PR INTERVAL: 184 MS
QRS AXIS: 5 DEGREES
QRSD INTERVAL: 110 MS
QT INTERVAL: 396 MS
QTC INTERVAL: 408 MS
RBC # BLD AUTO: 4.53 MILLION/UL (ref 3.88–5.62)
RIGHT ATRIAL 2D VOLUME: 44 ML
RIGHT ATRIUM AREA SYSTOLE A4C: 16.4 CM2
RIGHT VENTRICLE ID DIMENSION: 3.9 CM
SL CV LEFT ATRIUM LENGTH A2C: 5.7 CM
SL CV LV EF: 60
SL CV PED ECHO LEFT VENTRICLE DIASTOLIC VOLUME (MOD BIPLANE) 2D: 108 ML
SL CV PED ECHO LEFT VENTRICLE SYSTOLIC VOLUME (MOD BIPLANE) 2D: 39 ML
SODIUM SERPL-SCNC: 134 MMOL/L (ref 135–147)
T WAVE AXIS: 59 DEGREES
TR MAX PG: 24 MMHG
TR PEAK VELOCITY: 2.4 M/S
TRICUSPID VALVE PEAK REGURGITATION VELOCITY: 2.43 M/S
TRIGL SERPL-MCNC: 138 MG/DL
VENTRICULAR RATE: 64 BPM
WBC # BLD AUTO: 7.06 THOUSAND/UL (ref 4.31–10.16)

## 2022-06-09 PROCEDURE — 99232 SBSQ HOSP IP/OBS MODERATE 35: CPT | Performed by: PHYSICIAN ASSISTANT

## 2022-06-09 PROCEDURE — 80061 LIPID PANEL: CPT | Performed by: PHYSICIAN ASSISTANT

## 2022-06-09 PROCEDURE — 93306 TTE W/DOPPLER COMPLETE: CPT

## 2022-06-09 PROCEDURE — 83036 HEMOGLOBIN GLYCOSYLATED A1C: CPT | Performed by: PHYSICIAN ASSISTANT

## 2022-06-09 PROCEDURE — 97163 PT EVAL HIGH COMPLEX 45 MIN: CPT

## 2022-06-09 PROCEDURE — 92610 EVALUATE SWALLOWING FUNCTION: CPT

## 2022-06-09 PROCEDURE — 99233 SBSQ HOSP IP/OBS HIGH 50: CPT | Performed by: PSYCHIATRY & NEUROLOGY

## 2022-06-09 PROCEDURE — 97166 OT EVAL MOD COMPLEX 45 MIN: CPT

## 2022-06-09 PROCEDURE — 70544 MR ANGIOGRAPHY HEAD W/O DYE: CPT

## 2022-06-09 PROCEDURE — 70549 MR ANGIOGRAPH NECK W/O&W/DYE: CPT

## 2022-06-09 PROCEDURE — 85027 COMPLETE CBC AUTOMATED: CPT | Performed by: PHYSICIAN ASSISTANT

## 2022-06-09 PROCEDURE — 93010 ELECTROCARDIOGRAM REPORT: CPT | Performed by: INTERNAL MEDICINE

## 2022-06-09 PROCEDURE — A9585 GADOBUTROL INJECTION: HCPCS | Performed by: PHYSICIAN ASSISTANT

## 2022-06-09 PROCEDURE — 80048 BASIC METABOLIC PNL TOTAL CA: CPT | Performed by: PHYSICIAN ASSISTANT

## 2022-06-09 PROCEDURE — G1004 CDSM NDSC: HCPCS

## 2022-06-09 PROCEDURE — 93306 TTE W/DOPPLER COMPLETE: CPT | Performed by: INTERNAL MEDICINE

## 2022-06-09 PROCEDURE — 70553 MRI BRAIN STEM W/O & W/DYE: CPT

## 2022-06-09 RX ORDER — MAGNESIUM SULFATE 1 G/100ML
1 INJECTION INTRAVENOUS ONCE
Status: COMPLETED | OUTPATIENT
Start: 2022-06-09 | End: 2022-06-09

## 2022-06-09 RX ADMIN — Medication 3 MG: at 21:31

## 2022-06-09 RX ADMIN — LOSARTAN POTASSIUM 100 MG: 50 TABLET, FILM COATED ORAL at 08:59

## 2022-06-09 RX ADMIN — METOPROLOL SUCCINATE 100 MG: 100 TABLET, EXTENDED RELEASE ORAL at 08:59

## 2022-06-09 RX ADMIN — ASPIRIN 81 MG: 81 TABLET, COATED ORAL at 08:59

## 2022-06-09 RX ADMIN — ZOLPIDEM TARTRATE 10 MG: 5 TABLET ORAL at 21:31

## 2022-06-09 RX ADMIN — GADOBUTROL 10 ML: 604.72 INJECTION INTRAVENOUS at 14:27

## 2022-06-09 RX ADMIN — CLOPIDOGREL BISULFATE 75 MG: 75 TABLET ORAL at 08:59

## 2022-06-09 RX ADMIN — ATORVASTATIN CALCIUM 80 MG: 40 TABLET, FILM COATED ORAL at 08:58

## 2022-06-09 RX ADMIN — MAGNESIUM SULFATE HEPTAHYDRATE 1 G: 1 INJECTION, SOLUTION INTRAVENOUS at 01:20

## 2022-06-09 RX ADMIN — LEVOTHYROXINE SODIUM 100 MCG: 100 TABLET ORAL at 05:52

## 2022-06-09 NOTE — OCCUPATIONAL THERAPY NOTE
Occupational Therapy Evaluation      Brenton Led    6/9/2022    Principal Problem:    Stroke-like symptoms  Active Problems:    Essential hypertension    History of CVA (cerebrovascular accident)    Chronic diastolic heart failure ([facility] [place] [address] )    S/P CABG March 2021 LIMA-LAD;SVG to right PLV; SVG to [place]      Past Medical History:   Diagnosis Date    Anxiety     CAD (coronary artery disease)     Coronary artery disease     Depression     Disease of thyroid gland     Dizziness     Eye problems     Hypertension     Hypothyroidism     Migraines     Stroke ([facility] [place] [address] ) 11/17/2014    Tremors of nervous system        Past Surgical History:   Procedure Laterality Date    COLONOSCOPY  09/01/2017    COLONOSCOPY      CORONARY ARTERY BYPASS GRAFT  March 24,20201    CORONARY STENT PLACEMENT      DENTAL SURGERY      entire top teeth removal    HERNIA REPAIR      MS CABG, ARTERY-VEIN, FOUR N/A 3/24/2021    Procedure: CORONARY ARTERY BYPASS GRAFT (CABG) 3 VESSELS,  USING LEFT VANE-LAD AND LEFT GSV-RPL & RAMUS;  Surgeon: Audrey Nelson MD;  [facility];  Service: Cardiac Surgery    MS ECHO TRANSESOPHAG MONTR CARDIAC PUMP FUNCTJ N/A 3/24/2021    Procedure: TRANSESOPHAGEAL ECHOCARDIOGRAM (MARLEN); Surgeon: Audrey Nelson MD;  [facility];  Service: Cardiac Surgery      06/09/22 1447   OT Last Visit   OT Visit Date 06/09/22   Note Type   Note type Evaluation   Additional Comments Pt presents standing with PT present and agreeable to OT evaluation  At the end of session, pt left with PT ambulating halls with RW  Restrictions/Precautions   Weight Bearing Precautions Per Order No   Other Precautions Cognitive; Fall Risk;Telemetry  (slow speech)   Pain Assessment   Pain Score No Pain   Home Living   Type of [facility] One level  (9 CATY with rail)   Bathroom Shower/Tub Tub/shower unit   Bathroom Toilet Standard   Bathroom Equipment Grab bars in shower; [address]  (using RW more recently)   Prior Function   Level of [place] Independent with ADLs and functional mobility   Lives With Spouse   Receives Help From Family   ADL Assistance Independent   IADLs Needs assistance   Falls in the last 6 months 0   Vocational Retired   Lifestyle   Autonomy PTA, pt reports self to be (I) with basic self cares  Pt denies wearing socks and only wears slip shoes or no shoes at all  But reports able to bathe self once spouse assists getting in  Pt was able to mow lawn but reports inc'd fatigue within the last month   Reciprocal Relationships Spouse assists pt to bring LE's in/out of shower   Intrinsic Gratification Used to be a drummer, art, 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  Psychosocial   Psychosocial (WDL) WDL   ADL   Eating Assistance 7  Independent   Grooming Assistance 6  Modified Independent   Grooming Deficit Increased time to complete   UB Bathing Assistance 6  Modified Independent   UB Bathing Deficit Increased time to complete   LB Bathing Assistance 4  Minimal Assistance   UB Dressing Assistance 6  Modified independent   UB Dressing Deficit Increased time to complete   LB Dressing Assistance 4  Minimal Assistance  (socks; however pt does not wear at baseline   Able to don pants  )   Toileting Assistance  6  Modified independent   Transfers   Sit to Stand 6  Modified independent   Additional items Increased time required   Stand to Sit 6  Modified independent   Functional Mobility   Functional Mobility 6  Modified independent   Additional items Rolling walker   Balance   Static Sitting Good   Dynamic Sitting Good   Static Standing Fair +  (supported)   RUE Assessment   RUE Assessment X   RUE Strength   RUE Overall Strength Deficits   R Shoulder Flexion 3-/5   R Shoulder ABduction 3/5   R Elbow Flexion   (WFL)   R Elbow Extension   (WFL)   LUE Assessment   LUE Assessment WFL   Hand Function   Gross Motor Coordination Impaired  (RUE)   Fine Motor Coordination Functional   Cognition   Overall Cognitive Status Impaired   Arousal/Participation Alert; Cooperative   Attention Attends with cues to redirect   Orientation Level Oriented X4   Memory Decreased short term memory   Following Commands Follows one step commands with increased time or repetition   Comments Pt able to verify ID by stating name and   Pt verbose at times  Pleasant and cooperative  Assessment   Limitation Decreased ADL status; Decreased UE strength;Decreased high-level ADLs   Prognosis Good   Assessment Pt is a 61 y o  male seen for OT evaluation s/p [facility] on 2022 w/ Stroke-like symptoms  Comorbidities affecting pt's functional performance at time of assessment are listed above  Personal factors affecting pt at time of IE include:steps to enter environment, difficulty performing ADLS, difficulty performing IADLS  and environment  Prior to admission, pt was able to manage basic self cares and functional mobility mod (I) however with inc'd difficulty as of the last month  Pt reports inc'd fatigue with all purposeful tasks  Upon evaluation: Pt is at a modified (I) level for all ADLs with the exception of LB ADLs at a min (A) level (baseline)  Pt demonstrates ADL transfers and mobility at a modified (I) level as well  Pt presents well on OT evaluation however does have the following deficits impacting occupational performance: weakness, decreased ROM, decreased strength, decreased tolerance, impaired attention, impaired problem solving and dec'd muscle endurance  Pt to benefit from continued skilled OT tx while in the hospital to address deficits as defined above and maximize level of functional independence w ADL's and functional mobility  Occupational Performance areas to address include: bathing/shower, functional mobility, community mobility, clothing management and leisure participation   Pt's raw score on the AM-PAC Daily activity inpatient short form is 22, standardized score is 47 1, greater than 39 4  Patients at this level are likely to benefit from DC to home  This writer agrees with the latter recommendation  Based on OT evaluation, the assessment(s) completed, performance deficits listed, and current level of function, pt identified as a moderate complexity evaluation  Goals   Patient Goals To return home ASAP   LTG Time Frame 7-10   Long Term Goal #1 see below for list of goals   Plan   Treatment Interventions ADL retraining;Functional transfer training; Endurance training;UE strengthening/ROM; Patient/family training; Compensatory technique education;Equipment evaluation/education; Activityengagement; Energy conservation   Goal Expiration Date 06/19/22   OT Frequency 2-3x/wk   Recommendation   OT Discharge Recommendation Home with outpatient rehabilitation   AM-PAC Daily Activity Inpatient   Lower Body Dressing 3   Bathing 3   Toileting 4   Upper Body Dressing 4   Grooming 4   Eating 4   Daily Activity Raw Score 22   Daily Activity Standardized Score (Calc for Raw Score >=11) 47  1   AM-PAC Applied Cognition Inpatient   Following a Speech/Presentation 4   Understanding Ordinary Conversation 4   Taking Medications 4   Remembering Where Things Are Placed or Put Away 4   Remembering List of 4-5 Errands 3   Taking Care of Complicated Tasks 3   Applied Cognition Raw Score 22   Applied Cognition Standardized Score 47 83     GOALS:    Pt will achieve the following within specified time frame:  *Perform ADL transfers household distances with mod (I), consistently over the course of 2 sessions for inc'd independence with ADLs/purposeful tasks    *Perform LB ADL mod (I) using AE prn for inc'd independence with self cares    *Demonstrate Good static stand balance and Good-/Fair+ dyn stand balance while participating in purposeful tasks (ie: bed making, clothing retrieval) for inc'd' safety with standing purposeful tasks    *Increase stand tolerance x7-10 m for inc'd tolerance with standing purposeful tasks    *Perform clothing management for toileting mod (I)/(I) for inc'd independence with self cares    *Participate in 10m UE therex to increase overall stamina/activity tolerance for purposeful tasks    *Participate in further cognitive testing to assist with safe d/c planning    *Perform sinkside G&H (I)ly while standing, with good safety and Good balance for inc'd independence with self cares    *Pt will verbalize 2-3 energy conservation techniques to utilize in order to complete purposeful/ADL tasks safety, at highest level of performance and independence and with self report of dec'd fatigue      Flavia Tran, OT

## 2022-06-09 NOTE — NURSING NOTE
Pt refusing morning labs  Stated he will not be getting another stick until he gets answers from the Dr Gonzalez [place] states he knows he is not the only patient but he will be not treated like that  This RN TT slim who stated "MRI will be today" and she will communicate concerns with day provider  This RN listened to pt concerns and explained message from albert Sahni RN

## 2022-06-09 NOTE — PROGRESS NOTES
Progress Note - Neurology   Shanell Bradales 61 y o  male MRN: [id number]  Unit/Bed#: S -01 Encounter: [id number]      Assessment/Plan   * Stroke-like symptoms  Assessment & Plan  Shanell Bardales is a 61 y o  male with prior stroke with residual right sided weakness, migraines, HTN, HLD, CAD s/p CABG and stent (3/2021), hypothyroidism, and chronic pain who presented to [facility] on 6/8/2022 due to progressively worsening right sided weakness, new dysarthria, right eyelid ptosis, and dysphagia x1 month  Patient states all of these symptoms have slowly worsened over the last month  Of note, over the past 6 months, patient has had episodes of urinary and fecal incontinence, and states that he intermittently cannot feel the urine stream     Upon arrival to the ED, /79  CT head shows encephalomalacia of the left anterior inferior frontal lobe, but no acute infarct or hemorrhage  Labs revealed , but remainder of CBC and CMP unremarkable  Etiology for progressively worsening symptoms remains unclear  Concern for CVA vs recrudescence of prior stroke symptoms  Reported ptosis and dysaphagia, myasthenia gravis labs pending  Etiology for intermittent incontinence and inability to feel urine stream is unclear  On initial exam, patient had 4- to 4+/5 strength in the right UE/LE, upgoing right toe, and brisk reflexes on the right  Exam findings may be from chronic infarct  Plan:  - Continue on stroke pathway  - MRI brain wo contrast and MRA head/neck pending  - Echo official read pending  - Labs pending: Hemoglobin A1c, MMA  - Continue home Aspirin 81 mg and Plavix 75 mg daily  - Fasting Lipid Panel: Total cholesterol 133, triglycerides 138, HDL 34, LDL 71  - Continue home Atorvastatin 40 mg daily  - Normotensive goal (symptoms have been present >1 month)  - Euglycemic, normothermic goal  - Continue telemetry  - PT/OT/ST  - Continue to monitor and notify neurology with any changes    - Medical management and supportive care per primary team  Correction of any metabolic or infectious disturbances  Jaylen Cristina will need follow up in in 4 weeks with general attending or advance practitioner  He will not require outpatient neurological testing  Subjective:   Patient states he continues to have the slurring in his speech and continues to have the weakness on the right  Denies headaches, visual changes, left sided weakness, worsening ambulatory dysfunction  Vitals: Blood pressure 143/74, pulse 63, temperature 97 9 °F (36 6 °C), temperature source Oral, resp  rate 18, height 5' 9" (1 753 m), weight 102 kg (224 lb), SpO2 96 %  ,Body mass index is 33 08 kg/m²  Physical Exam  Vitals and nursing note reviewed  Constitutional:       General: He is not in acute distress  Appearance: Normal appearance  He is not ill-appearing, toxic-appearing or diaphoretic  HENT:      Head: Normocephalic and atraumatic  Eyes:      General: No scleral icterus  Right eye: No discharge  Left eye: No discharge  Extraocular Movements: Extraocular movements intact and EOM normal       Conjunctiva/sclera: Conjunctivae normal       Pupils: Pupils are equal, round, and reactive to light  Musculoskeletal:      Cervical back: Normal range of motion and neck supple  Skin:     General: Skin is warm and dry  Coloration: Skin is not jaundiced or pale  Findings: No bruising, erythema, lesion or rash  Neurological:      Mental Status: He is alert  Psychiatric:         Mood and Affect: Mood normal          Behavior: Behavior normal          Thought Content: Thought content normal          Judgment: Judgment normal        Neurologic Exam     Mental Status   Patient is alert, sitting up in bed, accompanied by wife  Oriented x 3  Mild dysarthria noted  No evidence of aphasia  Able to follow central and appendicular commands, and answers all questions appropriately        Cranial Nerves     CN II   Visual fields full to confrontation  CN III, IV, VI   Pupils are equal, round, and reactive to light  Extraocular motions are normal    Nystagmus: none   Upgaze: normal  Downgaze: normal  Conjugate gaze: present    CN VII   Facial expression full, symmetric  CN VIII   Hearing: intact    CN IX, X   Palate: symmetric    CN XII   Tongue deviation: none   No evidence of ptosis on exam     Motor Exam   RUE drift and pronation noted, LUE WNL    RLE proximal weakness noted, poor effort   LLE proximal strength full      Sensory Exam   Diminished pinprick on the right, intact on the left      Gait, Coordination, and Reflexes     Tremor   Resting tremor: absent  No dysmetria or ataxia in BUE finger to nose testing, slower movement and weakness noted in RUE  Right patellar reflex prominent, Left patellar reflex WNL   Right toe upgoing, left toe downgoing   No involuntary movements noted on exam      Lab Results: I have personally reviewed pertinent reports    Recent Results (from the past 24 hour(s))   Urine Macroscopic, POC    Collection Time: 06/08/22  1:37 PM   Result Value Ref Range    Color, UA Yellow     Clarity, UA Clear     pH, UA 5 5 4 5 - 8 0    Leukocytes, UA Negative Negative    Nitrite, UA Negative Negative    Protein, UA Negative Negative mg/dl    Glucose, UA Negative Negative mg/dl    Ketones, UA Negative Negative mg/dl    Urobilinogen, UA 0 2 0 2, 1 0 E U /dl E U /dl    Bilirubin, UA Negative Negative    Blood, UA Negative Negative    Specific Gravity, UA 1 020 1 003 - 1 030   HS Troponin I 2hr    Collection Time: 06/08/22  2:30 PM   Result Value Ref Range    hs TnI 2hr 6 "Refer to ACS Flowchart"- see link ng/L    Delta 2hr hsTnI 0 <20 ng/L   HS Troponin I 4hr    Collection Time: 06/08/22  4:14 PM   Result Value Ref Range    hs TnI 4hr 6 "Refer to ACS Flowchart"- see link ng/L    Delta 4hr hsTnI 0 <20 ng/L   Platelet count    Collection Time: 06/08/22  4:14 PM   Result Value Ref Range    Platelets 160 149 - 390 Thousands/uL    MPV 9 7 8 9 - 12 7 fL   Potassium    Collection Time: 06/08/22  9:45 PM   Result Value Ref Range    Potassium 3 8 3 5 - 5 3 mmol/L   Magnesium    Collection Time: 06/08/22  9:45 PM   Result Value Ref Range    Magnesium 1 7 (L) 1 9 - 2 7 mg/dL   Lipid Panel with Direct LDL reflex    Collection Time: 06/09/22  8:43 AM   Result Value Ref Range    Cholesterol 133 See Comment mg/dL    Triglycerides 138 See Comment mg/dL    HDL, Direct 34 (L) >=40 mg/dL    LDL Calculated 71 0 - 100 mg/dL   Basic metabolic panel    Collection Time: 06/09/22  8:43 AM   Result Value Ref Range    Sodium 134 (L) 135 - 147 mmol/L    Potassium 4 5 3 5 - 5 3 mmol/L    Chloride 102 96 - 108 mmol/L    CO2 22 21 - 32 mmol/L    ANION GAP 10 4 - 13 mmol/L    BUN 13 5 - 25 mg/dL    Creatinine 1 10 0 60 - 1 30 mg/dL    Glucose 103 65 - 140 mg/dL    Calcium 9 1 8 4 - 10 2 mg/dL    eGFR 70 ml/min/1 73sq m   CBC (With Platelets)    Collection Time: 06/09/22  8:43 AM   Result Value Ref Range    WBC 7 06 4 31 - 10 16 Thousand/uL    RBC 4 53 3 88 - 5 62 Million/uL    Hemoglobin 15 7 12 0 - 17 0 g/dL    Hematocrit 44 1 36 5 - 49 3 %    MCV 97 82 - 98 fL    MCH 34 7 (H) 26 8 - 34 3 pg    MCHC 35 6 31 4 - 37 4 g/dL    RDW 13 2 11 6 - 15 1 %    Platelets 921 226 - 406 Thousands/uL    MPV 9 5 8 9 - 12 7 fL   Echo complete w/ contrast if indicated    Collection Time: 06/09/22 11:00 AM   Result Value Ref Range    LA size 4 2 cm    Aortic valve mean velocity 7 80 m/s    Triscuspid Valve Regurgitation Peak Gradient 24 0 mmHg    Tricuspid valve peak regurgitation velocity 2 43 m/s    LVPWd 1 10 cm    Left Atrium Area-systolic Apical Two Chamber 23 4 cm2    Left Atrium Area-systolic Four Chamber 46 2 cm2    MV E' Tissue Velocity Septal 6 cm/s    MV E' Tissue Velocity Lateral 10 cm/s    RA 2D Volume 44 0 mL    TR Peak Simone 2 4 m/s    IVSd 4 92 cm    LV DIASTOLIC VOLUME (MOD BIPLANE) 2D 108 mL    LEFT VENTRICLE SYSTOLIC VOLUME (MOD BIPLANE) 2D 39 mL    Left ventricular stroke volume (2D) 69 00 mL    EF 65 %    A4C EF 63 %    LA length (A2C) 5 70 cm    LVIDd 4 80 cm    IVS 1 2 cm    LVIDS 3 10 cm    FS 35 28 - 44 %    Ao root 3 80 cm    RVID d 3 9 cm    LVOT mn grad 2 0 mmHg    AV mean gradient 3 mmHg    AV LVOT peak gradient 3 mmHg    MV mean gradient antegrade 1 mmHg    MV valve area p 1/2 method 2 50 cm2    E wave deceleration time 303 ms    LVOT peak simone 0 93 m/s    LVOT peak VTI 19 29 cm    Ao peak simone retrograde 1 16 m/s    Ao VTI 22 41 cm    AV peak gradient 5 mmHg    MV peak gradient antegrade 2 mmHg    MV Peak E Simone 58 cm/s    MV VTI 27 79 cm    MV Peak A Simone 6 42 m/s    LV Systolic Volume (BP) 37 mL    LV Diastolic Volume (BP) 479 mL    RAA A4C 16 4 cm2    MV stenosis pressure 1/2 time 88 ms    LVSV, 2D 69 mL    AV Velocity Ratio 0 80    ]  Imaging Studies: I have personally reviewed pertinent reports and I have personally reviewed pertinent films in PACS  EKG, Pathology, and Other Studies: I have personally reviewed pertinent reports  VTE Prophylaxis: Enoxaparin (Lovenox)    Counseling / Coordination of Care  Total time spent today 35 minutes  Greater than 50% of total time was spent with the patient and/or family counseling and/or coordination of care  A description of the counseling/coordination of care:  Patient was seen and evaluated  Discussed with attending  Chart reviewed thoroughly including laboratory and imaging studies  Plan of care discussed with patient and primary team  Discussed plan to obtain MRI brain and MRA head and neck with patient and wife  Dictation voice to text software has been used in the creation of this document  Please consider this in light of any contextual or grammatical errors

## 2022-06-09 NOTE — PLAN OF CARE
Problem: Neurological Deficit  Goal: Neurological status is stable or improving  Description: Interventions:  - Monitor and assess patient's level of consciousness, motor function, sensory function, and level of assistance needed for ADLs  - Monitor and report changes from baseline  Collaborate with interdisciplinary team to initiate plan and implement interventions as ordered  - Provide and maintain a safe environment  - Consider seizure precautions  - Consider fall precautions  - Consider aspiration precautions  - Consider bleeding precautions  Outcome: Progressing     Problem: Activity Intolerance/Impaired Mobility  Goal: Mobility/activity is maintained at optimum level for patient  Description: Interventions:  - Assess and monitor patient  barriers to mobility and need for assistive/adaptive devices  - Assess patient's emotional response to limitations  - Collaborate with interdisciplinary team and initiate plans and interventions as ordered  - Encourage independent activity per ability   - Maintain proper body alignment  - Perform active/passive rom as tolerated/ordered  - Plan activities to conserve energy   - Turn patient as appropriate  Outcome: Progressing     Problem: Communication Impairment  Goal: Ability to express needs and understand communication  Description: Assess patient's communication skills and ability to understand information  Patient will demonstrate use of effective communication techniques, alternative methods of communication and understanding even if not able to speak  - Encourage communication and provide alternate methods of communication as needed  - Collaborate with case management/ for discharge needs  - Include patient/family/caregiver in decisions related to communication    Outcome: Progressing     Problem: Potential for Aspiration  Goal: Non-ventilated patient's risk of aspiration is minimized  Description: Assess and monitor vital signs, respiratory status, and labs (WBC)  Monitor for signs of aspiration (tachypnea, cough, rales, wheezing, cyanosis, fever)  - Assess and monitor patient's ability to swallow  - Place patient up in chair to eat if possible  - HOB up at 90 degrees to eat if unable to get patient up into chair   - Supervise patient during oral intake  - Instruct patient/ family to take small bites  - Instruct patient/ family to take small single sips when taking liquids  - Follow patient-specific strategies generated by speech pathologist   Outcome: Progressing  Goal: Ventilated patient's risk of aspiration is minimized  Description: Assess and monitor vital signs, respiratory status, airway cuff pressure, and labs (WBC)  Monitor for signs of aspiration (tachypnea, cough, rales, wheezing, cyanosis, fever)  - Elevate head of bed 30 degrees if patient has tube feeding   - Monitor tube feeding  Outcome: Progressing     Problem: Nutrition  Goal: Nutrition/Hydration status is improving  Description: Monitor and assess patient's nutrition/hydration status for malnutrition (ex- brittle hair, bruises, dry skin, pale skin and conjunctiva, muscle wasting, smooth red tongue, and disorientation)  Collaborate with interdisciplinary team and initiate plan and interventions as ordered  Monitor patient's weight and dietary intake as ordered or per policy  Utilize nutrition screening tool and intervene per policy  Determine patient's food preferences and provide high-protein, high-caloric foods as appropriate  - Assist patient with eating   - Allow adequate time for meals   - Encourage patient to take dietary supplement as ordered  - Collaborate with clinical nutritionist   - Include patient/family/caregiver in decisions related to nutrition    Outcome: Progressing     Problem: PAIN - ADULT  Goal: Verbalizes/displays adequate comfort level or baseline comfort level  Description: Interventions:  - Encourage patient to monitor pain and request assistance  - Assess pain using appropriate pain scale  - Administer analgesics based on type and severity of pain and evaluate response  - Implement non-pharmacological measures as appropriate and evaluate response  - Consider cultural and social influences on pain and pain management  - Notify physician/advanced practitioner if interventions unsuccessful or patient reports new pain  Outcome: Progressing     Problem: SAFETY ADULT  Goal: Patient will remain free of falls  Description: INTERVENTIONS:  - Educate patient/family on patient safety including physical limitations  - Instruct patient to call for assistance with activity   - Consult OT/PT to assist with strengthening/mobility   - Keep Call bell within reach  - Keep bed low and locked with side rails adjusted as appropriate  - Keep care items and personal belongings within reach  - Initiate and maintain comfort rounds  - Make Fall Risk Sign visible to staff  - Offer Toileting every 2 Hours, in advance of need  - Initiate/Maintain bed alarm  - Apply yellow socks and bracelet for high fall risk patients  - Consider moving patient to room near nurses station  Outcome: Progressing  Goal: Maintain or return to baseline ADL function  Description: INTERVENTIONS:  -  Assess patient's ability to carry out ADLs; assess patient's baseline for ADL function and identify physical deficits which impact ability to perform ADLs (bathing, care of mouth/teeth, toileting, grooming, dressing, etc )  - Assess/evaluate cause of self-care deficits   - Assess range of motion  - Assess patient's mobility; develop plan if impaired  - Assess patient's need for assistive devices and provide as appropriate  - Encourage maximum independence but intervene and supervise when necessary  - Involve family in performance of ADLs  - Assess for home care needs following discharge   - Consider OT consult to assist with ADL evaluation and planning for discharge  - Provide patient education as appropriate  Outcome: Progressing  Goal: Maintains/Returns to pre admission functional level  Description: INTERVENTIONS:  - Perform BMAT or MOVE assessment daily    - Set and communicate daily mobility goal to care team and patient/family/caregiver  - Collaborate with rehabilitation services on mobility goals if consulted  - Perform Range of Motion 3 times a day  - Reposition patient every 2 hours  - Dangle patient 3 times a day  - Stand patient 3 times a day  - Ambulate patient 3 times a day  - Out of bed to chair 3 times a day   - Out of bed for meals 3 times a day  - Out of bed for toileting  - Record patient progress and toleration of activity level   Outcome: Progressing     Problem: DISCHARGE PLANNING  Goal: Discharge to home or other facility with appropriate resources  Description: INTERVENTIONS:  - Identify barriers to discharge w/patient and caregiver  - Arrange for needed discharge resources and transportation as appropriate  - Identify discharge learning needs (meds, wound care, etc )  - Arrange for interpretive services to assist at discharge as needed  - Refer to Case Management Department for coordinating discharge planning if the patient needs post-hospital services based on physician/advanced practitioner order or complex needs related to functional status, cognitive ability, or social support system  Outcome: Progressing     Problem: Knowledge Deficit  Goal: Patient/family/caregiver demonstrates understanding of disease process, treatment plan, medications, and discharge instructions  Description: Complete learning assessment and assess knowledge base    Interventions:  - Provide teaching at level of understanding  - Provide teaching via preferred learning methods  Outcome: Progressing     Problem: NEUROSENSORY - ADULT  Goal: Achieves stable or improved neurological status  Description: INTERVENTIONS  - Monitor and report changes in neurological status  - Monitor vital signs such as temperature, blood pressure, glucose, and any other labs ordered   - Initiate measures to prevent increased intracranial pressure  - Monitor for seizure activity and implement precautions if appropriate      Outcome: Progressing  Goal: Remains free of injury related to seizures activity  Description: INTERVENTIONS  - Maintain airway, patient safety  and administer oxygen as ordered  - Monitor patient for seizure activity, document and report duration and description of seizure to physician/advanced practitioner  - If seizure occurs,  ensure patient safety during seizure  - Reorient patient post seizure  - Seizure pads on all 4 side rails  - Instruct patient/family to notify RN of any seizure activity including if an aura is experienced  - Instruct patient/family to call for assistance with activity based on nursing assessment  - Administer anti-seizure medications if ordered    Outcome: Progressing  Goal: Achieves maximal functionality and self care  Description: INTERVENTIONS  - Monitor swallowing and airway patency with patient fatigue and changes in neurological status  - Encourage and assist patient to increase activity and self care     - Encourage visually impaired, hearing impaired and aphasic patients to use assistive/communication devices  Outcome: Progressing     Problem: CARDIOVASCULAR - ADULT  Goal: Maintains optimal cardiac output and hemodynamic stability  Description: INTERVENTIONS:  - Monitor I/O, vital signs and rhythm  - Monitor for S/S and trends of decreased cardiac output  - Administer and titrate ordered vasoactive medications to optimize hemodynamic stability  - Assess quality of pulses, skin color and temperature  - Assess for signs of decreased coronary artery perfusion  - Instruct patient to report change in severity of symptoms  Outcome: Progressing  Goal: Absence of cardiac dysrhythmias or at baseline rhythm  Description: INTERVENTIONS:  - Continuous cardiac monitoring, vital signs, obtain 12 lead EKG if ordered  - Administer antiarrhythmic and heart rate control medications as ordered  - Monitor electrolytes and administer replacement therapy as ordered  Outcome: Progressing     Problem: MUSCULOSKELETAL - ADULT  Goal: Maintain or return mobility to safest level of function  Description: INTERVENTIONS:  - Assess patient's ability to carry out ADLs; assess patient's baseline for ADL function and identify physical deficits which impact ability to perform ADLs (bathing, care of mouth/teeth, toileting, grooming, dressing, etc )  - Assess/evaluate cause of self-care deficits   - Assess range of motion  - Assess patient's mobility  - Assess patient's need for assistive devices and provide as appropriate  - Encourage maximum independence but intervene and supervise when necessary  - Involve family in performance of ADLs  - Assess for home care needs following discharge   - Consider OT consult to assist with ADL evaluation and planning for discharge  - Provide patient education as appropriate  Outcome: Progressing  Goal: Maintain proper alignment of affected body part  Description: INTERVENTIONS:  - Support, maintain and protect limb and body alignment  - Provide patient/ family with appropriate education  Outcome: Progressing     Problem: MOBILITY - ADULT  Goal: Maintain or return to baseline ADL function  Description: INTERVENTIONS:  -  Assess patient's ability to carry out ADLs; assess patient's baseline for ADL function and identify physical deficits which impact ability to perform ADLs (bathing, care of mouth/teeth, toileting, grooming, dressing, etc )  - Assess/evaluate cause of self-care deficits   - Assess range of motion  - Assess patient's mobility; develop plan if impaired  - Assess patient's need for assistive devices and provide as appropriate  - Encourage maximum independence but intervene and supervise when necessary  - Involve family in performance of ADLs  - Assess for home care needs following discharge   - Consider OT consult to assist with ADL evaluation and planning for discharge  - Provide patient education as appropriate  Outcome: Progressing  Goal: Maintains/Returns to pre admission functional level  Description: INTERVENTIONS:  - Perform BMAT or MOVE assessment daily    - Set and communicate daily mobility goal to care team and patient/family/caregiver  - Collaborate with rehabilitation services on mobility goals if consulted  - Perform Range of Motion 3 times a day  - Reposition patient every 2 hours    - Dangle patient 3 times a day  - Stand patient 3 times a day  - Ambulate patient 3 times a day  - Out of bed to chair 3 times a day   - Out of bed for meals 3 times a day  - Out of bed for toileting  - Record patient progress and toleration of activity level   Outcome: Progressing     Problem: Potential for Falls  Goal: Patient will remain free of falls  Description: INTERVENTIONS:  - Educate patient/family on patient safety including physical limitations  - Instruct patient to call for assistance with activity   - Consult OT/PT to assist with strengthening/mobility   - Keep Call bell within reach  - Keep bed low and locked with side rails adjusted as appropriate  - Keep care items and personal belongings within reach  - Initiate and maintain comfort rounds  - Make Fall Risk Sign visible to staff  - Offer Toileting every 3 Hours, in advance of need  - Initiate/Maintain bed alarm  - Apply yellow socks and bracelet for high fall risk patients  - Consider moving patient to room near nurses station  Outcome: Progressing

## 2022-06-09 NOTE — PLAN OF CARE
Problem: PHYSICAL THERAPY ADULT  Goal: Performs mobility at highest level of function for planned discharge setting  See evaluation for individualized goals  Description: Treatment/Interventions: Functional transfer training, LE strengthening/ROM, Therapeutic exercise, Cognitive reorientation, Equipment eval/education, Gait training, Bed mobility, Patient/family training, Endurance training, Elevations, Spoke to nursing, OT  Equipment Recommended: Steffen Marmolejo       See flowsheet documentation for full assessment, interventions and recommendations  Note: Prognosis: Fair  Problem List: Decreased strength, Decreased endurance, Impaired balance, Decreased mobility, Decreased cognition, Decreased coordination, Pain, Obesity (Gait deviations)  Assessment: Assessment: Pt is a 61 y o  male seen for PT evaluation s/p admit to [facility] on 6/8/2022 w/ Stroke-like symptoms  Order placed for PT  Prior to admission: Pt lives with spouse in a ranch home with steps to enter  Patient confirms wife's reports that he has needed to use a rolling walker more consistently since May 2022  Upon evaluation: Pt requires no physical assistance for transfers, ambulation, but does display gait deviations and slow gait speed degradation of gait quality as mobility persisted  Pt's clinical presentation is currently unstable/unpredictable given the functional mobility deficits above, especially (but not limited to) weakness, gait deviations, pain and decreased functional mobility tolerance, coupled with fall risks including slower gait speed, impaired balance, and combined with medical complications of abnormal sodium values and Degradation of mobility since May 2020 to  Recommend continue mobility training with rolling walker, TherEx to work on muscular strength and muscular endurance especially rle, balance activities, stair training    Barriers to Discharge: Inaccessible home environment        PT Discharge Recommendation: Home with outpatient rehabilitation          See flowsheet documentation for full assessment

## 2022-06-09 NOTE — PHYSICAL THERAPY NOTE
PHYSICAL THERAPY NOTE    Patient Name: Maddy Jacobson  QGLPI'M Date: 6/9/2022 06/09/22 1426   PT Last Visit   PT Visit Date 06/09/22   Note Type   Note type Cancelled Session; Evaluation   Cancel Reasons Patient off floor/test   Additional Comments attempted to see pt 3x; initially was with Speech therapy and then latter 2 attempts pt off floor @ MRI   Will follow   Kaleb Fernandez, PT

## 2022-06-09 NOTE — PLAN OF CARE
Problem: OCCUPATIONAL THERAPY ADULT  Goal: Performs self-care activities at highest level of function for planned discharge setting  See evaluation for individualized goals  Description: Treatment Interventions: ADL retraining, Functional transfer training, Endurance training, UE strengthening/ROM, Patient/family training, Compensatory technique education, Equipment evaluation/education, Activityengagement, Energy conservation          See flowsheet documentation for full assessment, interventions and recommendations  Outcome: Progressing  Note: Limitation: Decreased ADL status, Decreased UE strength, Decreased high-level ADLs  Prognosis: Good  Assessment: Pt is a 61 y o  male seen for OT evaluation s/p admit to [facility] on 6/8/2022 w/ Stroke-like symptoms  Comorbidities affecting pt's functional performance at time of assessment are listed above  Personal factors affecting pt at time of IE include:steps to enter environment, difficulty performing ADLS, difficulty performing IADLS  and environment  Prior to admission, pt was able to manage basic self cares and functional mobility mod (I) however with inc'd difficulty as of the last month  Pt reports inc'd fatigue with all purposeful tasks  Upon evaluation: Pt is at a modified (I) level for all ADLs with the exception of LB ADLs at a min (A) level (baseline)  Pt demonstrates ADL transfers and mobility at a modified (I) level as well  Pt presents well on OT evaluation however does have the following deficits impacting occupational performance: weakness, decreased ROM, decreased strength, decreased tolerance, impaired attention, impaired problem solving and dec'd muscle endurance  Pt to benefit from continued skilled OT tx while in the hospital to address deficits as defined above and maximize level of functional independence w ADL's and functional mobility   Occupational Performance areas to address include: bathing/shower, functional mobility, community mobility, clothing management and leisure participation  Pt's raw score on the AM-PAC Daily activity inpatient short form is 22, standardized score is 47 1, greater than 39 4  Patients at this level are likely to benefit from DC to home  This writer agrees with the latter recommendation  Based on OT evaluation, the assessment(s) completed, performance deficits listed, and current level of function, pt identified as a moderate complexity evaluation       OT Discharge Recommendation: Home with outpatient rehabilitation

## 2022-06-09 NOTE — NURSING NOTE
Pt refusing telemetry monitory  Pt removed monitor stating he "will be fine without it" and he "will not wear it until he gets answers and the MRI " RN tried educating pt on why monitoring was ordered  Pt still adamantly refusing  SLIM made aware  Will continue to monitor

## 2022-06-09 NOTE — PLAN OF CARE
Problem: Neurological Deficit  Goal: Neurological status is stable or improving  Description: Interventions:  - Monitor and assess patient's level of consciousness, motor function, sensory function, and level of assistance needed for ADLs  - Monitor and report changes from baseline  Collaborate with interdisciplinary team to initiate plan and implement interventions as ordered  - Provide and maintain a safe environment  - Consider seizure precautions  - Consider fall precautions  - Consider aspiration precautions  - Consider bleeding precautions  Outcome: Progressing     Problem: Activity Intolerance/Impaired Mobility  Goal: Mobility/activity is maintained at optimum level for patient  Description: Interventions:  - Assess and monitor patient  barriers to mobility and need for assistive/adaptive devices  - Assess patient's emotional response to limitations  - Collaborate with interdisciplinary team and initiate plans and interventions as ordered  - Encourage independent activity per ability   - Maintain proper body alignment  - Perform active/passive rom as tolerated/ordered  - Plan activities to conserve energy   - Turn patient as appropriate  Outcome: Progressing     Problem: Communication Impairment  Goal: Ability to express needs and understand communication  Description: Assess patient's communication skills and ability to understand information  Patient will demonstrate use of effective communication techniques, alternative methods of communication and understanding even if not able to speak  - Encourage communication and provide alternate methods of communication as needed  - Collaborate with case management/ for discharge needs  - Include patient/family/caregiver in decisions related to communication    Outcome: Progressing     Problem: Potential for Aspiration  Goal: Non-ventilated patient's risk of aspiration is minimized  Description: Assess and monitor vital signs, respiratory status, and labs (WBC)  Monitor for signs of aspiration (tachypnea, cough, rales, wheezing, cyanosis, fever)  - Assess and monitor patient's ability to swallow  - Place patient up in chair to eat if possible  - HOB up at 90 degrees to eat if unable to get patient up into chair   - Supervise patient during oral intake  - Instruct patient/ family to take small bites  - Instruct patient/ family to take small single sips when taking liquids  - Follow patient-specific strategies generated by speech pathologist   Outcome: Progressing  Goal: Ventilated patient's risk of aspiration is minimized  Description: Assess and monitor vital signs, respiratory status, airway cuff pressure, and labs (WBC)  Monitor for signs of aspiration (tachypnea, cough, rales, wheezing, cyanosis, fever)  - Elevate head of bed 30 degrees if patient has tube feeding   - Monitor tube feeding  Outcome: Progressing     Problem: Nutrition  Goal: Nutrition/Hydration status is improving  Description: Monitor and assess patient's nutrition/hydration status for malnutrition (ex- brittle hair, bruises, dry skin, pale skin and conjunctiva, muscle wasting, smooth red tongue, and disorientation)  Collaborate with interdisciplinary team and initiate plan and interventions as ordered  Monitor patient's weight and dietary intake as ordered or per policy  Utilize nutrition screening tool and intervene per policy  Determine patient's food preferences and provide high-protein, high-caloric foods as appropriate  - Assist patient with eating   - Allow adequate time for meals   - Encourage patient to take dietary supplement as ordered  - Collaborate with clinical nutritionist   - Include patient/family/caregiver in decisions related to nutrition    Outcome: Progressing     Problem: PAIN - ADULT  Goal: Verbalizes/displays adequate comfort level or baseline comfort level  Description: Interventions:  - Encourage patient to monitor pain and request assistance  - Assess pain using appropriate pain scale  - Administer analgesics based on type and severity of pain and evaluate response  - Implement non-pharmacological measures as appropriate and evaluate response  - Consider cultural and social influences on pain and pain management  - Notify physician/advanced practitioner if interventions unsuccessful or patient reports new pain  Outcome: Progressing     Problem: SAFETY ADULT  Goal: Patient will remain free of falls  Description: INTERVENTIONS:  - Educate patient/family on patient safety including physical limitations  - Instruct patient to call for assistance with activity   - Consult OT/PT to assist with strengthening/mobility   - Keep Call bell within reach  - Keep bed low and locked with side rails adjusted as appropriate  - Keep care items and personal belongings within reach  - Initiate and maintain comfort rounds  - Make Fall Risk Sign visible to staff  - Offer Toileting every  Hours, in advance of need  - Initiate/Maintain alarm  - Obtain necessary fall risk management equipment:   - Apply yellow socks and bracelet for high fall risk patients  - Consider moving patient to room near nurses station  Outcome: Progressing  Goal: Maintain or return to baseline ADL function  Description: INTERVENTIONS:  -  Assess patient's ability to carry out ADLs; assess patient's baseline for ADL function and identify physical deficits which impact ability to perform ADLs (bathing, care of mouth/teeth, toileting, grooming, dressing, etc )  - Assess/evaluate cause of self-care deficits   - Assess range of motion  - Assess patient's mobility; develop plan if impaired  - Assess patient's need for assistive devices and provide as appropriate  - Encourage maximum independence but intervene and supervise when necessary  - Involve family in performance of ADLs  - Assess for home care needs following discharge   - Consider OT consult to assist with ADL evaluation and planning for discharge  - Provide patient education as appropriate  Outcome: Progressing  Goal: Maintains/Returns to pre admission functional level  Description: INTERVENTIONS:  - Perform BMAT or MOVE assessment daily    - Set and communicate daily mobility goal to care team and patient/family/caregiver  - Collaborate with rehabilitation services on mobility goals if consulted  - Perform Range of Motion  times a day  - Reposition patient every  hours  - Dangle patient  times a day  - Stand patient  times a day  - Ambulate patient  times a day  - Out of bed to chair  times a day   - Out of bed for meals  times a day  - Out of bed for toileting  - Record patient progress and toleration of activity level   Outcome: Progressing     Problem: DISCHARGE PLANNING  Goal: Discharge to home or other facility with appropriate resources  Description: INTERVENTIONS:  - Identify barriers to discharge w/patient and caregiver  - Arrange for needed discharge resources and transportation as appropriate  - Identify discharge learning needs (meds, wound care, etc )  - Arrange for interpretive services to assist at discharge as needed  - Refer to Case Management Department for coordinating discharge planning if the patient needs post-hospital services based on physician/advanced practitioner order or complex needs related to functional status, cognitive ability, or social support system  Outcome: Progressing     Problem: Knowledge Deficit  Goal: Patient/family/caregiver demonstrates understanding of disease process, treatment plan, medications, and discharge instructions  Description: Complete learning assessment and assess knowledge base    Interventions:  - Provide teaching at level of understanding  - Provide teaching via preferred learning methods  Outcome: Progressing     Problem: NEUROSENSORY - ADULT  Goal: Achieves stable or improved neurological status  Description: INTERVENTIONS  - Monitor and report changes in neurological status  - Monitor vital signs such as temperature, blood pressure, glucose, and any other labs ordered   - Initiate measures to prevent increased intracranial pressure  - Monitor for seizure activity and implement precautions if appropriate      Outcome: Progressing  Goal: Remains free of injury related to seizures activity  Description: INTERVENTIONS  - Maintain airway, patient safety  and administer oxygen as ordered  - Monitor patient for seizure activity, document and report duration and description of seizure to physician/advanced practitioner  - If seizure occurs,  ensure patient safety during seizure  - Reorient patient post seizure  - Seizure pads on all 4 side rails  - Instruct patient/family to notify RN of any seizure activity including if an aura is experienced  - Instruct patient/family to call for assistance with activity based on nursing assessment  - Administer anti-seizure medications if ordered    Outcome: Progressing  Goal: Achieves maximal functionality and self care  Description: INTERVENTIONS  - Monitor swallowing and airway patency with patient fatigue and changes in neurological status  - Encourage and assist patient to increase activity and self care     - Encourage visually impaired, hearing impaired and aphasic patients to use assistive/communication devices  Outcome: Progressing     Problem: CARDIOVASCULAR - ADULT  Goal: Maintains optimal cardiac output and hemodynamic stability  Description: INTERVENTIONS:  - Monitor I/O, vital signs and rhythm  - Monitor for S/S and trends of decreased cardiac output  - Administer and titrate ordered vasoactive medications to optimize hemodynamic stability  - Assess quality of pulses, skin color and temperature  - Assess for signs of decreased coronary artery perfusion  - Instruct patient to report change in severity of symptoms  Outcome: Progressing  Goal: Absence of cardiac dysrhythmias or at baseline rhythm  Description: INTERVENTIONS:  - Continuous cardiac monitoring, vital signs, obtain 12 lead EKG if ordered  - Administer antiarrhythmic and heart rate control medications as ordered  - Monitor electrolytes and administer replacement therapy as ordered  Outcome: Progressing     Problem: MUSCULOSKELETAL - ADULT  Goal: Maintain or return mobility to safest level of function  Description: INTERVENTIONS:  - Assess patient's ability to carry out ADLs; assess patient's baseline for ADL function and identify physical deficits which impact ability to perform ADLs (bathing, care of mouth/teeth, toileting, grooming, dressing, etc )  - Assess/evaluate cause of self-care deficits   - Assess range of motion  - Assess patient's mobility  - Assess patient's need for assistive devices and provide as appropriate  - Encourage maximum independence but intervene and supervise when necessary  - Involve family in performance of ADLs  - Assess for home care needs following discharge   - Consider OT consult to assist with ADL evaluation and planning for discharge  - Provide patient education as appropriate  Outcome: Progressing  Goal: Maintain proper alignment of affected body part  Description: INTERVENTIONS:  - Support, maintain and protect limb and body alignment  - Provide patient/ family with appropriate education  Outcome: Progressing     Problem: MOBILITY - ADULT  Goal: Maintain or return to baseline ADL function  Description: INTERVENTIONS:  -  Assess patient's ability to carry out ADLs; assess patient's baseline for ADL function and identify physical deficits which impact ability to perform ADLs (bathing, care of mouth/teeth, toileting, grooming, dressing, etc )  - Assess/evaluate cause of self-care deficits   - Assess range of motion  - Assess patient's mobility; develop plan if impaired  - Assess patient's need for assistive devices and provide as appropriate  - Encourage maximum independence but intervene and supervise when necessary  - Involve family in performance of ADLs  - Assess for home care needs following discharge   - Consider OT consult to assist with ADL evaluation and planning for discharge  - Provide patient education as appropriate  Outcome: Progressing  Goal: Maintains/Returns to pre admission functional level  Description: INTERVENTIONS:  - Perform BMAT or MOVE assessment daily    - Set and communicate daily mobility goal to care team and patient/family/caregiver  - Collaborate with rehabilitation services on mobility goals if consulted  - Perform Range of Motion  times a day  - Reposition patient every  hours    - Dangle patient  times a day  - Stand patient  times a day  - Ambulate patient  times a day  - Out of bed to chair times a day   - Out of bed for meals  times a day  - Out of bed for toileting  - Record patient progress and toleration of activity level   Outcome: Progressing     Problem: Potential for Falls  Goal: Patient will remain free of falls  Description: INTERVENTIONS:  - Educate patient/family on patient safety including physical limitations  - Instruct patient to call for assistance with activity   - Consult OT/PT to assist with strengthening/mobility   - Keep Call bell within reach  - Keep bed low and locked with side rails adjusted as appropriate  - Keep care items and personal belongings within reach  - Initiate and maintain comfort rounds  - Make Fall Risk Sign visible to staff  - Offer Toileting every  Hours, in advance of need  - Initiate/Maintain alarm  - Obtain necessary fall risk management equipment:   - Apply yellow socks and bracelet for high fall risk patients  - Consider moving patient to room near nurses station  Outcome: Progressing

## 2022-06-09 NOTE — PROGRESS NOTES
[facility]  Progress Note - Sarabjit Bloom 1955, 61 y o  male MRN: [id number]  Unit/Bed#: S -01 Encounter: [id number]  Primary Care Provider: Diana Mesa MD   Date and time admitted to hospital: 6/8/2022 11:33 AM    * Stroke-like symptoms  Assessment & Plan  · POA, progressively worsening right sided weakness, new dysarthria, right eyelid ptosis, and dysphagia x1 month  Concern for new stroke vs recrudescence of old stroke  · CT Head negative   · Pending MRI/MRA, Echo   · B12, Folate normal   · Stroke Pathway   · Neurology consult   · Follow up MRI, MRA  · Follow up ECHO  · A1c, FLP  · ASA, Plavix, Lipitor - continued from home   · Euglycemia, normotension   · Telemetry   · PT/OT   · Neuro checks   · Myasthenia work up as well   · MMA  · Acetylcholine binding, blocking   · MUSK    History of CVA (cerebrovascular accident)  Assessment & Plan  · Patient reports prior [place] stroke 8 years ago with residual right sided weakness   · Management as above     S/P CABG March 2021 LIMA-LAD;SVG to right PLV; SVG to [address]  · Reports chest "twinges", but this has been going on for some time  Troponin negative   · Telemetry per stroke pathway   · Continue medical management     Chronic diastolic heart failure (HCC)  Assessment & Plan  Wt Readings from Last 3 Encounters:   05/06/22 102 kg (224 lb)   03/22/22 102 kg (224 lb)   03/11/22 101 kg (223 lb)     · Appears euvolemic         Essential hypertension  Assessment & Plan  · BP acceptable at this time   · Goal normotension   · Continue ARB, Toprol-XL        VTE Pharmacologic Prophylaxis: VTE Score: 8 High Risk (Score >/= 5) - Pharmacological DVT Prophylaxis Ordered: enoxaparin (Lovenox)  Sequential Compression Devices Ordered  Patient Centered Rounds: I performed bedside rounds with nursing staff today    Discussions with Specialists or Other Care Team Provider: Discussed with Neurology, RN, CM    Education and Discussions with Family / Patient: Updated  (wife) at bedside  Time Spent for Care: 30 minutes  More than 50% of total time spent on counseling and coordination of care as described above  Current Length of Stay: 1 day(s)  Current Patient Status: Inpatient   Certification Statement: The patient will continue to require additional inpatient hospital stay due to further Neurologic work up   Discharge Plan: Anticipate discharge in 24-48 hrs to discharge location to be determined pending rehab evaluations  Code Status: Level 1 - Full Code    Subjective:   Patient states that he had no changes in his symptoms  Denies chest pain  States that he wants to go home today  Objective:     Vitals:   Temp (24hrs), Av 2 °F (36 8 °C), Min:97 6 °F (36 4 °C), Max:98 7 °F (37 1 °C)    Temp:  [97 6 °F (36 4 °C)-98 7 °F (37 1 °C)] 97 9 °F (36 6 °C)  HR:  [55-87] 63  Resp:  [16-20] 18  BP: (143-191)/() 158/88  SpO2:  [95 %-99 %] 96 %  There is no height or weight on file to calculate BMI  Input and Output Summary (last 24 hours): Intake/Output Summary (Last 24 hours) at 2022 1145  Last data filed at 2022 1945  Gross per 24 hour   Intake 780 ml   Output 500 ml   Net 280 ml       Physical Exam:   Physical Exam  Constitutional:       General: He is not in acute distress  Appearance: Normal appearance  He is overweight  He is not ill-appearing or diaphoretic  HENT:      Head: Normocephalic and atraumatic  Mouth/Throat:      Mouth: Mucous membranes are moist    Eyes:      General: No scleral icterus  Pupils: Pupils are equal, round, and reactive to light  Cardiovascular:      Rate and Rhythm: Normal rate and regular rhythm  Pulses: Normal pulses  Heart sounds: Normal heart sounds, S1 normal and S2 normal  No murmur heard  No systolic murmur is present  No diastolic murmur is present  No gallop  No S3 or S4 sounds     Pulmonary:      Effort: Pulmonary effort is normal  No accessory muscle usage or respiratory distress  Breath sounds: Normal breath sounds  No stridor  No decreased breath sounds, wheezing, rhonchi or rales  Chest:      Chest wall: No tenderness  Abdominal:      General: Bowel sounds are normal  There is no distension  Palpations: Abdomen is soft  Tenderness: There is no abdominal tenderness  There is no guarding  Musculoskeletal:      Right lower leg: No edema  Left lower leg: No edema  Skin:     General: Skin is warm and dry  Coloration: Skin is not jaundiced  Neurological:      General: No focal deficit present  Mental Status: He is alert  Mental status is at baseline  Cranial Nerves: Dysarthria present  Sensory: Sensation is intact  Motor: Weakness present  No tremor or seizure activity  Psychiatric:         Behavior: Behavior is cooperative  Additional Data:     Labs:  Results from last 7 days   Lab Units 06/09/22  0843 06/08/22  1614 06/08/22  1159   WBC Thousand/uL 7 06  --  6 78   HEMOGLOBIN g/dL 15 7  --  14 8   HEMATOCRIT % 44 1  --  42 8   PLATELETS Thousands/uL 195   < > 173   NEUTROS PCT %  --   --  68   LYMPHS PCT %  --   --  21   MONOS PCT %  --   --  8   EOS PCT %  --   --  2    < > = values in this interval not displayed  Results from last 7 days   Lab Units 06/09/22  0843 06/08/22  2145 06/08/22  1159   SODIUM mmol/L 134*  --  137   POTASSIUM mmol/L 4 5   < > 4 1   CHLORIDE mmol/L 102  --  101   CO2 mmol/L 22  --  31   BUN mg/dL 13  --  14   CREATININE mg/dL 1 10  --  1 11   ANION GAP mmol/L 10  --  5   CALCIUM mg/dL 9 1  --  9 1   ALBUMIN g/dL  --   --  4 1   TOTAL BILIRUBIN mg/dL  --   --  0 83   ALK PHOS U/L  --   --  75   ALT U/L  --   --  12   AST U/L  --   --  17   GLUCOSE RANDOM mg/dL 103  --  89    < > = values in this interval not displayed                         Lines/Drains:  Invasive Devices  Report    Peripheral Intravenous Line  Duration           Peripheral IV 06/08/22 Left Antecubital <1 day                  Telemetry:  Telemetry Orders (From admission, onward)             24 Hour Telemetry Monitoring  Continuous x 24 Hours (Telem)        Expiring   References:    Telemetry Guidelines   Question:  Reason for 24 Hour Telemetry  Answer:  Acute CVA (>24 hrs old)                 Telemetry Reviewed: Normal Sinus Rhythm  Indication for Continued Telemetry Use: Acute CVA             Imaging: No pertinent imaging reviewed  Recent Cultures (last 7 days):         Last 24 Hours Medication List:   Current Facility-Administered Medications   Medication Dose Route Frequency Provider Last Rate    acetaminophen  650 mg Oral Q6H PRN Amanda Caban, PA-GRAHAM      aspirin  81 mg Oral Daily Amanda Caban, PA-GRAHAM      atorvastatin  80 mg Oral Daily Sadiumu Jean, PA-C      clopidogrel  75 mg Oral Daily Sadiumu Jean, PA-GRAHAM      enoxaparin  40 mg Subcutaneous Daily Sadi Jean, PA-GRAHAM      levothyroxine  100 mcg Oral Early Morning Sadi Jean, PA-GRAHAM      losartan  100 mg Oral Daily Sadi Jean, NINO      melatonin  3 mg Oral HS Lizbeth Roy MD      metoprolol succinate  100 mg Oral Daily Sadi Moser PA-C      oxyCODONE  5 mg Oral Q4H PRN Amanda Caban PA-C      tiZANidine  2 mg Oral Q8H PRN ASTRID Guerrier-GRAHAM      zolpidem  10 mg Oral HS PRN Amanda Caban PA-C          Today, Patient Was Seen By: Amanda Caban PA-C    **Please Note: This note may have been constructed using a voice recognition system  **

## 2022-06-09 NOTE — ASSESSMENT & PLAN NOTE
· POA, progressively worsening right sided weakness, new dysarthria, right eyelid ptosis, and dysphagia x1 month  Concern for new stroke vs recrudescence of old stroke    · CT Head negative   · Pending MRI/MRA, Echo   · B12, Folate normal   · Stroke Pathway   · Neurology consult   · Follow up MRI, MRA  · Follow up ECHO  · A1c, FLP  · ASA, Plavix, Lipitor - continued from home   · Euglycemia, normotension   · Telemetry   · PT/OT   · Neuro checks   · Myasthenia work up as well   · MMA  · Acetylcholine binding, blocking   · MUSK

## 2022-06-09 NOTE — SPEECH THERAPY NOTE
Speech-Language Pathology Bedside Swallow Evaluation        Patient Name: Germain GRANDA Date: 6/9/2022     Problem List  Principal Problem:    Stroke-like symptoms  Active Problems:    Essential hypertension    History of CVA (cerebrovascular accident)    Chronic diastolic heart failure ([facility] )    S/P CABG March 2021 LIMA-LAD;SVG to right PLV; SVG to RI         Past Medical History  Past Medical History:   Diagnosis Date    Anxiety     CAD (coronary artery disease)     Coronary artery disease     Depression     Disease of thyroid gland     Dizziness     Eye problems     Hypertension     Hypothyroidism     Migraines     Stroke ([facility] ) 11/17/2014    Tremors of nervous system        Past Surgical History  Past Surgical History:   Procedure Laterality Date    COLONOSCOPY  09/01/2017    COLONOSCOPY      CORONARY ARTERY BYPASS GRAFT  March 24,20201    CORONARY STENT PLACEMENT      DENTAL SURGERY      entire top teeth removal    HERNIA REPAIR      IA CABG, ARTERY-VEIN, FOUR N/A 3/24/2021    Procedure: CORONARY ARTERY BYPASS GRAFT (CABG) 3 VESSELS,  USING LEFT VANE-LAD AND LEFT GSV-RPL & RAMUS;  Surgeon: Yue Shea MD;  [facility]: [facility];  Service: Cardiac Surgery    IA ECHO TRANSESOPHAG [address] N/A 3/24/2021    Procedure: TRANSESOPHAGEAL ECHOCARDIOGRAM (MARLEN); Surgeon: Yue Shea MD;  Location: [facility];  Service: Cardiac Surgery       Summary    Pt presents with mild oral dysphagia due to not wearing upper dentures  Patient stated they do not fit well  Patient complained of difficulty chewing harder foods  Patient stated he has difficulty swallowing liquids at times however no overt signs or symptoms of aspiration were noted during evaluation  Patient was also concerned about his intermittent slurred speech, we discussed speaking at a slower rate with exaggerated articulation and that speech may be more slurred when he is tired    No exercises are recommended at this time  Recommendations:   Diet: regular diet and thin liquids choose softer food  Meds: whole with liquid   Frequent Oral care: 2x/day  Aspiration precautions  Other Recommendations/ considerations:  No follow-up therapy needed  Current Medical Status  Pt is a 61 y o  male who presented to [address] [place] [address]  with stroke like symptoms, R sided weakness  Patient reports that his prior stroke 8 years ago left him with right sided weakness  He reports that this has worsened over the last few weeks  He reports increased slurred speech and states that he has had difficulty swallowing  His wife reports that he has now had to use a walker full time since May  He denies headaches or changes in vision  Denies dizziness  Denies chest pain or shortness of breath  However, he does report having twinges in his chest for some time that he attributes to open heart surgery itself  Denies falls at home  Wife reports that she makes sure he takes his medications as directed faithfully  Past medical history:   Please see H&P for details    Special Studies:  MRI/MRA pending  CT-head: 6/8/22 No acute intracranial abnormality  Microangiopathic changes  Encephalomalacia left anterior inferior frontal lobe likely sequela of prior trauma  Social/Education/Vocational Hx:  Pt lives with family    Swallow Information   Current Risks for Dysphagia & Aspiration: CVA  Current Symptoms/Concerns: pt c/o difficulty swallowing liquids and intermittent sl speech  Current Diet: regular diet and thin liquids   Baseline Diet: regular diet and thin liquids  Takes pills- whole w/ cola, stated he does better with the carbonation      Baseline Assessment   Behavior/Cognition: alert  Speech/Language Status: able to participate in conversation and able to follow commands-intermittent dysarthria  Patient Positioning: upright in chair     Swallow Mechanism Exam   Facial: symmetrical  Labial: WFL  Lingual: WFL  Velum: unable to visualize  Mandible: adequate ROM  Dentition: edentulous and limited dentition-has upper denture at bedside but stated he does not use them to eat b/c they don't fit well  Vocal quality:clear/adequate   Volitional Cough: strong/productive   Respiratory: RA    Consistencies Assessed and Performance   Consistencies Administered:pt seen at lunch w/ chicken fingers, jaden food cake and water by cup and straw  Oral Stage: pt stated the chicken fingers were too hard to eat; he took a few bites of jaden food cake then refused more due to c/o being dry  Mastication, manipulation and transfer appeared [facility] but slow  Patient took single in successive sips of thin water by cup and straw with good oral control  Pharyngeal Stage:  Swallow initiation appeared timely and complete with no overt signs or symptoms of aspiration  Patient denied any food dysphagia symptoms        Esophageal Concerns: none reported      Results Reviewed with: patient, RN and family   Dysphagia Goals: none at this time      April Cornell Watt, [address] [facility]  Speech Pathologist  [place] license # [id number]  [place] license # [id number]  Available via [employer]

## 2022-06-09 NOTE — OCCUPATIONAL THERAPY NOTE
Occupational Therapy Cancellation       06/09/22 9877   OT Last Visit   OT Visit Date 06/09/22   Note Type   Note type Cancelled Session   Cancel Reasons Patient off floor/test   Additional Comments Pt at MRI at this time  Will f/u as schedule permits  Thank you       Render Neil OT

## 2022-06-09 NOTE — PHYSICAL THERAPY NOTE
PHYSICAL THERAPY EVALUATION  NAME:  Cory Avina  DATE: 06/09/22    AGE:   61 y o  Mrn:   [id number]  ADMIT DX:  Slurring of speech [R47 81]  Acute right-sided weakness [R53 1]  Problem List:   Patient Active Problem List   Diagnosis    CAD (coronary artery disease)    Chest pain    Claudication [facility])    Coronary atherosclerosis    Essential hypertension    Mixed hyperlipidemia    Hypothyroidism    Myofascial pain syndrome    Radiculopathy, lumbar region    Bilateral lower extremity edema    Palpitation    History of CVA (cerebrovascular accident)    History of percutaneous coronary intervention    Chronic diastolic heart failure ([facility] )    Encounter for screening colonoscopy    S/P CABG March 2021 LIMA-LAD;SVG to right PLV; SVG to RI    Thrombocytopenia (HCC)    Hyperchloremia    Hypocalcemia    Encounter for postoperative care    Stroke [facility])    Insomnia    BMI 33 0-33 9,adult    Anxiety    Continuous opioid dependence ([facility] )    Lower GI bleed    Stroke-like symptoms     Vitals:    06/09/22 0808 06/09/22 1000 06/09/22 1100 06/09/22 1523   BP: 158/88 143/74  (!) 171/94   BP Location: Right arm   Right arm   Pulse: 55 55 63 60   Resp: 20  18 18   Temp: 97 6 °F (36 4 °C)  97 9 °F (36 6 °C) 97 8 °F (36 6 °C)   TempSrc: Oral  Oral Oral   SpO2: 96%   95%   Weight:  102 kg (224 lb)     Height:  5' 9" (1 753 m)         Length Of Stay: 1  Performed at least 2 patient identifiers during session: Name and Birthday  PHYSICAL THERAPY EVALUATION :    06/09/22 1449   PT Last Visit   PT Visit Date 06/09/22   Note Type   Note type Evaluation   Pain Assessment   Pain Assessment Tool 0-10   Pain Score No Pain  (When asked, did later reports he was having some neck pain near traps)   Patient's Stated Pain Goal No pain   Hospital Pain Intervention(s) Repositioned; Ambulation/increased activity   Restrictions/Precautions   Weight Bearing Precautions Per Order No   Other Precautions Cognitive; Fall Risk;Telemetry  (SLOW SPEECH)   Home Living   Type of [facility] One level  (9STE w/ railing)   Home Equipment Walker  (needed to use rolling walker more consistently since May)  Patient reports also using TENS unit @home but does not remember parameters to use on right shoulder "spasms"   Additional Comments has license prefers to not drive   Prior Function   Level of [place] Independent with ADLs and functional mobility   Lives With Spouse   ADL Assistance Independent   IADLs Needs assistance   Falls in the last 6 months 0   Vocational Retired   General   Family/Caregiver Present Yes  (Spouse)   Cognition   Arousal/Participation Alert   Memory Decreased short term memory;Decreased recall of recent events   Following Commands Follows one step commands with increased time or repetition   Subjective   Subjective Patient pleasant and cooperative, agreeable to participate  Confirms wife's earlier reports that he has needed to use the walker more consistently since May, and that his mobility is getting slower   Strength RLE   R Hip Flexion 3+/5   R Knee Extension 3+/5  (?  Quad lag)   R Ankle Dorsiflexion 4/5   Strength LLE   L Hip Flexion 4/5   L Knee Extension 4/5   L Ankle Dorsiflexion 4/5   Light Touch   RLE Light Touch Grossly intact   LLE Light Touch Grossly intact   Proprioception   RLE Proprioception Grossly intact  (Great toe)   Transfers   Sit to Stand 6  Modified independent   Additional items Assist x 1; Increased time required   Stand to Sit 6  Modified independent   Additional items Assist x 1; Increased time required;Armrests   Ambulation/Elevation   Gait pattern Decreased R stance; Excessively slow   Gait Assistance 5  Supervision   Additional items Verbal cues  (For upright posture, staying inside of walker)   Assistive Device Rolling walker   Distance 180'  (0 43 m/sec comfortable gait speed)   Ambulation/Elevation Additional Comments Posture forward head and rounded shoulders   Balance   Static Sitting Good   Static Standing Fair +   Ambulatory Fair   Endurance Deficit   Endurance Deficit Yes   Endurance Deficit Description Degradation of gait quality with increased ambulation distance  Patient notes self reported muscular fatigue with activities, especially with rle   Activity Tolerance   Activity Tolerance Patient limited by fatigue;Patient limited by pain   Medical Staff Jacqueline carrillo w/ Fredis Jaime from [address] to RN before session   Assessment   Prognosis Fair   Problem List Decreased strength;Decreased endurance; Impaired balance;Decreased mobility; Decreased cognition;Decreased coordination;Pain;Obesity  (Gait deviations)   Assessment Assessment: Pt is a 61 y o  male seen for PT evaluation s/p admit to [facility] on 6/8/2022 w/ Stroke-like symptoms  Order placed for PT  Prior to admission: Pt lives with spouse in a ranch home with steps to enter  Patient confirms wife's reports that he has needed to use a rolling walker more consistently since May 2022  Upon evaluation: Pt requires no physical assistance for transfers, ambulation, but does display gait deviations and slow gait speed degradation of gait quality as mobility persisted  Pt's clinical presentation is currently unstable/unpredictable given the functional mobility deficits above, especially (but not limited to) weakness, gait deviations, pain and decreased functional mobility tolerance, coupled with fall risks including slower gait speed, impaired balance, and combined with medical complications of abnormal sodium values and Degradation of mobility since May 2020 to  Recommend continue mobility training with rolling walker, TherEx to work on muscular strength and muscular endurance especially rle, balance activities, stair training     Barriers to Discharge Inaccessible home environment   Goals   Patient Goals To go home ASAP, agreeable to go to outpatient PT for a program (which he can have needs met)--patient reports having a negative experience in the past with PT   STG Expiration Date 06/19/22   Short Term Goal #1 Goals: Pt will: Perform bed mobility tasks with modified I to prepare for transfers and reposition in bed  Perform transfers with modified I to improve ease of transfers  Perform ambulation with L RAD for >150' with modified I to improve gait quality and promote proper use of assistive device  Perform at least four stairs with railing and w/Supervision to return to home with CATY  Progress comfortable gait speed with LRAD to  53 M/s to progress to MCID   PT Treatment Day 0   Plan   Treatment/Interventions Functional transfer training;LE strengthening/ROM; Therapeutic exercise;Cognitive reorientation;Equipment eval/education;Gait training;Bed mobility; Patient/family training; Endurance training;Elevations; Spoke to nursing;OT   PT Frequency 4-6x/wk   Recommendation   PT Discharge Recommendation Home with outpatient rehabilitation   Equipment Recommended [address] [place] Mobility Inpatient   Turning in Bed Without Bedrails 3   Lying on Back to Sitting on Edge of Flat Bed 3   Moving Bed to Chair 4   Standing Up From Chair 4   Walk in Room 3   Climb 3-5 Stairs 2   Basic Mobility Inpatient Raw Score 19   Basic Mobility Standardized Score 42 48   Highest Level Of Mobility   JH-HLM Goal 6: Walk 10 steps or more   JH-HLM Achieved 7: Walk 25 feet or more   End of Consult   Patient Position at End of Consult All needs within reach; Bedside chair   (Please find full objective findings from PT assessment regarding body systems outlined above)  Pt requires PT /OT co-eval due to medical complexity, limited activity tolerance, and potential cognitive-behavioral impairments  PT and OT goals addressed separately  Pt to benefit from continued skilled PT tx while in hospital and upon DC to address deficits as defined above and maximize level of functional mobility    Co-morbidities affecting pt's physical performance at time of assessment include: CAD (coronary artery disease), Dizziness, Hypertension, Migraines, Stroke ([facility] ) (11/17/2014), and Tremors of nervous system, Coronary stent placement; Coronary artery bypass graft (March 24,20201)  Personal factors affecting pt at time of IE include: steps to enter environment, past experience, behavioral pattern, inability to perform IADLs and inability to navigate community distances      The patient's [facility] Basic Mobility Inpatient Short Form Raw Score is 19, Standardized Score is 42 48  A standardized score greater than 40 78 or Raw Score of 16 suggests the patient may benefit from discharge to home, which DOES coincide with CURRENT above PT recommendations  However please refer to therapist recommendation for discharge planning given other factors that may influence destination  Adapted from [employer] [facility] 6-Clicks Basic Mobility and Daily Activity Scores With Discharge Destination  Physical Therapy, 2021;101:1-9  DOI: [id number]      Portions of the record may have been created with voice recognition software  Occasional wrong word or "sound a like" substitutions may have occurred due to the inherent limitations of voice recognition software    Read the chart carefully and recognize, using context, where substitutions have occurred

## 2022-06-10 ENCOUNTER — TRANSITIONAL CARE MANAGEMENT (OUTPATIENT)
Dept: FAMILY MEDICINE CLINIC | Facility: CLINIC | Age: 67
End: 2022-06-10

## 2022-06-10 VITALS
WEIGHT: 224 LBS | BODY MASS INDEX: 33.18 KG/M2 | HEART RATE: 62 BPM | RESPIRATION RATE: 18 BRPM | OXYGEN SATURATION: 95 % | HEIGHT: 69 IN | DIASTOLIC BLOOD PRESSURE: 100 MMHG | TEMPERATURE: 98.2 F | SYSTOLIC BLOOD PRESSURE: 161 MMHG

## 2022-06-10 PROBLEM — I47.2 NSVT (NONSUSTAINED VENTRICULAR TACHYCARDIA) (HCC): Status: ACTIVE | Noted: 2022-06-10

## 2022-06-10 PROBLEM — I47.29 NSVT (NONSUSTAINED VENTRICULAR TACHYCARDIA): Status: ACTIVE | Noted: 2022-06-10

## 2022-06-10 PROCEDURE — 99233 SBSQ HOSP IP/OBS HIGH 50: CPT | Performed by: PSYCHIATRY & NEUROLOGY

## 2022-06-10 PROCEDURE — 99239 HOSP IP/OBS DSCHRG MGMT >30: CPT | Performed by: PHYSICIAN ASSISTANT

## 2022-06-10 RX ORDER — MAGNESIUM OXIDE 240 MG
400 POWDER IN PACKET (EA) ORAL DAILY
Refills: 0
Start: 2022-06-10

## 2022-06-10 RX ORDER — LIDOCAINE 50 MG/G
2 PATCH TOPICAL DAILY
Refills: 0
Start: 2022-06-11

## 2022-06-10 RX ORDER — LIDOCAINE 50 MG/G
2 PATCH TOPICAL DAILY
Status: DISCONTINUED | OUTPATIENT
Start: 2022-06-10 | End: 2022-06-10 | Stop reason: HOSPADM

## 2022-06-10 RX ADMIN — METOPROLOL SUCCINATE 100 MG: 100 TABLET, EXTENDED RELEASE ORAL at 09:39

## 2022-06-10 RX ADMIN — ASPIRIN 81 MG: 81 TABLET, COATED ORAL at 09:39

## 2022-06-10 RX ADMIN — LOSARTAN POTASSIUM 100 MG: 50 TABLET, FILM COATED ORAL at 09:39

## 2022-06-10 RX ADMIN — LEVOTHYROXINE SODIUM 100 MCG: 100 TABLET ORAL at 04:36

## 2022-06-10 RX ADMIN — CLOPIDOGREL BISULFATE 75 MG: 75 TABLET ORAL at 09:39

## 2022-06-10 RX ADMIN — LIDOCAINE 5% 2 PATCH: 700 PATCH TOPICAL at 04:36

## 2022-06-10 RX ADMIN — ATORVASTATIN CALCIUM 80 MG: 40 TABLET, FILM COATED ORAL at 09:39

## 2022-06-10 NOTE — ASSESSMENT & PLAN NOTE
· POA, progressively worsening right sided weakness, new dysarthria, right eyelid ptosis, and dysphagia x1 month in a patient prior history of stroke    · Low normal range b12 385 and folate 4 2--should be repleted outpatient  · CT Head negative   · MRI/MRA negative for acute stroke or high grade, but with evidence prior stroke noted  · Echo stable  · Stroke Pathway was followed  · Continue dual anti-platelet treatments patient was already on  · Recommended for outpatient to but patient declining it  · Neurology consult appreciated, will need outpatient  · Myasthenia work up in process but labs pending at this time

## 2022-06-10 NOTE — ASSESSMENT & PLAN NOTE
· Known to [facility] cardiology, follow-up outpatient  · Troponin negative on this admission  · Continue med from prior to hospitalization including dual anti-platelet

## 2022-06-10 NOTE — DISCHARGE SUMMARY
[facility]  Discharge- Whitley Pellet 1955, 61 y o  male MRN: [id number]  Unit/Bed#: S -01 Encounter: [id number]  Primary Care Provider: Chantal Vincent MD   Date and time admitted to hospital: 6/8/2022 11:33 AM    * Stroke-like symptoms  Assessment & Plan  · POA, progressively worsening right sided weakness, new dysarthria, right eyelid ptosis, and dysphagia x1 month in a patient prior history of stroke  · Low normal range b12 385 and folate 4 2--should be repleted outpatient  · CT Head negative   · MRI/MRA negative for acute stroke or high grade, but with evidence prior stroke noted  · Echo stable  · Stroke Pathway was followed  · Continue dual anti-platelet treatments patient was already on  · Recommended for outpatient to but patient declining it  · Neurology consult appreciated, will need outpatient  · Myasthenia work up in process but labs pending at this time    S/P CABG March 2021 [facility];SVG to right PLV; SVG to [address]  · Known to [facility] cardiology, follow-up outpatient  · Troponin negative on this admission  · Continue med from prior to hospitalization including dual anti-platelet    Chronic diastolic heart failure ([address] )  Assessment & Plan  Wt Readings from Last 3 Encounters:   06/09/22 102 kg (224 lb)   05/06/22 102 kg (224 lb)   03/22/22 102 kg (224 lb)     · Appears euvolemic, not on any diuretics        NSVT (nonsustained ventricular tachycardia) ([facility])  Assessment & Plan  · Events of nonsustained V-tach noted on telemetry  See strip uploaded    Apparently was asymptomatic  · Patient took his own telemetry off and is refusing to wear it any longer  · Echocardiogram fortunately with stable ejection fraction  · Potassium normal as of yesterday but magnesium mildly low, can be repleted  · Recommend follow-up with his regular Cardiology team    Essential hypertension  Assessment & Plan  · BP acceptable at this time   · Goal normotension · Continue ARB, Toprol-XL    Medical Problems             Resolved Problems  Date Reviewed: 6/10/2022   None               Discharging Physician / Practitioner: Ti Zaldivar PA-C  PCP: Natividad Penaloza MD  Admission Date:   Admission Orders (From admission, onward)     Ordered        06/08/22 1325  Inpatient Admission  Once                      Discharge Date: 06/10/22    Consultations During Hospital Stay:  · neurology    Procedures Performed:   · MRI brain evidence of old stroke with no acute stroke  · MRA brain no evidence of high-grade stenosis or occlusion  · 2D echocardiogram normal    Significant Findings / Test Results:   · As above    Incidental Findings:   · none    Test Results Pending at Discharge (will require follow up):   · MG work up     Outpatient Tests Requested:  · BMP, magnesium    Complications:  None    Reason for Admission:  Recent onset dysarthria, dysphagia and weakness    Hospital Course:   Arlene Nails is a 61 y o  male patient with prior history of stroke with right-sided weakness and coronary artery disease who originally presented to the hospital on 6/8/2022 due to increasing right-sided weakness, dysarthria and aphasia  He was seen in consultation by Neurology placed on stroke pathway but imaging revealed evidence of old strokes without any new  events  Blood work was sent for myasthenia gravis workup but is pending at the discharge, and no medication changes were made by Neurology team   Patient was seen by Physical therapy and cleared for outpatient PT and is very eager for discharge today  He was seen by speech therapy and cleared for regular diet    Please see above list of diagnoses and related plan for additional information  Condition at Discharge: stable    Discharge Day Visit / Exam:   Subjective:  I was notified by nursing that patient is extremely eager to be discharged as soon as possible   Patient reports as an outpatient he has been having some difficulty swallowing liquids but is tolerating solids  He reports no chest pain, shortness of breath, dizziness  Feels he is otherwise doing better and is ambulating with his walker in the sandoval  Vitals: Blood Pressure: 161/100 (06/10/22 0743)  Pulse: 62 (06/10/22 0743)  Temperature: 98 2 °F (36 8 °C) (06/10/22 0743)  Temp Source: Oral (06/10/22 0743)  Respirations: 18 (06/10/22 0743)  Height: 5' 9" (175 3 cm) (06/09/22 1000)  Weight - Scale: 102 kg (224 lb) (06/09/22 1000)  SpO2: 95 % (06/10/22 0743)  Exam:   Physical Exam  Vitals reviewed  Constitutional:       General: He is not in acute distress  Appearance: He is not ill-appearing, toxic-appearing or diaphoretic  Eyes:      General: No scleral icterus  Right eye: No discharge  Left eye: No discharge  Conjunctiva/sclera: Conjunctivae normal    Cardiovascular:      Rate and Rhythm: Normal rate and regular rhythm  Heart sounds: No murmur heard  Pulmonary:      Effort: No respiratory distress  Breath sounds: Normal breath sounds  No stridor  No wheezing, rhonchi or rales  Abdominal:      General: There is no distension  Tenderness: There is no abdominal tenderness  There is no guarding  Comments: Large area of ecchymosis noted on right side of abdomen   Musculoskeletal:      Right lower leg: No edema  Left lower leg: No edema  Skin:     General: Skin is warm and dry  Coloration: Skin is not jaundiced or pale  Findings: No bruising, erythema, lesion or rash  Neurological:      Mental Status: He is alert  Comments: Awake alert interactive patient was seen ambulating slowly in the hallway with his walker  Dysarthria noted, speech is slightly difficult to understand  He follows commands appropriately     Psychiatric:         Mood and Affect: Mood normal         Discussion with Family:  Spoke with wife at bedside    Discharge instructions/Information to patient and family:   See after visit summary for information provided to patient and family  Provisions for Follow-Up Care:  See after visit summary for information related to follow-up care and any pertinent home health orders  Disposition:  Home    Planned Readmission: none     Discharge Statement:  I spent 35 minutes discharging the patient  This time was spent on the day of discharge  I had direct contact with the patient on the day of discharge  Greater than 50% of the total time was spent examining patient, answering all patient questions, arranging and discussing plan of care with patient as well as directly providing post-discharge instructions  Additional time then spent on discharge activities  Case was discussed with case management and Neurology    Discharge Medications:  See after visit summary for reconciled discharge medications provided to patient and/or family        **Please Note: This note may have been constructed using a voice recognition system**

## 2022-06-10 NOTE — PROGRESS NOTES
Progress Note - Neurology   Rosa Maria Nobles 61 y o  male MRN: [id number]  Unit/Bed#: S -01 Encounter: [id number]      Assessment/Plan   * Stroke-like symptoms  Assessment & Plan  Rosa Maria Nobles is a 61 y o  male with prior stroke with residual right sided weakness, migraines, HTN, HLD, CAD s/p CABG and stent (3/2021), hypothyroidism, and chronic pain who presented to [facility] 6/8/2022 due to progressively worsening right sided weakness, new dysarthria, right eyelid ptosis, and dysphagia x1 month  Patient states all of these symptoms have slowly worsened over the last month  Of note, over the past 6 months, patient has had episodes of urinary and fecal incontinence, and states that he intermittently cannot feel the urine stream     Upon arrival to the ED, /79  CT head shows encephalomalacia of the left anterior inferior frontal lobe, but no acute infarct or hemorrhage  Labs revealed , but remainder of CBC and CMP unremarkable  On initial exam, patient had 4- to 4+/5 strength in the right UE/LE, upgoing right toe, and brisk reflexes on the right  Exam findings may be from chronic infarct  MRI brain and MRA head and neck unremarkable  Progressively worsening/fluctuating symptoms likely secondary to recrudescence of prior stroke symptoms, possibly related to BP as patient reports elevated pressures at home with SBP in the 160s  Also reported ptosis and dysaphagia, myasthenia gravis labs pending  Etiology for intermittent incontinence and inability to feel urine stream is unclear  Plan:  - Echo: EF 60%, wall motion normal; mild tricuspid regurg  - Labs pending:  MMA, acetylcholine binding and blocking receptors, MUSK antibody  - Continue home Aspirin 81 mg and Plavix 75 mg daily  - Fasting Lipid Panel:   Total cholesterol 133, triglycerides 138, HDL 34, LDL 71  - Continue home Atorvastatin 40 mg daily  - Hemoglobin A1c:  5 6  - Normotension, euglycemic, normothermia  - PT/OT/Speech  - Continue to monitor and notify neurology with any changes  - Medical management and supportive care per primary team  Correction of any metabolic or infectious disturbances    - Follow-up with outpatient Neurology in 4-6 weeks    Drae Soto will need follow up in in 6 weeks with general attending or advance practitioner  He will not require outpatient neurological testing  Subjective: Today patient states he feels better and is ready to go home  Patient reports that his blood pressure was elevated last night, however did eventually come down when he was able to relax  Patient admits to very dull frontal headache  Denies any chest pain, shortness of breath, abdominal pain, nausea, vomiting, worsening weakness, numbness/tingling  ROS:  12 point ROS performed, as stated above, all others negative  Vitals: Blood pressure 161/100, pulse 62, temperature 98 2 °F (36 8 °C), temperature source Oral, resp  rate 18, height 5' 9" (1 753 m), weight 102 kg (224 lb), SpO2 95 %  ,Body mass index is 33 08 kg/m²  Physical Exam  Vitals and nursing note reviewed  Constitutional:       General: He is not in acute distress  Appearance: Normal appearance  He is not ill-appearing, toxic-appearing or diaphoretic  HENT:      Head: Normocephalic and atraumatic  Eyes:      General: No scleral icterus  Right eye: No discharge  Left eye: No discharge  Extraocular Movements: Extraocular movements intact  Conjunctiva/sclera: Conjunctivae normal       Pupils: Pupils are equal, round, and reactive to light  Musculoskeletal:      Cervical back: Normal range of motion and neck supple  Skin:     General: Skin is warm and dry  Coloration: Skin is not jaundiced or pale  Findings: No bruising, erythema, lesion or rash  Neurological:      Mental Status: He is alert     Psychiatric:         Mood and Affect: Mood normal          Behavior: Behavior normal          Thought Content: Thought content normal          Judgment: Judgment normal        Neurologic Exam     Mental Status   Patient is alert, lying in bed  Oriented x 3  Mild dysarthria noted, no evidence of aphasia  Able to name all objects provided, follows central and appendicular commands, answers all questions appropriately  Cranial Nerves     CN III, IV, VI   Pupils are equal, round, and reactive to light  Conjugate gaze: present  Pupils round, 4 mm bilaterally, reactive  No evidence of ptosis on exam  EOMs intact, no nystagmus  No visual field deficits noted on exam  Subtle right lower facial weakness noted  Facial sensation intact bilaterally     Motor Exam   Right upper extremity pronation and drift noted  Bilateral  strength, biceps, triceps strength 5/5  Right deltoid strength 4-/5, left deltoid 5/5    Right hip flexion strength 4/5, left hip flexion 5/5  Bilateral dorsiflexion and plantar flexion strength 5/5     Sensory Exam   Light touch normal    No evidence of extinction with bilateral simultaneous stimulation     Gait, Coordination, and Reflexes     Tremor   Resting tremor: absent  No ataxia dysmetria bilateral upper extremity finger-to-nose testing, slower ROM in right upper extremity  No involuntary movements or seizure-like activity noted throughout exam     Lab Results: I have personally reviewed pertinent reports  No results found for this or any previous visit (from the past 24 hour(s))  ]  Imaging Studies: I have personally reviewed pertinent reports and I have personally reviewed pertinent films in PACS  EKG, Pathology, and Other Studies: I have personally reviewed pertinent reports  VTE Prophylaxis: Enoxaparin (Lovenox)    Counseling / Coordination of Care  Total time spent today 32 minutes  Greater than 50% of total time was spent with the patient and/or family counseling and/or coordination of care    A description of the counseling/coordination of care:  Patient was seen and evaluated  Discussed with attending  Chart reviewed thoroughly including laboratory and imaging studies  Plan of care discussed with patient and primary team  Discussed MRI brain and MRA head and neck with patient  Dictation voice to text software has been used in the creation of this document  Please consider this in light of any contextual or grammatical errors

## 2022-06-10 NOTE — DISCHARGE INSTR - AVS FIRST PAGE
Dear Carlota Brady,     It was our pleasure to care for you here at [facility]  It is our hope that we were always able to exceed the expected standards for your care during your stay  You were hospitalized due to stroke like symptoms but with negative workup, now with tests pending to look for myasthenia gravis  You were cared for on the 3rd floor by Karol Hendrix PA-C under the service of Sheryle Montes, MD with the [facility] who covers for your primary care physician (PCP), Nestor Dutta MD, while you were hospitalized  If you have any questions or concerns related to this hospitalization, you may contact us at [phone]  For follow up as well as any medication refills, we recommend that you follow up with your primary care physician  A registered nurse will reach out to you by phone within a few days after your discharge to answer any additional questions that you may have after going home  However, at this time we provide for you here, the most important instructions / recommendations at discharge:     Notable Medication Adjustments -   Continue your regular medications as per prior to the hospitalization  Take magnesium daily to keep your magnesium levels up as well as your other heart medicines  Testing Required after Discharge -   You have blood work that is pending and will not be resulted before you leave  Important follow up information -   Follow-up with neurology for the pending results  Follow-up with your cardiologist especially in light of your temporary irregular heart rhythm called nonsustained V-tach  Other Instructions -   Outpatient PT was recommended  The bruise on your abdomen from the injection should heal on its own    If you develop worsening pain contact your doctor immediately  Please review this entire after visit summary as additional general instructions including medication list, appointments, activity, diet, any pertinent wound care, and other additional recommendations from your care team that may be provided for you        Sincerely,     Daphney Kingston PA-C

## 2022-06-10 NOTE — ASSESSMENT & PLAN NOTE
Wt Readings from Last 3 Encounters:   06/09/22 102 kg (224 lb)   05/06/22 102 kg (224 lb)   03/22/22 102 kg (224 lb)     · Appears euvolemic, not on any diuretics

## 2022-06-10 NOTE — ASSESSMENT & PLAN NOTE
· Events of nonsustained V-tach noted on telemetry  See strip uploaded    Apparently was asymptomatic  · Patient took his own telemetry off and is refusing to wear it any longer  · Echocardiogram fortunately with stable ejection fraction  · Potassium normal as of yesterday but magnesium mildly low, can be repleted  · Recommend follow-up with his regular Cardiology team

## 2022-06-13 LAB
ACHR BIND AB SER-SCNC: 0.06 NMOL/L (ref 0–0.24)
METHYLMALONATE SERPL-SCNC: 172 NMOL/L (ref 0–378)

## 2022-06-14 LAB — ACHR BLOCK AB/ACHR TOTAL SFR SER: 25 % (ref 0–25)

## 2022-06-15 ENCOUNTER — TELEPHONE (OUTPATIENT)
Dept: NEUROLOGY | Facility: CLINIC | Age: 67
End: 2022-06-15

## 2022-06-15 LAB — MUSK AB SER IA-ACNC: <1 U/ML

## 2022-06-15 NOTE — TELEPHONE ENCOUNTER
SLAN/STROKE LIKE SYMPTOMS              NOTES:Deshawn Treadwelligham will need follow up in in 4 weeks with general attending or advance practitioner  He will not require outpatient neurological testing  SCHEDULED: 7/5/22 @ 3PM W/  [address]  SENT LETTER W/APPOINTMENT DETAILS/DIRECTIONS

## 2022-06-24 DIAGNOSIS — M25.579 ACUTE ANKLE PAIN, UNSPECIFIED LATERALITY: ICD-10-CM

## 2022-06-24 DIAGNOSIS — F41.9 ANXIETY: ICD-10-CM

## 2022-06-24 RX ORDER — CLONAZEPAM 2 MG/1
4 TABLET ORAL 2 TIMES DAILY PRN
Qty: 180 TABLET | Refills: 0 | Status: SHIPPED | OUTPATIENT
Start: 2022-06-24

## 2022-06-24 RX ORDER — OXYCODONE HYDROCHLORIDE 5 MG/1
5 TABLET ORAL EVERY 4 HOURS PRN
Qty: 16 TABLET | Refills: 0 | Status: SHIPPED | OUTPATIENT
Start: 2022-06-24 | End: 2022-07-23 | Stop reason: SDUPTHER

## 2022-06-27 ENCOUNTER — TELEPHONE (OUTPATIENT)
Dept: CARDIOLOGY CLINIC | Facility: CLINIC | Age: 67
End: 2022-06-27

## 2022-06-27 DIAGNOSIS — M54.50 LOW BACK PAIN WITHOUT SCIATICA, UNSPECIFIED BACK PAIN LATERALITY, UNSPECIFIED CHRONICITY: Primary | ICD-10-CM

## 2022-06-27 DIAGNOSIS — Z95.1 S/P CABG (CORONARY ARTERY BYPASS GRAFT): Primary | ICD-10-CM

## 2022-06-27 RX ORDER — ACETAMINOPHEN AND CODEINE PHOSPHATE 300; 30 MG/1; MG/1
1 TABLET ORAL EVERY 4 HOURS PRN
Qty: 30 TABLET | Refills: 0 | Status: SHIPPED | OUTPATIENT
Start: 2022-06-27 | End: 2022-08-07 | Stop reason: SDUPTHER

## 2022-06-27 NOTE — TELEPHONE ENCOUNTER
P/C feels he has a blockages, and needs a cath, having ankle and foot edema bilaterally  Symptoms have been on and off for three days  Having a lot of upper back pain, and occasionally chest pains on and off  Has a hospital bed, sit upright pain does go away  Longest it lasted was 3/4 hrs  Did advise need to go to ED, pt states I not going there they won't do anything, I need a cath to see if he has blockages  He states if goes to ED they will do an EKG and sent him home   Pt states was just in the hospital   Wt he does about every three days   222 lbs   Watching salt intake     Please advise    Plavix 75 mg   Asa 81 mg  Metoprolol succinate 100 mg qd

## 2022-07-05 ENCOUNTER — HOSPITAL ENCOUNTER (OUTPATIENT)
Dept: NON INVASIVE DIAGNOSTICS | Facility: CLINIC | Age: 67
Discharge: HOME | End: 2022-07-05
Payer: MEDICARE

## 2022-07-05 VITALS
SYSTOLIC BLOOD PRESSURE: 152 MMHG | HEART RATE: 53 BPM | BODY MASS INDEX: 33.18 KG/M2 | HEIGHT: 69 IN | DIASTOLIC BLOOD PRESSURE: 64 MMHG | WEIGHT: 224 LBS | OXYGEN SATURATION: 99 %

## 2022-07-05 DIAGNOSIS — Z95.1 S/P CABG (CORONARY ARTERY BYPASS GRAFT): ICD-10-CM

## 2022-07-05 LAB
BASELINE ST DEPRESSION: 0 MM
CHEST PAIN STATEMENT: NORMAL
MAX DIASTOLIC BP: 64 MMHG
MAX HEART RATE: 85 BPM
MAX PREDICTED HEART RATE: 155 BPM
MAX. SYSTOLIC BP: 152 MMHG
NUC STRESS EJECTION FRACTION: 57 %
PROTOCOL NAME: NORMAL
RATE PRESSURE PRODUCT: 9760
REASON FOR TERMINATION: NORMAL
SL CV REST NUCLEAR ISOTOPE DOSE: 10.71 MCI
SL CV STRESS NUCLEAR ISOTOPE DOSE: 30.3 MCI
SL CV STRESS RECOVERY BP: 138 MMHG
SL CV STRESS RECOVERY HR: 68 BPM
SL CV STRESS RECOVERY O2 SAT: 99 %
STRESS ANGINA INDEX: 0
STRESS BASELINE BP: NORMAL MMHG
STRESS BASELINE HR: 53 BPM
STRESS O2 SAT REST: 99 %
STRESS PEAK HR: 80 BPM
STRESS POST O2 SAT PEAK: 99 %
STRESS POST PEAK BP: 122 MMHG
STRESS ST DEPRESSION: 0 MM
TARGET HR FORMULA: NORMAL
TEST INDICATION: NORMAL
TIME IN EXERCISE PHASE: NORMAL

## 2022-07-05 PROCEDURE — 78452 HT MUSCLE IMAGE SPECT MULT: CPT | Performed by: INTERNAL MEDICINE

## 2022-07-05 PROCEDURE — A9502 TC99M TETROFOSMIN: HCPCS

## 2022-07-05 PROCEDURE — 78452 HT MUSCLE IMAGE SPECT MULT: CPT

## 2022-07-05 PROCEDURE — G1004 CDSM NDSC: HCPCS

## 2022-07-05 PROCEDURE — 93017 CV STRESS TEST TRACING ONLY: CPT

## 2022-07-05 PROCEDURE — 93016 CV STRESS TEST SUPVJ ONLY: CPT | Performed by: INTERNAL MEDICINE

## 2022-07-05 PROCEDURE — 93018 CV STRESS TEST I&R ONLY: CPT | Performed by: INTERNAL MEDICINE

## 2022-07-05 RX ADMIN — REGADENOSON 0.4 MG: 0.08 INJECTION, SOLUTION INTRAVENOUS at 11:08

## 2022-07-06 ENCOUNTER — TELEPHONE (OUTPATIENT)
Dept: CARDIOLOGY CLINIC | Facility: CLINIC | Age: 67
End: 2022-07-06

## 2022-07-06 NOTE — TELEPHONE ENCOUNTER
----- Message from Michael Gray DO sent at 7/6/2022  9:33 AM EDT -----  Please let pt know stress test normal  Thx     I called Eduarda Guevara and advised  He said: why does he still get leg and foot edema? I asked about his salt intake, he said no he watches that  He said he recently had an MRA and that was "clean too" but he said something is WRONG  Any suggestions?

## 2022-07-07 NOTE — TELEPHONE ENCOUNTER
I called Dl Alonso and he said he guesses "he will have to wait until he gets enough blockages that they see them or I fall over dead "

## 2022-07-15 ENCOUNTER — APPOINTMENT (OUTPATIENT)
Dept: LAB | Facility: CLINIC | Age: 67
End: 2022-07-15
Payer: MEDICARE

## 2022-07-15 DIAGNOSIS — E78.2 MIXED HYPERLIPIDEMIA: ICD-10-CM

## 2022-07-15 DIAGNOSIS — E03.4 HYPOTHYROIDISM DUE TO ACQUIRED ATROPHY OF THYROID: ICD-10-CM

## 2022-07-15 DIAGNOSIS — I10 ESSENTIAL HYPERTENSION: ICD-10-CM

## 2022-07-15 LAB
ALBUMIN SERPL BCP-MCNC: 3.7 G/DL (ref 3.5–5)
ALP SERPL-CCNC: 88 U/L (ref 46–116)
ALT SERPL W P-5'-P-CCNC: 28 U/L (ref 12–78)
ANION GAP SERPL CALCULATED.3IONS-SCNC: 3 MMOL/L (ref 4–13)
AST SERPL W P-5'-P-CCNC: 27 U/L (ref 5–45)
BILIRUB SERPL-MCNC: 0.87 MG/DL (ref 0.2–1)
BUN SERPL-MCNC: 15 MG/DL (ref 5–25)
CALCIUM SERPL-MCNC: 9.3 MG/DL (ref 8.3–10.1)
CHLORIDE SERPL-SCNC: 102 MMOL/L (ref 100–108)
CHOLEST SERPL-MCNC: 121 MG/DL
CO2 SERPL-SCNC: 31 MMOL/L (ref 21–32)
CREAT SERPL-MCNC: 1.41 MG/DL (ref 0.6–1.3)
GFR SERPL CREATININE-BSD FRML MDRD: 51 ML/MIN/1.73SQ M
GLUCOSE P FAST SERPL-MCNC: 113 MG/DL (ref 65–99)
HDLC SERPL-MCNC: 32 MG/DL
LDLC SERPL CALC-MCNC: 50 MG/DL (ref 0–100)
POTASSIUM SERPL-SCNC: 4.3 MMOL/L (ref 3.5–5.3)
PROT SERPL-MCNC: 7.6 G/DL (ref 6.4–8.2)
SODIUM SERPL-SCNC: 136 MMOL/L (ref 136–145)
TRIGL SERPL-MCNC: 197 MG/DL
TSH SERPL DL<=0.05 MIU/L-ACNC: 2.43 UIU/ML (ref 0.45–4.5)

## 2022-07-15 PROCEDURE — 36415 COLL VENOUS BLD VENIPUNCTURE: CPT

## 2022-07-15 PROCEDURE — 80061 LIPID PANEL: CPT

## 2022-07-15 PROCEDURE — 84443 ASSAY THYROID STIM HORMONE: CPT

## 2022-07-15 PROCEDURE — 80053 COMPREHEN METABOLIC PANEL: CPT

## 2022-07-21 DIAGNOSIS — N52.9 ERECTILE DYSFUNCTION, UNSPECIFIED ERECTILE DYSFUNCTION TYPE: ICD-10-CM

## 2022-07-21 RX ORDER — SILDENAFIL 100 MG/1
100 TABLET, FILM COATED ORAL DAILY PRN
Qty: 30 TABLET | Refills: 0 | Status: SHIPPED | OUTPATIENT
Start: 2022-07-21 | End: 2022-09-07 | Stop reason: SDUPTHER

## 2022-07-23 DIAGNOSIS — E78.2 MIXED HYPERLIPIDEMIA: ICD-10-CM

## 2022-07-23 DIAGNOSIS — I25.709 CORONARY ARTERY DISEASE INVOLVING CORONARY BYPASS GRAFT OF NATIVE HEART WITH ANGINA PECTORIS (HCC): ICD-10-CM

## 2022-07-23 DIAGNOSIS — M25.579 ACUTE ANKLE PAIN, UNSPECIFIED LATERALITY: ICD-10-CM

## 2022-07-25 RX ORDER — ATORVASTATIN CALCIUM 40 MG/1
80 TABLET, FILM COATED ORAL DAILY
Qty: 180 TABLET | Refills: 0 | Status: SHIPPED | OUTPATIENT
Start: 2022-07-25 | End: 2022-10-16 | Stop reason: SDUPTHER

## 2022-07-26 RX ORDER — OXYCODONE HYDROCHLORIDE 5 MG/1
5 TABLET ORAL EVERY 4 HOURS PRN
Qty: 16 TABLET | Refills: 0 | Status: SHIPPED | OUTPATIENT
Start: 2022-07-26 | End: 2022-08-26 | Stop reason: SDUPTHER

## 2022-08-07 DIAGNOSIS — M54.50 LOW BACK PAIN WITHOUT SCIATICA, UNSPECIFIED BACK PAIN LATERALITY, UNSPECIFIED CHRONICITY: ICD-10-CM

## 2022-08-07 DIAGNOSIS — I10 ESSENTIAL HYPERTENSION: ICD-10-CM

## 2022-08-07 DIAGNOSIS — Z95.1 S/P CABG (CORONARY ARTERY BYPASS GRAFT): ICD-10-CM

## 2022-08-08 RX ORDER — ACETAMINOPHEN AND CODEINE PHOSPHATE 300; 30 MG/1; MG/1
1 TABLET ORAL EVERY 4 HOURS PRN
Qty: 30 TABLET | Refills: 0 | Status: SHIPPED | OUTPATIENT
Start: 2022-08-08 | End: 2022-08-26 | Stop reason: SDUPTHER

## 2022-08-08 RX ORDER — METOPROLOL SUCCINATE 100 MG/1
100 TABLET, EXTENDED RELEASE ORAL DAILY
Qty: 90 TABLET | Refills: 0 | Status: SHIPPED | OUTPATIENT
Start: 2022-08-08

## 2022-08-08 RX ORDER — CLOPIDOGREL BISULFATE 75 MG/1
75 TABLET ORAL DAILY
Qty: 90 TABLET | Refills: 0 | Status: SHIPPED | OUTPATIENT
Start: 2022-08-08

## 2022-08-15 DIAGNOSIS — F41.9 ANXIETY: ICD-10-CM

## 2022-08-15 RX ORDER — CLONAZEPAM 2 MG/1
4 TABLET ORAL 2 TIMES DAILY PRN
Qty: 180 TABLET | Refills: 0 | Status: SHIPPED | OUTPATIENT
Start: 2022-08-15 | End: 2022-09-16 | Stop reason: SDUPTHER

## 2022-08-17 DIAGNOSIS — I10 ESSENTIAL HYPERTENSION: ICD-10-CM

## 2022-08-18 RX ORDER — VALSARTAN 160 MG/1
160 TABLET ORAL DAILY
Qty: 90 TABLET | Refills: 3 | Status: SHIPPED | OUTPATIENT
Start: 2022-08-18

## 2022-08-20 DIAGNOSIS — E03.9 HYPOTHYROIDISM, UNSPECIFIED TYPE: ICD-10-CM

## 2022-08-22 RX ORDER — LEVOTHYROXINE SODIUM 0.1 MG/1
100 TABLET ORAL DAILY
Qty: 90 TABLET | Refills: 0 | Status: SHIPPED | OUTPATIENT
Start: 2022-08-22

## 2022-08-26 DIAGNOSIS — M54.50 LOW BACK PAIN WITHOUT SCIATICA, UNSPECIFIED BACK PAIN LATERALITY, UNSPECIFIED CHRONICITY: ICD-10-CM

## 2022-08-26 DIAGNOSIS — G47.00 INSOMNIA, UNSPECIFIED TYPE: ICD-10-CM

## 2022-08-26 DIAGNOSIS — M25.579 ACUTE ANKLE PAIN, UNSPECIFIED LATERALITY: ICD-10-CM

## 2022-08-29 RX ORDER — ZOLPIDEM TARTRATE 10 MG/1
TABLET ORAL
Qty: 180 TABLET | Refills: 0 | Status: SHIPPED | OUTPATIENT
Start: 2022-08-29

## 2022-08-29 RX ORDER — ACETAMINOPHEN AND CODEINE PHOSPHATE 300; 30 MG/1; MG/1
1 TABLET ORAL EVERY 4 HOURS PRN
Qty: 30 TABLET | Refills: 0 | Status: SHIPPED | OUTPATIENT
Start: 2022-08-29 | End: 2022-09-28

## 2022-08-29 RX ORDER — OXYCODONE HYDROCHLORIDE 5 MG/1
5 TABLET ORAL EVERY 4 HOURS PRN
Qty: 16 TABLET | Refills: 0 | Status: SHIPPED | OUTPATIENT
Start: 2022-08-29 | End: 2022-09-30 | Stop reason: SDUPTHER

## 2022-09-06 ENCOUNTER — RA CDI HCC (OUTPATIENT)
Dept: OTHER | Facility: HOSPITAL | Age: 67
End: 2022-09-06

## 2022-09-06 NOTE — PROGRESS NOTES
I13 [id number]  [facility]  coding opportunities          Chart Reviewed number of suggestions sent to Provider: 1     Patients Insurance     Medicare Insurance: [employer]

## 2022-09-07 ENCOUNTER — TELEPHONE (OUTPATIENT)
Dept: FAMILY MEDICINE CLINIC | Facility: CLINIC | Age: 67
End: 2022-09-07

## 2022-09-07 DIAGNOSIS — N52.9 ERECTILE DYSFUNCTION, UNSPECIFIED ERECTILE DYSFUNCTION TYPE: ICD-10-CM

## 2022-09-07 RX ORDER — SILDENAFIL 100 MG/1
100 TABLET, FILM COATED ORAL DAILY PRN
Qty: 30 TABLET | Refills: 0 | Status: SHIPPED | OUTPATIENT
Start: 2022-09-07 | End: 2022-10-16 | Stop reason: SDUPTHER

## 2022-09-07 NOTE — TELEPHONE ENCOUNTER
I called pt back today as he was upset that Prior Nerissa Carlson is not completed I explained to him that there was a delay due to messages crossing over the holiday and the office being closed  Pt does need prior auth for both his Oxycodone & his Apapcoeine 300-30 mg   Please make sure Prior Auth is completed ASAP and contact pt  To make him aware when done   Thank You

## 2022-09-16 ENCOUNTER — OFFICE VISIT (OUTPATIENT)
Dept: FAMILY MEDICINE CLINIC | Facility: CLINIC | Age: 67
End: 2022-09-16
Payer: MEDICARE

## 2022-09-16 VITALS
BODY MASS INDEX: 32.88 KG/M2 | HEIGHT: 69 IN | WEIGHT: 222 LBS | HEART RATE: 65 BPM | OXYGEN SATURATION: 98 % | SYSTOLIC BLOOD PRESSURE: 154 MMHG | DIASTOLIC BLOOD PRESSURE: 74 MMHG | TEMPERATURE: 97.9 F

## 2022-09-16 DIAGNOSIS — F41.9 ANXIETY: ICD-10-CM

## 2022-09-16 DIAGNOSIS — I63.00 CEREBROVASCULAR ACCIDENT (CVA) DUE TO THROMBOSIS OF PRECEREBRAL ARTERY (HCC): ICD-10-CM

## 2022-09-16 DIAGNOSIS — G47.00 INSOMNIA, UNSPECIFIED TYPE: ICD-10-CM

## 2022-09-16 DIAGNOSIS — F11.20 OPIOID DEPENDENCE, UNCOMPLICATED (HCC): ICD-10-CM

## 2022-09-16 DIAGNOSIS — I25.709 CORONARY ARTERY DISEASE INVOLVING CORONARY BYPASS GRAFT OF NATIVE HEART WITH ANGINA PECTORIS (HCC): ICD-10-CM

## 2022-09-16 DIAGNOSIS — E78.2 MIXED HYPERLIPIDEMIA: ICD-10-CM

## 2022-09-16 DIAGNOSIS — I10 ESSENTIAL HYPERTENSION: ICD-10-CM

## 2022-09-16 DIAGNOSIS — E03.4 HYPOTHYROIDISM DUE TO ACQUIRED ATROPHY OF THYROID: Primary | ICD-10-CM

## 2022-09-16 DIAGNOSIS — Z23 IMMUNIZATION DUE: ICD-10-CM

## 2022-09-16 PROCEDURE — 99214 OFFICE O/P EST MOD 30 MIN: CPT | Performed by: FAMILY MEDICINE

## 2022-09-16 PROCEDURE — 90662 IIV NO PRSV INCREASED AG IM: CPT

## 2022-09-16 PROCEDURE — G0008 ADMIN INFLUENZA VIRUS VAC: HCPCS

## 2022-09-16 RX ORDER — CLONAZEPAM 2 MG/1
4 TABLET ORAL 2 TIMES DAILY PRN
Qty: 180 TABLET | Refills: 0 | Status: SHIPPED | OUTPATIENT
Start: 2022-09-16

## 2022-09-16 NOTE — PATIENT INSTRUCTIONS

## 2022-09-16 NOTE — PROGRESS NOTES
Name: Nilesh Davila      : 1955      MRN: [id number]  Encounter Provider: Marisol Posada MD  Encounter Date: 2022   Encounter department: [address] [place] [address] Place     1  Hypothyroidism due to acquired atrophy of thyroid  Assessment & Plan:  Patient to continue current dose of thyroid medicine and recheck TSH in 6 months      2  Coronary artery disease involving coronary bypass graft of native heart with angina pectoris [facility])  Assessment & Plan:  Patient to continue  with current cardiac meds to decrease risk of re stenosis  Patient to follow up with cardiology for scheduled appointments to decrease risk for further cardiac problems with appropriate diagnostic testing to reassess cardiac status  Patient had alll questions about this problem answered today  3  Essential hypertension  Assessment & Plan:  Patient is stable with current anti-hypertensive medicine and continue to follow a low sodium diet and take current medication  All questions about this condition were answered today  4  Cerebrovascular accident (CVA) due to thrombosis of precerebral artery [facility])  Assessment & Plan:  Patient is stable  and will continue present plan of care and reassess at next routine visit  All questions about this problem from patient were answered today  5  Anxiety  Assessment & Plan:  Patient to continue utilizing medical therapy as well as counseling sources as applicable to condition  If suicidal thought or fear of suicide attempt, to call 911 and seek help immediately  Medications and therapy reviewed today and all patient  questions answered today  Orders:  -     clonazePAM (KlonoPIN) 2 mg tablet; Take 2 tablets (4 mg total) by mouth 2 (two) times a day as needed for seizures    6  Insomnia, unspecified type  Assessment & Plan:  Discussed with patient use of sedative  medications and good  sleep hygiene to maximize treatment for this problem   Pt  to use sedatives only prior to sleep and cautioned them about usage at any other time  All patient questions about this problem answered today  7  Mixed hyperlipidemia  Assessment & Plan:  Patient  is stable with current medication and we discussed a low fat low cholesterol diet  Weight loss also discussed for this will help lower cholesterol also  Recheck lipids in 6 months  8  Opioid dependence, uncomplicated (HCC)  -     Urine Drug Screen  -     Methylphenidate  -     Fentanyl with Confirmation  -     Buprenorphine w/ Naloxone  -     Naltrexone  -     Gabapentin  -     Pregabalin  -     Synthetic Stimulants  -     [facility] All Prescribed Medications        Falls Plan of Care: balance, strength, and gait training instructions were provided  Recommended assistive device to help with gait and balance  Home safety education provided  Subjective     This 47-year-old male here today for checkup on multiple medical problems patient with coronary artery disease hypertension hyperlipidemia hypothyroidism history of stroke ED some anxiety and some depression and also some chronic pain  Patient has generalized weakness and decreased function but is functional since his stroke  Patient also here today for his opioid visit is doing well with his medications  Patient would like to have a flu shot today which was given to him  Patient needs refills on his Klonopin but at other medicines he will call for  Patient had lab work done his last visit and is doing well and we will order that for his next visit  Patient also for his opioid visit today which was complete  Also discussed with patient his elevated blood pressure today as well as last time he does not want to change his regimen for blood pressure at this point and is aware his blood pressure is little on the high side  Review of Systems   Constitutional: Negative for activity change, appetite change, chills, fatigue, fever and unexpected weight change  HENT: Negative for congestion, ear pain, hearing loss, mouth sores, postnasal drip, sinus pressure, sinus pain, sneezing and sore throat  Respiratory: Negative for apnea, cough, shortness of breath and wheezing  Cardiovascular: Negative for chest pain, palpitations and leg swelling  Gastrointestinal: Negative for abdominal pain, constipation, diarrhea, nausea and vomiting  Endocrine: Negative for cold intolerance and heat intolerance  Genitourinary: Negative for dysuria, frequency and hematuria  Musculoskeletal: Positive for gait problem  Negative for arthralgias, back pain, joint swelling and neck pain  Skin: Negative for rash  Neurological: Positive for weakness  Negative for dizziness and numbness  Hematological: Does not bruise/bleed easily  Psychiatric/Behavioral: Negative for agitation, behavioral problems, confusion, hallucinations and sleep disturbance  The patient is not nervous/anxious  Past Medical History:   Diagnosis Date    Anxiety     CAD (coronary artery disease)     Coronary artery disease     Depression     Disease of thyroid gland     Dizziness     Eye problems     Hypertension     Hypothyroidism     Migraines     [facility]    Tremors of nervous system      Past Surgical History:   Procedure Laterality Date    COLONOSCOPY  09/01/2017    COLONOSCOPY      CORONARY ARTERY BYPASS GRAFT  March 24,20201    CORONARY STENT PLACEMENT      DENTAL SURGERY      entire top teeth removal    HERNIA REPAIR      [place] CABG, ARTERY-VEIN, FOUR N/A 3/24/2021    Procedure: CORONARY ARTERY BYPASS GRAFT (CABG) 3 VESSELS,  USING LEFT VANE-LAD AND LEFT GSV-RPL & RAMUS;  Surgeon: Jennifer Varma MD;  [facility]: [facility];  Service: Cardiac Surgery    [place] ECHO TRANSESOPHAG [address] N/A 3/24/2021    Procedure: TRANSESOPHAGEAL ECHOCARDIOGRAM (MARLEN);   Surgeon: Jennifer Varma MD;  [facility];  Service: Cardiac Surgery     Family History   Problem Relation Age of Onset    Cancer Mother     Heart attack Father     Heart disease Father     Heart attack Sister     Cancer Brother      Social History     Socioeconomic History    Marital status: /Civil Union     Spouse name: None    Number of children: None    Years of education: None    Highest education level: None   Occupational History    None   Tobacco Use    Smoking status: Never Smoker    Smokeless tobacco: Never Used   Vaping Use    Vaping Use: Never used   Substance and Sexual Activity    Alcohol use: No    Drug use: No    Sexual activity: Not Currently     Partners: Female   Other Topics Concern    None   Social History Narrative    Most recent tobacco use screenin2019      Do you currently or have you served in the [employer]:   No      Were you activated, into active duty, as a member of the [employer] or as a Reservist:   No      Occupation:   retired      Education:   15      Marital status:         Sexual orientation:   Heterosexual      Exercise level:   Occasional      Diet:   Regular      General stress level:   Medium      Alcohol intake:   Occasional      Caffeine intake:   Occasional      Chewing tobacco:   none      Illicit drugs:   none      Guns present in home:   No      Seat belts used routinely:   Yes      Smoke alarm in home:   Yes      Advance directive:   No      Live alone or with others:   with others      Are there stairs in your home:   Yes  only in front of house     Pets:   No      Social Determinants of Health     Financial Resource Strain: Not on file   Food Insecurity: Not on file   Transportation Needs: Not on file   Physical Activity: Not on file   Stress: Not on file   Social Connections: Not on file   Intimate Partner Violence: Not on file   Housing Stability: Not on file     Current Outpatient Medications on File Prior to Visit   Medication Sig    acetaminophen-codeine (TYLENOL #3) 300-30 mg per tablet Take 1 tablet by mouth every 4 (four) hours as needed for moderate pain    oxyCODONE (ROXICODONE) 5 immediate release tablet Take 1 tablet (5 mg total) by mouth every 4 (four) hours as needed for moderate pain Max Daily Amount: 30 mg    aspirin 81 MG tablet Take 81 mg by mouth    atorvastatin (LIPITOR) 40 mg tablet Take 2 tablets (80 mg total) by mouth daily    clopidogrel (PLAVIX) 75 mg tablet Take 1 tablet (75 mg total) by mouth daily    levothyroxine 100 mcg tablet Take 1 tablet (100 mcg total) by mouth daily    metoprolol succinate (TOPROL-XL) 100 mg 24 hr tablet Take 1 tablet (100 mg total) by mouth daily    sildenafil (VIAGRA) 100 mg tablet Take 1 tablet (100 mg total) by mouth daily as needed for erectile dysfunction    tiZANidine (ZANAFLEX) 2 mg tablet TAKE 1 TABLET (2 MG TOTAL) BY MOUTH EVERY 8 (EIGHT) HOURS AS NEEDED FOR MUSCLE SPASMS    valsartan (DIOVAN) 160 mg tablet Take 1 tablet (160 mg total) by mouth daily    zolpidem (AMBIEN) 10 mg tablet 2 po  q hs    [DISCONTINUED] clonazePAM (KlonoPIN) 2 mg tablet Take 2 tablets (4 mg total) by mouth 2 (two) times a day as needed for seizures    [DISCONTINUED] lidocaine (LIDODERM) 5 % Apply 2 patches topically daily Remove & Discard patch within 12 hours or as directed by MD    [DISCONTINUED] Magnesium Oxide (Magnesium Oxide 400) 240 MG PACK Take 400 mg by mouth in the morning     Allergies   Allergen Reactions    Medical Tape Other (See Comments)     Other reaction(s): BURNS USE PAPER TAPE     Immunization History   Administered Date(s) Administered    COVID-19 PFIZER VACCINE 0 3 ML IM 04/29/2021, 05/20/2021, 11/27/2021    INFLUENZA 12/20/2017, 11/28/2018, 11/03/2020, 10/18/2021    Influenza Injectable, MDCK, Preservative Free, Quadrivalent, 0 5 mL 11/20/2019, 11/03/2020    Tdap 05/08/2014, 07/23/2021    Tuberculin Skin Test-PPD Intradermal 01/04/2001       Objective     /74 (BP Location: Left arm, Patient Position: Sitting, Cuff Size: Large)   Pulse 65   Temp 97 9 °F (36 6 °C)   Ht 5' 9" (1 753 m)   Wt 101 kg (222 lb)   SpO2 98%   BMI 32 78 kg/m²     Physical Exam  Vitals and nursing note reviewed  Constitutional:       Appearance: He is well-developed  HENT:      Head: Normocephalic and atraumatic  Right Ear: Tympanic membrane normal       Left Ear: Tympanic membrane normal       Nose: Nose normal       Mouth/Throat:      Mouth: Mucous membranes are moist    Eyes:      General: No scleral icterus  Conjunctiva/sclera: Conjunctivae normal       Pupils: Pupils are equal, round, and reactive to light  Neck:      Thyroid: No thyromegaly  Cardiovascular:      Rate and Rhythm: Normal rate and regular rhythm  Heart sounds: Normal heart sounds  Pulmonary:      Effort: Pulmonary effort is normal  No respiratory distress  Breath sounds: Normal breath sounds  No wheezing  Abdominal:      General: Bowel sounds are normal       Palpations: Abdomen is soft  Tenderness: There is no abdominal tenderness  There is no guarding or rebound  Musculoskeletal:         General: Normal range of motion  Cervical back: Normal range of motion and neck supple  Skin:     General: Skin is warm and dry  Findings: No rash  Neurological:      Mental Status: He is alert and oriented to person, place, and time  Motor: Weakness present  Gait: Gait abnormal    Psychiatric:         Mood and Affect: Mood normal          Behavior: Behavior normal          Thought Content:  Thought content normal          Judgment: Judgment normal        Rojas Mcgill MD

## 2022-09-16 NOTE — PROGRESS NOTES
Assessment/Plan     Problem List Items Addressed This Visit        Endocrine    Hypothyroidism - Primary     Patient to continue current dose of thyroid medicine and recheck TSH in 6 months            Cardiovascular and Mediastinum    CAD (coronary artery disease)     Patient to continue  with current cardiac meds to decrease risk of re stenosis  Patient to follow up with cardiology for scheduled appointments to decrease risk for further cardiac problems with appropriate diagnostic testing to reassess cardiac status  Patient had alll questions about this problem answered today  Essential hypertension     Patient is stable with current anti-hypertensive medicine and continue to follow a low sodium diet and take current medication  All questions about this condition were answered today  Stroke [facility])     Patient is stable  and will continue present plan of care and reassess at next routine visit  All questions about this problem from patient were answered today  Other    Mixed hyperlipidemia     Patient  is stable with current medication and we discussed a low fat low cholesterol diet  Weight loss also discussed for this will help lower cholesterol also  Recheck lipids in 6 months  Insomnia     Discussed with patient use of sedative  medications and good  sleep hygiene to maximize treatment for this problem  Pt  to use sedatives only prior to sleep and cautioned them about usage at any other time  All patient questions about this problem answered today  Anxiety     Patient to continue utilizing medical therapy as well as counseling sources as applicable to condition  If suicidal thought or fear of suicide attempt, to call 911 and seek help immediately  Medications and therapy reviewed today and all patient  questions answered today           Relevant Medications    clonazePAM (KlonoPIN) 2 mg tablet      Other Visit Diagnoses     Opioid dependence, uncomplicated ([facility] ) Relevant Orders    Urine Drug Screen    Methylphenidate    Fentanyl with Confirmation    Buprenorphine w/ Naloxone    Naltrexone    Gabapentin    Pregabalin    Synthetic Stimulants    [facility] All Prescribed Medications         Treatment Plan: Chronic pain since stroke    Treatment Goals: Pain relief    Opiate risks  There are risks associated with opioid medications, including dependence, addiction and tolerance  The patient understands and agrees to use these medications only as prescribed  Potential side effects of the medications include, but are not limited to, constipation, drowsiness, addiction, impaired judgment and risk of fatal overdose if not taken as prescribed  The patient was warned against driving while taking sedation medications  Sharing medications is a felony  At this point in time, the patient is showing no signs of addiction, abuse, diversion or suicidal ideation  Opioid agreement  Pain management agreement was reviewed with patient and signed/updated during visit      Drug screen  Drug screen collected during today's visit      PDMP review  [place] or [place]  reviewed  No red flags were identified; safe to proceed with prescription      Greater than 50% of this time was spent in counseling/coordination of care regarding: prognosis and instructions for management  Subjective     Opioid Management:   Type of visit: Initial    Chronic pain    Current pain description: controlled    Functional status: impaired    Goals of care: Pain relief    Social Support System  Patient receives support from their: wife    Screening Tools/Assessments:    PHQ-2/9:  Last PHQ-2 score: 0 (Last PHQ-2 date: 5/6/2022)    Opioid Risk Tool (ORT):  Current ORT Score: 2 (low risk for opiate abuse)    SOAPP:  Current SOAPP Score: 0 (negative, low risk patient)    Brief Pain Inventory (BPI):  1) Throughout our lives, most of us have had pain from time to time (such as minor headaches, sprains, and toothaches)   Have you had pain other than these everyday kinds of pain today? No  2) Where is your pain located? 3) Rate your pain at its worst in the last 24 hours: 3  4) Rate your pain at its least in the last 24 hours: 3  5) Rate your average level of pain: 3  6) Rate your pain right now: 2  7) What treatments or medications are you receiving for your pain?   8) In the past 24 hours, how much relief have pain treatments or medication provided? 10%  9) During the past 24 hours, pain has interfered with your:   A) General activity: 4     B) Mood: 0     C) Walking ability: 6     D) Normal work (work outside the home & housework): 4     E) Relations with other people: 0     F) Sleep: 0     G) Enjoyment of life: 0    Opioid agreement:  Active Opioid agreement on file?: No      Naloxone:  Currently prescribed Naloxone (Narcan): No      HPI  Pain Medications             acetaminophen-codeine (TYLENOL #3) 300-30 mg per tablet Take 1 tablet by mouth every 4 (four) hours as needed for moderate pain    clonazePAM (KlonoPIN) 2 mg tablet Take 2 tablets (4 mg total) by mouth 2 (two) times a day as needed for seizures    oxyCODONE (ROXICODONE) 5 immediate release tablet Take 1 tablet (5 mg total) by mouth every 4 (four) hours as needed for moderate pain Max Daily Amount: 30 mg    aspirin 81 MG tablet Take 81 mg by mouth    tiZANidine (ZANAFLEX) 2 mg tablet TAKE 1 TABLET (2 MG TOTAL) BY MOUTH EVERY 8 (EIGHT) HOURS AS NEEDED FOR MUSCLE SPASMS         Outpatient Morphine Milligram Equivalents Per Day     9/16/22 and after 72 MME/Day    Order Name Dose Route Frequency Maximum MME/Day     acetaminophen-codeine (TYLENOL #3) 300-30 mg per tablet 1 tablet Oral Every 4 hours PRN 27 MME/Day     oxyCODONE (ROXICODONE) 5 immediate release tablet 5 mg Oral Every 4 hours PRN 45 MME/Day    Total Potential Morphine Milligram Equivalents Per Day 72 MME/Day    Calculation Information        acetaminophen-codeine (TYLENOL #3) 300-30 mg per tablet acetaminophen-codeine 300-30 mg Tabs: single dose of 30 mg of opioid in combo * 6 doses per day * morphine equivalence factor of 0 15 = 27 MME/Day       oxyCODONE (ROXICODONE) 5 immediate release tablet    oxyCODONE 5 Tabs: maximum daily dose of 30 mg * morphine equivalence factor of 1 5 = 45 MME/Day                         PDMP Review       Value Time User    PDMP Reviewed  Yes 8/29/2022  9:23 PM Nargis Leong MD         Review of Systems  Objective     /74 (BP Location: Left arm, Patient Position: Sitting, Cuff Size: Large)   Pulse 65   Temp 97 9 °F (36 6 °C)   Ht 5' 9" (1 753 m)   Wt 101 kg (222 lb)   SpO2 98%   BMI 32 78 kg/m²     Physical Exam    Nargis Leong MD

## 2022-09-19 LAB
6MAM UR QL: NORMAL NG/ML
AMPHETAMINES UR QL: NORMAL NG/ML
BARBITURATES UR QL: NORMAL NG/ML
BENZODIAZ UR QL: NORMAL NG/ML
BUPRENORPHINE UR QL: NORMAL NG/ML
BZE UR QL: NORMAL NG/ML
CREAT UR-MCNC: NORMAL MG/DL
ETHANOL UR QL: NORMAL NG/ML
FENTANYL: NORMAL NG/ML
GABAPENTIN UR-MCNC: NORMAL NG/ML
MDPV: NORMAL NG/ML
METHADONE UR QL: NORMAL NG/ML
NALTREXONE UR-MCNC: NORMAL NG/ML
OPIATES UR QL: NORMAL NG/ML
OXYCODONE UR QL: NORMAL NG/ML
PCP UR QL: NORMAL NG/ML
SL AMB PREGABALIN: NORMAL NG/ML
SL AMB RITALINIC ACID: NORMAL NG/ML
SPECIMEN SOURCE: NORMAL
THC UR QL: NORMAL NG/ML

## 2022-09-26 ENCOUNTER — TELEPHONE (OUTPATIENT)
Dept: FAMILY MEDICINE CLINIC | Facility: CLINIC | Age: 67
End: 2022-09-26

## 2022-09-26 LAB
6MAM UR QL: NORMAL NG/ML
ALPRAZ SAL CFM-MCNC: NEGATIVE NG/ML
AMPHETAMINES SAL QL SCN: NEGATIVE NG/ML
AMPHETAMINES UR QL: NORMAL NG/ML
BARBITURATES SAL QL SCN: NEGATIVE NG/ML
BARBITURATES UR QL: NORMAL NG/ML
BENZODIAZ SAL QL SCN: POSITIVE NG/ML
BENZODIAZ UR QL: NORMAL NG/ML
BUPRENORPHINE SAL QL SCN: NEGATIVE NG/ML
BUPRENORPHINE UR QL: NORMAL NG/ML
BZE UR QL: NORMAL NG/ML
CANNABINOIDS SAL QL SCN: NEGATIVE NG/ML
CLONAZEPAM SAL CFM-MCNC: 3.2 NG/ML
COCAINE SAL QL SCN: NEGATIVE NG/ML
COTININE SAL QL SCN: NEGATIVE NG/ML
CREAT UR-MCNC: NORMAL MG/DL
DIAZEPAM SAL CFM-MCNC: NEGATIVE NG/ML
ETHANOL UR QL: NORMAL NG/ML
FENTANYL: NORMAL NG/ML
GABAPENTIN UR-MCNC: NORMAL NG/ML
LORAZEPAM SAL CFM-MCNC: NEGATIVE NG/ML
Lab: NEGATIVE NG/ML
MDPV: NORMAL NG/ML
MEPROBAMATE UR-MCNC: NEGATIVE NG/ML
METHADONE SAL QL SCN: NEGATIVE NG/ML
METHADONE UR QL: NORMAL NG/ML
NALTREXONE UR-MCNC: NORMAL NG/ML
NORDIAZEPAM SAL CFM-MCNC: NEGATIVE NG/ML
OPIATES SAL QL SCN: NEGATIVE NG/ML
OPIATES UR QL: NORMAL NG/ML
OXAZEPAM SAL CFM-MCNC: NEGATIVE NG/ML
OXYCODONE UR QL: NORMAL NG/ML
PCP SAL QL SCN: NEGATIVE NG/ML
PCP UR QL: NORMAL NG/ML
REF LAB TEST NAME: NORMAL
REF LAB TEST: NORMAL
SL AMB CLIENT CONTACT: NORMAL
SL AMB PREGABALIN: NORMAL NG/ML
SL AMB RITALINIC ACID: NORMAL NG/ML
SL AMB ZOLPIDEM: 9.3 NG/ML
SL AMB ZOLPIDEM: POSITIVE NG/ML
SPECIMEN SOURCE: NORMAL
TEMAZEPAM SAL CFM-MCNC: NEGATIVE NG/ML
THC UR QL: NORMAL NG/ML
TRAMADOL SAL QL SCN: NEGATIVE NG/ML
TRIAZOLAM SAL CFM-MCNC: NEGATIVE NG/ML

## 2022-09-26 NOTE — TELEPHONE ENCOUNTER
Pt called regarding a denial for his Tylenol 3   He said he was told to have his Docotor to contact [facility] [employer] @ [phone] and override the appeal His [employer] id # is [id number] Thanks!

## 2022-09-28 DIAGNOSIS — M54.50 LOW BACK PAIN WITHOUT SCIATICA, UNSPECIFIED BACK PAIN LATERALITY, UNSPECIFIED CHRONICITY: ICD-10-CM

## 2022-09-28 RX ORDER — ACETAMINOPHEN AND CODEINE PHOSPHATE 300; 30 MG/1; MG/1
TABLET ORAL
Qty: 30 TABLET | Refills: 0 | Status: SHIPPED | OUTPATIENT
Start: 2022-09-28

## 2022-09-30 DIAGNOSIS — M25.579 ACUTE ANKLE PAIN, UNSPECIFIED LATERALITY: ICD-10-CM

## 2022-09-30 RX ORDER — OXYCODONE HYDROCHLORIDE 5 MG/1
5 TABLET ORAL EVERY 4 HOURS PRN
Qty: 16 TABLET | Refills: 0 | Status: SHIPPED | OUTPATIENT
Start: 2022-09-30

## 2022-10-16 DIAGNOSIS — E78.2 MIXED HYPERLIPIDEMIA: ICD-10-CM

## 2022-10-16 DIAGNOSIS — N52.9 ERECTILE DYSFUNCTION, UNSPECIFIED ERECTILE DYSFUNCTION TYPE: ICD-10-CM

## 2022-10-16 DIAGNOSIS — I25.709 CORONARY ARTERY DISEASE INVOLVING CORONARY BYPASS GRAFT OF NATIVE HEART WITH ANGINA PECTORIS (HCC): ICD-10-CM

## 2022-10-17 RX ORDER — SILDENAFIL 100 MG/1
100 TABLET, FILM COATED ORAL DAILY PRN
Qty: 30 TABLET | Refills: 0 | Status: SHIPPED | OUTPATIENT
Start: 2022-10-17

## 2022-10-17 RX ORDER — ATORVASTATIN CALCIUM 40 MG/1
80 TABLET, FILM COATED ORAL DAILY
Qty: 180 TABLET | Refills: 0 | Status: SHIPPED | OUTPATIENT
Start: 2022-10-17

## 2022-11-14 DIAGNOSIS — M54.50 LOW BACK PAIN WITHOUT SCIATICA, UNSPECIFIED BACK PAIN LATERALITY, UNSPECIFIED CHRONICITY: ICD-10-CM

## 2022-11-14 DIAGNOSIS — F41.9 ANXIETY: ICD-10-CM

## 2022-11-14 DIAGNOSIS — I10 ESSENTIAL HYPERTENSION: ICD-10-CM

## 2022-11-14 DIAGNOSIS — M25.579 ACUTE ANKLE PAIN, UNSPECIFIED LATERALITY: ICD-10-CM

## 2022-11-14 RX ORDER — METOPROLOL SUCCINATE 100 MG/1
100 TABLET, EXTENDED RELEASE ORAL DAILY
Qty: 90 TABLET | Refills: 0 | Status: SHIPPED | OUTPATIENT
Start: 2022-11-14

## 2022-11-14 RX ORDER — ACETAMINOPHEN AND CODEINE PHOSPHATE 300; 30 MG/1; MG/1
1 TABLET ORAL EVERY 8 HOURS PRN
Qty: 30 TABLET | Refills: 0 | Status: SHIPPED | OUTPATIENT
Start: 2022-11-14

## 2022-11-14 RX ORDER — OXYCODONE HYDROCHLORIDE 5 MG/1
5 TABLET ORAL EVERY 4 HOURS PRN
Qty: 16 TABLET | Refills: 0 | Status: SHIPPED | OUTPATIENT
Start: 2022-11-14

## 2022-11-14 RX ORDER — CLONAZEPAM 2 MG/1
4 TABLET ORAL 2 TIMES DAILY PRN
Qty: 180 TABLET | Refills: 0 | Status: SHIPPED | OUTPATIENT
Start: 2022-11-14

## 2022-11-20 DIAGNOSIS — G47.00 INSOMNIA, UNSPECIFIED TYPE: ICD-10-CM

## 2022-11-20 DIAGNOSIS — I10 ESSENTIAL HYPERTENSION: ICD-10-CM

## 2022-11-20 DIAGNOSIS — Z95.1 S/P CABG (CORONARY ARTERY BYPASS GRAFT): ICD-10-CM

## 2022-11-20 DIAGNOSIS — E03.9 HYPOTHYROIDISM, UNSPECIFIED TYPE: ICD-10-CM

## 2022-11-21 DIAGNOSIS — G47.00 INSOMNIA, UNSPECIFIED TYPE: ICD-10-CM

## 2022-11-21 RX ORDER — ZOLPIDEM TARTRATE 10 MG/1
TABLET ORAL
Qty: 180 TABLET | Refills: 0 | Status: SHIPPED | OUTPATIENT
Start: 2022-11-21

## 2022-11-21 RX ORDER — VALSARTAN 160 MG/1
160 TABLET ORAL DAILY
Qty: 90 TABLET | Refills: 0 | Status: SHIPPED | OUTPATIENT
Start: 2022-11-21

## 2022-11-21 RX ORDER — CLOPIDOGREL BISULFATE 75 MG/1
75 TABLET ORAL DAILY
Qty: 90 TABLET | Refills: 0 | Status: SHIPPED | OUTPATIENT
Start: 2022-11-21

## 2022-11-21 RX ORDER — LEVOTHYROXINE SODIUM 0.1 MG/1
100 TABLET ORAL DAILY
Qty: 90 TABLET | Refills: 0 | Status: SHIPPED | OUTPATIENT
Start: 2022-11-21

## 2022-11-22 RX ORDER — ZOLPIDEM TARTRATE 10 MG/1
TABLET ORAL
Qty: 180 TABLET | Refills: 1 | Status: SHIPPED | OUTPATIENT
Start: 2022-11-22

## 2022-11-25 DIAGNOSIS — N52.9 ERECTILE DYSFUNCTION, UNSPECIFIED ERECTILE DYSFUNCTION TYPE: ICD-10-CM

## 2022-11-25 RX ORDER — SILDENAFIL 100 MG/1
100 TABLET, FILM COATED ORAL DAILY PRN
Qty: 30 TABLET | Refills: 0 | Status: SHIPPED | OUTPATIENT
Start: 2022-11-25

## 2022-12-09 DIAGNOSIS — M25.579 ACUTE ANKLE PAIN, UNSPECIFIED LATERALITY: ICD-10-CM

## 2022-12-09 DIAGNOSIS — M54.50 LOW BACK PAIN WITHOUT SCIATICA, UNSPECIFIED BACK PAIN LATERALITY, UNSPECIFIED CHRONICITY: ICD-10-CM

## 2022-12-09 RX ORDER — OXYCODONE HYDROCHLORIDE 5 MG/1
5 TABLET ORAL EVERY 4 HOURS PRN
Qty: 16 TABLET | Refills: 0 | Status: SHIPPED | OUTPATIENT
Start: 2022-12-09

## 2022-12-09 RX ORDER — ACETAMINOPHEN AND CODEINE PHOSPHATE 300; 30 MG/1; MG/1
1 TABLET ORAL EVERY 8 HOURS PRN
Qty: 30 TABLET | Refills: 0 | Status: SHIPPED | OUTPATIENT
Start: 2022-12-09

## 2022-12-21 DIAGNOSIS — F41.9 ANXIETY: ICD-10-CM

## 2022-12-21 RX ORDER — CLONAZEPAM 2 MG/1
4 TABLET ORAL 2 TIMES DAILY PRN
Qty: 180 TABLET | Refills: 0 | Status: SHIPPED | OUTPATIENT
Start: 2022-12-21

## 2022-12-24 DIAGNOSIS — N52.9 ERECTILE DYSFUNCTION, UNSPECIFIED ERECTILE DYSFUNCTION TYPE: ICD-10-CM

## 2022-12-26 DIAGNOSIS — N52.9 ERECTILE DYSFUNCTION, UNSPECIFIED ERECTILE DYSFUNCTION TYPE: ICD-10-CM

## 2022-12-27 RX ORDER — SILDENAFIL 100 MG/1
TABLET, FILM COATED ORAL
Qty: 30 TABLET | Refills: 0 | Status: SHIPPED | OUTPATIENT
Start: 2022-12-27

## 2022-12-27 RX ORDER — SILDENAFIL 100 MG/1
100 TABLET, FILM COATED ORAL DAILY PRN
Qty: 30 TABLET | Refills: 0 | Status: SHIPPED | OUTPATIENT
Start: 2022-12-27

## 2023-01-03 ENCOUNTER — TELEPHONE (OUTPATIENT)
Dept: FAMILY MEDICINE CLINIC | Facility: CLINIC | Age: 68
End: 2023-01-03

## 2023-01-03 DIAGNOSIS — Z12.5 SCREENING FOR MALIGNANT NEOPLASM OF PROSTATE: ICD-10-CM

## 2023-01-03 DIAGNOSIS — N52.9 ERECTILE DYSFUNCTION, UNSPECIFIED ERECTILE DYSFUNCTION TYPE: Primary | ICD-10-CM

## 2023-01-03 DIAGNOSIS — I25.709 CORONARY ARTERY DISEASE INVOLVING CORONARY BYPASS GRAFT OF NATIVE HEART WITH ANGINA PECTORIS (HCC): ICD-10-CM

## 2023-01-03 DIAGNOSIS — N40.1 BENIGN PROSTATIC HYPERPLASIA WITH LOWER URINARY TRACT SYMPTOMS, SYMPTOM DETAILS UNSPECIFIED: ICD-10-CM

## 2023-01-03 DIAGNOSIS — I10 ESSENTIAL HYPERTENSION: ICD-10-CM

## 2023-01-03 DIAGNOSIS — E78.2 MIXED HYPERLIPIDEMIA: ICD-10-CM

## 2023-01-08 DIAGNOSIS — I25.709 CORONARY ARTERY DISEASE INVOLVING CORONARY BYPASS GRAFT OF NATIVE HEART WITH ANGINA PECTORIS (HCC): ICD-10-CM

## 2023-01-08 DIAGNOSIS — E78.2 MIXED HYPERLIPIDEMIA: ICD-10-CM

## 2023-01-09 RX ORDER — ATORVASTATIN CALCIUM 40 MG/1
80 TABLET, FILM COATED ORAL DAILY
Qty: 180 TABLET | Refills: 0 | Status: SHIPPED | OUTPATIENT
Start: 2023-01-09

## 2023-01-20 ENCOUNTER — APPOINTMENT (OUTPATIENT)
Dept: LAB | Facility: CLINIC | Age: 68
End: 2023-01-20

## 2023-01-20 DIAGNOSIS — N40.1 BENIGN PROSTATIC HYPERPLASIA WITH LOWER URINARY TRACT SYMPTOMS, SYMPTOM DETAILS UNSPECIFIED: ICD-10-CM

## 2023-01-20 DIAGNOSIS — I25.709 CORONARY ARTERY DISEASE INVOLVING CORONARY BYPASS GRAFT OF NATIVE HEART WITH ANGINA PECTORIS (HCC): ICD-10-CM

## 2023-01-20 DIAGNOSIS — Z12.5 SCREENING FOR MALIGNANT NEOPLASM OF PROSTATE: ICD-10-CM

## 2023-01-20 DIAGNOSIS — E78.2 MIXED HYPERLIPIDEMIA: ICD-10-CM

## 2023-01-20 DIAGNOSIS — I10 ESSENTIAL HYPERTENSION: ICD-10-CM

## 2023-01-20 DIAGNOSIS — N52.9 ERECTILE DYSFUNCTION, UNSPECIFIED ERECTILE DYSFUNCTION TYPE: ICD-10-CM

## 2023-01-20 LAB
ALBUMIN SERPL BCP-MCNC: 3.8 G/DL (ref 3.5–5)
ALP SERPL-CCNC: 88 U/L (ref 46–116)
ALT SERPL W P-5'-P-CCNC: 23 U/L (ref 12–78)
ANION GAP SERPL CALCULATED.3IONS-SCNC: 6 MMOL/L (ref 4–13)
AST SERPL W P-5'-P-CCNC: 16 U/L (ref 5–45)
BACTERIA UR QL AUTO: ABNORMAL /HPF
BASOPHILS # BLD AUTO: 0.04 THOUSANDS/ÂΜL (ref 0–0.1)
BASOPHILS NFR BLD AUTO: 1 % (ref 0–1)
BILIRUB SERPL-MCNC: 0.67 MG/DL (ref 0.2–1)
BILIRUB UR QL STRIP: NEGATIVE
BUN SERPL-MCNC: 15 MG/DL (ref 5–25)
CALCIUM SERPL-MCNC: 9.2 MG/DL (ref 8.3–10.1)
CAOX CRY URNS QL MICRO: ABNORMAL /HPF
CHLORIDE SERPL-SCNC: 103 MMOL/L (ref 96–108)
CHOLEST SERPL-MCNC: 123 MG/DL
CLARITY UR: CLEAR
CO2 SERPL-SCNC: 29 MMOL/L (ref 21–32)
COLOR UR: YELLOW
CREAT SERPL-MCNC: 1.36 MG/DL (ref 0.6–1.3)
EOSINOPHIL # BLD AUTO: 0.15 THOUSAND/ÂΜL (ref 0–0.61)
EOSINOPHIL NFR BLD AUTO: 2 % (ref 0–6)
ERYTHROCYTE [DISTWIDTH] IN BLOOD BY AUTOMATED COUNT: 13.1 % (ref 11.6–15.1)
GFR SERPL CREATININE-BSD FRML MDRD: 54 ML/MIN/1.73SQ M
GLUCOSE P FAST SERPL-MCNC: 100 MG/DL (ref 65–99)
GLUCOSE UR STRIP-MCNC: NEGATIVE MG/DL
HCT VFR BLD AUTO: 44.8 % (ref 36.5–49.3)
HDLC SERPL-MCNC: 37 MG/DL
HGB BLD-MCNC: 15.1 G/DL (ref 12–17)
HGB UR QL STRIP.AUTO: NEGATIVE
IMM GRANULOCYTES # BLD AUTO: 0.02 THOUSAND/UL (ref 0–0.2)
IMM GRANULOCYTES NFR BLD AUTO: 0 % (ref 0–2)
KETONES UR STRIP-MCNC: NEGATIVE MG/DL
LDLC SERPL CALC-MCNC: 46 MG/DL (ref 0–100)
LEUKOCYTE ESTERASE UR QL STRIP: NEGATIVE
LYMPHOCYTES # BLD AUTO: 1.75 THOUSANDS/ÂΜL (ref 0.6–4.47)
LYMPHOCYTES NFR BLD AUTO: 29 % (ref 14–44)
MAGNESIUM SERPL-MCNC: 1.9 MG/DL (ref 1.6–2.6)
MCH RBC QN AUTO: 34.5 PG (ref 26.8–34.3)
MCHC RBC AUTO-ENTMCNC: 33.7 G/DL (ref 31.4–37.4)
MCV RBC AUTO: 102 FL (ref 82–98)
MONOCYTES # BLD AUTO: 0.56 THOUSAND/ÂΜL (ref 0.17–1.22)
MONOCYTES NFR BLD AUTO: 9 % (ref 4–12)
MUCOUS THREADS UR QL AUTO: ABNORMAL
NEUTROPHILS # BLD AUTO: 3.63 THOUSANDS/ÂΜL (ref 1.85–7.62)
NEUTS SEG NFR BLD AUTO: 59 % (ref 43–75)
NITRITE UR QL STRIP: NEGATIVE
NON-SQ EPI CELLS URNS QL MICRO: ABNORMAL /HPF
NRBC BLD AUTO-RTO: 0 /100 WBCS
PH UR STRIP.AUTO: 6 [PH]
PLATELET # BLD AUTO: 189 THOUSANDS/UL (ref 149–390)
PMV BLD AUTO: 9.7 FL (ref 8.9–12.7)
POTASSIUM SERPL-SCNC: 4.4 MMOL/L (ref 3.5–5.3)
PROT SERPL-MCNC: 7.8 G/DL (ref 6.4–8.4)
PROT UR STRIP-MCNC: ABNORMAL MG/DL
PSA SERPL-MCNC: 3.9 NG/ML (ref 0–4)
RBC # BLD AUTO: 4.38 MILLION/UL (ref 3.88–5.62)
RBC #/AREA URNS AUTO: ABNORMAL /HPF
SODIUM SERPL-SCNC: 138 MMOL/L (ref 135–147)
SP GR UR STRIP.AUTO: 1.02 (ref 1–1.03)
TESTOST SERPL-MCNC: 424 NG/DL (ref 95–948)
TRIGL SERPL-MCNC: 198 MG/DL
TSH SERPL DL<=0.05 MIU/L-ACNC: 1.66 UIU/ML (ref 0.45–4.5)
URATE SERPL-MCNC: 6 MG/DL (ref 3.5–8.5)
UROBILINOGEN UR STRIP-ACNC: <2 MG/DL
WBC # BLD AUTO: 6.15 THOUSAND/UL (ref 4.31–10.16)
WBC #/AREA URNS AUTO: ABNORMAL /HPF

## 2023-01-23 DIAGNOSIS — N52.9 ERECTILE DYSFUNCTION, UNSPECIFIED ERECTILE DYSFUNCTION TYPE: ICD-10-CM

## 2023-01-23 RX ORDER — SILDENAFIL 100 MG/1
100 TABLET, FILM COATED ORAL AS NEEDED
Qty: 30 TABLET | Refills: 5 | Status: SHIPPED | OUTPATIENT
Start: 2023-01-23

## 2023-01-25 ENCOUNTER — TELEPHONE (OUTPATIENT)
Dept: GASTROENTEROLOGY | Facility: CLINIC | Age: 68
End: 2023-01-25

## 2023-01-27 ENCOUNTER — RA CDI HCC (OUTPATIENT)
Dept: OTHER | Facility: HOSPITAL | Age: 68
End: 2023-01-27

## 2023-02-02 ENCOUNTER — OFFICE VISIT (OUTPATIENT)
Dept: UROLOGY | Facility: CLINIC | Age: 68
End: 2023-02-02

## 2023-02-02 VITALS
HEIGHT: 69 IN | RESPIRATION RATE: 18 BRPM | DIASTOLIC BLOOD PRESSURE: 78 MMHG | BODY MASS INDEX: 33.33 KG/M2 | WEIGHT: 225 LBS | HEART RATE: 69 BPM | SYSTOLIC BLOOD PRESSURE: 120 MMHG | OXYGEN SATURATION: 98 %

## 2023-02-02 DIAGNOSIS — R97.20 RISING PSA LEVEL: Primary | ICD-10-CM

## 2023-02-02 DIAGNOSIS — N52.9 ERECTILE DYSFUNCTION, UNSPECIFIED ERECTILE DYSFUNCTION TYPE: ICD-10-CM

## 2023-02-02 NOTE — PROGRESS NOTES
1  Rising PSA level              Assessment and plan:       1  Rising PSA  - repeat PSA    2  ED  - return for [facility], [place]      Chief Complaint     Erectile dysfunction    History of Present Illness     Cisco Rajan is a 74 y o  male presenting today for consultation  Last PSA 3 9 (1/20/2023), previously 0 5 (9/24/2021)    Testosterone 424 (1/20/23) at 7:50AM     ED refractory to sildenafil  Uses Vacuum device PRN  Medical comorbidities include thrombocytopenia, CVA, coronary artery disease, CHF, opioid dependence, depression, hypothyroidism      Laboratory     Lab Results   Component Value Date    CREATININE 1 36 (H) 01/20/2023       Lab Results   Component Value Date    PSA 3 9 01/20/2023    PSA 0 5 09/24/2021    PSA 1 0 07/24/2020         Review of Systems     Review of Systems   Constitutional: Negative for activity change, appetite change, chills, diaphoresis, fatigue, fever and unexpected weight change  Respiratory: Negative for chest tightness and shortness of breath  Cardiovascular: Negative for chest pain, palpitations and leg swelling  Gastrointestinal: Negative for abdominal distention, abdominal pain, constipation, diarrhea, nausea and vomiting  Genitourinary: Negative for decreased urine volume, difficulty urinating, dysuria, enuresis, flank pain, frequency, genital sores, hematuria and urgency  Musculoskeletal: Negative for back pain, gait problem and myalgias  Skin: Negative for color change, pallor, rash and wound  Psychiatric/Behavioral: Negative for behavioral problems  The patient is not nervous/anxious            AUA SYMPTOM SCORE    Flowsheet Row Most Recent Value   AUA SYMPTOM SCORE    How often have you had a sensation of not emptying your bladder completely after you finished urinating? 0 (P)     How often have you had to urinate again less than two hours after you finished urinating? 0 (P)     How often have you found you stopped and started again several times when you urinate? 1 (P)     How often have you found it difficult to postpone urination? 1 (P)     How often have you had a weak urinary stream? 0 (P)     How often have you had to push or strain to begin urination? 1 (P)     How many times did you most typically get up to urinate from the time you went to bed at night until the time you got up in the morning? 0 (P)     Quality of Life: If you were to spend the rest of your life with your urinary condition just the way it is now, how would you feel about that? 1 (P)     AUA SYMPTOM SCORE 3 (P)           Allergies     Allergies   Allergen Reactions   • Medical Tape Other (See Comments)     Other reaction(s): BURNS USE PAPER TAPE       Physical Exam     Physical Exam  Constitutional:       Appearance: Normal appearance  HENT:      Head: Normocephalic and atraumatic  Eyes:      General:         Right eye: No discharge  Left eye: No discharge  Conjunctiva/sclera: Conjunctivae normal    Pulmonary:      Effort: Pulmonary effort is normal  No respiratory distress  Musculoskeletal:         General: No swelling or tenderness  Normal range of motion  Skin:     General: Skin is warm and dry  Coloration: Skin is not jaundiced or pale  Neurological:      General: No focal deficit present  Mental Status: He is alert and oriented to person, place, and time  Psychiatric:         Mood and Affect: Mood normal          Behavior: Behavior normal          Vital Signs     There were no vitals filed for this visit        Current Medications       Current Outpatient Medications:   •  clonazePAM (KlonoPIN) 2 mg tablet, Take 2 tablets (4 mg total) by mouth 2 (two) times a day as needed for seizures, Disp: 180 tablet, Rfl: 0  •  acetaminophen-codeine (TYLENOL #3) 300-30 mg per tablet, Take 1 tablet by mouth every 8 (eight) hours as needed for moderate pain, Disp: 30 tablet, Rfl: 0  •  aspirin 81 MG tablet, Take 81 mg by mouth, Disp: , Rfl:   •  atorvastatin (LIPITOR) 40 mg tablet, Take 2 tablets (80 mg total) by mouth daily, Disp: 180 tablet, Rfl: 0  •  clopidogrel (PLAVIX) 75 mg tablet, Take 1 tablet (75 mg total) by mouth daily, Disp: 90 tablet, Rfl: 0  •  levothyroxine 100 mcg tablet, Take 1 tablet (100 mcg total) by mouth daily, Disp: 90 tablet, Rfl: 0  •  metoprolol succinate (TOPROL-XL) 100 mg 24 hr tablet, Take 1 tablet (100 mg total) by mouth daily, Disp: 90 tablet, Rfl: 0  •  oxyCODONE (ROXICODONE) 5 immediate release tablet, Take 1 tablet (5 mg total) by mouth every 4 (four) hours as needed for moderate pain Max Daily Amount: 30 mg, Disp: 16 tablet, Rfl: 0  •  sildenafil (VIAGRA) 100 mg tablet, Take 1 tablet (100 mg total) by mouth daily as needed for erectile dysfunction, Disp: 30 tablet, Rfl: 0  •  sildenafil (VIAGRA) 100 mg tablet, Take 1 tablet (100 mg total) by mouth as needed for erectile dysfunction, Disp: 30 tablet, Rfl: 5  •  tiZANidine (ZANAFLEX) 2 mg tablet, Take 1 tablet (2 mg total) by mouth every 8 (eight) hours as needed for muscle spasms, Disp: 270 tablet, Rfl: 1  •  valsartan (DIOVAN) 160 mg tablet, Take 1 tablet (160 mg total) by mouth daily, Disp: 90 tablet, Rfl: 0  •  zolpidem (AMBIEN) 10 mg tablet, 2 po  q hs, Disp: 180 tablet, Rfl: 0  •  zolpidem (AMBIEN) 10 mg tablet, 2 po  q hs, Disp: 180 tablet, Rfl: 1      Active Problems     Patient Active Problem List   Diagnosis   • CAD (coronary artery disease)   • Chest pain   • Claudication [facility])   • Coronary atherosclerosis   • Essential hypertension   • Mixed hyperlipidemia   • Hypothyroidism   • Myofascial pain syndrome   • Radiculopathy, lumbar region   • Bilateral lower extremity edema   • Palpitation   • History of CVA (cerebrovascular accident)   • History of percutaneous coronary intervention   • Chronic diastolic heart failure (HCC)   • Encounter for screening colonoscopy   • S/P CABG March 2021 LIMA-LAD;SVG to right PLV; SVG to RI   • Thrombocytopenia [facility])   • Hyperchloremia   • Hypocalcemia   • Encounter for postoperative care   • Stroke [facility])   • Insomnia   • BMI 33 0-33 9,adult   • Anxiety   • Continuous opioid dependence (HCC)   • Lower GI bleed   • Stroke-like symptoms   • NSVT (nonsustained ventricular tachycardia)         Past Medical History     Past Medical History:   Diagnosis Date   • Anxiety    • CAD (coronary artery disease)    • Coronary artery disease    • Depression    • Disease of thyroid gland    • Dizziness    • Eye problems    • Hypertension    • Hypothyroidism    • Migraines    • [facility]   • Tremors of nervous system          Surgical History     Past Surgical History:   Procedure Laterality Date   • COLONOSCOPY  09/01/2017   • COLONOSCOPY     • CORONARY ARTERY BYPASS GRAFT  March 24,20201   • CORONARY STENT PLACEMENT     • DENTAL SURGERY      entire top teeth removal   • HERNIA REPAIR     • [place] CABG, ARTERY-VEIN, FOUR N/A 3/24/2021    Procedure: CORONARY ARTERY BYPASS GRAFT (CABG) 3 VESSELS,  USING LEFT VANE-LAD AND LEFT GSV-RPL & RAMUS;  Surgeon: Noni Rivas MD;  [facility];  Service: Cardiac Surgery   • [place] ECHO TRANSESOPHAG MONTR CARDIAC PUMP FUNCTJ N/A 3/24/2021    Procedure: TRANSESOPHAGEAL ECHOCARDIOGRAM (MARLEN);   Surgeon: Noni Rivas MD;  [facility];  Service: Cardiac Surgery         Family History     Family History   Problem Relation Age of Onset   • Cancer Mother    • Heart attack Father    • Heart disease Father    • Heart attack Sister    • Cancer Brother          Social History     Social History       Radiology

## 2023-02-13 DIAGNOSIS — F41.9 ANXIETY: ICD-10-CM

## 2023-02-13 DIAGNOSIS — I10 ESSENTIAL HYPERTENSION: ICD-10-CM

## 2023-02-13 DIAGNOSIS — E03.9 HYPOTHYROIDISM, UNSPECIFIED TYPE: ICD-10-CM

## 2023-02-13 DIAGNOSIS — Z95.1 S/P CABG (CORONARY ARTERY BYPASS GRAFT): ICD-10-CM

## 2023-02-13 RX ORDER — CLONAZEPAM 2 MG/1
4 TABLET ORAL 2 TIMES DAILY PRN
Qty: 180 TABLET | Refills: 0 | Status: SHIPPED | OUTPATIENT
Start: 2023-02-13

## 2023-02-13 RX ORDER — METOPROLOL SUCCINATE 100 MG/1
100 TABLET, EXTENDED RELEASE ORAL DAILY
Qty: 90 TABLET | Refills: 0 | Status: SHIPPED | OUTPATIENT
Start: 2023-02-13

## 2023-02-13 RX ORDER — LEVOTHYROXINE SODIUM 0.1 MG/1
100 TABLET ORAL DAILY
Qty: 90 TABLET | Refills: 0 | Status: SHIPPED | OUTPATIENT
Start: 2023-02-13

## 2023-02-13 RX ORDER — CLOPIDOGREL BISULFATE 75 MG/1
75 TABLET ORAL DAILY
Qty: 90 TABLET | Refills: 0 | Status: SHIPPED | OUTPATIENT
Start: 2023-02-13

## 2023-02-21 ENCOUNTER — APPOINTMENT (OUTPATIENT)
Dept: LAB | Facility: CLINIC | Age: 68
End: 2023-02-21

## 2023-02-21 DIAGNOSIS — R97.20 RISING PSA LEVEL: ICD-10-CM

## 2023-02-23 LAB
PSA FREE MFR SERPL: 33.3 %
PSA FREE SERPL-MCNC: 0.3 NG/ML
PSA SERPL-MCNC: 0.9 NG/ML (ref 0–4)

## 2023-02-28 ENCOUNTER — PROCEDURE VISIT (OUTPATIENT)
Dept: UROLOGY | Facility: CLINIC | Age: 68
End: 2023-02-28

## 2023-02-28 DIAGNOSIS — N52.9 ERECTILE DYSFUNCTION, UNSPECIFIED ERECTILE DYSFUNCTION TYPE: Primary | ICD-10-CM

## 2023-02-28 NOTE — PROGRESS NOTES
1  Erectile dysfunction, unspecified erectile dysfunction type              Assessment and plan:       1  Prostate cancer screening  - PSA returned to baseline at 0 9  - continue annual surveillance with PCP    2  Erectile dysfunction  - continue viagra + pump PRN  - [facility] teaching provided today, except medication was not injected as patient did not have prescription with him  Patient provided with our intracavernosal injection pamphlet  PGE 20mcg resent to compounding pharmacy    Patient will follow up with urology as needed  Encouraged to contact us with any concerns  Nury Powers PA-C      Chief Complaint     ED    History of Present Illness     Jose Bunn is a 74 y o  male presenting today for follow-up  PSA trend:  0 5 (9/24/2021)  3 9 (1/20/2023)  0 9 (2/21/23)    Testosterone 424 (1/20/23) at 7:50AM   Patient has utilized sildenafil and a vacuum assisted device as needed  This is intermittently efficacious  We will have other occasions where he has difficulties with erections  At his last visit we discussed intracavernosal injections, however unfortunately there was miscommunication and prescription was not received  Medical comorbidities include thrombocytopenia, CVA, coronary artery disease, CHF, opioid dependence, depression, hypothyroidism      Laboratory     Lab Results   Component Value Date    CREATININE 1 36 (H) 01/20/2023       Lab Results   Component Value Date    PSA 0 9 02/21/2023    PSA 3 9 01/20/2023    PSA 0 5 09/24/2021         Review of Systems     Review of Systems   Constitutional: Negative for activity change, appetite change, chills, diaphoresis, fatigue, fever and unexpected weight change  Respiratory: Negative for chest tightness and shortness of breath  Cardiovascular: Negative for chest pain, palpitations and leg swelling  Gastrointestinal: Negative for abdominal distention, abdominal pain, constipation, diarrhea, nausea and vomiting  Genitourinary: Negative for decreased urine volume, difficulty urinating, dysuria, enuresis, flank pain, frequency, genital sores, hematuria and urgency  Musculoskeletal: Negative for back pain, gait problem and myalgias  Skin: Negative for color change, pallor, rash and wound  Psychiatric/Behavioral: Negative for behavioral problems  The patient is not nervous/anxious  AUA SYMPTOM SCORE    Flowsheet Row Most Recent Value   AUA SYMPTOM SCORE    How often have you had a sensation of not emptying your bladder completely after you finished urinating? 0 (P)     How often have you had to urinate again less than two hours after you finished urinating? 0 (P)     How often have you found you stopped and started again several times when you urinate? 1 (P)     How often have you found it difficult to postpone urination? 1 (P)     How often have you had a weak urinary stream? 0 (P)     How often have you had to push or strain to begin urination? 1 (P)     How many times did you most typically get up to urinate from the time you went to bed at night until the time you got up in the morning? 0 (P)     Quality of Life: If you were to spend the rest of your life with your urinary condition just the way it is now, how would you feel about that? 1 (P)     AUA SYMPTOM SCORE 3 (P)           Allergies     Allergies   Allergen Reactions   • Medical Tape Other (See Comments)     Other reaction(s): BURNS USE PAPER TAPE       Physical Exam     Physical Exam  Constitutional:       Appearance: Normal appearance  HENT:      Head: Normocephalic and atraumatic  Eyes:      General:         Right eye: No discharge  Left eye: No discharge  Conjunctiva/sclera: Conjunctivae normal    Pulmonary:      Effort: Pulmonary effort is normal  No respiratory distress  Musculoskeletal:         General: No swelling or tenderness  Normal range of motion  Skin:     General: Skin is warm and dry        Coloration: Skin is not jaundiced or pale  Neurological:      General: No focal deficit present  Mental Status: He is alert and oriented to person, place, and time  Psychiatric:         Mood and Affect: Mood normal          Behavior: Behavior normal          Vital Signs     There were no vitals filed for this visit        Current Medications       Current Outpatient Medications:   •  clonazePAM (KlonoPIN) 2 mg tablet, Take 2 tablets (4 mg total) by mouth 2 (two) times a day as needed for seizures, Disp: 180 tablet, Rfl: 0  •  clopidogrel (PLAVIX) 75 mg tablet, Take 1 tablet (75 mg total) by mouth daily, Disp: 90 tablet, Rfl: 0  •  levothyroxine 100 mcg tablet, Take 1 tablet (100 mcg total) by mouth daily, Disp: 90 tablet, Rfl: 0  •  acetaminophen-codeine (TYLENOL #3) 300-30 mg per tablet, Take 1 tablet by mouth every 8 (eight) hours as needed for moderate pain, Disp: 30 tablet, Rfl: 0  •  aspirin 81 MG tablet, Take 81 mg by mouth, Disp: , Rfl:   •  atorvastatin (LIPITOR) 40 mg tablet, Take 2 tablets (80 mg total) by mouth daily, Disp: 180 tablet, Rfl: 0  •  metoprolol succinate (TOPROL-XL) 100 mg 24 hr tablet, Take 1 tablet (100 mg total) by mouth daily, Disp: 90 tablet, Rfl: 0  •  oxyCODONE (ROXICODONE) 5 immediate release tablet, Take 1 tablet (5 mg total) by mouth every 4 (four) hours as needed for moderate pain Max Daily Amount: 30 mg, Disp: 16 tablet, Rfl: 0  •  sildenafil (VIAGRA) 100 mg tablet, Take 1 tablet (100 mg total) by mouth as needed for erectile dysfunction, Disp: 30 tablet, Rfl: 5  •  tiZANidine (ZANAFLEX) 2 mg tablet, Take 1 tablet (2 mg total) by mouth every 8 (eight) hours as needed for muscle spasms, Disp: 270 tablet, Rfl: 1  •  valsartan (DIOVAN) 160 mg tablet, Take 1 tablet (160 mg total) by mouth daily, Disp: 90 tablet, Rfl: 0  •  zolpidem (AMBIEN) 10 mg tablet, 2 po  q hs, Disp: 180 tablet, Rfl: 0  •  zolpidem (AMBIEN) 10 mg tablet, 2 po  q hs, Disp: 180 tablet, Rfl: 1      Active Problems     Patient Active Problem List   Diagnosis   • CAD (coronary artery disease)   • Chest pain   • Claudication [facility])   • Coronary atherosclerosis   • Essential hypertension   • Mixed hyperlipidemia   • Hypothyroidism   • Myofascial pain syndrome   • Radiculopathy, lumbar region   • Bilateral lower extremity edema   • Palpitation   • History of CVA (cerebrovascular accident)   • History of percutaneous coronary intervention   • [facility]   • Encounter for screening colonoscopy   • S/P CABG March 2021 LIMA-LAD;SVG to right PLV; SVG to RI   • Thrombocytopenia ([place] [address] )   • Hyperchloremia   • Hypocalcemia   • Encounter for postoperative care   • Stroke [facility])   • Insomnia   • BMI 33 0-33 9,adult   • Anxiety   • Continuous opioid dependence ([place] [address] )   • Lower GI bleed   • Stroke-like symptoms   • NSVT (nonsustained ventricular tachycardia)         Past Medical History     Past Medical History:   Diagnosis Date   • Anxiety    • CAD (coronary artery disease)    • Coronary artery disease    • Depression    • Disease of thyroid gland    • Dizziness    • Eye problems    • Hypertension    • Hypothyroidism    • Migraines    • Stroke ([place] [address] ) 11/17/2014   • Tremors of nervous system          Surgical History     Past Surgical History:   Procedure Laterality Date   • COLONOSCOPY  09/01/2017   • COLONOSCOPY     • CORONARY ARTERY BYPASS GRAFT  March 24,20201   • CORONARY STENT PLACEMENT     • DENTAL SURGERY      entire top teeth removal   • HERNIA REPAIR     • [place] CORONARY ARTERY BYP W/VEIN & ARTERY GRAFT 4 VEIN N/A 3/24/2021    Procedure: CORONARY ARTERY BYPASS GRAFT (CABG) 3 VESSELS,  USING LEFT VANE-LAD AND LEFT GSV-RPL & RAMUS;  Surgeon: Rosa Pereira MD;  [facility];  Service: Cardiac Surgery   • [place] ECHO TRANSESOPHAG MONTR CARDIAC PUMP FUNCTJ N/A 3/24/2021    Procedure: TRANSESOPHAGEAL ECHOCARDIOGRAM (MARLEN);   Surgeon: Rosa Pereira MD;  [facility];  Service: Cardiac Surgery         Family History     Family History   Problem Relation Age of Onset   • Cancer Mother    • Heart attack Father    • Heart disease Father    • Heart attack Sister    • Cancer Brother          Social History     Social History       Radiology

## 2023-02-28 NOTE — TELEPHONE ENCOUNTER
Returned call to patient   He did not have medication for his appt today  Patient and his wife are very upset   They did not know they needed to  the medication for the appt they thought our office would have it  Patient scheduled an appt for himself via with provider  Advised patient that the appt would need to be changed due to the amount of time that is needed for teaching  Appt given for 3/23/23 in our [place] location with Donnie   The patient and his wife would also like to speak to the  about the inconvenience  this has caused them

## 2023-02-28 NOTE — TELEPHONE ENCOUNTER
Patient called back  States that he needs an appointment ASAP with a different urologist  Pt states he needs assistance on how to use medication

## 2023-03-03 ENCOUNTER — OFFICE VISIT (OUTPATIENT)
Dept: UROLOGY | Facility: CLINIC | Age: 68
End: 2023-03-03

## 2023-03-03 VITALS
HEIGHT: 69 IN | WEIGHT: 220 LBS | DIASTOLIC BLOOD PRESSURE: 90 MMHG | BODY MASS INDEX: 32.58 KG/M2 | SYSTOLIC BLOOD PRESSURE: 142 MMHG

## 2023-03-03 DIAGNOSIS — N52.9 ERECTILE DYSFUNCTION, UNSPECIFIED ERECTILE DYSFUNCTION TYPE: Primary | ICD-10-CM

## 2023-03-03 NOTE — TELEPHONE ENCOUNTER
Reviewed chart with Renato Abraham states the medication was ordered at last visit, but this does not go electronically through [employer]  Unfortunately, the patient did not have the medication at the time of her appointment however she did spend about 30 minutes reviewing instructions  Medication was ordered and confirmed  Patient does not need repeat appointment today with Indy Ross  Called and left message for patient making him aware of this

## 2023-03-05 NOTE — PROGRESS NOTES
Injection corpora cavernosa     Date/Time 3/3/2023 2:00 PM     Performed by  Becky Zaman PA-C     Authorized by Becky Zaman PA-C      Universal Protocol   Consent: Verbal consent obtained  Consent given by: patient  Patient understanding: patient states understanding of the procedure being performed  Patient identity confirmed: verbally with patient       Procedure Details   Procedure Notes: 79-year-old man with medically refractory erectile dysfunction following a stroke  He walks with a cane and has poor balance  He had verbal instruction on intracorporeal injection last week and is here for an actual injection trial   Patient is again verbally instructed on drawing up the medication  He will use 0 2 cc of 10 mcg alprostadil to start  This was injected without difficulty or bleeding  He had a modest response  He is instructed to use the same dose with stimulation at home  He may also use the assistance of his vacuum pump    He should call back for follow-up or with any further questions or concerns

## 2023-03-08 ENCOUNTER — OFFICE VISIT (OUTPATIENT)
Dept: CARDIAC SURGERY | Facility: CLINIC | Age: 68
End: 2023-03-08

## 2023-03-08 VITALS
OXYGEN SATURATION: 100 % | DIASTOLIC BLOOD PRESSURE: 72 MMHG | SYSTOLIC BLOOD PRESSURE: 120 MMHG | HEIGHT: 69 IN | BODY MASS INDEX: 34.07 KG/M2 | HEART RATE: 68 BPM | WEIGHT: 230 LBS

## 2023-03-08 DIAGNOSIS — I10 ESSENTIAL HYPERTENSION: ICD-10-CM

## 2023-03-08 DIAGNOSIS — Z95.1 S/P CABG (CORONARY ARTERY BYPASS GRAFT): Primary | ICD-10-CM

## 2023-03-08 DIAGNOSIS — Z86.73 HISTORY OF CVA (CEREBROVASCULAR ACCIDENT): ICD-10-CM

## 2023-03-08 DIAGNOSIS — E78.2 MIXED HYPERLIPIDEMIA: ICD-10-CM

## 2023-03-08 NOTE — PROGRESS NOTES
Cardiology Follow Up Visit    Johanne Edwards  1955  [id number]  [id number] [facility]  [address] [place] [address] Road  [address] [facility] [id number] [phone]    1  S/P CABG March 2021 LIMA-LAD;SVG to right PLV; SVG to RI        2  Mixed hyperlipidemia        3  History of CVA (cerebrovascular accident)        4  Essential hypertension              Discussion/Summary:    3  60 yo with CAD, multiple prior stent procedures [facility] Pad[facility]) ultimately March 2021 CABG x3, doing well    2  History of stroke with ambulatory dysfunction    3  Essential hypertension, blood pressure elevated    4  Hyperlipidemia, LDL goal less than 70 on atorvastatin 40, 66 September 2021    Plan:  From cardiac standpoint patient doing well  Encouraged as much activity as possible realizing ambulatory dysfunction with prior stroke  Mediterranean type diet  Interval History:    30-year-old male coronary artery disease multiple stent procedures pending 16 years prior transition care to [facility]  Ultimately, cardiac catheterization here March 2021 with CABG x3    Has done well since    Ambulatory dysfunction post stroke which he states was approximately 8 years ago    No symptoms of coronary artery disease  Notices some discomfort at his sternotomy scar on occasion  Hyperlipidemia with LDL goal less than 70       Patient Active Problem List   Diagnosis   • CAD (coronary artery disease)   • Chest pain   • Claudication [facility])   • Coronary atherosclerosis   • Essential hypertension   • Mixed hyperlipidemia   • Hypothyroidism   • Myofascial pain syndrome   • Radiculopathy, lumbar region   • Bilateral lower extremity edema   • Palpitation   • History of CVA (cerebrovascular accident)   • History of percutaneous coronary intervention   • Chronic diastolic heart failure ([address] )   • Encounter for screening colonoscopy   • S/P CABG March 2021 LIMA-LAD;SVG to right PLV; SVG to RI   • Thrombocytopenia (HCC)   • Hyperchloremia   • Hypocalcemia   • Encounter for postoperative care   • Stroke [facility])   • Insomnia   • BMI 33 0-33 9,adult   • Anxiety   • Continuous opioid dependence (HCC)   • Lower GI bleed   • Stroke-like symptoms   • NSVT (nonsustained ventricular tachycardia)     Past Medical History:   Diagnosis Date   • Anxiety    • CAD (coronary artery disease)    • Coronary artery disease    • Depression    • Disease of thyroid gland    • Dizziness    • Erectile dysfunction    • Eye problems    • Hypertension    • Hypothyroidism    • Migraines    • Stroke ([facility] [address] ) 2014   • Tremors of nervous system      Social History     Socioeconomic History   • Marital status: /Civil Union     Spouse name: Not on file   • Number of children: Not on file   • Years of education: Not on file   • Highest education level: Not on file   Occupational History   • Not on file   Tobacco Use   • Smoking status: Never   • Smokeless tobacco: Never   Vaping Use   • Vaping Use: Never used   Substance and Sexual Activity   • Alcohol use: No   • Drug use: No   • Sexual activity: Not Currently     Partners: Female     Birth control/protection: None   Other Topics Concern   • Not on file   Social History Narrative    Most recent tobacco use screenin2019      Do you currently or have you served in the [employer]:   No      Were you activated, into active duty, as a member of the [employer] or as a Reservist:   No      Occupation:   retired      Education:   15      Marital status:         Sexual orientation:   Heterosexual      Exercise level:   Occasional      Diet:   Regular      General stress level:   Medium      Alcohol intake:   Occasional      Caffeine intake:   Occasional      Chewing tobacco:   none      Illicit drugs:   none      Guns present in home:   No      Seat belts used routinely:   Yes      Smoke alarm in home:   Yes      Advance directive:   No Live alone or with others:   with others      Are there stairs in your home:   Yes  only in front of house     Pets:   No      Social Determinants of Health     Financial Resource Strain: Low Risk    • Difficulty of Paying Living Expenses: Not hard at all   Food Insecurity: Not on file   Transportation Needs: No Transportation Needs   • Lack of Transportation (Medical): No   • Lack of Transportation (Non-Medical): No   Physical Activity: Not on file   Stress: Not on file   Social Connections: Not on file   Intimate Partner Violence: Not on file   Housing Stability: Not on file      Family History   Problem Relation Age of Onset   • Cancer Mother    • Hypertension Mother    • Heart attack Father    • Heart disease Father    • Hypertension Father    • Heart attack Sister    • Cancer Brother      Past Surgical History:   Procedure Laterality Date   • COLONOSCOPY  09/01/2017   • COLONOSCOPY     • CORONARY ARTERY BYPASS GRAFT  March 24,20201   • CORONARY STENT PLACEMENT     • DENTAL SURGERY      entire top teeth removal   • HERNIA REPAIR     • [place] CORONARY ARTERY BYP W/VEIN & ARTERY GRAFT 4 VEIN N/A 3/24/2021    Procedure: CORONARY ARTERY BYPASS GRAFT (CABG) 3 VESSELS,  USING LEFT VANE-LAD AND LEFT GSV-RPL & RAMUS;  Surgeon: King Gracia MD;  [facility];  Service: Cardiac Surgery   • [place] ECHO TRANSESOPHAG MONTR CARDIAC PUMP FUNCTJ N/A 3/24/2021    Procedure: TRANSESOPHAGEAL ECHOCARDIOGRAM (MARLEN);   Surgeon: King Gracia MD;  [facility];  Service: Cardiac Surgery       Current Outpatient Medications:   •  acetaminophen-codeine (TYLENOL #3) 300-30 mg per tablet, Take 1 tablet by mouth every 8 (eight) hours as needed for moderate pain, Disp: 30 tablet, Rfl: 0  •  aspirin 81 MG tablet, Take 81 mg by mouth, Disp: , Rfl:   •  atorvastatin (LIPITOR) 40 mg tablet, Take 2 tablets (80 mg total) by mouth daily, Disp: 180 tablet, Rfl: 0  •  clonazePAM (KlonoPIN) 2 mg tablet, Take 2 tablets (4 mg total) by mouth 2 (two) times a day as needed for seizures, Disp: 180 tablet, Rfl: 0  •  clopidogrel (PLAVIX) 75 mg tablet, Take 1 tablet (75 mg total) by mouth daily, Disp: 90 tablet, Rfl: 0  •  levothyroxine 100 mcg tablet, Take 1 tablet (100 mcg total) by mouth daily, Disp: 90 tablet, Rfl: 0  •  metoprolol succinate (TOPROL-XL) 100 mg 24 hr tablet, Take 1 tablet (100 mg total) by mouth daily, Disp: 90 tablet, Rfl: 0  •  oxyCODONE (ROXICODONE) 5 immediate release tablet, Take 1 tablet (5 mg total) by mouth every 4 (four) hours as needed for moderate pain Max Daily Amount: 30 mg, Disp: 16 tablet, Rfl: 0  •  sildenafil (VIAGRA) 100 mg tablet, Take 1 tablet (100 mg total) by mouth as needed for erectile dysfunction, Disp: 30 tablet, Rfl: 5  •  tiZANidine (ZANAFLEX) 2 mg tablet, Take 1 tablet (2 mg total) by mouth every 8 (eight) hours as needed for muscle spasms, Disp: 270 tablet, Rfl: 1  •  valsartan (DIOVAN) 160 mg tablet, Take 1 tablet (160 mg total) by mouth daily, Disp: 90 tablet, Rfl: 0  •  zolpidem (AMBIEN) 10 mg tablet, 2 po  q hs, Disp: 180 tablet, Rfl: 0  •  zolpidem (AMBIEN) 10 mg tablet, 2 po  q hs (Patient not taking: Reported on 3/8/2023), Disp: 180 tablet, Rfl: 1  Allergies   Allergen Reactions   • Medical Tape Other (See Comments)     Other reaction(s): BURNS USE PAPER TAPE         Social, Family, Medication history reviewed and updated as necessary      Labs:     Lab Results   Component Value Date    K 4 4 01/20/2023     01/20/2023    CO2 29 01/20/2023    BUN 15 01/20/2023    CREATININE 1 36 (H) 01/20/2023    CREATININE 1 41 (H) 07/15/2022    GLUCOSE 105 09/24/2021    CALCIUM 9 2 01/20/2023       Lab Results   Component Value Date    WBC 6 15 01/20/2023    HGB 15 1 01/20/2023    HGB 15 7 06/09/2022    HCT 44 8 01/20/2023    HCT 44 1 06/09/2022     01/20/2023     06/09/2022       No results found for: CHOL  Lab Results   Component Value Date    HDL 37 (L) 01/20/2023    HDL 32 (L) 07/15/2022     Lab Results   Component Value Date    LDLCALC 46 01/20/2023    LDLCALC 50 07/15/2022     No results found for: LDLDIRECT          Lab Results   Component Value Date    TRIG 198 (H) 01/20/2023    TRIG 197 (H) 07/15/2022       Lab Results   Component Value Date    ALT 23 01/20/2023    ALT 28 07/15/2022    AST 16 01/20/2023    AST 27 07/15/2022       Lab Results   Component Value Date    INR 1 00 12/22/2021    INR 0 96 03/19/2021       No results found for: NTBNP    Lab Results   Component Value Date    HGBA1C 5 6 06/09/2022    HGBA1C 5 7 (H) 03/19/2021           Imaging: Reviewed in epic        Review of Systems:  14 systems reviewed and negative with exception of the above        PHYSICAL EXAM:      Vitals:    03/08/23 1045   BP: 120/72   Pulse: 68   SpO2: 100%     Body mass index is 33 97 kg/m²  Weight (last 2 days)     Date/Time Weight    03/08/23 1045 104 (230)            Gen: No acute distress  HEENT: anicteric, mucous membranes moist  Neck: supple, no jugular venous distention, or carotid bruit  Heart: regular, normal s1 and s2, no murmur/rub or gallop  Lungs :clear to auscultation bilaterally, no rales/rhonchi or wheeze  Abdomen: soft nontender, normoactive bowel sounds, no organomegaly  Ext: warm and perfused, normal femoral pulses, trace edema, or clubbing  Skin: warm, no rashes  Neuro: AAO x 3  Psychiatric: normal affect  Musculoskeletal: no obvious joint deformities  This note was completed in part utilizing [employer] fluency direct voice recognition software  Grammatical errors, random word insertion, spelling mistakes, and incomplete sentences may be an occasional consequence of the system secondary to software limitations, ambient noise and hardware issues  At the time of dictation, efforts were made to edit, clarify and /or correct errors  Please read the chart carefully and recognize, using context, where substitutions have occurred    If you have any questions or concerns about the context, text or information contained within the body of this dictation, please contact myself, the provider, for further clarification

## 2023-03-20 ENCOUNTER — RA CDI HCC (OUTPATIENT)
Dept: OTHER | Facility: HOSPITAL | Age: 68
End: 2023-03-20

## 2023-03-20 NOTE — PROGRESS NOTES
I13 0  [facility]  coding opportunities          Chart Reviewed number of suggestions sent to Provider: 1     Patients Insurance     Medicare Insurance: [employer]

## 2023-03-27 ENCOUNTER — OFFICE VISIT (OUTPATIENT)
Dept: FAMILY MEDICINE CLINIC | Facility: CLINIC | Age: 68
End: 2023-03-27

## 2023-03-27 VITALS
DIASTOLIC BLOOD PRESSURE: 80 MMHG | TEMPERATURE: 98.2 F | BODY MASS INDEX: 33.92 KG/M2 | SYSTOLIC BLOOD PRESSURE: 118 MMHG | HEIGHT: 69 IN | OXYGEN SATURATION: 99 % | RESPIRATION RATE: 18 BRPM | HEART RATE: 60 BPM | WEIGHT: 229 LBS

## 2023-03-27 DIAGNOSIS — I69.351 HEMIPLEGIA AND HEMIPARESIS FOLLOWING CEREBRAL INFARCTION AFFECTING RIGHT DOMINANT SIDE (HCC): ICD-10-CM

## 2023-03-27 DIAGNOSIS — L98.9 SKIN LESION: ICD-10-CM

## 2023-03-27 DIAGNOSIS — I10 ESSENTIAL HYPERTENSION: ICD-10-CM

## 2023-03-27 DIAGNOSIS — B35.6 TINEA CRURIS: ICD-10-CM

## 2023-03-27 DIAGNOSIS — I73.9 CLAUDICATION (HCC): ICD-10-CM

## 2023-03-27 DIAGNOSIS — I25.709 CORONARY ARTERY DISEASE INVOLVING CORONARY BYPASS GRAFT OF NATIVE HEART WITH ANGINA PECTORIS (HCC): ICD-10-CM

## 2023-03-27 DIAGNOSIS — Z00.00 WELL ADULT EXAM: Primary | ICD-10-CM

## 2023-03-27 DIAGNOSIS — D68.9 COAGULATION DEFECT, UNSPECIFIED (HCC): ICD-10-CM

## 2023-03-27 DIAGNOSIS — I50.32 CHRONIC DIASTOLIC (CONGESTIVE) HEART FAILURE (HCC): ICD-10-CM

## 2023-03-27 DIAGNOSIS — F41.9 ANXIETY: ICD-10-CM

## 2023-03-27 DIAGNOSIS — E03.4 HYPOTHYROIDISM DUE TO ACQUIRED ATROPHY OF THYROID: ICD-10-CM

## 2023-03-27 DIAGNOSIS — I63.00 CEREBROVASCULAR ACCIDENT (CVA) DUE TO THROMBOSIS OF PRECEREBRAL ARTERY (HCC): ICD-10-CM

## 2023-03-27 DIAGNOSIS — F11.20 OPIOID DEPENDENCE, UNCOMPLICATED (HCC): ICD-10-CM

## 2023-03-27 DIAGNOSIS — E78.2 MIXED HYPERLIPIDEMIA: ICD-10-CM

## 2023-03-27 RX ORDER — CLOTRIMAZOLE AND BETAMETHASONE DIPROPIONATE 10; .64 MG/G; MG/G
CREAM TOPICAL 2 TIMES DAILY
Qty: 30 G | Refills: 0 | Status: SHIPPED | OUTPATIENT
Start: 2023-03-27

## 2023-03-27 NOTE — PROGRESS NOTES
Name: Leydi Hillman      : 1955      MRN: [id number]  Encounter Provider: Andrea Torrez MD  Encounter Date: 3/27/2023   Encounter department: [address] [place] [address] Place     1  Well adult exam    2  Cerebrovascular accident (CVA) due to thrombosis of precerebral artery [facility])  Assessment & Plan:  Patient is stable  and will continue present plan of care and reassess at next routine visit  All questions about this problem from patient were answered today  3  Mixed hyperlipidemia  Assessment & Plan:  Patient  is stable with current medication and we discussed a low fat low cholesterol diet  Weight loss also discussed for this will help lower cholesterol also  Recheck lipids in 6 months  Orders:  -     Comprehensive metabolic panel; Future  -     Lipid Panel with Direct LDL reflex; Future    4  Hypothyroidism due to acquired atrophy of thyroid  Assessment & Plan:  Patient to continue current dose of thyroid medicine and recheck TSH in 6 months      5  Essential hypertension  Assessment & Plan:  Patient is stable with current anti-hypertensive medicine and continue to follow a low sodium diet and take current medication  All questions about this condition were answered today  6  Coronary artery disease involving coronary bypass graft of native heart with angina pectoris [facility])  Assessment & Plan:  Patient to continue  with current cardiac meds to decrease risk of re stenosis  Patient to follow up with cardiology for scheduled appointments to decrease risk for further cardiac problems with appropriate diagnostic testing to reassess cardiac status  Patient had alll questions about this problem answered today  7  Anxiety  Assessment & Plan:  Patient to continue utilizing medical therapy as well as counseling sources as applicable to condition  If suicidal thought or fear of suicide attempt, to call 911 and seek help immediately   Medications and therapy reviewed today and all patient  questions answered today  8  Skin lesion  -     Ambulatory Referral to General Surgery; Future    9  Tinea cruris  -     clotrimazole-betamethasone (LOTRISONE) 1-0 05 % cream; Apply topically 2 (two) times a day    10  Hemiplegia and hemiparesis following cerebral infarction affecting right dominant side ([facility] )    11  Chronic diastolic (congestive) heart failure ([facility] )    12  Opioid dependence, uncomplicated ([place] [address] )    13  Claudication ([place] [address] )    14  Coagulation defect, unspecified ([place] [address] )      BMI Counseling: There is no height or weight on file to calculate BMI  The BMI is above normal  Nutrition recommendations include decreasing portion sizes, encouraging healthy choices of fruits and vegetables, decreasing fast food intake, consuming healthier snacks, limiting drinks that contain sugar, moderation in carbohydrate intake, increasing intake of lean protein, reducing intake of saturated and trans fat and reducing intake of cholesterol  Exercise recommendations include exercising 3-5 times per week  No pharmacotherapy was ordered  Patient referred to PCP  Rationale for BMI follow-up plan is due to patient being overweight or obese  Depression Screening and Follow-up Plan: Patient was screened for depression during today's encounter  They screened negative with a PHQ-2 score of 0  Falls Plan of Care: balance, strength, and gait training instructions were provided  Recommended assistive device to help with gait and balance  Home safety education provided  Subjective     44-year-old male here today for checkup on multiple medical problems as well as his Medicare wellness and his opioid visit    Patient is doing well with his medications patient also is has a new complaint of a lesion in the right side of his neck is a flat lesion a little less than a centimeter by half a centimeter he did see about having removed that where that is that I would have him see one of the general surgeons  Review of Systems   Constitutional: Negative for activity change, appetite change, chills, fatigue, fever and unexpected weight change  HENT: Negative for congestion, ear pain, hearing loss, mouth sores, postnasal drip, sinus pressure, sinus pain, sneezing and sore throat  Respiratory: Negative for apnea, cough, shortness of breath and wheezing  Cardiovascular: Negative for chest pain, palpitations and leg swelling  Gastrointestinal: Negative for abdominal pain, constipation, diarrhea, nausea and vomiting  Endocrine: Negative for cold intolerance and heat intolerance  Genitourinary: Negative for dysuria, frequency and hematuria  Musculoskeletal: Positive for gait problem  Negative for arthralgias, back pain, joint swelling and neck pain  Skin: Negative for rash  Neurological: Positive for weakness  Negative for dizziness and numbness  Hematological: Does not bruise/bleed easily  Psychiatric/Behavioral: Negative for agitation, behavioral problems, confusion, hallucinations and sleep disturbance  The patient is not nervous/anxious          Past Medical History:   Diagnosis Date   • Anxiety    • CAD (coronary artery disease)    • Coronary artery disease    • Depression    • Disease of thyroid gland    • Dizziness    • Erectile dysfunction    • Eye problems    • Hypertension    • Hypothyroidism    • Migraines    • Stroke ([facility] ) 11/17/2014   • Tremors of nervous system      Past Surgical History:   Procedure Laterality Date   • COLONOSCOPY  09/01/2017   • COLONOSCOPY     • CORONARY ARTERY BYPASS GRAFT  March 24,20201   • CORONARY STENT PLACEMENT     • DENTAL SURGERY      entire top teeth removal   • HERNIA REPAIR     • [place] CORONARY ARTERY BYP W/VEIN & ARTERY GRAFT 4 VEIN N/A 3/24/2021    Procedure: CORONARY ARTERY BYPASS GRAFT (CABG) 3 VESSELS,  USING LEFT VANE-LAD AND LEFT GSV-RPL & RAMUS;  Surgeon: Tyson Barrera MD;  [facility];  Service: Cardiac Surgery   • [place] ECHO Im ert 103 N/A 3/24/2021    Procedure: TRANSESOPHAGEAL ECHOCARDIOGRAM (MARLEN);   Surgeon: Lisa Bermudez MD;  Location[facility];  Service: Cardiac Surgery     Family History   Problem Relation Age of Onset   • Cancer Mother    • Hypertension Mother    • Heart attack Father    • Heart disease Father    • Hypertension Father    • Heart attack Sister    • Cancer Brother      Social History     Socioeconomic History   • Marital status: /Civil Union     Spouse name: None   • Number of children: None   • Years of education: None   • Highest education level: None   Occupational History   • None   Tobacco Use   • Smoking status: Never   • Smokeless tobacco: Never   Vaping Use   • Vaping Use: Never used   Substance and Sexual Activity   • Alcohol use: No   • Drug use: No   • Sexual activity: Not Currently     Partners: Female     Birth control/protection: None   Other Topics Concern   • None   Social History Narrative    Most recent tobacco use screenin2019      Do you currently or have you served in the [employer]:   No      Were you activated, into active duty, as a member of the [employer] or as a Reservist:   No      Occupation:   retired      Education:   15      Marital status:         Sexual orientation:   Heterosexual      Exercise level:   Occasional      Diet:   Regular      General stress level:   Medium      Alcohol intake:   Occasional      Caffeine intake:   Occasional      Chewing tobacco:   none      Illicit drugs:   none      Guns present in home:   No      Seat belts used routinely:   Yes      Smoke alarm in home:   Yes      Advance directive:   No      Live alone or with others:   with others      Are there stairs in your home:   Yes  only in front of house     Pets:   No      Social Determinants of Health     Financial Resource Strain: Low Risk    • Difficulty of Paying Living Expenses: Not hard at all   Food Insecurity: Not on file Transportation Needs: No Transportation Needs   • Lack of Transportation (Medical): No   • Lack of Transportation (Non-Medical):  No   Physical Activity: Not on file   Stress: Not on file   Social Connections: Not on file   Intimate Partner Violence: Not on file   Housing Stability: Not on file     Current Outpatient Medications on File Prior to Visit   Medication Sig   • acetaminophen-codeine (TYLENOL #3) 300-30 mg per tablet Take 1 tablet by mouth every 8 (eight) hours as needed for moderate pain   • aspirin 81 MG tablet Take 81 mg by mouth   • atorvastatin (LIPITOR) 40 mg tablet Take 2 tablets (80 mg total) by mouth daily   • clonazePAM (KlonoPIN) 2 mg tablet Take 2 tablets (4 mg total) by mouth 2 (two) times a day as needed for seizures   • clopidogrel (PLAVIX) 75 mg tablet Take 1 tablet (75 mg total) by mouth daily   • levothyroxine 100 mcg tablet Take 1 tablet (100 mcg total) by mouth daily   • metoprolol succinate (TOPROL-XL) 100 mg 24 hr tablet Take 1 tablet (100 mg total) by mouth daily   • sildenafil (VIAGRA) 100 mg tablet Take 1 tablet (100 mg total) by mouth as needed for erectile dysfunction   • tiZANidine (ZANAFLEX) 2 mg tablet Take 1 tablet (2 mg total) by mouth every 8 (eight) hours as needed for muscle spasms   • valsartan (DIOVAN) 160 mg tablet Take 1 tablet (160 mg total) by mouth daily   • zolpidem (AMBIEN) 10 mg tablet 2 po  q hs   • oxyCODONE (ROXICODONE) 5 immediate release tablet Take 1 tablet (5 mg total) by mouth every 4 (four) hours as needed for moderate pain Max Daily Amount: 30 mg (Patient not taking: Reported on 3/27/2023)   • zolpidem (AMBIEN) 10 mg tablet 2 po  q hs (Patient not taking: Reported on 3/8/2023)     Allergies   Allergen Reactions   • Medical Tape Other (See Comments)     Other reaction(s): BURNS USE PAPER TAPE     Immunization History   Administered Date(s) Administered   • COVID-19 PFIZER VACCINE 0 3 ML IM 04/29/2021, 05/20/2021, 11/27/2021   • INFLUENZA 12/20/2017, "11/28/2018, 11/03/2020, 10/18/2021, 09/16/2022   • Influenza Injectable, MDCK, Preservative Free, Quadrivalent, 0 5 mL 11/20/2019, 11/03/2020   • Influenza, high dose seasonal 0 7 mL 09/16/2022   • Tdap 05/08/2014, 07/23/2021   • Tuberculin Skin Test-PPD Intradermal 01/04/2001       Objective     /80 (BP Location: Left arm, Patient Position: Sitting, Cuff Size: Large)   Pulse 60   Temp 98 2 °F (36 8 °C) (Temporal)   Resp 18   Ht 5' 9\" (1 753 m)   Wt 104 kg (229 lb)   SpO2 99%   BMI 33 82 kg/m²     Physical Exam  Kimberli Culp MD  Answers for HPI/ROS submitted by the patient on 3/24/2023  How would you rate your overall health?: fair  Compared to last year, how is your physical health?: slightly worse  In general, how satisfied are you with your life?: dissatisfied  Compared to last year, how is your eyesight?: same  Compared to last year, how is your hearing?: same  Compared to last year, how is your emotional/mental health?: same  How often is anger a problem for you?: never, rarely  How often do you feel unusually tired/fatigued?: sometimes  In the past 7 days, how much pain have you experienced?: a lot  If you answered \"some\" or \"a lot\", please rate the severity of your pain on a scale of 1 to 10 (1 being the least severe pain and 10 being the most intense pain)  : 5/10  In the past 6 months, have you lost or gained 10 pounds without trying?: No  One or more falls in the last year: Yes  Do you have trouble with the stairs inside or outside your home?: No  Does your home have working smoke alarms?: Yes  Does your home have a carbon monoxide monitor?: Yes  Which safety hazards (if any) have you experienced in your home? Please select all that apply : none  How would you describe your current diet?  Please select all that apply : Regular  In addition to prescription medications, are you taking any over-the-counter supplements?: No  Can you manage your medications?: Yes  Are you currently taking any " opioid medications?: Yes  Can you walk and transfer into and out of your bed and chair?: Yes  Can you dress and groom yourself?: Yes  Can you bathe or shower yourself?: Yes  Can you feed yourself?: Yes  Can you do your laundry/ housekeeping?: Yes  Can you manage your money, pay your bills, and track your expenses?: Yes  Can you make your own meals?: Yes  Can you do your own shopping?: Yes  Please list your DME (Durable Medical Equipment) supplier, if you use one : cane ,walker,  hospital bed  Within the last 12 months, have you had any hospitalizations or Emergency Department visits?: Yes  If yes, how many times have you been hospitalized within the past year?: 1-2  Do you have a living will?: Yes  Do you have a Durable POA (Power of ) for healthcare decisions?: Yes  Do you have an Advanced Directive for end of life decisions?: Yes  How often have you used an illegal drug (including marijuana) or a prescription medication for non-medical reasons in the past year?: never  What is the typical number of drinks you consume in a day?: 0  What is the typical number of drinks you consume in a week?: 0  How often did you have a drink containing alcohol in the past year?: never  How many drinks did you have on a typical day  when you were drinking in the past year?: 0  How often did you have 6 or more drinks on one occasion in the past year?: never

## 2023-03-27 NOTE — PROGRESS NOTES
Assessment/Plan     Problem List Items Addressed This Visit        Endocrine    Hypothyroidism     Patient to continue current dose of thyroid medicine and recheck TSH in 6 months            Cardiovascular and Mediastinum    CAD (coronary artery disease)     Patient to continue  with current cardiac meds to decrease risk of re stenosis  Patient to follow up with cardiology for scheduled appointments to decrease risk for further cardiac problems with appropriate diagnostic testing to reassess cardiac status  Patient had alll questions about this problem answered today  Essential hypertension     Patient is stable with current anti-hypertensive medicine and continue to follow a low sodium diet and take current medication  All questions about this condition were answered today  Stroke [facility])     Patient is stable  and will continue present plan of care and reassess at next routine visit  All questions about this problem from patient were answered today  Nervous and Auditory    Hemiplegia and hemiparesis following cerebral infarction affecting right dominant side ([address] )       Other    Claudication ([address] )    Mixed hyperlipidemia     Patient  is stable with current medication and we discussed a low fat low cholesterol diet  Weight loss also discussed for this will help lower cholesterol also  Recheck lipids in 6 months  Relevant Orders    Comprehensive metabolic panel    Lipid Panel with Direct LDL reflex    Anxiety     Patient to continue utilizing medical therapy as well as counseling sources as applicable to condition  If suicidal thought or fear of suicide attempt, to call 911 and seek help immediately  Medications and therapy reviewed today and all patient  questions answered today          Other Visit Diagnoses     Well adult exam    -  Primary    Skin lesion        Relevant Medications    clotrimazole-betamethasone (LOTRISONE) 1-0 05 % cream    Other Relevant Orders    Ambulatory Referral to General Surgery    Tinea cruris        Relevant Medications    clotrimazole-betamethasone (LOTRISONE) 1-0 05 % cream    Chronic diastolic (congestive) heart failure (HCC)        Opioid dependence, uncomplicated (HCC)        Coagulation defect, unspecified (HCC)             Treatment Plan: Chronic pain stroke    Treatment Goals: Pain control    Opiate risks  There are risks associated with opioid medications, including dependence, addiction and tolerance  The patient understands and agrees to use these medications only as prescribed  Potential side effects of the medications include, but are not limited to, constipation, drowsiness, addiction, impaired judgment and risk of fatal overdose if not taken as prescribed  The patient was warned against driving while taking sedation medications  Sharing medications is a felony  At this point in time, the patient is showing no signs of addiction, abuse, diversion or suicidal ideation  Opioid agreement  Pain management agreement was reviewed with patient and signed/updated during visit          Subjective     Opioid Management:   Type of visit: Follow-up    Screening Tools/Assessments:    PHQ-2/9:  PHQ-2 score: 0    Brief Pain Inventory (BPI):  1) Throughout our lives, most of us have had pain from time to time (such as minor headaches, sprains, and toothaches)  Have you had pain other than these everyday kinds of pain today? No  2) Where is your pain located? middle of back pinch nerve  3) Rate your pain at its worst in the last 24 hours: 3  4) Rate your pain at its least in the last 24 hours: 3  5) Rate your average level of pain: 5  6) Rate your pain right now: 3  7) What treatments or medications are you receiving for your pain?  Tylenol 3 - Oxycodone  8) In the past 24 hours, how much relief have pain treatments or medication provided? 10%  9) During the past 24 hours, pain has interfered with your:     A) General activity: 9     B) Mood: 5     C) Walking ability: 5     D) Normal work (work outside the home & housework): 10     E) Relations with other people: 0     F) Sleep: 8     G) Enjoyment of life: 9    COMM:  Current COMM Score: 2 (negative, low risk patient)    Drug Screen:  Date of last drug screen: 9/26/2022    Opioid agreement:  Active Opioid agreement on file?: Yes    Opioid agreement signed date: 9/16/2022  Opioid agreement expiration date: 9/16/2023    Naloxone:  Currently prescribed Naloxone (Narcan): No      HPI  Pain Medications             acetaminophen-codeine (TYLENOL #3) 300-30 mg per tablet Take 1 tablet by mouth every 8 (eight) hours as needed for moderate pain    aspirin 81 MG tablet Take 81 mg by mouth    clonazePAM (KlonoPIN) 2 mg tablet Take 2 tablets (4 mg total) by mouth 2 (two) times a day as needed for seizures    tiZANidine (ZANAFLEX) 2 mg tablet Take 1 tablet (2 mg total) by mouth every 8 (eight) hours as needed for muscle spasms    oxyCODONE (ROXICODONE) 5 immediate release tablet Take 1 tablet (5 mg total) by mouth every 4 (four) hours as needed for moderate pain Max Daily Amount: 30 mg         Outpatient Morphine Milligram Equivalents Per Day     3/27/23 and after 58 5 MME/Day    Order Name Dose Route Frequency Maximum MME/Day     oxyCODONE (ROXICODONE) 5 immediate release tablet 5 mg Oral Every 4 hours PRN 45 MME/Day    Patient not taking     --     acetaminophen-codeine (TYLENOL #3) 300-30 mg per tablet 1 tablet Oral Every 8 hours PRN 13 5 MME/Day    Total Potential Morphine Milligram Equivalents Per Day 58 5 MME/Day    Calculation Information        oxyCODONE (ROXICODONE) 5 immediate release tablet    The written sig was used for calculation because the patient reports that they are not taking this order      Written Sig    oxyCODONE 5 Tabs: maximum daily dose of 30 mg * morphine equivalence factor of 1 5 = 45 MME/Day       acetaminophen-codeine (TYLENOL #3) 300-30 mg per tablet    acetaminophen-codeine 300-30 mg Tabs: single dose "of 30 mg of opioid in combo * 3 doses per day * morphine equivalence factor of 0 15 = 13 5 MME/Day                         PDMP Review       Value Time User    PDMP Reviewed  Yes 2/13/2023 11:24 PM Justo Zapien MD         Review of Systems  Objective     /80 (BP Location: Left arm, Patient Position: Sitting, Cuff Size: Large)   Pulse 60   Temp 98 2 °F (36 8 °C) (Temporal)   Resp 18   Ht 5' 9\" (1 753 m)   Wt 104 kg (229 lb)   SpO2 99%   BMI 33 82 kg/m²     Physical Exam    Justo Zapien MD        Answers for HPI/ROS submitted by the patient on 3/24/2023  How would you rate your overall health?: fair  Compared to last year, how is your physical health?: slightly worse  In general, how satisfied are you with your life?: dissatisfied  Compared to last year, how is your eyesight?: same  Compared to last year, how is your hearing?: same  Compared to last year, how is your emotional/mental health?: same  How often is anger a problem for you?: never, rarely  How often do you feel unusually tired/fatigued?: sometimes  In the past 7 days, how much pain have you experienced?: a lot  If you answered \"some\" or \"a lot\", please rate the severity of your pain on a scale of 1 to 10 (1 being the least severe pain and 10 being the most intense pain)  : 5/10  In the past 6 months, have you lost or gained 10 pounds without trying?: No  One or more falls in the last year: Yes  Do you have trouble with the stairs inside or outside your home?: No  Does your home have working smoke alarms?: Yes  Does your home have a carbon monoxide monitor?: Yes  Which safety hazards (if any) have you experienced in your home? Please select all that apply : none  How would you describe your current diet?  Please select all that apply : Regular  In addition to prescription medications, are you taking any over-the-counter supplements?: No  Can you manage your medications?: Yes  Are you currently taking any opioid medications?: Yes  Can you " walk and transfer into and out of your bed and chair?: Yes  Can you dress and groom yourself?: Yes  Can you bathe or shower yourself?: Yes  Can you feed yourself?: Yes  Can you do your laundry/ housekeeping?: Yes  Can you manage your money, pay your bills, and track your expenses?: Yes  Can you make your own meals?: Yes  Can you do your own shopping?: Yes  Please list your DME (Durable Medical Equipment) supplier, if you use one : cane ,walker,  hospital bed  Within the last 12 months, have you had any hospitalizations or Emergency Department visits?: Yes  If yes, how many times have you been hospitalized within the past year?: 1-2  Do you have a living will?: Yes  Do you have a Durable POA ([address]) for healthcare decisions?: Yes  Do you have an Advanced Directive for end of life decisions?: Yes  How often have you used an illegal drug (including marijuana) or a prescription medication for non-medical reasons in the past year?: never  What is the typical number of drinks you consume in a day?: 0  What is the typical number of drinks you consume in a week?: 0  How often did you have a drink containing alcohol in the past year?: never  How many drinks did you have on a typical day  when you were drinking in the past year?: 0  How often did you have 6 or more drinks on one occasion in the past year?: never

## 2023-03-27 NOTE — PROGRESS NOTES
Assessment and Plan:     Problem List Items Addressed This Visit        Endocrine    Hypothyroidism     Patient to continue current dose of thyroid medicine and recheck TSH in 6 months            Cardiovascular and Mediastinum    CAD (coronary artery disease)     Patient to continue  with current cardiac meds to decrease risk of re stenosis  Patient to follow up with cardiology for scheduled appointments to decrease risk for further cardiac problems with appropriate diagnostic testing to reassess cardiac status  Patient had alll questions about this problem answered today  Essential hypertension     Patient is stable with current anti-hypertensive medicine and continue to follow a low sodium diet and take current medication  All questions about this condition were answered today  Stroke [facility])     Patient is stable  and will continue present plan of care and reassess at next routine visit  All questions about this problem from patient were answered today  Nervous and Auditory    Hemiplegia and hemiparesis following cerebral infarction affecting right dominant side ([address] )       Other    Claudication ([address] )    Mixed hyperlipidemia     Patient  is stable with current medication and we discussed a low fat low cholesterol diet  Weight loss also discussed for this will help lower cholesterol also  Recheck lipids in 6 months  Relevant Orders    Comprehensive metabolic panel    Lipid Panel with Direct LDL reflex    Anxiety     Patient to continue utilizing medical therapy as well as counseling sources as applicable to condition  If suicidal thought or fear of suicide attempt, to call 911 and seek help immediately  Medications and therapy reviewed today and all patient  questions answered today          Other Visit Diagnoses     Well adult exam    -  Primary    Skin lesion        Relevant Medications    clotrimazole-betamethasone (LOTRISONE) 1-0 05 % cream    Other Relevant Orders Ambulatory Referral to General Surgery    Tinea cruris        Relevant Medications    clotrimazole-betamethasone (LOTRISONE) 1-0 05 % cream    Chronic diastolic (congestive) heart failure (HCC)        Opioid dependence, uncomplicated (HCC)        Coagulation defect, unspecified ([facility] )               Preventive health issues were discussed with patient, and age appropriate screening tests were ordered as noted in patient's After Visit Summary  Personalized health advice and appropriate referrals for health education or preventive services given if needed, as noted in patient's After Visit Summary       History of Present Illness:     Patient presents for a Medicare Wellness Visit    HPI   Patient Care Team:  Kimberli Culp MD as PCP - General (Family Medicine)  Kimberli Culp MD (Family Medicine)     Review of Systems:     Review of Systems     Problem List:     Patient Active Problem List   Diagnosis   • CAD (coronary artery disease)   • Chest pain   • Claudication [facility])   • Coronary atherosclerosis   • Essential hypertension   • Mixed hyperlipidemia   • Hypothyroidism   • Myofascial pain syndrome   • Radiculopathy, lumbar region   • Bilateral lower extremity edema   • Palpitation   • History of CVA (cerebrovascular accident)   • History of percutaneous coronary intervention   • Chronic diastolic heart failure ([facility] )   • Encounter for screening colonoscopy   • S/P CABG March 2021 LIMA-LAD;SVG to right PLV; SVG to RI   • Thrombocytopenia ([place] [address] )   • Hyperchloremia   • Hypocalcemia   • Encounter for postoperative care   • Stroke [facility])   • Insomnia   • BMI 33 0-33 9,adult   • Anxiety   • Continuous opioid dependence ([place] [address] )   • Lower GI bleed   • Stroke-like symptoms   • NSVT (nonsustained ventricular tachycardia)   • Hemiplegia and hemiparesis following cerebral infarction affecting right dominant side [facility])      Past Medical and Surgical History:     Past Medical History:   Diagnosis Date   • Anxiety    • CAD (coronary artery disease)    • Coronary artery disease    • Depression    • Disease of thyroid gland    • Dizziness    • Erectile dysfunction    • Eye problems    • Hypertension    • Hypothyroidism    • Migraines    • Stroke ([facility] ) 2014   • Tremors of nervous system      Past Surgical History:   Procedure Laterality Date   • COLONOSCOPY  2017   • COLONOSCOPY     • CORONARY ARTERY BYPASS GRAFT     • CORONARY STENT PLACEMENT     • DENTAL SURGERY      entire top teeth removal   • HERNIA REPAIR     • CA CORONARY ARTERY BYP W/VEIN & ARTERY GRAFT 4 VEIN N/A 3/24/2021    Procedure: CORONARY ARTERY BYPASS GRAFT (CABG) 3 VESSELS,  USING LEFT VANE-LAD AND LEFT GSV-RPL & RAMUS;  Surgeon: Freddy Smart MD;  [facility]: [facility];  Service: Cardiac Surgery   • CA ECHO TRANSESOPHAG [address] N/A 3/24/2021    Procedure: TRANSESOPHAGEAL ECHOCARDIOGRAM (MARLEN);   Surgeon: Freddy Smart MD;  Location: [facility];  Service: Cardiac Surgery      Family History:     Family History   Problem Relation Age of Onset   • Cancer Mother    • Hypertension Mother    • Heart attack Father    • Heart disease Father    • Hypertension Father    • Heart attack Sister    • Cancer Brother       Social History:     Social History     Socioeconomic History   • Marital status: /Civil Union     Spouse name: None   • Number of children: None   • Years of education: None   • Highest education level: None   Occupational History   • None   Tobacco Use   • Smoking status: Never   • Smokeless tobacco: Never   Vaping Use   • Vaping Use: Never used   Substance and Sexual Activity   • Alcohol use: No   • Drug use: No   • Sexual activity: Not Currently     Partners: Female     Birth control/protection: None   Other Topics Concern   • None   Social History Narrative    Most recent tobacco use screenin2019      Do you currently or have you served in the [employer]:   No      Were you activated, into active duty, as a member of the [employer] or as a Reservist:   No      Occupation:   retired      Education:   15      Marital status:         Sexual orientation:   Heterosexual      Exercise level:   Occasional      Diet:   Regular      General stress level:   Medium      Alcohol intake:   Occasional      Caffeine intake:   Occasional      Chewing tobacco:   none      Illicit drugs:   none      Guns present in home:   No      Seat belts used routinely:   Yes      Smoke alarm in home:   Yes      Advance directive:   No      Live alone or with others:   with others      Are there stairs in your home:   Yes  only in front of house     Pets:   No      Social Determinants of Health     Financial Resource Strain: Low Risk    • Difficulty of Paying Living Expenses: Not hard at all   Food Insecurity: Not on file   Transportation Needs: No Transportation Needs   • Lack of Transportation (Medical): No   • Lack of Transportation (Non-Medical):  No   Physical Activity: Not on file   Stress: Not on file   Social Connections: Not on file   Intimate Partner Violence: Not on file   Housing Stability: Not on file      Medications and Allergies:     Current Outpatient Medications   Medication Sig Dispense Refill   • acetaminophen-codeine (TYLENOL #3) 300-30 mg per tablet Take 1 tablet by mouth every 8 (eight) hours as needed for moderate pain 30 tablet 0   • aspirin 81 MG tablet Take 81 mg by mouth     • atorvastatin (LIPITOR) 40 mg tablet Take 2 tablets (80 mg total) by mouth daily 180 tablet 0   • clonazePAM (KlonoPIN) 2 mg tablet Take 2 tablets (4 mg total) by mouth 2 (two) times a day as needed for seizures 180 tablet 0   • clopidogrel (PLAVIX) 75 mg tablet Take 1 tablet (75 mg total) by mouth daily 90 tablet 0   • clotrimazole-betamethasone (LOTRISONE) 1-0 05 % cream Apply topically 2 (two) times a day 30 g 0   • levothyroxine 100 mcg tablet Take 1 tablet (100 mcg total) by mouth daily 90 tablet 0   • metoprolol succinate (TOPROL-XL) 100 mg 24 hr tablet Take 1 tablet (100 mg total) by mouth daily 90 tablet 0   • sildenafil (VIAGRA) 100 mg tablet Take 1 tablet (100 mg total) by mouth as needed for erectile dysfunction 30 tablet 5   • tiZANidine (ZANAFLEX) 2 mg tablet Take 1 tablet (2 mg total) by mouth every 8 (eight) hours as needed for muscle spasms 270 tablet 1   • valsartan (DIOVAN) 160 mg tablet Take 1 tablet (160 mg total) by mouth daily 90 tablet 0   • zolpidem (AMBIEN) 10 mg tablet 2 po  q hs 180 tablet 0   • oxyCODONE (ROXICODONE) 5 immediate release tablet Take 1 tablet (5 mg total) by mouth every 4 (four) hours as needed for moderate pain Max Daily Amount: 30 mg (Patient not taking: Reported on 3/27/2023) 16 tablet 0   • zolpidem (AMBIEN) 10 mg tablet 2 po  q hs (Patient not taking: Reported on 3/8/2023) 180 tablet 1     No current facility-administered medications for this visit  Allergies   Allergen Reactions   • Medical Tape Other (See Comments)     Other reaction(s): BURNS USE PAPER TAPE      Immunizations:     Immunization History   Administered Date(s) Administered   • COVID-19 PFIZER VACCINE 0 3 ML IM 04/29/2021, 05/20/2021, 11/27/2021   • INFLUENZA 12/20/2017, 11/28/2018, 11/03/2020, 10/18/2021, 09/16/2022   • Influenza Injectable, MDCK, Preservative Free, Quadrivalent, 0 5 mL 11/20/2019, 11/03/2020   • Influenza, high dose seasonal 0 7 mL 09/16/2022   • Tdap 05/08/2014, 07/23/2021   • Tuberculin Skin Test-PPD Intradermal 01/04/2001      Health Maintenance:         Topic Date Due   • Colorectal Cancer Screening  02/23/2027   • HIV Screening  Completed   • Hepatitis C Screening  Completed         Topic Date Due   • Pneumococcal Vaccine: 65+ Years (1 - PCV) Never done   • COVID-19 Vaccine (4 - Booster for Ornelas Peter series) 01/22/2022      Medicare Screening Tests and Risk Assessments:     Soniya Garrett is here for his Subsequent Wellness visit  Health Risk Assessment:   Patient rates overall health as fair  Patient feels that their physical health rating is slightly worse  Patient is dissatisfied with their life  Eyesight was rated as same  Hearing was rated as same  Patient feels that their emotional and mental health rating is same  Patients states they are never, rarely angry  Patient states they are sometimes unusually tired/fatigued  Pain experienced in the last 7 days has been a lot  Patient's pain rating has been 5/10  Patient states that he has experienced no weight loss or gain in last 6 months  Depression Screening:   PHQ-2 Score: 0      Fall Risk Screening: In the past year, patient has experienced: history of falling in past year    Number of falls: 2 or more  Injured during fall?: No    Feels unsteady when standing or walking?: Yes    Worried about falling?: Yes      Home Safety:  Patient does not have trouble with stairs inside or outside of their home  Patient has working smoke alarms and has working carbon monoxide detector  Home safety hazards include: none  Nutrition:   Current diet is Regular  Medications:   Patient is not currently taking any over-the-counter supplements  Patient is able to manage medications  Activities of Daily Living (ADLs)/Instrumental Activities of Daily Living (IADLs):   Walk and transfer into and out of bed and chair?: Yes  Dress and groom yourself?: Yes    Bathe or shower yourself?: Yes    Feed yourself? Yes  Do your laundry/housekeeping?: Yes  Manage your money, pay your bills and track your expenses?: Yes  Make your own meals?: Yes    Do your own shopping?: Yes    Durable Medical Equipment Suppliers  cane ,walker,  hospital bed    Previous Hospitalizations:   Any hospitalizations or ED visits within the last 12 months?: Yes    How many hospitalizations have you had in the last year?: 1-2    Advance Care Planning:   Living will: Yes    Durable POA for healthcare:  Yes    Advanced directive: Yes    Advanced directive counseling given: No    Five wishes "given: No    Patient declined ACP directive: No    End of Life Decisions reviewed with patient: Yes    Provider agrees with end of life decisions: Yes      Cognitive Screening:   Provider or family/friend/caregiver concerned regarding cognition?: No    PREVENTIVE SCREENINGS      Cardiovascular Screening:    General: Screening Not Indicated and History Lipid Disorder      Diabetes Screening:     General: Screening Current      Colorectal Cancer Screening:     General: Screening Current      Prostate Cancer Screening:    General: Screening Current      Abdominal Aortic Aneurysm (AAA) Screening:    Risk factors include: age between 73-67 yo        Lung Cancer Screening:     General: Screening Not Indicated      Hepatitis C Screening:    General: Screening Current    Screening, Brief Intervention, and Referral to Treatment (SBIRT)    Screening  Typical number of drinks in a day: 0  Typical number of drinks in a week: 0  Interpretation: Low risk drinking behavior  AUDIT-C Screenin) How often did you have a drink containing alcohol in the past year? never  2) How many drinks did you have on a typical day when you were drinking in the past year? 0  3) How often did you have 6 or more drinks on one occasion in the past year? never    AUDIT-C Score: 0  Interpretation: Score 0-3 (male): Negative screen for alcohol misuse    Single Item Drug Screening:  How often have you used an illegal drug (including marijuana) or a prescription medication for non-medical reasons in the past year? never    Single Item Drug Screen Score: 0  Interpretation: Negative screen for possible drug use disorder    Review of Current Opioid Use    Opioid Risk Tool (ORT) Interpretation: Complete Opioid Risk Tool (ORT)    No results found       Physical Exam:     /80 (BP Location: Left arm, Patient Position: Sitting, Cuff Size: Large)   Pulse 60   Temp 98 2 °F (36 8 °C) (Temporal)   Resp 18   Ht 5' 9\" (1 753 m)   Wt 104 kg (229 lb)   " SpO2 99%   BMI 33 82 kg/m²     Physical Exam     Fouzia Carter MD

## 2023-03-27 NOTE — PATIENT INSTRUCTIONS
Medicare Preventive Visit Patient Instructions  Thank you for completing your Welcome to Medicare Visit or Medicare Annual Wellness Visit today  Your next wellness visit will be due in one year (3/27/2024)  The screening/preventive services that you may require over the next 5-10 years are detailed below  Some tests may not apply to you based off risk factors and/or age  Screening tests ordered at today's visit but not completed yet may show as past due  Also, please note that scanned in results may not display below  Preventive Screenings:  Service Recommendations Previous Testing/Comments   Colorectal Cancer Screening  · Colonoscopy    · Fecal Occult Blood Test (FOBT)/Fecal Immunochemical Test (FIT)  · Fecal DNA/Cologuard Test  · Flexible Sigmoidoscopy Age: 39-70 years old   Colonoscopy: every 10 years (May be performed more frequently if at higher risk)  OR  FOBT/FIT: every 1 year  OR  Cologuard: every 3 years  OR  Sigmoidoscopy: every 5 years  Screening may be recommended earlier than age 39 if at higher risk for colorectal cancer  Also, an individualized decision between you and your healthcare provider will decide whether screening between the ages of 74-80 would be appropriate   Colonoscopy: 02/24/2022  FOBT/FIT: Not on file  Cologuard: Not on file  Sigmoidoscopy: Not on file    Screening Current     Prostate Cancer Screening Individualized decision between patient and health care provider in men between ages of 53-78   Medicare will cover every 12 months beginning on the day after your 50th birthday PSA: 0 9 ng/mL     Screening Current     Hepatitis C Screening Once for adults born between 1945 and 1965  More frequently in patients at high risk for Hepatitis C Hep C Antibody: 10/05/2017    Screening Current   Diabetes Screening 1-2 times per year if you're at risk for diabetes or have pre-diabetes Fasting glucose: 100 mg/dL (1/20/2023)  A1C: 5 6 % (6/9/2022)  Screening Current   Cholesterol Screening Once every 5 years if you don't have a lipid disorder  May order more often based on risk factors  Lipid panel: 01/20/2023  Screening Not Indicated  History Lipid Disorder      Other Preventive Screenings Covered by Medicare:  1  Abdominal Aortic Aneurysm (AAA) Screening: covered once if your at risk  You're considered to be at risk if you have a family history of AAA or a male between the age of 73-68 who smoking at least 100 cigarettes in your lifetime  2  Lung Cancer Screening: covers low dose CT scan once per year if you meet all of the following conditions: (1) Age 50-69; (2) No signs or symptoms of lung cancer; (3) Current smoker or have quit smoking within the last 15 years; (4) You have a tobacco smoking history of at least 20 pack years (packs per day x number of years you smoked); (5) You get a written order from a healthcare provider  3  Glaucoma Screening: covered annually if you're considered high risk: (1) You have diabetes OR (2) Family history of glaucoma OR (3)  aged 48 and older OR (3)  American aged 72 and older  3  Osteoporosis Screening: covered every 2 years if you meet one of the following conditions: (1) Have a vertebral abnormality; (2) On glucocorticoid therapy for more than 3 months; (3) Have primary hyperparathyroidism; (4) On osteoporosis medications and need to assess response to drug therapy  5  HIV Screening: covered annually if you're between the age of 12-76  Also covered annually if you are younger than 13 and older than 72 with risk factors for HIV infection  For pregnant patients, it is covered up to 3 times per pregnancy      Immunizations:  Immunization Recommendations   Influenza Vaccine Annual influenza vaccination during flu season is recommended for all persons aged >= 6 months who do not have contraindications   Pneumococcal Vaccine   * Pneumococcal conjugate vaccine = PCV13 (Prevnar 13), PCV15 (Vaxneuvance), PCV20 (Prevnar 20)  * Pneumococcal polysaccharide vaccine = PPSV23 (Pneumovax) Adults 2364 years old: 1-3 doses may be recommended based on certain risk factors  Adults 72 years old: 1-2 doses may be recommended based off what pneumonia vaccine you previously received   Hepatitis B Vaccine 3 dose series if at intermediate or high risk (ex: diabetes, end stage renal disease, liver disease)   Tetanus (Td) Vaccine - COST NOT COVERED BY MEDICARE PART B Following completion of primary series, a booster dose should be given every 10 years to maintain immunity against tetanus  Td may also be given as tetanus wound prophylaxis  Tdap Vaccine - COST NOT COVERED BY MEDICARE PART B Recommended at least once for all adults  For pregnant patients, recommended with each pregnancy  Shingles Vaccine (Shingrix) - COST NOT COVERED BY MEDICARE PART B  2 shot series recommended in those aged 48 and above     Health Maintenance Due:      Topic Date Due   • Colorectal Cancer Screening  02/23/2027   • HIV Screening  Completed   • Hepatitis C Screening  Completed     Immunizations Due:      Topic Date Due   • Pneumococcal Vaccine: 65+ Years (1 - PCV) Never done   • COVID-19 Vaccine (4 - Booster for Ornelas Peter series) 01/22/2022     Advance Directives   What are advance directives? Advance directives are legal documents that state your wishes and plans for medical care  These plans are made ahead of time in case you lose your ability to make decisions for yourself  Advance directives can apply to any medical decision, such as the treatments you want, and if you want to donate organs  What are the types of advance directives? There are many types of advance directives, and each state has rules about how to use them  You may choose a combination of any of the following:  · Living will: This is a written record of the treatment you want  You can also choose which treatments you do not want, which to limit, and which to stop at a certain time   This includes surgery, medicine, IV fluid, and tube feedings  · Durable power of  for healthcare [facility]): This is a written record that states who you want to make healthcare choices for you when you are unable to make them for yourself  This person, called a proxy, is usually a family member or a friend  You may choose more than 1 proxy  · Do not resuscitate (DNR) order:  A DNR order is used in case your heart stops beating or you stop breathing  It is a request not to have certain forms of treatment, such as CPR  A DNR order may be included in other types of advance directives  · Medical directive: This covers the care that you want if you are in a coma, near death, or unable to make decisions for yourself  You can list the treatments you want for each condition  Treatment may include pain medicine, surgery, blood transfusions, dialysis, IV or tube feedings, and a ventilator (breathing machine)  · Values history: This document has questions about your views, beliefs, and how you feel and think about life  This information can help others choose the care that you would choose  Why are advance directives important? An advance directive helps you control your care  Although spoken wishes may be used, it is better to have your wishes written down  Spoken wishes can be misunderstood, or not followed  Treatments may be given even if you do not want them  An advance directive may make it easier for your family to make difficult choices about your care  Fall Prevention    Fall prevention  includes ways to make your home and other areas safer  It also includes ways you can move more carefully to prevent a fall  Health conditions that cause changes in your blood pressure, vision, or muscle strength and coordination may increase your risk for falls  Medicines may also increase your risk for falls if they make you dizzy, weak, or sleepy  Fall prevention tips:   · Stand or sit up slowly  · Use assistive devices as directed  · Wear shoes that fit well and have soles that   · Wear a personal alarm  · Stay active  · Manage your medical conditions  Home Safety Tips:  · Add items to prevent falls in the bathroom  · Keep paths clear  · Install bright lights in your home  · Keep items you use often on shelves within reach  · Paint or place reflective tape on the edges of your stairs  Weight Management   Why it is important to manage your weight:  Being overweight increases your risk of health conditions such as heart disease, high blood pressure, type 2 diabetes, and certain types of cancer  It can also increase your risk for osteoarthritis, sleep apnea, and other respiratory problems  Aim for a slow, steady weight loss  Even a small amount of weight loss can lower your risk of health problems  How to lose weight safely:  A safe and healthy way to lose weight is to eat fewer calories and get regular exercise  You can lose up about 1 pound a week by decreasing the number of calories you eat by 500 calories each day  Healthy meal plan for weight management:  A healthy meal plan includes a variety of foods, contains fewer calories, and helps you stay healthy  A healthy meal plan includes the following:  · Eat whole-grain foods more often  A healthy meal plan should contain fiber  Fiber is the part of grains, fruits, and vegetables that is not broken down by your body  Whole-grain foods are healthy and provide extra fiber in your diet  Some examples of whole-grain foods are whole-wheat breads and pastas, oatmeal, brown rice, and bulgur  · Eat a variety of vegetables every day  Include dark, leafy greens such as spinach, kale, jacques greens, and mustard greens  Eat yellow and orange vegetables such as carrots, sweet potatoes, and winter squash  · Eat a variety of fruits every day  Choose fresh or canned fruit (canned in its own juice or light syrup) instead of juice   Fruit juice has very little or no fiber   · Eat low-fat dairy foods  Drink fat-free (skim) milk or 1% milk  Eat fat-free yogurt and low-fat cottage cheese  Try low-fat cheeses such as mozzarella and other reduced-fat cheeses  · Choose meat and other protein foods that are low in fat  Choose beans or other legumes such as split peas or lentils  Choose fish, skinless poultry (chicken or turkey), or lean cuts of red meat (beef or pork)  Before you cook meat or poultry, cut off any visible fat  · Use less fat and oil  Try baking foods instead of frying them  Add less fat, such as margarine, sour cream, regular salad dressing and mayonnaise to foods  Eat fewer high-fat foods  Some examples of high-fat foods include french fries, doughnuts, ice cream, and cakes  · Eat fewer sweets  Limit foods and drinks that are high in sugar  This includes candy, cookies, regular soda, and sweetened drinks  Exercise:  Exercise at least 30 minutes per day on most days of the week  Some examples of exercise include walking, biking, dancing, and swimming  You can also fit in more physical activity by taking the stairs instead of the elevator or parking farther away from stores  Ask your healthcare provider about the best exercise plan for you  Narcotic (Opioid) Safety    Use narcotics safely:  · Take prescribed narcotics exactly as directed  · Do not give narcotics to others or take narcotics that belong to someone else  · Do not mix narcotics without medicines or alcohol  · Do not drive or operate heavy machinery after you take the narcotic  · Monitor for side effects and notify your healthcare provider if you experienced side effects such as nausea, sleepiness, itching, or trouble thinking clearly  Manage constipation:    Constipation is the most common side effect of narcotic medicine  Constipation is when you have hard, dry bowel movements, or you go longer than usual between bowel movements   Tell your healthcare provider about all changes in your bowel movements while you are taking narcotics  He or she may recommend laxative medicine to help you have a bowel movement  He or she may also change the kind of narcotic you are taking, or change when you take it  The following are more ways you can prevent or relieve constipation:    · Drink liquids as directed  You may need to drink extra liquids to help soften and move your bowels  Ask how much liquid to drink each day and which liquids are best for you  · Eat high-fiber foods  This may help decrease constipation by adding bulk to your bowel movements  High-fiber foods include fruits, vegetables, whole-grain breads and cereals, and beans  Your healthcare provider or dietitian can help you create a high-fiber meal plan  Your provider may also recommend a fiber supplement if you cannot get enough fiber from food  · Exercise regularly  Regular physical activity can help stimulate your intestines  Walking is a good exercise to prevent or relieve constipation  Ask which exercises are best for you  · Schedule a time each day to have a bowel movement  This may help train your body to have regular bowel movements  Bend forward while you are on the toilet to help move the bowel movement out  Sit on the toilet for at least 10 minutes, even if you do not have a bowel movement  Store narcotics safely:   · Store narcotics where others cannot easily get them  Keep them in a locked cabinet or secure area  Do not  keep them in a purse or other bag you carry with you  A person may be looking for something else and find the narcotics  · Make sure narcotics are stored out of the reach of children  A child can easily overdose on narcotics  Narcotics may look like candy to a small child  The best way to dispose of narcotics: The laws vary by country and area  In the [place], the best way is to return the narcotics through a take-back program  This program is offered by the [employer] (DONNIE)   The following are options for using the program:  · Take the narcotics to a DONNIE collection site  The site is often a law enforcement center  Call your local law enforcement center for scheduled take-back days in your area  You will be given information on where to go if the collection site is in a different location  · Take the narcotics to an approved pharmacy or hospital   A pharmacy or hospital may be set up as a collection site  You will need to ask if it is a DONNIE collection site if you were not directed there  A pharmacy or doctor's office may not be able to take back narcotics unless it is a DONNIE site  · Use a mail-back system  This means you are given containers to put the narcotics into  You will then mail them in the containers  · Use a take-back drop box  This is a place to leave the narcotics at any time  People and animals will not be able to get into the box  Your local law enforcement agency can tell you where to find a drop box in your area  Other ways to manage pain:   · Ask your healthcare provider about non-narcotic medicines to control pain  Nonprescription medicines include NSAIDs (such as ibuprofen) and acetaminophen  Prescription medicines include muscle relaxers, antidepressants, and steroids  · Pain may be managed without any medicines  Some ways to relieve pain include massage, aromatherapy, or meditation  Physical or occupational therapy may also help  For more information:   · Drug Enforcement Administration  [address] [place] [address] Michelle [address]  Phone: 2- 415 - 846-4245  Web Address: [facility]/drug_disposal/    · [address]  and Drug Administration  [facility] , [address] [place] [address]  Phone: 2- 263 - 310-8640  Web Address: http://[employer]/     © Copyright [employer] 2018 Information is for End User's use only and may not be sold, redistributed or otherwise used for commercial purposes   All illustrations and images included in Mu are the copyrighted property of A D A M , Inc  or [address] of care:  Maximize your health and quality of life by:   · Increasing your level of function and activity  · Decreasing the negative effects of pain on your life  · Minimizing the risks and side effects of medications and ensuring safe use of opioid medication     Ways for you to help meet your goals:  Maintain a healthy lifestyle  This includes proper nutrition, regular physical activity as able, try for 8 hours of sleep per night, use stress reduction strategies, avoid triggers  Risks and side effects of opioid use:  Prescription opioids carry serious risks of addiction and  overdose, especially with prolonged use  An opioid overdose,  often marked by slowed breathing, can cause sudden death  The  use of prescription opioids can have a number of side effects as  well, even when taken as directed:  · Tolerance--meaning you might need to take more of a medication for the same pain relief  · Physical dependence--meaning you have symptoms of withdrawal when a medication is stopped  · Increased sensitivity to pain  · Constipation  · Nausea, vomiting, dry mouth  · Sleepiness and dizziness   · Confusion  · Depression  · Low levels of testosterone that can result in lower sex drive, energy, and strength  · Itching and sweating    If you are prescribed opioids for pain:  · Never take opioids in greater amounts or more often than prescribed  · Help prevent misuse and abuse  - Never sell or share prescription opioids         - Never use another person’s prescription opioids  · ‡Store prescription opioids in a secure place and out of reach of others (this may include visitors, children, friends, and family)    · Safely dispose of unused prescription opioids: Find your community drug take-back program or your pharmacy mail-back program, or flush them down the toilet, following guidance from the Food and Drug Administration (www fda gov/Drugs/ResourcesForYou)  · ‡Visit www cdc gov/drugoverdose to learn about the risks of opioid abuse and overdose  · If you believe you may be struggling with addiction, tell your health care provider and ask for guidance or call [facility]’s National Helpline at 0-030-155-JJHQ

## 2023-04-17 PROBLEM — L98.9 SKIN LESION: Status: ACTIVE | Noted: 2023-04-17

## 2023-04-23 DIAGNOSIS — E03.9 HYPOTHYROIDISM, UNSPECIFIED TYPE: ICD-10-CM

## 2023-04-23 DIAGNOSIS — I25.709 CORONARY ARTERY DISEASE INVOLVING CORONARY BYPASS GRAFT OF NATIVE HEART WITH ANGINA PECTORIS (HCC): ICD-10-CM

## 2023-04-23 DIAGNOSIS — I10 ESSENTIAL HYPERTENSION: ICD-10-CM

## 2023-04-23 DIAGNOSIS — E78.2 MIXED HYPERLIPIDEMIA: ICD-10-CM

## 2023-04-23 DIAGNOSIS — F41.9 ANXIETY: ICD-10-CM

## 2023-04-23 DIAGNOSIS — Z95.1 S/P CABG (CORONARY ARTERY BYPASS GRAFT): ICD-10-CM

## 2023-04-24 RX ORDER — ATORVASTATIN CALCIUM 40 MG/1
TABLET, FILM COATED ORAL
Qty: 180 TABLET | Refills: 0 | Status: SHIPPED | OUTPATIENT
Start: 2023-04-24

## 2023-04-24 RX ORDER — VALSARTAN 160 MG/1
TABLET ORAL
Qty: 90 TABLET | Refills: 0 | Status: SHIPPED | OUTPATIENT
Start: 2023-04-24

## 2023-04-24 RX ORDER — LEVOTHYROXINE SODIUM 0.1 MG/1
TABLET ORAL
Qty: 90 TABLET | Refills: 0 | Status: SHIPPED | OUTPATIENT
Start: 2023-04-24

## 2023-04-24 RX ORDER — CLONAZEPAM 2 MG/1
TABLET ORAL
Qty: 120 TABLET | Refills: 5 | Status: SHIPPED | OUTPATIENT
Start: 2023-04-24

## 2023-04-24 RX ORDER — CLOPIDOGREL BISULFATE 75 MG/1
TABLET ORAL
Qty: 90 TABLET | Refills: 0 | Status: SHIPPED | OUTPATIENT
Start: 2023-04-24

## 2023-07-01 DIAGNOSIS — M54.50 LOW BACK PAIN WITHOUT SCIATICA, UNSPECIFIED BACK PAIN LATERALITY, UNSPECIFIED CHRONICITY: ICD-10-CM

## 2023-07-01 RX ORDER — TIZANIDINE 2 MG/1
TABLET ORAL
Qty: 270 TABLET | Refills: 0 | Status: SHIPPED | OUTPATIENT
Start: 2023-07-01

## 2023-07-13 DIAGNOSIS — N52.9 ERECTILE DYSFUNCTION, UNSPECIFIED ERECTILE DYSFUNCTION TYPE: ICD-10-CM

## 2023-07-13 RX ORDER — SILDENAFIL 100 MG/1
TABLET, FILM COATED ORAL
Qty: 30 TABLET | Refills: 5 | Status: SHIPPED | OUTPATIENT
Start: 2023-07-13

## 2023-07-26 ENCOUNTER — TELEPHONE (OUTPATIENT)
Dept: CARDIOLOGY CLINIC | Facility: CLINIC | Age: 68
End: 2023-07-26

## 2023-07-26 NOTE — TELEPHONE ENCOUNTER
(F#: [phone])    In regards to upcoming procedure at [address] [place] [address], Anesthesiologist requested  last ekg, stress test, echo, and office visit to be faxed to them.

## 2023-07-27 ENCOUNTER — OFFICE VISIT (OUTPATIENT)
Dept: FAMILY MEDICINE CLINIC | Facility: CLINIC | Age: 68
End: 2023-07-27
Payer: MEDICARE

## 2023-07-27 VITALS
HEART RATE: 64 BPM | TEMPERATURE: 98.1 F | HEIGHT: 69 IN | DIASTOLIC BLOOD PRESSURE: 76 MMHG | RESPIRATION RATE: 18 BRPM | SYSTOLIC BLOOD PRESSURE: 122 MMHG | WEIGHT: 228 LBS | OXYGEN SATURATION: 97 % | BODY MASS INDEX: 33.77 KG/M2

## 2023-07-27 DIAGNOSIS — Z01.818 PREOPERATIVE CLEARANCE: Primary | ICD-10-CM

## 2023-07-27 DIAGNOSIS — D68.9 COAGULATION DEFECT, UNSPECIFIED (HCC): ICD-10-CM

## 2023-07-27 DIAGNOSIS — N18.30 STAGE 3 CHRONIC KIDNEY DISEASE, UNSPECIFIED WHETHER STAGE 3A OR 3B CKD (HCC): ICD-10-CM

## 2023-07-27 DIAGNOSIS — I47.20 VENTRICULAR TACHYCARDIA (HCC): ICD-10-CM

## 2023-07-27 PROCEDURE — 93000 ELECTROCARDIOGRAM COMPLETE: CPT | Performed by: NURSE PRACTITIONER

## 2023-07-27 PROCEDURE — 99213 OFFICE O/P EST LOW 20 MIN: CPT | Performed by: NURSE PRACTITIONER

## 2023-07-27 NOTE — PROGRESS NOTES
Name: Ethan Arzola      : 1955      MRN: [id number]  Encounter Provider: CHRISTA Koch  Encounter Date: 2023   Encounter department: [address] [place] [address]    Assessment & Plan     1. Preoperative clearance  -     POCT ECG    2. Ventricular tachycardia (HCC)    3. Coagulation defect, unspecified (HCC)    4. Stage 3 chronic kidney disease, unspecified whether stage 3a or 3b CKD ([address])    Patient is here for preop clearance for cataract surgery. Patient is found to be a good current surgical candidate patient has a stable EKG and may hold his Plavix 4 to 5 days prior to the procedure starting the Plavix back at the normal dose a day after the procedure. Ventricular tachycardia is coagulation defect in his stage III kidney disease are all stable would not be concerned for this procedure. Surgical clearance was faxed to the [facility]      Patient is here for an upcoming cataract procedure denies any new symptoms. Review of Systems   Constitutional: Negative for appetite change and fever. HENT: Negative for congestion and trouble swallowing. Respiratory: Negative for shortness of breath. Cardiovascular: Negative for chest pain. Gastrointestinal: Negative for abdominal pain. Genitourinary: Negative for difficulty urinating. Musculoskeletal: Negative for myalgias. Neurological: Negative for dizziness. Psychiatric/Behavioral: The patient is not nervous/anxious.         Current Outpatient Medications on File Prior to Visit   Medication Sig   • acetaminophen-codeine (TYLENOL #3) 300-30 mg per tablet Take 1 tablet by mouth every 8 (eight) hours as needed for moderate pain   • aspirin 81 MG tablet Take 81 mg by mouth   • atorvastatin (LIPITOR) 40 mg tablet TAKE 2 TABLETS EVERY DAY   • clonazePAM (KlonoPIN) 2 mg tablet TAKE 2 TABLETS TWICE DAILY FOR SEIZURES AS NEEDED   • clopidogrel (PLAVIX) 75 mg tablet TAKE 1 TABLET EVERY DAY   • clotrimazole-betamethasone (LOTRISONE) 1-0.05 % cream Apply topically 2 (two) times a day   • levothyroxine 100 mcg tablet TAKE 1 TABLET EVERY DAY   • metoprolol succinate (TOPROL-XL) 100 mg 24 hr tablet Take 1 tablet (100 mg total) by mouth daily   • sildenafil (VIAGRA) 100 mg tablet TAKE 1 TABLET BY MOUTH AS NEEDED FOR ERECTILE DYSFUNCTION   • tiZANidine (ZANAFLEX) 2 mg tablet TAKE 1 TABLET EVERY 8 HOURS AS NEEDED FOR MUSCLE SPASM(S)   • valsartan (DIOVAN) 160 mg tablet TAKE 1 TABLET EVERY DAY   • zolpidem (AMBIEN) 10 mg tablet 2 po  q hs   • zolpidem (AMBIEN) 10 mg tablet 2 po  q hs (Patient not taking: Reported on 4/17/2023)   • [DISCONTINUED] oxyCODONE (ROXICODONE) 5 immediate release tablet Take 1 tablet (5 mg total) by mouth every 4 (four) hours as needed for moderate pain Max Daily Amount: 30 mg (Patient not taking: Reported on 3/27/2023)       Objective     /76 (BP Location: Left arm, Patient Position: Sitting, Cuff Size: Large)   Pulse 64   Temp 98.1 °F (36.7 °C) (Temporal)   Resp 18   Ht 5' 9" (1.753 m)   Wt 103 kg (228 lb)   SpO2 97%   BMI 33.67 kg/m²     Physical Exam  Vitals and nursing note reviewed. Constitutional:       General: He is not in acute distress. Appearance: He is well-developed. HENT:      Head: Normocephalic. Right Ear: External ear normal.      Left Ear: External ear normal.      Mouth/Throat:      Pharynx: Oropharynx is clear. Eyes:      Pupils: Pupils are equal, round, and reactive to light. Cardiovascular:      Rate and Rhythm: Normal rate and regular rhythm. Heart sounds: Normal heart sounds. Pulmonary:      Effort: Pulmonary effort is normal.      Breath sounds: Normal breath sounds. Abdominal:      Palpations: Abdomen is soft. Musculoskeletal:         General: Normal range of motion. Cervical back: Normal range of motion and neck supple. Skin:     General: Skin is warm and dry. Neurological:      General: No focal deficit present. Mental Status: He is alert. Psychiatric:         Behavior: Behavior normal.         Thought Content:  Thought content normal.         Judgment: Judgment normal.       CHRISTA Knox

## 2023-08-09 DIAGNOSIS — G47.00 INSOMNIA, UNSPECIFIED TYPE: ICD-10-CM

## 2023-08-09 RX ORDER — ZOLPIDEM TARTRATE 10 MG/1
TABLET ORAL
Qty: 180 TABLET | Refills: 0 | Status: SHIPPED | OUTPATIENT
Start: 2023-08-09

## 2023-09-14 ENCOUNTER — APPOINTMENT (OUTPATIENT)
Dept: LAB | Facility: CLINIC | Age: 68
End: 2023-09-14
Payer: MEDICARE

## 2023-09-14 DIAGNOSIS — E03.9 HYPOTHYROIDISM, UNSPECIFIED TYPE: ICD-10-CM

## 2023-09-14 DIAGNOSIS — Z95.1 S/P CABG (CORONARY ARTERY BYPASS GRAFT): ICD-10-CM

## 2023-09-14 DIAGNOSIS — E78.2 MIXED HYPERLIPIDEMIA: ICD-10-CM

## 2023-09-14 DIAGNOSIS — I10 ESSENTIAL HYPERTENSION: ICD-10-CM

## 2023-09-14 LAB
ALBUMIN SERPL BCP-MCNC: 4.4 G/DL (ref 3.5–5)
ALP SERPL-CCNC: 81 U/L (ref 34–104)
ALT SERPL W P-5'-P-CCNC: 19 U/L (ref 7–52)
ANION GAP SERPL CALCULATED.3IONS-SCNC: 10 MMOL/L
AST SERPL W P-5'-P-CCNC: 23 U/L (ref 13–39)
BILIRUB SERPL-MCNC: 1.1 MG/DL (ref 0.2–1)
BUN SERPL-MCNC: 15 MG/DL (ref 5–25)
CALCIUM SERPL-MCNC: 9.7 MG/DL (ref 8.4–10.2)
CHLORIDE SERPL-SCNC: 101 MMOL/L (ref 96–108)
CHOLEST SERPL-MCNC: 113 MG/DL
CO2 SERPL-SCNC: 28 MMOL/L (ref 21–32)
CREAT SERPL-MCNC: 1.33 MG/DL (ref 0.6–1.3)
GFR SERPL CREATININE-BSD FRML MDRD: 55 ML/MIN/1.73SQ M
GLUCOSE P FAST SERPL-MCNC: 104 MG/DL (ref 65–99)
HDLC SERPL-MCNC: 34 MG/DL
LDLC SERPL CALC-MCNC: 62 MG/DL (ref 0–100)
POTASSIUM SERPL-SCNC: 4.2 MMOL/L (ref 3.5–5.3)
PROT SERPL-MCNC: 7.5 G/DL (ref 6.4–8.4)
SODIUM SERPL-SCNC: 139 MMOL/L (ref 135–147)
TRIGL SERPL-MCNC: 84 MG/DL

## 2023-09-14 PROCEDURE — 80053 COMPREHEN METABOLIC PANEL: CPT

## 2023-09-14 PROCEDURE — 80061 LIPID PANEL: CPT

## 2023-09-14 PROCEDURE — 36415 COLL VENOUS BLD VENIPUNCTURE: CPT

## 2023-09-14 RX ORDER — CLOPIDOGREL BISULFATE 75 MG/1
TABLET ORAL
Qty: 90 TABLET | Refills: 0 | Status: SHIPPED | OUTPATIENT
Start: 2023-09-14

## 2023-09-14 RX ORDER — LEVOTHYROXINE SODIUM 0.1 MG/1
TABLET ORAL
Qty: 90 TABLET | Refills: 0 | Status: SHIPPED | OUTPATIENT
Start: 2023-09-14

## 2023-09-14 RX ORDER — VALSARTAN 160 MG/1
TABLET ORAL
Qty: 90 TABLET | Refills: 3 | Status: SHIPPED | OUTPATIENT
Start: 2023-09-14

## 2023-09-14 NOTE — TELEPHONE ENCOUNTER
Patient was in the area and stopped by to say audra. He is grateful to you for all your help when he was a patient here. Also said you saved his life.
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
## 2023-09-27 ENCOUNTER — OFFICE VISIT (OUTPATIENT)
Dept: FAMILY MEDICINE CLINIC | Facility: CLINIC | Age: 68
End: 2023-09-27
Payer: MEDICARE

## 2023-09-27 VITALS
BODY MASS INDEX: 33.77 KG/M2 | TEMPERATURE: 97.4 F | HEIGHT: 69 IN | SYSTOLIC BLOOD PRESSURE: 136 MMHG | DIASTOLIC BLOOD PRESSURE: 86 MMHG | HEART RATE: 65 BPM | OXYGEN SATURATION: 96 % | WEIGHT: 228 LBS

## 2023-09-27 DIAGNOSIS — I10 ESSENTIAL HYPERTENSION: ICD-10-CM

## 2023-09-27 DIAGNOSIS — G47.00 INSOMNIA, UNSPECIFIED TYPE: ICD-10-CM

## 2023-09-27 DIAGNOSIS — I69.351 HEMIPLEGIA AND HEMIPARESIS FOLLOWING CEREBRAL INFARCTION AFFECTING RIGHT DOMINANT SIDE (HCC): Primary | ICD-10-CM

## 2023-09-27 DIAGNOSIS — I25.10 ATHEROSCLEROSIS OF NATIVE CORONARY ARTERY OF NATIVE HEART WITHOUT ANGINA PECTORIS: ICD-10-CM

## 2023-09-27 DIAGNOSIS — Z23 IMMUNIZATION DUE: ICD-10-CM

## 2023-09-27 DIAGNOSIS — E03.4 HYPOTHYROIDISM DUE TO ACQUIRED ATROPHY OF THYROID: ICD-10-CM

## 2023-09-27 DIAGNOSIS — F41.9 ANXIETY: ICD-10-CM

## 2023-09-27 PROCEDURE — 99214 OFFICE O/P EST MOD 30 MIN: CPT | Performed by: FAMILY MEDICINE

## 2023-09-27 PROCEDURE — G0008 ADMIN INFLUENZA VIRUS VAC: HCPCS | Performed by: FAMILY MEDICINE

## 2023-09-27 PROCEDURE — 90662 IIV NO PRSV INCREASED AG IM: CPT | Performed by: FAMILY MEDICINE

## 2023-11-12 DIAGNOSIS — G47.00 INSOMNIA, UNSPECIFIED TYPE: ICD-10-CM

## 2023-11-13 RX ORDER — ZOLPIDEM TARTRATE 10 MG/1
TABLET ORAL
Qty: 180 TABLET | Refills: 0 | Status: SHIPPED | OUTPATIENT
Start: 2023-11-13

## 2023-11-16 DIAGNOSIS — F41.9 ANXIETY: ICD-10-CM

## 2023-11-16 RX ORDER — CLONAZEPAM 2 MG/1
TABLET ORAL
Qty: 120 TABLET | Refills: 0 | Status: SHIPPED | OUTPATIENT
Start: 2023-11-16

## 2023-11-19 DIAGNOSIS — I25.709 CORONARY ARTERY DISEASE INVOLVING CORONARY BYPASS GRAFT OF NATIVE HEART WITH ANGINA PECTORIS (HCC): ICD-10-CM

## 2023-11-19 DIAGNOSIS — E78.2 MIXED HYPERLIPIDEMIA: ICD-10-CM

## 2023-11-20 RX ORDER — ATORVASTATIN CALCIUM 40 MG/1
TABLET, FILM COATED ORAL
Qty: 180 TABLET | Refills: 1 | Status: SHIPPED | OUTPATIENT
Start: 2023-11-20

## 2023-11-22 ENCOUNTER — NURSE TRIAGE (OUTPATIENT)
Age: 68
End: 2023-11-22
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
## 2023-11-23 DIAGNOSIS — B35.6 TINEA CRURIS: ICD-10-CM

## 2023-11-23 PROBLEM — Z48.89 ENCOUNTER FOR POSTOPERATIVE CARE: Status: RESOLVED | Noted: 2021-04-30 | Resolved: 2023-11-23

## 2023-11-24 ENCOUNTER — OFFICE VISIT (OUTPATIENT)
Dept: FAMILY MEDICINE CLINIC | Facility: CLINIC | Age: 68
End: 2023-11-24
Payer: MEDICARE

## 2023-11-24 VITALS
WEIGHT: 226 LBS | RESPIRATION RATE: 16 BRPM | HEART RATE: 58 BPM | OXYGEN SATURATION: 98 % | DIASTOLIC BLOOD PRESSURE: 90 MMHG | TEMPERATURE: 97.5 F | BODY MASS INDEX: 33.47 KG/M2 | SYSTOLIC BLOOD PRESSURE: 142 MMHG | HEIGHT: 69 IN

## 2023-11-24 DIAGNOSIS — I69.351 HEMIPLEGIA AND HEMIPARESIS FOLLOWING CEREBRAL INFARCTION AFFECTING RIGHT DOMINANT SIDE (HCC): ICD-10-CM

## 2023-11-24 DIAGNOSIS — E03.4 HYPOTHYROIDISM DUE TO ACQUIRED ATROPHY OF THYROID: ICD-10-CM

## 2023-11-24 DIAGNOSIS — I10 ESSENTIAL HYPERTENSION: ICD-10-CM

## 2023-11-24 DIAGNOSIS — G47.00 INSOMNIA, UNSPECIFIED TYPE: ICD-10-CM

## 2023-11-24 DIAGNOSIS — E78.2 MIXED HYPERLIPIDEMIA: ICD-10-CM

## 2023-11-24 DIAGNOSIS — M25.562 CHRONIC PAIN OF LEFT KNEE: ICD-10-CM

## 2023-11-24 DIAGNOSIS — F32.89 OTHER DEPRESSION: ICD-10-CM

## 2023-11-24 DIAGNOSIS — I25.709 CORONARY ARTERY DISEASE INVOLVING CORONARY BYPASS GRAFT OF NATIVE HEART WITH ANGINA PECTORIS (HCC): ICD-10-CM

## 2023-11-24 DIAGNOSIS — G89.29 CHRONIC PAIN OF LEFT KNEE: ICD-10-CM

## 2023-11-24 DIAGNOSIS — F41.9 ANXIETY: Primary | ICD-10-CM

## 2023-11-24 PROCEDURE — 99214 OFFICE O/P EST MOD 30 MIN: CPT | Performed by: FAMILY MEDICINE

## 2023-11-24 RX ORDER — CLOTRIMAZOLE AND BETAMETHASONE DIPROPIONATE 10; .64 MG/G; MG/G
CREAM TOPICAL 2 TIMES DAILY
Qty: 30 G | Refills: 0 | Status: SHIPPED | OUTPATIENT
Start: 2023-11-24

## 2023-11-24 RX ORDER — ESCITALOPRAM OXALATE 5 MG/1
5 TABLET ORAL DAILY
Qty: 30 TABLET | Refills: 1 | Status: SHIPPED | OUTPATIENT
Start: 2023-11-24

## 2023-11-24 RX ORDER — OXYCODONE HYDROCHLORIDE 5 MG/1
5 CAPSULE ORAL EVERY 4 HOURS PRN
Qty: 30 CAPSULE | Refills: 0 | Status: SHIPPED | OUTPATIENT
Start: 2023-11-24 | End: 2023-11-28
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
## 2023-11-25 DIAGNOSIS — M54.50 LOW BACK PAIN WITHOUT SCIATICA, UNSPECIFIED BACK PAIN LATERALITY, UNSPECIFIED CHRONICITY: ICD-10-CM

## 2023-11-27 RX ORDER — TIZANIDINE 2 MG/1
TABLET ORAL
Qty: 270 TABLET | Refills: 3 | Status: SHIPPED | OUTPATIENT
Start: 2023-11-27

## 2023-11-28 ENCOUNTER — TELEPHONE (OUTPATIENT)
Age: 68
End: 2023-11-28

## 2023-11-28 DIAGNOSIS — M54.50 LOW BACK PAIN WITHOUT SCIATICA, UNSPECIFIED BACK PAIN LATERALITY, UNSPECIFIED CHRONICITY: Primary | ICD-10-CM

## 2023-11-28 NOTE — TELEPHONE ENCOUNTER
Patient calling to follow up with oxycodone script - he cannot take capsules. Please advise with a new script.

## 2023-11-28 NOTE — TELEPHONE ENCOUNTER
Pharmacy called regarding the prescription for Oxycodone. The pharmacy can only do tablets not capsules. A new script needs to be sent to the pharmacy. Radha rose.

## 2023-11-29 ENCOUNTER — TELEPHONE (OUTPATIENT)
Age: 68
End: 2023-11-29

## 2023-11-29 NOTE — TELEPHONE ENCOUNTER
Aysha from [address] [place] [address] called to verify diagnosis codes for Oxycodone and Clonazepam, states only can fill Oxycodone for 7 days, patient is aware and told Aysha that was ok.

## 2024-01-02 NOTE — ASSESSMENT & PLAN NOTE
Patient to continue utilizing medical therapy as well as counseling sources as applicable to condition. If suicidal thought or fear of suicide attempt, to call 911 and seek help immediately. Medications and therapy reviewed today and all patient  questions answered today.

## 2024-01-02 NOTE — ASSESSMENT & PLAN NOTE
Patient to continue  with current cardiac meds to decrease risk of re stenosis. Patient to follow up with cardiology for scheduled appointments to decrease risk for further cardiac problems with appropriate diagnostic testing to reassess cardiac status. Patient had alll questions about this problem answered today.

## 2024-01-02 NOTE — ASSESSMENT & PLAN NOTE
Discussed with patient use of sedative  medications and good  sleep hygiene to maximize treatment for this problem. Pt  to use sedatives only prior to sleep and cautioned them about usage at any other time. All patient questions about this problem answered today.

## 2024-01-02 NOTE — PROGRESS NOTES
Name: Deshawn Tsai      : 1955      MRN: [id number]  Encounter Provider: Chadd Lambert MD  Encounter Date: 1/3/2024   Encounter department: [facility]    Assessment & Plan     1. Cerebrovascular accident (CVA) due to thrombosis of precerebral artery (HCC)  -     Comprehensive metabolic panel; Future  -     CBC and differential; Future  -     Magnesium; Future  -     Uric acid; Future  -     UA/M w/rflx Culture, Comp    2. Mixed hyperlipidemia  Assessment & Plan:  Patient  is stable with current medication and we discussed a low fat low cholesterol diet. Weight loss also discussed for this will help lower cholesterol also. Recheck lipids in 6 months.     Orders:  -     Lipid Panel with Direct LDL reflex; Future    3. Insomnia, unspecified type  Assessment & Plan:  Discussed with patient use of sedative  medications and good  sleep hygiene to maximize treatment for this problem. Pt  to use sedatives only prior to sleep and cautioned them about usage at any other time. All patient questions about this problem answered today.       4. Hypothyroidism due to acquired atrophy of thyroid  Assessment & Plan:  Patient to continue current dose of thyroid medicine and recheck TSH in 6 months     Orders:  -     TSH, 3rd generation with Free T4 reflex; Future    5. Hemiplegia and hemiparesis following cerebral infarction affecting right dominant side (HCC)  Assessment & Plan:  Patient is stable  and will continue present plan of care and reassess at next routine visit. All questions about this problem from patient were answered today.       6. Atherosclerosis of native coronary artery of native heart without angina pectoris    7. Anxiety  Assessment & Plan:  Patient to continue utilizing medical therapy as well as counseling sources as applicable to condition. If suicidal thought or fear of suicide attempt, to call 911 and seek help immediately. Medications and therapy reviewed today and  all patient  questions answered today.       8. Encounter for immunization  -     Pneumococcal Conjugate Vaccine 20-valent (PCV20)    9. Screening for malignant neoplasm of prostate  -     PSA Total, Diagnostic; Future    10. Simple chronic bronchitis (HCC)  -     XR chest pa & lateral; Future; Expected date: 01/03/2024  -     azithromycin (Zithromax) 250 mg tablet; Take 2 tablets (500 mg total) by mouth daily for 1 day, THEN 1 tablet (250 mg total) daily for 4 days.    11. Coronary artery disease involving coronary bypass graft of native heart with angina pectoris (HCC)    12. Chronic diastolic (congestive) heart failure (HCC)    13. Opioid dependence, uncomplicated (HCC)    14. Claudication (HCC)    15. Ventricular tachycardia (HCC)    16. Coagulation defect, unspecified (HCC)    17. Acute on chronic diastolic heart failure (HCC)    18. Stage 3 chronic kidney disease, unspecified whether stage 3a or 3b CKD (HCC)    19. Benign prostatic hyperplasia with lower urinary tract symptoms, symptom details unspecified  -     PSA Total, Diagnostic; Future           Subjective     HPI  Review of Systems   Constitutional:  Negative for activity change, appetite change, chills, fatigue, fever and unexpected weight change.   HENT:  Negative for congestion, ear pain, hearing loss, mouth sores, postnasal drip, sinus pressure, sinus pain, sneezing and sore throat.    Respiratory:  Positive for cough. Negative for apnea, shortness of breath and wheezing.    Cardiovascular:  Negative for chest pain, palpitations and leg swelling.   Gastrointestinal:  Negative for abdominal pain, constipation, diarrhea, nausea and vomiting.   Endocrine: Negative for cold intolerance and heat intolerance.   Genitourinary:  Negative for dysuria, frequency and hematuria.   Musculoskeletal:  Positive for gait problem. Negative for arthralgias, back pain, joint swelling and neck pain.   Skin:  Negative for rash.   Neurological:  Positive for weakness.  Negative for dizziness and numbness.   Hematological:  Does not bruise/bleed easily.   Psychiatric/Behavioral:  Negative for agitation, behavioral problems, confusion, hallucinations and sleep disturbance. The patient is not nervous/anxious.        Past Medical History:   Diagnosis Date   • Allergic    • Anxiety    • CAD (coronary artery disease)    • Coronary artery disease    • Depression    • Disease of thyroid gland    • Dizziness    • Erectile dysfunction    • Eye problems    • Hypertension    • Hypothyroidism    • Migraines    • Stroke (HCC) 11/17/2014   • Tremors of nervous system      Past Surgical History:   Procedure Laterality Date   • COLONOSCOPY  09/01/2017   • COLONOSCOPY     • CORONARY ARTERY BYPASS GRAFT  March 24,20201   • CORONARY STENT PLACEMENT     • DENTAL SURGERY      entire top teeth removal   • HERNIA REPAIR     • [place] CORONARY ARTERY BYP W/VEIN & ARTERY GRAFT 4 VEIN N/A 3/24/2021    Procedure: CORONARY ARTERY BYPASS GRAFT (CABG) 3 VESSELS,  USING LEFT VANE-LAD AND LEFT GSV-RPL & RAMUS;  Surgeon: ISAIAH Jennings MD;  [facility];  Service: Cardiac Surgery   • AL ECHO TRANSESOPHAG MONTR CARDIAC PUMP FUNCTJ N/A 3/24/2021    Procedure: TRANSESOPHAGEAL ECHOCARDIOGRAM (MARLEN);  Surgeon: ISAIAH Jennings MD;  [facility];  Service: Cardiac Surgery     Family History   Problem Relation Age of Onset   • Cancer Mother    • Hypertension Mother    • Rheum arthritis Mother    • Heart attack Father    • Heart disease Father    • Hypertension Father    • Early death Father    • Heart attack Sister    • Hearing loss Sister    • Stroke Sister    • Psychiatric Illness Sister    • Cancer Brother      Social History     Socioeconomic History   • Marital status: /Civil Union     Spouse name: None   • Number of children: None   • Years of education: None   • Highest education level: None   Occupational History   • None   Tobacco Use   • Smoking status: Never   • Smokeless tobacco:  Never   Vaping Use   • Vaping status: Never Used   Substance and Sexual Activity   • Alcohol use: No   • Drug use: No   • Sexual activity: Not Currently     Partners: Female     Birth control/protection: None   Other Topics Concern   • None   Social History Narrative    Most recent tobacco use screenin2019      Do you currently or have you served in the [employer]:   No      Were you activated, into active duty, as a member of the [employer] or as a Reservist:   No      Occupation:   retired      Education:   12      Marital status:         Sexual orientation:   Heterosexual      Exercise level:   Occasional      Diet:   Regular      General stress level:   Medium      Alcohol intake:   Occasional      Caffeine intake:   Occasional      Chewing tobacco:   none      Illicit drugs:   none      Guns present in home:   No      Seat belts used routinely:   Yes      Smoke alarm in home:   Yes      Advance directive:   No      Live alone or with others:   with others      Are there stairs in your home:   Yes  only in front of house     Pets:   No      Social Determinants of Health     Financial Resource Strain: Low Risk  (3/24/2023)    Overall Financial Resource Strain (CARDIA)    • Difficulty of Paying Living Expenses: Not hard at all   Food Insecurity: Not on file   Transportation Needs: No Transportation Needs (3/24/2023)    PRAPARE - Transportation    • Lack of Transportation (Medical): No    • Lack of Transportation (Non-Medical): No   Physical Activity: Not on file   Stress: Not on file   Social Connections: Not on file   Intimate Partner Violence: Not on file   Housing Stability: Not on file     Current Outpatient Medications on File Prior to Visit   Medication Sig   • acetaminophen-codeine (TYLENOL #3) 300-30 mg per tablet Take 1 tablet by mouth every 8 (eight) hours as needed for moderate pain   • aspirin 81 MG tablet Take 81 mg by mouth   • atorvastatin (LIPITOR) 40 mg tablet TAKE 2  "TABLETS EVERY DAY   • clonazePAM (KlonoPIN) 2 mg tablet TAKE 2 TABLETS TWICE A DAY FOR SEIZURES AS NEEDED   • clopidogrel (PLAVIX) 75 mg tablet TAKE 1 TABLET EVERY DAY   • clotrimazole-betamethasone (LOTRISONE) 1-0.05 % cream Apply topically 2 (two) times a day   • levothyroxine 100 mcg tablet TAKE 1 TABLET EVERY DAY   • metoprolol succinate (TOPROL-XL) 100 mg 24 hr tablet Take 1 tablet (100 mg total) by mouth daily   • oxyCODONE HCl 5 MG TABA Take 5 mg by mouth 4 (four) times a day as needed (pain) Max Daily Amount: 20 mg   • sildenafil (VIAGRA) 100 mg tablet TAKE 1 TABLET BY MOUTH AS NEEDED FOR ERECTILE DYSFUNCTION   • tiZANidine (ZANAFLEX) 2 mg tablet TAKE 1 TABLET EVERY 8 HOURS AS NEEDED FOR MUSCLE SPASM(S)   • valsartan (DIOVAN) 160 mg tablet TAKE 1 TABLET EVERY DAY   • zolpidem (AMBIEN) 10 mg tablet 2 po  q hs     Allergies   Allergen Reactions   • Medical Tape Other (See Comments) and Rash     Other reaction(s): BURNS USE PAPER TAPE     Immunization History   Administered Date(s) Administered   • COVID-19 PFIZER VACCINE 0.3 ML IM 04/29/2021, 05/20/2021, 11/27/2021   • INFLUENZA 12/20/2017, 11/28/2018, 11/03/2020, 10/18/2021, 09/16/2022, 09/27/2023   • Influenza Injectable, MDCK, Preservative Free, Quadrivalent, 0.5 mL 11/20/2019, 11/03/2020   • Influenza, high dose seasonal 0.7 mL 09/16/2022, 09/27/2023   • Tdap 05/08/2014, 07/23/2021   • Tuberculin Skin Test-PPD Intradermal 01/04/2001       Objective     /76 (BP Location: Left arm, Patient Position: Sitting, Cuff Size: Large)   Pulse 68   Temp 97.6 °F (36.4 °C)   Resp 18   Ht 5' 9\" (1.753 m)   Wt 99 kg (218 lb 3.2 oz)   SpO2 96%   BMI 32.22 kg/m²     Physical Exam  Chadd Lambert MD    "

## 2024-01-03 ENCOUNTER — OFFICE VISIT (OUTPATIENT)
Dept: FAMILY MEDICINE CLINIC | Facility: CLINIC | Age: 69
End: 2024-01-03
Payer: MEDICARE

## 2024-01-03 ENCOUNTER — APPOINTMENT (OUTPATIENT)
Dept: RADIOLOGY | Facility: MEDICAL CENTER | Age: 69
End: 2024-01-03
Payer: MEDICARE

## 2024-01-03 VITALS
HEART RATE: 68 BPM | RESPIRATION RATE: 18 BRPM | OXYGEN SATURATION: 96 % | SYSTOLIC BLOOD PRESSURE: 138 MMHG | DIASTOLIC BLOOD PRESSURE: 76 MMHG | WEIGHT: 218.2 LBS | TEMPERATURE: 97.6 F | HEIGHT: 69 IN | BODY MASS INDEX: 32.32 KG/M2

## 2024-01-03 DIAGNOSIS — N40.1 BENIGN PROSTATIC HYPERPLASIA WITH LOWER URINARY TRACT SYMPTOMS, SYMPTOM DETAILS UNSPECIFIED: ICD-10-CM

## 2024-01-03 DIAGNOSIS — Z12.5 SCREENING FOR MALIGNANT NEOPLASM OF PROSTATE: ICD-10-CM

## 2024-01-03 DIAGNOSIS — I25.10 ATHEROSCLEROSIS OF NATIVE CORONARY ARTERY OF NATIVE HEART WITHOUT ANGINA PECTORIS: ICD-10-CM

## 2024-01-03 DIAGNOSIS — I63.00 CEREBROVASCULAR ACCIDENT (CVA) DUE TO THROMBOSIS OF PRECEREBRAL ARTERY (HCC): Primary | ICD-10-CM

## 2024-01-03 DIAGNOSIS — I47.20 VENTRICULAR TACHYCARDIA (HCC): ICD-10-CM

## 2024-01-03 DIAGNOSIS — I50.32 CHRONIC DIASTOLIC (CONGESTIVE) HEART FAILURE (HCC): ICD-10-CM

## 2024-01-03 DIAGNOSIS — E03.4 HYPOTHYROIDISM DUE TO ACQUIRED ATROPHY OF THYROID: ICD-10-CM

## 2024-01-03 DIAGNOSIS — E78.2 MIXED HYPERLIPIDEMIA: ICD-10-CM

## 2024-01-03 DIAGNOSIS — I69.351 HEMIPLEGIA AND HEMIPARESIS FOLLOWING CEREBRAL INFARCTION AFFECTING RIGHT DOMINANT SIDE (HCC): ICD-10-CM

## 2024-01-03 DIAGNOSIS — F11.20 OPIOID DEPENDENCE, UNCOMPLICATED (HCC): ICD-10-CM

## 2024-01-03 DIAGNOSIS — Z23 ENCOUNTER FOR IMMUNIZATION: ICD-10-CM

## 2024-01-03 DIAGNOSIS — J41.0 SIMPLE CHRONIC BRONCHITIS (HCC): ICD-10-CM

## 2024-01-03 DIAGNOSIS — F41.9 ANXIETY: ICD-10-CM

## 2024-01-03 DIAGNOSIS — N18.30 STAGE 3 CHRONIC KIDNEY DISEASE, UNSPECIFIED WHETHER STAGE 3A OR 3B CKD (HCC): ICD-10-CM

## 2024-01-03 DIAGNOSIS — G47.00 INSOMNIA, UNSPECIFIED TYPE: ICD-10-CM

## 2024-01-03 DIAGNOSIS — I50.33 ACUTE ON CHRONIC DIASTOLIC HEART FAILURE (HCC): ICD-10-CM

## 2024-01-03 DIAGNOSIS — I73.9 CLAUDICATION (HCC): ICD-10-CM

## 2024-01-03 DIAGNOSIS — I25.709 CORONARY ARTERY DISEASE INVOLVING CORONARY BYPASS GRAFT OF NATIVE HEART WITH ANGINA PECTORIS (HCC): ICD-10-CM

## 2024-01-03 DIAGNOSIS — D68.9 COAGULATION DEFECT, UNSPECIFIED (HCC): ICD-10-CM

## 2024-01-03 PROCEDURE — 71046 X-RAY EXAM CHEST 2 VIEWS: CPT

## 2024-01-03 PROCEDURE — 99214 OFFICE O/P EST MOD 30 MIN: CPT | Performed by: FAMILY MEDICINE

## 2024-01-03 RX ORDER — AZITHROMYCIN 250 MG/1
TABLET, FILM COATED ORAL DAILY
Qty: 6 TABLET | Refills: 0 | Status: SHIPPED | OUTPATIENT
Start: 2024-01-03 | End: 2024-01-08

## 2024-01-28 DIAGNOSIS — F41.9 ANXIETY: ICD-10-CM

## 2024-01-28 DIAGNOSIS — M54.50 LOW BACK PAIN WITHOUT SCIATICA, UNSPECIFIED BACK PAIN LATERALITY, UNSPECIFIED CHRONICITY: ICD-10-CM

## 2024-01-29 RX ORDER — TIZANIDINE 2 MG/1
2 TABLET ORAL EVERY 8 HOURS PRN
Qty: 270 TABLET | Refills: 0 | Status: SHIPPED | OUTPATIENT
Start: 2024-01-29

## 2024-01-29 RX ORDER — CLONAZEPAM 2 MG/1
2 TABLET ORAL 2 TIMES DAILY PRN
Qty: 120 TABLET | Refills: 0 | Status: SHIPPED | OUTPATIENT
Start: 2024-01-29

## 2024-02-01 DIAGNOSIS — M54.50 LOW BACK PAIN WITHOUT SCIATICA, UNSPECIFIED BACK PAIN LATERALITY, UNSPECIFIED CHRONICITY: ICD-10-CM

## 2024-02-07 ENCOUNTER — TELEPHONE (OUTPATIENT)
Dept: CARDIOLOGY CLINIC | Facility: CLINIC | Age: 69
End: 2024-02-07

## 2024-02-07 DIAGNOSIS — E03.9 HYPOTHYROIDISM, UNSPECIFIED TYPE: ICD-10-CM

## 2024-02-07 DIAGNOSIS — I10 ESSENTIAL HYPERTENSION: ICD-10-CM

## 2024-02-07 DIAGNOSIS — Z95.1 S/P CABG (CORONARY ARTERY BYPASS GRAFT): ICD-10-CM

## 2024-02-07 RX ORDER — CLOPIDOGREL BISULFATE 75 MG/1
TABLET ORAL
Qty: 90 TABLET | Refills: 1 | Status: SHIPPED | OUTPATIENT
Start: 2024-02-07

## 2024-02-07 RX ORDER — METOPROLOL SUCCINATE 100 MG/1
100 TABLET, EXTENDED RELEASE ORAL DAILY
Qty: 90 TABLET | Refills: 3 | Status: SHIPPED | OUTPATIENT
Start: 2024-02-07

## 2024-02-07 RX ORDER — LEVOTHYROXINE SODIUM 0.1 MG/1
TABLET ORAL
Qty: 90 TABLET | Refills: 1 | Status: SHIPPED | OUTPATIENT
Start: 2024-02-07

## 2024-02-07 NOTE — TELEPHONE ENCOUNTER
Pt called reporting symptoms similar to before he had his CABG in 2021 and feels he may have another blockage.  He is c/o left arm pain from shoulder to elbow, fluttering, slight foot edema and back pain.   He denies any CP  SOB occurs when he rests after exerting himself.  /51    These symptoms are similar to calls in the past 7/2022, when he also asked for a cath.    He stated he does not want a stress test because they give false readings   Last stress was 7/5/2022    He is scheduled to see you 3/5/24.

## 2024-02-07 NOTE — TELEPHONE ENCOUNTER
Pt instructed to report to ED if he doesn't feel well. He verbalized understanding and doesn't think he will report for several personal reasons and will f/u at the next visit.

## 2024-02-11 DIAGNOSIS — G47.00 INSOMNIA, UNSPECIFIED TYPE: ICD-10-CM

## 2024-02-12 RX ORDER — ZOLPIDEM TARTRATE 10 MG/1
TABLET ORAL
Qty: 180 TABLET | Refills: 0 | Status: SHIPPED | OUTPATIENT
Start: 2024-02-12

## 2024-03-05 ENCOUNTER — OFFICE VISIT (OUTPATIENT)
Dept: CARDIAC SURGERY | Facility: CLINIC | Age: 69
End: 2024-03-05
Payer: MEDICARE

## 2024-03-05 VITALS
BODY MASS INDEX: 32.58 KG/M2 | DIASTOLIC BLOOD PRESSURE: 76 MMHG | OXYGEN SATURATION: 97 % | WEIGHT: 220 LBS | HEART RATE: 62 BPM | HEIGHT: 69 IN | SYSTOLIC BLOOD PRESSURE: 126 MMHG

## 2024-03-05 DIAGNOSIS — R07.9 CHEST PAIN, UNSPECIFIED TYPE: ICD-10-CM

## 2024-03-05 DIAGNOSIS — Z95.1 S/P CABG (CORONARY ARTERY BYPASS GRAFT): Primary | ICD-10-CM

## 2024-03-05 DIAGNOSIS — I47.29 NSVT (NONSUSTAINED VENTRICULAR TACHYCARDIA) (HCC): ICD-10-CM

## 2024-03-05 DIAGNOSIS — I50.33 ACUTE ON CHRONIC DIASTOLIC HEART FAILURE (HCC): ICD-10-CM

## 2024-03-05 PROCEDURE — 93000 ELECTROCARDIOGRAM COMPLETE: CPT | Performed by: INTERNAL MEDICINE

## 2024-03-05 PROCEDURE — 99214 OFFICE O/P EST MOD 30 MIN: CPT | Performed by: INTERNAL MEDICINE

## 2024-03-05 NOTE — PROGRESS NOTES
Cardiology Follow Up Visit    Deshawn Tsai  1955  [id number]  [facility] [place]  [address]  [address]  [facility] [id number] [phone]    1. S/P CABG (coronary artery bypass graft)  POCT ECG    NM myocardial perfusion spect (rx stress and/or rest)    Echo complete w/ contrast if indicated      2. Acute on chronic diastolic heart failure (HCC)  NM myocardial perfusion spect (rx stress and/or rest)    Echo complete w/ contrast if indicated      3. NSVT (nonsustained ventricular tachycardia) (HCC)        4. Chest pain, unspecified type  Echo complete w/ contrast if indicated            Discussion/Summary:    1. 67yo with CAD, multiple prior stent procedures. 11 in total ([place], [facility]) ultimately March 2021 CABG x3 has had decades of noncardiac chest pain which he has attributed to his heart as everytime he had a heart catheterization he got a stent.    2. History of stroke with ambulatory dysfunction    3. Essential hypertension, blood pressure elevated    4. Hyperlipidemia, LDL goal less than 70 on atorvastatin 40, 66 September 2021    5. H/o volume overload, diast chf, ckd 3,  euvolemic    Plan: Clinically, patient has noncardiac chest pain.  Occurs as he sitting there it is has no specific aggravating or relieving factors.  He states for decades it was relieved with aspirin.  Last stress test in 2022 post CABG x 3 was normal.  Echo and nuclear stress test ordered as a result.  Was convinced that all of his cardiac catheterizations and stents have prevented heart attacks.  I told him that in stable ischemic heart disease stents do not prevent heart attacks they are they are used to treat symptoms.   Instructed prophylactic coronary stents  do not prevent heart attacks or make you live longer in stable ishcemic heart disease.  We also reviewed cardiac cath and revascularization role in an acute coronary  syndrome which he has not had in close to 30 years.  Would continue patient's antiplatelet therapy.  His blood pressure is well-controlled on beta-blocker.  He remains on atorvastatin as well.    Interval History:    67-year-old male coronary artery disease multiple stent procedures pending 16 years prior transition care to [facility].  He had 11 stents in total starting in the early 1990s.  This was at [facility].    Ultimately, cardiac catheterization here March 2021 with CABG x3    Has done well since    Ambulatory dysfunction post stroke which he states was approximately 8 years ago    No symptoms of coronary artery disease.  Notices some discomfort at his sternotomy scar on occasion.    Hyperlipidemia with LDL goal less than 70.     Patient has aged intermittent left arm pain and shoulder pain while sitting.  He takes an aspirin and it goes away.  These symptoms have been intermittent off and on since the early 1990s.  He has felt that this is the reason he needed 11 stents and subsequently 3 bypass grafts.  He also states stress test have not shown any evidence of ischemia or problems in the past and why he has wanted cardiac catheterizations for the symptoms in the past.    Patient Active Problem List   Diagnosis    CAD (coronary artery disease)    Chest pain    Claudication ([facility])    Coronary atherosclerosis    Essential hypertension    Mixed hyperlipidemia    Hypothyroidism    Myofascial pain syndrome    Radiculopathy, lumbar region    Bilateral lower extremity edema    Palpitation    History of CVA (cerebrovascular accident)    History of percutaneous coronary intervention    Acute on chronic diastolic heart failure ([facility])    Encounter for screening colonoscopy    S/P CABG March 2021 LIMA-LAD;SVG to right PLV; SVG to RI    Thrombocytopenia (HCC)    Hyperchloremia    Hypocalcemia    Stroke ([facility])    Insomnia    BMI 33.0-33.9,adult    Anxiety    Continuous opioid dependence ([facility])    Lower GI bleed     Stroke-like symptoms    NSVT (nonsustained ventricular tachycardia) (HCC)    Hemiplegia and hemiparesis following cerebral infarction affecting right dominant side (HCC)    Skin lesion    Coronary artery disease involving coronary bypass graft of native heart with angina pectoris (HCC)     Past Medical History:   Diagnosis Date    Allergic     Anxiety     CAD (coronary artery disease)     Coronary artery disease     Depression     Disease of thyroid gland     Dizziness     Erectile dysfunction     Eye problems     Hypertension     Hypothyroidism     Migraines     Stroke (HCC) 2014    Tremors of nervous system      Social History     Socioeconomic History    Marital status: /Civil Union     Spouse name: Not on file    Number of children: Not on file    Years of education: Not on file    Highest education level: Not on file   Occupational History    Not on file   Tobacco Use    Smoking status: Never    Smokeless tobacco: Never   Vaping Use    Vaping status: Never Used   Substance and Sexual Activity    Alcohol use: No    Drug use: No    Sexual activity: Not Currently     Partners: Female     Birth control/protection: None   Other Topics Concern    Not on file   Social History Narrative    Most recent tobacco use screenin2019      Do you currently or have you served in the [employer]:   No      Were you activated, into active duty, as a member of the [employer] or as a Reservist:   No      Occupation:   retired      Education:   12      Marital status:         Sexual orientation:   Heterosexual      Exercise level:   Occasional      Diet:   Regular      General stress level:   Medium      Alcohol intake:   Occasional      Caffeine intake:   Occasional      Chewing tobacco:   none      Illicit drugs:   none      Guns present in home:   No      Seat belts used routinely:   Yes      Smoke alarm in home:   Yes      Advance directive:   No      Live alone or with others:   with others       Are there stairs in your home:   Yes  only in front of house     Pets:   No      Social Determinants of Health     Financial Resource Strain: Low Risk  (3/24/2023)    Overall Financial Resource Strain (CARDIA)     Difficulty of Paying Living Expenses: Not hard at all   Food Insecurity: Not on file   Transportation Needs: No Transportation Needs (3/24/2023)    PRAPARE - Transportation     Lack of Transportation (Medical): No     Lack of Transportation (Non-Medical): No   Physical Activity: Not on file   Stress: Not on file   Social Connections: Not on file   Intimate Partner Violence: Not on file   Housing Stability: Not on file      Family History   Problem Relation Age of Onset    Cancer Mother     Hypertension Mother     Rheum arthritis Mother     Heart attack Father     Heart disease Father     Hypertension Father     Early death Father     Heart attack Sister     Hearing loss Sister     Stroke Sister     Psychiatric Illness Sister     Cancer Brother      Past Surgical History:   Procedure Laterality Date    COLONOSCOPY  09/01/2017    COLONOSCOPY      CORONARY ARTERY BYPASS GRAFT  March 24,20201    CORONARY STENT PLACEMENT      DENTAL SURGERY      entire top teeth removal    HERNIA REPAIR      [place] CORONARY ARTERY BYP W/VEIN & ARTERY GRAFT 4 VEIN N/A 3/24/2021    Procedure: CORONARY ARTERY BYPASS GRAFT (CABG) 3 VESSELS,  USING LEFT VANE-LAD AND LEFT GSV-RPL & RAMUS;  Surgeon: ISAIAH Jennings MD;  [facility];  Service: Cardiac Surgery    [place] ECHO TRANSESOPHAG MONTR CARDIAC PUMP FUNCTJ N/A 3/24/2021    Procedure: TRANSESOPHAGEAL ECHOCARDIOGRAM (MALREN);  Surgeon: ISAIAH Jennings MD;  [facility];  Service: Cardiac Surgery       Current Outpatient Medications:     acetaminophen-codeine (TYLENOL #3) 300-30 mg per tablet, Take 1 tablet by mouth every 8 (eight) hours as needed for moderate pain, Disp: 30 tablet, Rfl: 0    aspirin 81 MG tablet, Take 81 mg by mouth, Disp: , Rfl:      "atorvastatin (LIPITOR) 40 mg tablet, TAKE 2 TABLETS EVERY DAY, Disp: 180 tablet, Rfl: 1    clonazePAM (KlonoPIN) 2 mg tablet, Take 1 tablet (2 mg total) by mouth 2 (two) times a day as needed for seizures, Disp: 120 tablet, Rfl: 0    clopidogrel (PLAVIX) 75 mg tablet, TAKE 1 TABLET EVERY DAY, Disp: 90 tablet, Rfl: 1    clotrimazole-betamethasone (LOTRISONE) 1-0.05 % cream, Apply topically 2 (two) times a day, Disp: 30 g, Rfl: 0    levothyroxine 100 mcg tablet, TAKE 1 TABLET EVERY DAY, Disp: 90 tablet, Rfl: 1    metoprolol succinate (TOPROL-XL) 100 mg 24 hr tablet, TAKE 1 TABLET EVERY DAY, Disp: 90 tablet, Rfl: 3    oxyCODONE HCl 5 MG TABA, Take 5 mg by mouth 4 (four) times a day as needed (pain) Max Daily Amount: 20 mg, Disp: 40 each, Rfl: 0    sildenafil (VIAGRA) 100 mg tablet, TAKE 1 TABLET BY MOUTH AS NEEDED FOR ERECTILE DYSFUNCTION, Disp: 30 tablet, Rfl: 5    tiZANidine (ZANAFLEX) 2 mg tablet, Take 1 tablet (2 mg total) by mouth every 8 (eight) hours as needed for muscle spasms, Disp: 270 tablet, Rfl: 0    valsartan (DIOVAN) 160 mg tablet, TAKE 1 TABLET EVERY DAY, Disp: 90 tablet, Rfl: 3    zolpidem (AMBIEN) 10 mg tablet, 2 po  q hs, Disp: 180 tablet, Rfl: 0  Allergies   Allergen Reactions    Medical Tape Other (See Comments) and Rash     Other reaction(s): BURNS USE PAPER TAPE         Social, Family, Medication history reviewed and updated as necessary      Labs:     Lab Results   Component Value Date    K 4.2 09/14/2023     09/14/2023    CO2 28 09/14/2023    BUN 15 09/14/2023    CREATININE 1.33 (H) 09/14/2023    CREATININE 1.36 (H) 01/20/2023    GLUCOSE 105 09/24/2021    CALCIUM 9.7 09/14/2023       Lab Results   Component Value Date    WBC 6.15 01/20/2023    HGB 15.1 01/20/2023    HGB 15.7 06/09/2022    HCT 44.8 01/20/2023    HCT 44.1 06/09/2022     01/20/2023     06/09/2022       No results found for: \"CHOL\"  Lab Results   Component Value Date    HDL 34 (L) 09/14/2023    HDL 37 (L) " "01/20/2023     Lab Results   Component Value Date    LDLCALC 62 09/14/2023    LDLCALC 46 01/20/2023     No results found for: \"LDLDIRECT\"          Lab Results   Component Value Date    TRIG 84 09/14/2023    TRIG 198 (H) 01/20/2023       Lab Results   Component Value Date    ALT 19 09/14/2023    ALT 23 01/20/2023    AST 23 09/14/2023    AST 16 01/20/2023    ALKPHOS 81 09/14/2023    ALKPHOS 88 01/20/2023       Lab Results   Component Value Date    INR 1.00 12/22/2021    INR 0.96 03/19/2021       No results found for: \"NTBNP\"    Lab Results   Component Value Date    HGBA1C 5.6 06/09/2022    HGBA1C 5.7 (H) 03/19/2021           Imaging: Reviewed in epic        Review of Systems:  14 systems reviewed and negative with exception of the above        PHYSICAL EXAM:      Vitals:    03/05/24 1107   BP: 126/76   Pulse: 62   SpO2: 97%     Body mass index is 32.49 kg/m².   Weight (last 2 days)       Date/Time Weight    03/05/24 1107 99.8 (220)              Gen: No acute distress  HEENT: anicteric, mucous membranes moist  Neck: supple, no jugular venous distention, or carotid bruit  Heart: regular, normal s1 and s2, no murmur/rub or gallop  Lungs :clear to auscultation bilaterally, no rales/rhonchi or wheeze  Abdomen: soft nontender, normoactive bowel sounds, no organomegaly  Ext: warm and perfused, normal femoral pulses, trace edema, or clubbing  Skin: warm, no rashes  Neuro: AAO x 3  Psychiatric: normal affect  Musculoskeletal: no obvious joint deformities.        This note was completed in part utilizing [employer] direct voice recognition software.   Grammatical errors, random word insertion, spelling mistakes, and incomplete sentences may be an occasional consequence of the system secondary to software limitations, ambient noise and hardware issues. At the time of dictation, efforts were made to edit, clarify and /or correct errors.  Please read the chart carefully and recognize, using context, where substitutions have " occurred.  If you have any questions or concerns about the context, text or information contained within the body of this dictation, please contact myself, the provider, for further clarification.

## 2024-03-10 DIAGNOSIS — F41.9 ANXIETY: ICD-10-CM

## 2024-03-11 RX ORDER — CLONAZEPAM 2 MG/1
2 TABLET ORAL 2 TIMES DAILY PRN
Qty: 120 TABLET | Refills: 0 | Status: SHIPPED | OUTPATIENT
Start: 2024-03-11

## 2024-03-21 ENCOUNTER — APPOINTMENT (OUTPATIENT)
Dept: LAB | Facility: CLINIC | Age: 69
End: 2024-03-21
Payer: MEDICARE

## 2024-03-21 DIAGNOSIS — Z12.5 SCREENING FOR MALIGNANT NEOPLASM OF PROSTATE: ICD-10-CM

## 2024-03-21 DIAGNOSIS — I63.00 CEREBROVASCULAR ACCIDENT (CVA) DUE TO THROMBOSIS OF PRECEREBRAL ARTERY (HCC): ICD-10-CM

## 2024-03-21 DIAGNOSIS — E03.4 HYPOTHYROIDISM DUE TO ACQUIRED ATROPHY OF THYROID: ICD-10-CM

## 2024-03-21 DIAGNOSIS — E78.2 MIXED HYPERLIPIDEMIA: ICD-10-CM

## 2024-03-21 DIAGNOSIS — N40.1 BENIGN PROSTATIC HYPERPLASIA WITH LOWER URINARY TRACT SYMPTOMS, SYMPTOM DETAILS UNSPECIFIED: ICD-10-CM

## 2024-03-21 LAB
ALBUMIN SERPL BCP-MCNC: 4.1 G/DL (ref 3.5–5)
ALP SERPL-CCNC: 74 U/L (ref 34–104)
ALT SERPL W P-5'-P-CCNC: 12 U/L (ref 7–52)
ANION GAP SERPL CALCULATED.3IONS-SCNC: 9 MMOL/L (ref 4–13)
AST SERPL W P-5'-P-CCNC: 18 U/L (ref 13–39)
BACTERIA UR QL AUTO: ABNORMAL /HPF
BASOPHILS # BLD AUTO: 0.05 THOUSANDS/ÂΜL (ref 0–0.1)
BASOPHILS NFR BLD AUTO: 1 % (ref 0–1)
BILIRUB SERPL-MCNC: 0.93 MG/DL (ref 0.2–1)
BILIRUB UR QL STRIP: NEGATIVE
BUN SERPL-MCNC: 13 MG/DL (ref 5–25)
CALCIUM SERPL-MCNC: 9.2 MG/DL (ref 8.4–10.2)
CHLORIDE SERPL-SCNC: 99 MMOL/L (ref 96–108)
CHOLEST SERPL-MCNC: 124 MG/DL
CLARITY UR: CLEAR
CO2 SERPL-SCNC: 30 MMOL/L (ref 21–32)
COLOR UR: YELLOW
CREAT SERPL-MCNC: 1.1 MG/DL (ref 0.6–1.3)
EOSINOPHIL # BLD AUTO: 0.11 THOUSAND/ÂΜL (ref 0–0.61)
EOSINOPHIL NFR BLD AUTO: 2 % (ref 0–6)
ERYTHROCYTE [DISTWIDTH] IN BLOOD BY AUTOMATED COUNT: 13.2 % (ref 11.6–15.1)
GFR SERPL CREATININE-BSD FRML MDRD: 69 ML/MIN/1.73SQ M
GLUCOSE P FAST SERPL-MCNC: 106 MG/DL (ref 65–99)
GLUCOSE UR STRIP-MCNC: NEGATIVE MG/DL
HCT VFR BLD AUTO: 43.4 % (ref 36.5–49.3)
HDLC SERPL-MCNC: 35 MG/DL
HGB BLD-MCNC: 14.6 G/DL (ref 12–17)
HGB UR QL STRIP.AUTO: NEGATIVE
HYALINE CASTS #/AREA URNS LPF: ABNORMAL /LPF
IMM GRANULOCYTES # BLD AUTO: 0.02 THOUSAND/UL (ref 0–0.2)
IMM GRANULOCYTES NFR BLD AUTO: 0 % (ref 0–2)
KETONES UR STRIP-MCNC: NEGATIVE MG/DL
LDLC SERPL CALC-MCNC: 61 MG/DL (ref 0–100)
LEUKOCYTE ESTERASE UR QL STRIP: NEGATIVE
LYMPHOCYTES # BLD AUTO: 1.19 THOUSANDS/ÂΜL (ref 0.6–4.47)
LYMPHOCYTES NFR BLD AUTO: 25 % (ref 14–44)
MAGNESIUM SERPL-MCNC: 1.8 MG/DL (ref 1.9–2.7)
MCH RBC QN AUTO: 34 PG (ref 26.8–34.3)
MCHC RBC AUTO-ENTMCNC: 33.6 G/DL (ref 31.4–37.4)
MCV RBC AUTO: 101 FL (ref 82–98)
MONOCYTES # BLD AUTO: 0.42 THOUSAND/ÂΜL (ref 0.17–1.22)
MONOCYTES NFR BLD AUTO: 9 % (ref 4–12)
MUCOUS THREADS UR QL AUTO: ABNORMAL
NEUTROPHILS # BLD AUTO: 3.05 THOUSANDS/ÂΜL (ref 1.85–7.62)
NEUTS SEG NFR BLD AUTO: 63 % (ref 43–75)
NITRITE UR QL STRIP: NEGATIVE
NON-SQ EPI CELLS URNS QL MICRO: ABNORMAL /HPF
NRBC BLD AUTO-RTO: 0 /100 WBCS
PH UR STRIP.AUTO: 5.5 [PH]
PLATELET # BLD AUTO: 167 THOUSANDS/UL (ref 149–390)
PMV BLD AUTO: 10.1 FL (ref 8.9–12.7)
POTASSIUM SERPL-SCNC: 4 MMOL/L (ref 3.5–5.3)
PROT SERPL-MCNC: 6.9 G/DL (ref 6.4–8.4)
PROT UR STRIP-MCNC: ABNORMAL MG/DL
PSA SERPL-MCNC: 0.54 NG/ML (ref 0–4)
RBC # BLD AUTO: 4.29 MILLION/UL (ref 3.88–5.62)
RBC #/AREA URNS AUTO: ABNORMAL /HPF
SODIUM SERPL-SCNC: 138 MMOL/L (ref 135–147)
SP GR UR STRIP.AUTO: 1.02 (ref 1–1.03)
TRIGL SERPL-MCNC: 138 MG/DL
TSH SERPL DL<=0.05 MIU/L-ACNC: 1.46 UIU/ML (ref 0.45–4.5)
URATE SERPL-MCNC: 5.4 MG/DL (ref 3.5–8.5)
UROBILINOGEN UR STRIP-ACNC: <2 MG/DL
WBC # BLD AUTO: 4.84 THOUSAND/UL (ref 4.31–10.16)
WBC #/AREA URNS AUTO: ABNORMAL /HPF

## 2024-03-21 PROCEDURE — 81001 URINALYSIS AUTO W/SCOPE: CPT

## 2024-03-21 PROCEDURE — 84153 ASSAY OF PSA TOTAL: CPT

## 2024-03-21 PROCEDURE — 84550 ASSAY OF BLOOD/URIC ACID: CPT

## 2024-03-21 PROCEDURE — 83735 ASSAY OF MAGNESIUM: CPT

## 2024-03-21 PROCEDURE — 80053 COMPREHEN METABOLIC PANEL: CPT

## 2024-03-21 PROCEDURE — 80061 LIPID PANEL: CPT

## 2024-03-21 PROCEDURE — 36415 COLL VENOUS BLD VENIPUNCTURE: CPT

## 2024-03-21 PROCEDURE — 85025 COMPLETE CBC W/AUTO DIFF WBC: CPT

## 2024-03-21 PROCEDURE — 84443 ASSAY THYROID STIM HORMONE: CPT

## 2024-03-30 DIAGNOSIS — N52.9 ERECTILE DYSFUNCTION, UNSPECIFIED ERECTILE DYSFUNCTION TYPE: ICD-10-CM

## 2024-03-30 RX ORDER — SILDENAFIL 100 MG/1
100 TABLET, FILM COATED ORAL DAILY PRN
Qty: 30 TABLET | Refills: 0 | Status: SHIPPED | OUTPATIENT
Start: 2024-03-30

## 2024-04-01 ENCOUNTER — HOSPITAL ENCOUNTER (OUTPATIENT)
Dept: NON INVASIVE DIAGNOSTICS | Facility: CLINIC | Age: 69
Discharge: HOME | End: 2024-04-01
Payer: MEDICARE

## 2024-04-01 VITALS
WEIGHT: 220.02 LBS | DIASTOLIC BLOOD PRESSURE: 76 MMHG | HEIGHT: 69 IN | SYSTOLIC BLOOD PRESSURE: 126 MMHG | BODY MASS INDEX: 32.59 KG/M2 | HEART RATE: 62 BPM

## 2024-04-01 DIAGNOSIS — Z95.1 S/P CABG (CORONARY ARTERY BYPASS GRAFT): ICD-10-CM

## 2024-04-01 DIAGNOSIS — I50.33 ACUTE ON CHRONIC DIASTOLIC HEART FAILURE (HCC): ICD-10-CM

## 2024-04-01 DIAGNOSIS — R07.9 CHEST PAIN, UNSPECIFIED TYPE: ICD-10-CM

## 2024-04-01 LAB
AORTIC ROOT: 4.1 CM
APICAL FOUR CHAMBER EJECTION FRACTION: 64 %
ASCENDING AORTA: 3.7 CM
E WAVE DECELERATION TIME: 251 MS
E/A RATIO: 1.21
FRACTIONAL SHORTENING: 36 (ref 28–44)
INTERVENTRICULAR SEPTUM IN DIASTOLE (PARASTERNAL SHORT AXIS VIEW): 1.2 CM
INTERVENTRICULAR SEPTUM: 1.2 CM (ref 0.6–1.1)
LAAS-AP2: 16.3 CM2
LAAS-AP4: 20.3 CM2
LEFT ATRIUM AREA SYSTOLE SINGLE PLANE A4C: 19.2 CM2
LEFT ATRIUM SIZE: 4.3 CM
LEFT ATRIUM VOLUME (MOD BIPLANE): 56 ML
LEFT ATRIUM VOLUME INDEX (MOD BIPLANE): 26 ML/M2
LEFT INTERNAL DIMENSION IN SYSTOLE: 2.7 CM (ref 2.1–4)
LEFT VENTRICULAR INTERNAL DIMENSION IN DIASTOLE: 4.2 CM (ref 3.5–6)
LEFT VENTRICULAR POSTERIOR WALL IN END DIASTOLE: 1.1 CM
LEFT VENTRICULAR STROKE VOLUME: 51 ML
LVSV (TEICH): 51 ML
MAX HR PERCENT: 51 %
MAX HR: 78 BPM
MV E'TISSUE VEL-SEP: 7 CM/S
MV PEAK A VEL: 0.68 M/S
MV PEAK E VEL: 82 CM/S
MV STENOSIS PRESSURE HALF TIME: 73 MS
MV VALVE AREA P 1/2 METHOD: 3.01
NUC STRESS EJECTION FRACTION: 62 %
RA PRESSURE ESTIMATED: 5 MMHG
RATE PRESSURE PRODUCT: NORMAL
RIGHT ATRIUM AREA SYSTOLE A4C: 12.3 CM2
RIGHT VENTRICLE ID DIMENSION: 3.6 CM
RV PSP: 32 MMHG
SL CV LEFT ATRIUM LENGTH A2C: 4.7 CM
SL CV LV EF: 55
SL CV PED ECHO LEFT VENTRICLE DIASTOLIC VOLUME (MOD BIPLANE) 2D: 77 ML
SL CV PED ECHO LEFT VENTRICLE SYSTOLIC VOLUME (MOD BIPLANE) 2D: 26 ML
SL CV REST NUCLEAR ISOTOPE DOSE: 11 MCI
SL CV STRESS NUCLEAR ISOTOPE DOSE: 33 MCI
SL CV STRESS RECOVERY BP: NORMAL MMHG
SL CV STRESS RECOVERY HR: 69 BPM
SL CV STRESS RECOVERY O2 SAT: 98 %
STRESS ANGINA INDEX: 0
STRESS BASELINE BP: NORMAL MMHG
STRESS BASELINE HR: 59 BPM
STRESS O2 SAT REST: 97 %
STRESS PEAK HR: 78 BPM
STRESS POST ESTIMATED WORKLOAD: 1 METS
STRESS POST EXERCISE DUR MIN: 3 MIN
STRESS POST EXERCISE DUR SEC: 0 SEC
STRESS POST O2 SAT PEAK: 98 %
STRESS POST PEAK BP: 138 MMHG
STRESS/REST PERFUSION RATIO: 0.79
TR MAX PG: 27 MMHG
TR PEAK VELOCITY: 2.6 M/S
TRICUSPID ANNULAR PLANE SYSTOLIC EXCURSION: 1.5 CM
TRICUSPID VALVE PEAK REGURGITATION VELOCITY: 2.57 M/S

## 2024-04-01 PROCEDURE — 78452 HT MUSCLE IMAGE SPECT MULT: CPT | Performed by: INTERNAL MEDICINE

## 2024-04-01 PROCEDURE — 93017 CV STRESS TEST TRACING ONLY: CPT

## 2024-04-01 PROCEDURE — 93016 CV STRESS TEST SUPVJ ONLY: CPT | Performed by: INTERNAL MEDICINE

## 2024-04-01 PROCEDURE — 93306 TTE W/DOPPLER COMPLETE: CPT

## 2024-04-01 PROCEDURE — 93306 TTE W/DOPPLER COMPLETE: CPT | Performed by: INTERNAL MEDICINE

## 2024-04-01 PROCEDURE — 93018 CV STRESS TEST I&R ONLY: CPT | Performed by: INTERNAL MEDICINE

## 2024-04-01 PROCEDURE — 78452 HT MUSCLE IMAGE SPECT MULT: CPT

## 2024-04-01 PROCEDURE — A9502 TC99M TETROFOSMIN: HCPCS

## 2024-04-01 RX ORDER — REGADENOSON 0.08 MG/ML
0.4 INJECTION, SOLUTION INTRAVENOUS ONCE
Status: COMPLETED | OUTPATIENT
Start: 2024-04-01 | End: 2024-04-01

## 2024-04-01 RX ORDER — REGADENOSON 0.08 MG/ML
0.4 INJECTION, SOLUTION INTRAVENOUS ONCE
Status: DISCONTINUED | OUTPATIENT
Start: 2024-04-01 | End: 2024-04-01

## 2024-04-01 RX ADMIN — REGADENOSON 0.4 MG: 0.08 INJECTION, SOLUTION INTRAVENOUS at 12:56

## 2024-04-02 ENCOUNTER — TELEPHONE (OUTPATIENT)
Dept: CARDIOLOGY CLINIC | Facility: CLINIC | Age: 69
End: 2024-04-02

## 2024-04-02 LAB
CHEST PAIN STATEMENT: NORMAL
MAX DIASTOLIC BP: 90 MMHG
MAX PREDICTED HEART RATE: 154 BPM
PROTOCOL NAME: NORMAL
REASON FOR TERMINATION: NORMAL
STRESS POST EXERCISE DUR MIN: 3 MIN
STRESS POST EXERCISE DUR SEC: 0 SEC
STRESS POST PEAK HR: 80 BPM
STRESS POST PEAK SYSTOLIC BP: 138 MMHG
TARGET HR FORMULA: NORMAL
TEST INDICATION: NORMAL

## 2024-04-02 NOTE — TELEPHONE ENCOUNTER
----- Message from Leonel Chappell DO sent at 4/2/2024  9:31 AM EDT -----  Please let patient know stress test was normal.

## 2024-04-05 ENCOUNTER — OFFICE VISIT (OUTPATIENT)
Dept: FAMILY MEDICINE CLINIC | Facility: CLINIC | Age: 69
End: 2024-04-05
Payer: MEDICARE

## 2024-04-05 VITALS
SYSTOLIC BLOOD PRESSURE: 124 MMHG | HEIGHT: 69 IN | BODY MASS INDEX: 33.33 KG/M2 | WEIGHT: 225 LBS | HEART RATE: 62 BPM | TEMPERATURE: 97.2 F | DIASTOLIC BLOOD PRESSURE: 84 MMHG | OXYGEN SATURATION: 98 %

## 2024-04-05 DIAGNOSIS — Z00.00 WELL ADULT EXAM: Primary | ICD-10-CM

## 2024-04-05 DIAGNOSIS — I10 ESSENTIAL HYPERTENSION: ICD-10-CM

## 2024-04-05 DIAGNOSIS — E78.2 MIXED HYPERLIPIDEMIA: ICD-10-CM

## 2024-04-05 DIAGNOSIS — I63.00 CEREBROVASCULAR ACCIDENT (CVA) DUE TO THROMBOSIS OF PRECEREBRAL ARTERY (HCC): ICD-10-CM

## 2024-04-05 DIAGNOSIS — F41.9 ANXIETY: ICD-10-CM

## 2024-04-05 DIAGNOSIS — E03.4 HYPOTHYROIDISM DUE TO ACQUIRED ATROPHY OF THYROID: ICD-10-CM

## 2024-04-05 DIAGNOSIS — I69.351 HEMIPLEGIA AND HEMIPARESIS FOLLOWING CEREBRAL INFARCTION AFFECTING RIGHT DOMINANT SIDE (HCC): ICD-10-CM

## 2024-04-05 DIAGNOSIS — I25.709 CORONARY ARTERY DISEASE INVOLVING CORONARY BYPASS GRAFT OF NATIVE HEART WITH ANGINA PECTORIS (HCC): ICD-10-CM

## 2024-04-05 DIAGNOSIS — G47.00 INSOMNIA, UNSPECIFIED TYPE: ICD-10-CM

## 2024-04-05 PROCEDURE — 99214 OFFICE O/P EST MOD 30 MIN: CPT | Performed by: FAMILY MEDICINE

## 2024-04-05 PROCEDURE — G0439 PPPS, SUBSEQ VISIT: HCPCS | Performed by: FAMILY MEDICINE

## 2024-04-05 NOTE — PROGRESS NOTES
Assessment and Plan:     Problem List Items Addressed This Visit     Essential hypertension     Patient is stable with current anti-hypertensive medicine and continue to follow a low sodium diet and take current medication. All questions about this condition were answered today.          Mixed hyperlipidemia     Patient  is stable with current medication and we discussed a low fat low cholesterol diet. Weight loss also discussed for this will help lower cholesterol also. Recheck lipids in 6 months.          Hypothyroidism     Patient to continue current dose of thyroid medicine and recheck TSH in 6 months          Stroke ([facility])     Patient is stable  and will continue present plan of care and reassess at next routine visit. All questions about this problem from patient were answered today.          Insomnia     Discussed with patient use of sedative  medications and good  sleep hygiene to maximize treatment for this problem. Pt  to use sedatives only prior to sleep and cautioned them about usage at any other time. All patient questions about this problem answered today.          Anxiety     Patient to continue utilizing medical therapy as well as counseling sources as applicable to condition. If suicidal thought or fear of suicide attempt, to call 911 and seek help immediately. Medications and therapy reviewed today and all patient  questions answered today.          Hemiplegia and hemiparesis following cerebral infarction affecting right dominant side (HCC)     Patient is stable  and will continue present plan of care and reassess at next routine visit. All questions about this problem from patient were answered today.          Coronary artery disease involving coronary bypass graft of native heart with angina pectoris ([facility])     Patient to continue  with current cardiac meds to decrease risk of re stenosis. Patient to follow up with cardiology for scheduled appointments to decrease risk for further cardiac problems  with appropriate diagnostic testing to reassess cardiac status. Patient had alll questions about this problem answered today.         Other Visit Diagnoses     Well adult exam    -  Primary           Preventive health issues were discussed with patient, and age appropriate screening tests were ordered as noted in patient's After Visit Summary.  Personalized health advice and appropriate referrals for health education or preventive services given if needed, as noted in patient's After Visit Summary.     History of Present Illness:     Patient presents for a Medicare Wellness Visit    HPI   Patient Care Team:  Chadd Lambert MD as PCP - General (Family Medicine)  Chadd Lambert MD (Family Medicine)     Review of Systems:     Review of Systems     Problem List:     Patient Active Problem List   Diagnosis   • CAD (coronary artery disease)   • Chest pain   • Claudication (HCC)   • Coronary atherosclerosis   • Essential hypertension   • Mixed hyperlipidemia   • Hypothyroidism   • Myofascial pain syndrome   • Radiculopathy, lumbar region   • Bilateral lower extremity edema   • Palpitation   • History of CVA (cerebrovascular accident)   • History of percutaneous coronary intervention   • Acute on chronic diastolic heart failure (HCC)   • Encounter for screening colonoscopy   • S/P CABG March 2021 LIMA-LAD;SVG to right PLV; SVG to RI   • Thrombocytopenia ([facility])   • Hyperchloremia   • Hypocalcemia   • Stroke ([facility])   • Insomnia   • BMI 33.0-33.9,adult   • Anxiety   • Continuous opioid dependence ([facility])   • Lower GI bleed   • Stroke-like symptoms   • NSVT (nonsustained ventricular tachycardia) ([facility])   • Hemiplegia and hemiparesis following cerebral infarction affecting right dominant side ([facility])   • Skin lesion   • Coronary artery disease involving coronary bypass graft of native heart with angina pectoris ([facility])      Past Medical and Surgical History:     Past Medical History:   Diagnosis Date   • Allergic    • Anxiety    • CAD  (coronary artery disease)    • Coronary artery disease    • Depression    • Disease of thyroid gland    • Dizziness    • Erectile dysfunction    • Eye problems    • Hypertension    • Hypothyroidism    • Migraines    • Stroke (HCC) 11/17/2014   • Tremors of nervous system      Past Surgical History:   Procedure Laterality Date   • COLONOSCOPY  09/01/2017   • COLONOSCOPY     • CORONARY ARTERY BYPASS GRAFT  March 24,20201   • CORONARY STENT PLACEMENT     • DENTAL SURGERY      entire top teeth removal   • HERNIA REPAIR     • NC CORONARY ARTERY BYP W/VEIN & ARTERY GRAFT 4 VEIN N/A 3/24/2021    Procedure: CORONARY ARTERY BYPASS GRAFT (CABG) 3 VESSELS,  USING LEFT VANE-LAD AND LEFT GSV-RPL & RAMUS;  Surgeon: ISAIAH Jenninsg MD;  Location: [facility];  Service: Cardiac Surgery   • NC ECHO TRANSESOPHAG MONTR CARDIAC PUMP FUNCTJ N/A 3/24/2021    Procedure: TRANSESOPHAGEAL ECHOCARDIOGRAM (MARLEN);  Surgeon: ISAIAH Jennings MD;  Location: [facility];  Service: Cardiac Surgery      Family History:     Family History   Problem Relation Age of Onset   • Cancer Mother    • Hypertension Mother    • Rheum arthritis Mother    • Heart attack Father    • Heart disease Father    • Hypertension Father    • Early death Father    • Heart attack Sister    • Hearing loss Sister    • Stroke Sister    • Psychiatric Illness Sister    • Cancer Brother       Social History:     Social History     Socioeconomic History   • Marital status: /Civil Union     Spouse name: None   • Number of children: None   • Years of education: None   • Highest education level: None   Occupational History   • None   Tobacco Use   • Smoking status: Never   • Smokeless tobacco: Never   Vaping Use   • Vaping status: Never Used   Substance and Sexual Activity   • Alcohol use: No   • Drug use: No   • Sexual activity: Not Currently     Partners: Female     Birth control/protection: None   Other Topics Concern   • None   Social History Narrative    Most  recent tobacco use screenin2019      Do you currently or have you served in the  Armed Forces:   No      Were you activated, into active duty, as a member of the [employer] or as a Reservist:   No      Occupation:   retired      Education:   12      Marital status:         Sexual orientation:   Heterosexual      Exercise level:   Occasional      Diet:   Regular      General stress level:   Medium      Alcohol intake:   Occasional      Caffeine intake:   Occasional      Chewing tobacco:   none      Illicit drugs:   none      Guns present in home:   No      Seat belts used routinely:   Yes      Smoke alarm in home:   Yes      Advance directive:   No      Live alone or with others:   with others      Are there stairs in your home:   Yes  only in front of house     Pets:   No      Social Determinants of Health     Financial Resource Strain: Low Risk  (3/24/2023)    Overall Financial Resource Strain (CARDIA)    • Difficulty of Paying Living Expenses: Not hard at all   Food Insecurity: Patient Declined (2024)    Hunger Vital Sign    • Worried About Running Out of Food in the Last Year: Patient declined    • Ran Out of Food in the Last Year: Patient declined   Transportation Needs: No Transportation Needs (2024)    PRAPARE - Transportation    • Lack of Transportation (Medical): No    • Lack of Transportation (Non-Medical): No   Physical Activity: Not on file   Stress: Not on file   Social Connections: Not on file   Intimate Partner Violence: Not on file   Housing Stability: Unknown (2024)    Housing Stability Vital Sign    • Unable to Pay for Housing in the Last Year: No    • Number of Places Lived in the Last Year: Not on file    • Unstable Housing in the Last Year: No      Medications and Allergies:     Current Outpatient Medications   Medication Sig Dispense Refill   • acetaminophen-codeine (TYLENOL #3) 300-30 mg per tablet Take 1 tablet by mouth every 8 (eight) hours as needed for  moderate pain 30 tablet 0   • aspirin 81 MG tablet Take 81 mg by mouth     • atorvastatin (LIPITOR) 40 mg tablet TAKE 2 TABLETS EVERY  tablet 1   • clonazePAM (KlonoPIN) 2 mg tablet Take 1 tablet (2 mg total) by mouth 2 (two) times a day as needed for seizures 120 tablet 0   • clopidogrel (PLAVIX) 75 mg tablet TAKE 1 TABLET EVERY DAY 90 tablet 1   • clotrimazole-betamethasone (LOTRISONE) 1-0.05 % cream Apply topically 2 (two) times a day 30 g 0   • levothyroxine 100 mcg tablet TAKE 1 TABLET EVERY DAY 90 tablet 1   • metoprolol succinate (TOPROL-XL) 100 mg 24 hr tablet TAKE 1 TABLET EVERY DAY 90 tablet 3   • oxyCODONE HCl 5 MG TABA Take 5 mg by mouth 4 (four) times a day as needed (pain) Max Daily Amount: 20 mg 40 each 0   • sildenafil (VIAGRA) 100 mg tablet Take 1 tablet (100 mg total) by mouth daily as needed for erectile dysfunction 30 tablet 0   • tiZANidine (ZANAFLEX) 2 mg tablet Take 1 tablet (2 mg total) by mouth every 8 (eight) hours as needed for muscle spasms 270 tablet 0   • valsartan (DIOVAN) 160 mg tablet TAKE 1 TABLET EVERY DAY 90 tablet 3   • zolpidem (AMBIEN) 10 mg tablet 2 po  q hs 180 tablet 0     No current facility-administered medications for this visit.     Allergies   Allergen Reactions   • Medical Tape Other (See Comments) and Rash     Other reaction(s): BURNS USE PAPER TAPE      Immunizations:     Immunization History   Administered Date(s) Administered   • COVID-19 PFIZER VACCINE 0.3 ML IM 04/29/2021, 05/20/2021, 11/27/2021   • INFLUENZA 12/20/2017, 11/28/2018, 11/03/2020, 10/18/2021, 09/16/2022, 09/27/2023   • Influenza Injectable, MDCK, Preservative Free, Quadrivalent, 0.5 mL 11/20/2019, 11/03/2020   • Influenza, high dose seasonal 0.7 mL 09/16/2022, 09/27/2023   • Tdap 05/08/2014, 07/23/2021   • Tuberculin Skin Test-PPD Intradermal 01/04/2001      Health Maintenance:         Topic Date Due   • Colorectal Cancer Screening  02/23/2027   • Hepatitis C Screening  Completed          Topic Date Due   • Pneumococcal Vaccine: 65+ Years (1 of 2 - PCV) Never done   • COVID-19 Vaccine (4 - 2023-24 season) 09/01/2023      Medicare Screening Tests and Risk Assessments:     Deshawn is here for his Subsequent Wellness visit.     Health Risk Assessment:   Patient rates overall health as fair. Patient feels that their physical health rating is slightly worse. Patient is satisfied with their life. Eyesight was rated as same. Hearing was rated as same. Patient feels that their emotional and mental health rating is slightly better. Patients states they are never, rarely angry. Patient states they are sometimes unusually tired/fatigued. Pain experienced in the last 7 days has been a lot. Patient's pain rating has been 8/10. Patient states that he has experienced no weight loss or gain in last 6 months.     Depression Screening:   PHQ-2 Score: 1      Fall Risk Screening:   In the past year, patient has experienced: no history of falling in past year      Home Safety:  Patient does not have trouble with stairs inside or outside of their home. Patient has working smoke alarms and has working carbon monoxide detector. Home safety hazards include: none.     Nutrition:   Current diet is Unhealthy.     Medications:   Patient is currently taking over-the-counter supplements. OTC medications include: see medication list. Patient is able to manage medications.     Activities of Daily Living (ADLs)/Instrumental Activities of Daily Living (IADLs):   Walk and transfer into and out of bed and chair?: Yes  Dress and groom yourself?: Yes    Bathe or shower yourself?: Yes    Feed yourself? Yes  Do your laundry/housekeeping?: Yes  Manage your money, pay your bills and track your expenses?: Yes  Make your own meals?: Yes    Do your own shopping?: Yes    Durable Medical Equipment Suppliers  cane or walker    Previous Hospitalizations:   Any hospitalizations or ED visits within the last 12 months?: No      Advance Care Planning:  "  Living will: Yes    Durable POA for healthcare: Yes    Advanced directive: Yes      PREVENTIVE SCREENINGS      Cardiovascular Screening:    General: Screening Not Indicated and History Lipid Disorder      Diabetes Screening:     General: Screening Current      Colorectal Cancer Screening:     General: Screening Current      Prostate Cancer Screening:    General: Screening Current      Abdominal Aortic Aneurysm (AAA) Screening:    Risk factors include: age between 65-74 yo        Lung Cancer Screening:     General: Screening Not Indicated      Hepatitis C Screening:    General: Screening Current    Screening, Brief Intervention, and Referral to Treatment (SBIRT)    Screening  Typical number of drinks in a day: 0  Typical number of drinks in a week: 0  Interpretation: Low risk drinking behavior.    AUDIT-C Screenin) How often did you have a drink containing alcohol in the past year? never  2) How many drinks did you have on a typical day when you were drinking in the past year? 0  3) How often did you have 6 or more drinks on one occasion in the past year? never    AUDIT-C Score: 0  Interpretation: Score 0-3 (male): Negative screen for alcohol misuse    Single Item Drug Screening:  How often have you used an illegal drug (including marijuana) or a prescription medication for non-medical reasons in the past year? never    Single Item Drug Screen Score: 0  Interpretation: Negative screen for possible drug use disorder    No results found.     Physical Exam:     /84 (BP Location: Right arm, Patient Position: Sitting, Cuff Size: Large)   Pulse 62   Temp (!) 97.2 °F (36.2 °C) (Skin)   Ht 5' 9\" (1.753 m)   Wt 102 kg (225 lb)   SpO2 98%   BMI 33.23 kg/m²     Physical Exam     Chadd Lambert MD  "

## 2024-04-05 NOTE — PROGRESS NOTES
Name: Deshawn Tsai      : 1955      MRN: [id number]  Encounter Provider: Chadd Lambert MD  Encounter Date: 2024   Encounter department: [facility]    Assessment & Plan     1. Well adult exam    2. Cerebrovascular accident (CVA) due to thrombosis of precerebral artery (HCC)  Assessment & Plan:  Patient is stable  and will continue present plan of care and reassess at next routine visit. All questions about this problem from patient were answered today.       3. Mixed hyperlipidemia  Assessment & Plan:  Patient  is stable with current medication and we discussed a low fat low cholesterol diet. Weight loss also discussed for this will help lower cholesterol also. Recheck lipids in 6 months.       4. Insomnia, unspecified type  Assessment & Plan:  Discussed with patient use of sedative  medications and good  sleep hygiene to maximize treatment for this problem. Pt  to use sedatives only prior to sleep and cautioned them about usage at any other time. All patient questions about this problem answered today.       5. Hypothyroidism due to acquired atrophy of thyroid  Assessment & Plan:  Patient to continue current dose of thyroid medicine and recheck TSH in 6 months       6. Hemiplegia and hemiparesis following cerebral infarction affecting right dominant side (HCC)  Assessment & Plan:  Patient is stable  and will continue present plan of care and reassess at next routine visit. All questions about this problem from patient were answered today.       7. Essential hypertension  Assessment & Plan:  Patient is stable with current anti-hypertensive medicine and continue to follow a low sodium diet and take current medication. All questions about this condition were answered today.       8. Coronary artery disease involving coronary bypass graft of native heart with angina pectoris (HCC)  Assessment & Plan:  Patient to continue  with current cardiac meds to decrease risk of re  stenosis. Patient to follow up with cardiology for scheduled appointments to decrease risk for further cardiac problems with appropriate diagnostic testing to reassess cardiac status. Patient had alll questions about this problem answered today.       9. Anxiety  Assessment & Plan:  Patient to continue utilizing medical therapy as well as counseling sources as applicable to condition. If suicidal thought or fear of suicide attempt, to call 911 and seek help immediately. Medications and therapy reviewed today and all patient  questions answered today.           Depression Screening and Follow-up Plan: Patient was screened for depression during today's encounter. They screened negative with a PHQ-2 score of 1.    Falls Plan of Care: balance, strength, and gait training instructions were provided. Home safety education provided.         Subjective     HPI  Review of Systems   Constitutional:  Negative for activity change, appetite change, chills, fatigue, fever and unexpected weight change.   HENT:  Negative for congestion, ear pain, hearing loss, mouth sores, postnasal drip, sinus pressure, sinus pain, sneezing and sore throat.    Respiratory:  Negative for apnea, cough, shortness of breath and wheezing.    Cardiovascular:  Negative for chest pain, palpitations and leg swelling.   Gastrointestinal:  Negative for abdominal pain, constipation, diarrhea, nausea and vomiting.   Endocrine: Negative for cold intolerance and heat intolerance.   Genitourinary:  Negative for dysuria, frequency and hematuria.   Musculoskeletal:  Negative for arthralgias, back pain, gait problem, joint swelling and neck pain.   Skin:  Negative for rash.   Neurological:  Negative for dizziness, weakness and numbness.   Hematological:  Does not bruise/bleed easily.   Psychiatric/Behavioral:  Negative for agitation, behavioral problems, confusion, hallucinations and sleep disturbance. The patient is not nervous/anxious.        Past Medical History:    Diagnosis Date   • Allergic    • Anxiety    • CAD (coronary artery disease)    • Coronary artery disease    • Depression    • Disease of thyroid gland    • Dizziness    • Erectile dysfunction    • Eye problems    • Hypertension    • Hypothyroidism    • Migraines    • Stroke (HCC) 11/17/2014   • Tremors of nervous system      Past Surgical History:   Procedure Laterality Date   • COLONOSCOPY  09/01/2017   • COLONOSCOPY     • CORONARY ARTERY BYPASS GRAFT  March 24,20201   • CORONARY STENT PLACEMENT     • DENTAL SURGERY      entire top teeth removal   • HERNIA REPAIR     • DE CORONARY ARTERY BYP W/VEIN & ARTERY GRAFT 4 VEIN N/A 3/24/2021    Procedure: CORONARY ARTERY BYPASS GRAFT (CABG) 3 VESSELS,  USING LEFT VANE-LAD AND LEFT GSV-RPL & RAMUS;  Surgeon: ISAIAH Jennings MD;  Location: [facility];  Service: Cardiac Surgery   • DE ECHO TRANSESOPHAG MONTR CARDIAC PUMP FUNCTJ N/A 3/24/2021    Procedure: TRANSESOPHAGEAL ECHOCARDIOGRAM (MARLEN);  Surgeon: ISAIAH Jennings MD;  Location: [facility];  Service: Cardiac Surgery     Family History   Problem Relation Age of Onset   • Cancer Mother    • Hypertension Mother    • Rheum arthritis Mother    • Heart attack Father    • Heart disease Father    • Hypertension Father    • Early death Father    • Heart attack Sister    • Hearing loss Sister    • Stroke Sister    • Psychiatric Illness Sister    • Cancer Brother      Social History     Socioeconomic History   • Marital status: /Civil Union     Spouse name: None   • Number of children: None   • Years of education: None   • Highest education level: None   Occupational History   • None   Tobacco Use   • Smoking status: Never   • Smokeless tobacco: Never   Vaping Use   • Vaping status: Never Used   Substance and Sexual Activity   • Alcohol use: No   • Drug use: No   • Sexual activity: Not Currently     Partners: Female     Birth control/protection: None   Other Topics Concern   • None   Social History Narrative     Most recent tobacco use screenin2019      Do you currently or have you served in the  Armed Forces:   No      Were you activated, into active duty, as a member of the [employer] or as a Reservist:   No      Occupation:   retired      Education:   12      Marital status:         Sexual orientation:   Heterosexual      Exercise level:   Occasional      Diet:   Regular      General stress level:   Medium      Alcohol intake:   Occasional      Caffeine intake:   Occasional      Chewing tobacco:   none      Illicit drugs:   none      Guns present in home:   No      Seat belts used routinely:   Yes      Smoke alarm in home:   Yes      Advance directive:   No      Live alone or with others:   with others      Are there stairs in your home:   Yes  only in front of house     Pets:   No      Social Determinants of Health     Financial Resource Strain: Low Risk  (3/24/2023)    Overall Financial Resource Strain (CARDIA)    • Difficulty of Paying Living Expenses: Not hard at all   Food Insecurity: Patient Declined (2024)    Hunger Vital Sign    • Worried About Running Out of Food in the Last Year: Patient declined    • Ran Out of Food in the Last Year: Patient declined   Transportation Needs: No Transportation Needs (2024)    PRAPARE - Transportation    • Lack of Transportation (Medical): No    • Lack of Transportation (Non-Medical): No   Physical Activity: Not on file   Stress: Not on file   Social Connections: Not on file   Intimate Partner Violence: Not on file   Housing Stability: Unknown (2024)    Housing Stability Vital Sign    • Unable to Pay for Housing in the Last Year: No    • Number of Places Lived in the Last Year: Not on file    • Unstable Housing in the Last Year: No     Current Outpatient Medications on File Prior to Visit   Medication Sig   • acetaminophen-codeine (TYLENOL #3) 300-30 mg per tablet Take 1 tablet by mouth every 8 (eight) hours as needed for moderate pain   •  "aspirin 81 MG tablet Take 81 mg by mouth   • atorvastatin (LIPITOR) 40 mg tablet TAKE 2 TABLETS EVERY DAY   • clonazePAM (KlonoPIN) 2 mg tablet Take 1 tablet (2 mg total) by mouth 2 (two) times a day as needed for seizures   • clopidogrel (PLAVIX) 75 mg tablet TAKE 1 TABLET EVERY DAY   • clotrimazole-betamethasone (LOTRISONE) 1-0.05 % cream Apply topically 2 (two) times a day   • levothyroxine 100 mcg tablet TAKE 1 TABLET EVERY DAY   • metoprolol succinate (TOPROL-XL) 100 mg 24 hr tablet TAKE 1 TABLET EVERY DAY   • oxyCODONE HCl 5 MG TABA Take 5 mg by mouth 4 (four) times a day as needed (pain) Max Daily Amount: 20 mg   • sildenafil (VIAGRA) 100 mg tablet Take 1 tablet (100 mg total) by mouth daily as needed for erectile dysfunction   • tiZANidine (ZANAFLEX) 2 mg tablet Take 1 tablet (2 mg total) by mouth every 8 (eight) hours as needed for muscle spasms   • valsartan (DIOVAN) 160 mg tablet TAKE 1 TABLET EVERY DAY   • zolpidem (AMBIEN) 10 mg tablet 2 po  q hs     Allergies   Allergen Reactions   • Medical Tape Other (See Comments) and Rash     Other reaction(s): BURNS USE PAPER TAPE     Immunization History   Administered Date(s) Administered   • COVID-19 PFIZER VACCINE 0.3 ML IM 04/29/2021, 05/20/2021, 11/27/2021   • INFLUENZA 12/20/2017, 11/28/2018, 11/03/2020, 10/18/2021, 09/16/2022, 09/27/2023   • Influenza Injectable, MDCK, Preservative Free, Quadrivalent, 0.5 mL 11/20/2019, 11/03/2020   • Influenza, high dose seasonal 0.7 mL 09/16/2022, 09/27/2023   • Tdap 05/08/2014, 07/23/2021   • Tuberculin Skin Test-PPD Intradermal 01/04/2001       Objective     /84 (BP Location: Right arm, Patient Position: Sitting, Cuff Size: Large)   Pulse 62   Temp (!) 97.2 °F (36.2 °C) (Skin)   Ht 5' 9\" (1.753 m)   Wt 102 kg (225 lb)   SpO2 98%   BMI 33.23 kg/m²     Physical Exam  Vitals and nursing note reviewed.   Constitutional:       Appearance: He is well-developed.   HENT:      Head: Normocephalic and atraumatic.     " " Nose: Nose normal.   Eyes:      General: No scleral icterus.     Conjunctiva/sclera: Conjunctivae normal.      Pupils: Pupils are equal, round, and reactive to light.   Neck:      Thyroid: No thyromegaly.   Cardiovascular:      Rate and Rhythm: Normal rate and regular rhythm.      Heart sounds: Normal heart sounds.   Pulmonary:      Effort: Pulmonary effort is normal. No respiratory distress.      Breath sounds: Normal breath sounds. No wheezing.   Abdominal:      General: Bowel sounds are normal.      Palpations: Abdomen is soft.      Tenderness: There is no abdominal tenderness. There is no guarding or rebound.   Musculoskeletal:         General: Normal range of motion.      Cervical back: Normal range of motion and neck supple.   Skin:     General: Skin is warm and dry.      Findings: No rash.   Neurological:      Mental Status: He is alert and oriented to person, place, and time.   Psychiatric:         Mood and Affect: Mood normal.         Behavior: Behavior normal.         Thought Content: Thought content normal.         Judgment: Judgment normal.       Chadd Lambert MD    Answers submitted by the patient for this visit:  Medicare Annual Wellness Visit (Submitted on 4/2/2024)  How would you rate your overall health?: fair  Compared to last year, how is your physical health?: slightly worse  In general, how satisfied are you with your life?: satisfied  Compared to last year, how is your eyesight?: same  Compared to last year, how is your hearing?: same  Compared to last year, how is your emotional/mental health?: slightly better  How often is anger a problem for you?: never, rarely  How often do you feel unusually tired/fatigued?: sometimes  In the past 7 days, how much pain have you experienced?: a lot  If you answered \"some\" or \"a lot\", please rate the severity of your pain on a scale of 1 to 10 (1 being the least severe pain and 10 being the most intense pain).: 8/10  In the past 6 months, have you lost or " gained 10 pounds without trying?: No  One or more falls in the last year: No  Do you have trouble with the stairs inside or outside your home?: No  Does your home have working smoke alarms?: Yes  Does your home have a carbon monoxide monitor?: Yes  Which safety hazards (if any) have you experienced in your home? Please select all that apply.: none  How would you describe your current diet? Please select all that apply.: Unhealthy  In addition to prescription medications, are you taking any over-the-counter supplements?: Yes  If yes, what supplements are you taking?: aspirin 81 MG tablet  Can you manage your medications?: Yes  Are you currently taking any opioid medications?: No  Can you walk and transfer into and out of your bed and chair?: Yes  Can you dress and groom yourself?: Yes  Can you bathe or shower yourself?: Yes  Can you feed yourself?: Yes  Can you do your laundry/ housekeeping?: Yes  Can you manage your money, pay your bills, and track your expenses?: Yes  Can you make your own meals?: Yes  Can you do your own shopping?: Yes  Please list your DME (Durable Medical Equipment) supplier, if you use one.: cane or walker  Within the last 12 months, have you had any hospitalizations or Emergency Department visits?: No  Do you have a living will?: Yes  Do you have a Durable POA (Power of ) for healthcare decisions?: Yes  Do you have an Advanced Directive for end of life decisions?: Yes  How often have you used an illegal drug (including marijuana) or a prescription medication for non-medical reasons in the past year?: never  What is the typical number of drinks you consume in a day?: 0  What is the typical number of drinks you consume in a week?: 0  How often did you have a drink containing alcohol in the past year?: never  How many drinks did you have on a typical day  when you were drinking in the past year?: 0  How often did you have 6 or more drinks on one occasion in the past year?: never

## 2024-04-05 NOTE — PATIENT INSTRUCTIONS
Medicare Preventive Visit Patient Instructions  Thank you for completing your Welcome to Medicare Visit or Medicare Annual Wellness Visit today. Your next wellness visit will be due in one year (4/6/2025).  The screening/preventive services that you may require over the next 5-10 years are detailed below. Some tests may not apply to you based off risk factors and/or age. Screening tests ordered at today's visit but not completed yet may show as past due. Also, please note that scanned in results may not display below.  Preventive Screenings:  Service Recommendations Previous Testing/Comments   Colorectal Cancer Screening  Colonoscopy    Fecal Occult Blood Test (FOBT)/Fecal Immunochemical Test (FIT)  Fecal DNA/Cologuard Test  Flexible Sigmoidoscopy Age: 45-75 years old   Colonoscopy: every 10 years (May be performed more frequently if at higher risk)  OR  FOBT/FIT: every 1 year  OR  Cologuard: every 3 years  OR  Sigmoidoscopy: every 5 years  Screening may be recommended earlier than age 45 if at higher risk for colorectal cancer. Also, an individualized decision between you and your healthcare provider will decide whether screening between the ages of 76-85 would be appropriate. Colonoscopy: 02/24/2022  FOBT/FIT: Not on file  Cologuard: Not on file  Sigmoidoscopy: Not on file    Screening Current     Prostate Cancer Screening Individualized decision between patient and health care provider in men between ages of 55-69   Medicare will cover every 12 months beginning on the day after your 50th birthday PSA: 0.54 ng/mL     Screening Current     Hepatitis C Screening Once for adults born between 1945 and 1965  More frequently in patients at high risk for Hepatitis C Hep C Antibody: 10/05/2017    Screening Current   Diabetes Screening 1-2 times per year if you're at risk for diabetes or have pre-diabetes Fasting glucose: 106 mg/dL (3/21/2024)  A1C: 5.6 % (6/9/2022)  Screening Current   Cholesterol Screening Once every 5  years if you don't have a lipid disorder. May order more often based on risk factors. Lipid panel: 03/21/2024  Screening Not Indicated  History Lipid Disorder      Other Preventive Screenings Covered by Medicare:  Abdominal Aortic Aneurysm (AAA) Screening: covered once if your at risk. You're considered to be at risk if you have a family history of AAA or a male between the age of 65-75 who smoking at least 100 cigarettes in your lifetime.  Lung Cancer Screening: covers low dose CT scan once per year if you meet all of the following conditions: (1) Age 55-77; (2) No signs or symptoms of lung cancer; (3) Current smoker or have quit smoking within the last 15 years; (4) You have a tobacco smoking history of at least 20 pack years (packs per day x number of years you smoked); (5) You get a written order from a healthcare provider.  Glaucoma Screening: covered annually if you're considered high risk: (1) You have diabetes OR (2) Family history of glaucoma OR (3)  aged 50 and older OR (4)  American aged 65 and older  Osteoporosis Screening: covered every 2 years if you meet one of the following conditions: (1) Have a vertebral abnormality; (2) On glucocorticoid therapy for more than 3 months; (3) Have primary hyperparathyroidism; (4) On osteoporosis medications and need to assess response to drug therapy.  HIV Screening: covered annually if you're between the age of 15-65. Also covered annually if you are younger than 15 and older than 65 with risk factors for HIV infection. For pregnant patients, it is covered up to 3 times per pregnancy.    Immunizations:  Immunization Recommendations   Influenza Vaccine Annual influenza vaccination during flu season is recommended for all persons aged >= 6 months who do not have contraindications   Pneumococcal Vaccine   * Pneumococcal conjugate vaccine = PCV13 (Prevnar 13), PCV15 (Vaxneuvance), PCV20 (Prevnar 20)  * Pneumococcal polysaccharide vaccine = PPSV23  (Pneumovax) Adults 19-63 yo with certain risk factors or if 65+ yo  If never received any pneumonia vaccine: recommend Prevnar 20 (PCV20)  Give PCV20 if previously received 1 dose of PCV13 or PPSV23   Hepatitis B Vaccine 3 dose series if at intermediate or high risk (ex: diabetes, end stage renal disease, liver disease)   Respiratory syncytial virus (RSV) Vaccine - COVERED BY MEDICARE PART D  * RSVPreF3 (Arexvy) CDC recommends that adults 60 years of age and older may receive a single dose of RSV vaccine using shared clinical decision-making (SCDM)   Tetanus (Td) Vaccine - COST NOT COVERED BY MEDICARE PART B Following completion of primary series, a booster dose should be given every 10 years to maintain immunity against tetanus. Td may also be given as tetanus wound prophylaxis.   Tdap Vaccine - COST NOT COVERED BY MEDICARE PART B Recommended at least once for all adults. For pregnant patients, recommended with each pregnancy.   Shingles Vaccine (Shingrix) - COST NOT COVERED BY MEDICARE PART B  2 shot series recommended in those 19 years and older who have or will have weakened immune systems or those 50 years and older     Health Maintenance Due:      Topic Date Due   • Colorectal Cancer Screening  02/23/2027   • Hepatitis C Screening  Completed     Immunizations Due:      Topic Date Due   • Pneumococcal Vaccine: 65+ Years (1 of 2 - PCV) Never done   • COVID-19 Vaccine (4 - 2023-24 season) 09/01/2023     Advance Directives   What are advance directives?  Advance directives are legal documents that state your wishes and plans for medical care. These plans are made ahead of time in case you lose your ability to make decisions for yourself. Advance directives can apply to any medical decision, such as the treatments you want, and if you want to donate organs.   What are the types of advance directives?  There are many types of advance directives, and each state has rules about how to use them. You may choose a  combination of any of the following:  Living will:  This is a written record of the treatment you want. You can also choose which treatments you do not want, which to limit, and which to stop at a certain time. This includes surgery, medicine, IV fluid, and tube feedings.   Durable power of  for healthcare (DPAHC):  This is a written record that states who you want to make healthcare choices for you when you are unable to make them for yourself. This person, called a proxy, is usually a family member or a friend. You may choose more than 1 proxy.  Do not resuscitate (DNR) order:  A DNR order is used in case your heart stops beating or you stop breathing. It is a request not to have certain forms of treatment, such as CPR. A DNR order may be included in other types of advance directives.  Medical directive:  This covers the care that you want if you are in a coma, near death, or unable to make decisions for yourself. You can list the treatments you want for each condition. Treatment may include pain medicine, surgery, blood transfusions, dialysis, IV or tube feedings, and a ventilator (breathing machine).  Values history:  This document has questions about your views, beliefs, and how you feel and think about life. This information can help others choose the care that you would choose.  Why are advance directives important?  An advance directive helps you control your care. Although spoken wishes may be used, it is better to have your wishes written down. Spoken wishes can be misunderstood, or not followed. Treatments may be given even if you do not want them. An advance directive may make it easier for your family to make difficult choices about your care.   Weight Management   Why it is important to manage your weight:  Being overweight increases your risk of health conditions such as heart disease, high blood pressure, type 2 diabetes, and certain types of cancer. It can also increase your risk for  osteoarthritis, sleep apnea, and other respiratory problems. Aim for a slow, steady weight loss. Even a small amount of weight loss can lower your risk of health problems.  How to lose weight safely:  A safe and healthy way to lose weight is to eat fewer calories and get regular exercise. You can lose up about 1 pound a week by decreasing the number of calories you eat by 500 calories each day.   Healthy meal plan for weight management:  A healthy meal plan includes a variety of foods, contains fewer calories, and helps you stay healthy. A healthy meal plan includes the following:  Eat whole-grain foods more often.  A healthy meal plan should contain fiber. Fiber is the part of grains, fruits, and vegetables that is not broken down by your body. Whole-grain foods are healthy and provide extra fiber in your diet. Some examples of whole-grain foods are whole-wheat breads and pastas, oatmeal, brown rice, and bulgur.  Eat a variety of vegetables every day.  Include dark, leafy greens such as spinach, kale, jacques greens, and mustard greens. Eat yellow and orange vegetables such as carrots, sweet potatoes, and winter squash.   Eat a variety of fruits every day.  Choose fresh or canned fruit (canned in its own juice or light syrup) instead of juice. Fruit juice has very little or no fiber.  Eat low-fat dairy foods.  Drink fat-free (skim) milk or 1% milk. Eat fat-free yogurt and low-fat cottage cheese. Try low-fat cheeses such as mozzarella and other reduced-fat cheeses.  Choose meat and other protein foods that are low in fat.  Choose beans or other legumes such as split peas or lentils. Choose fish, skinless poultry (chicken or turkey), or lean cuts of red meat (beef or pork). Before you cook meat or poultry, cut off any visible fat.   Use less fat and oil.  Try baking foods instead of frying them. Add less fat, such as margarine, sour cream, regular salad dressing and mayonnaise to foods. Eat fewer high-fat foods. Some  examples of high-fat foods include french fries, doughnuts, ice cream, and cakes.  Eat fewer sweets.  Limit foods and drinks that are high in sugar. This includes candy, cookies, regular soda, and sweetened drinks.  Exercise:  Exercise at least 30 minutes per day on most days of the week. Some examples of exercise include walking, biking, dancing, and swimming. You can also fit in more physical activity by taking the stairs instead of the elevator or parking farther away from stores. Ask your healthcare provider about the best exercise plan for you.      © Copyright [employer] 2018 Information is for End User's use only and may not be sold, redistributed or otherwise used for commercial purposes. All illustrations and images included in CareNotes® are the copyrighted property of [employer] Inc[employer] or [employer]

## 2024-04-12 DIAGNOSIS — M54.50 LOW BACK PAIN WITHOUT SCIATICA, UNSPECIFIED BACK PAIN LATERALITY, UNSPECIFIED CHRONICITY: ICD-10-CM

## 2024-04-12 DIAGNOSIS — I25.709 CORONARY ARTERY DISEASE INVOLVING CORONARY BYPASS GRAFT OF NATIVE HEART WITH ANGINA PECTORIS (HCC): ICD-10-CM

## 2024-04-12 DIAGNOSIS — E78.2 MIXED HYPERLIPIDEMIA: ICD-10-CM

## 2024-04-12 RX ORDER — ATORVASTATIN CALCIUM 40 MG/1
TABLET, FILM COATED ORAL
Qty: 180 TABLET | Refills: 1 | Status: SHIPPED | OUTPATIENT
Start: 2024-04-12

## 2024-04-12 RX ORDER — TIZANIDINE 2 MG/1
2 TABLET ORAL EVERY 8 HOURS PRN
Qty: 270 TABLET | Refills: 3 | Status: SHIPPED | OUTPATIENT
Start: 2024-04-12

## 2024-04-12 NOTE — TELEPHONE ENCOUNTER
Tizanidine refill must be reviewed and completed by the office or provider. The refill is unable to be approved or denied by the medication management team.

## 2024-04-14 DIAGNOSIS — F41.9 ANXIETY: ICD-10-CM

## 2024-04-15 RX ORDER — CLONAZEPAM 2 MG/1
2 TABLET ORAL 2 TIMES DAILY PRN
Qty: 120 TABLET | Refills: 0 | Status: SHIPPED | OUTPATIENT
Start: 2024-04-15

## 2024-04-28 DIAGNOSIS — N52.9 ERECTILE DYSFUNCTION, UNSPECIFIED ERECTILE DYSFUNCTION TYPE: ICD-10-CM

## 2024-04-28 RX ORDER — SILDENAFIL 100 MG/1
100 TABLET, FILM COATED ORAL DAILY PRN
Qty: 30 TABLET | Refills: 0 | Status: SHIPPED | OUTPATIENT
Start: 2024-04-28

## 2024-04-29 DIAGNOSIS — M54.50 LOW BACK PAIN WITHOUT SCIATICA, UNSPECIFIED BACK PAIN LATERALITY, UNSPECIFIED CHRONICITY: ICD-10-CM

## 2024-05-02 DIAGNOSIS — M54.50 LOW BACK PAIN WITHOUT SCIATICA, UNSPECIFIED BACK PAIN LATERALITY, UNSPECIFIED CHRONICITY: ICD-10-CM

## 2024-05-02 NOTE — TELEPHONE ENCOUNTER
----- Message from Miesha Tsai on behalf of Deshawn Tsai sent at 5/1/2024  5:53 PM EDT -----  Regarding: Acetaminophen-codeine 300-30mg per tablet  Contact: [phone]  Good evening doctor I order the wrong medication for Pierre he took his last pill and I hit the wrong button. Would you please send it to [facility] [address] [place] [address]. That was my mistake.    Thank you,   Miesha

## 2024-05-03 RX ORDER — ACETAMINOPHEN AND CODEINE PHOSPHATE 300; 30 MG/1; MG/1
1 TABLET ORAL EVERY 8 HOURS PRN
Qty: 90 TABLET | Refills: 0 | Status: SHIPPED | OUTPATIENT
Start: 2024-05-03

## 2024-05-05 ENCOUNTER — TELEPHONE (OUTPATIENT)
Age: 69
End: 2024-05-05

## 2024-05-05 NOTE — TELEPHONE ENCOUNTER
PA for Acetaminophen-Codeine (TYLENOL with CODEINE #3) 300-30 mg per tablet    Submitted via    []CMM-KEY   []Surescripts-Case ID #   [x]Faxed to plan   []Other website   []Phone call Case ID #     Office notes sent, clinical questions answered. Awaiting determination    Turnaround time for your insurance to make a decision on your Prior Authorization can take 7-21 business days.

## 2024-05-06 NOTE — TELEPHONE ENCOUNTER
PA for Acetaminophen-Codeine (TYLENOL with CODEINE #3) 300-30 mg per tablet  Approved     Date(s) approved until 12-        Patient advised by          [x] [employer] Message  [] Phone call   []LMOM  []L/M to call office as no active Communication consent on file  []Unable to leave detailed message as VM not approved on Communication consent       Pharmacy advised by    [x]Fax  []Phone call    Approval letter scanned into Media Yes

## 2024-05-07 ENCOUNTER — TELEPHONE (OUTPATIENT)
Dept: CARDIOLOGY CLINIC | Facility: CLINIC | Age: 69
End: 2024-05-07

## 2024-05-07 NOTE — TELEPHONE ENCOUNTER
Patient to have bottom teeth pulled. The dentist is asking if patient should have antibiotic prophylaxis. Please advise.

## 2024-05-08 DIAGNOSIS — G47.00 INSOMNIA, UNSPECIFIED TYPE: ICD-10-CM

## 2024-05-08 RX ORDER — ZOLPIDEM TARTRATE 10 MG/1
TABLET ORAL
Qty: 180 TABLET | Refills: 0 | Status: SHIPPED | OUTPATIENT
Start: 2024-05-08

## 2024-05-14 ENCOUNTER — TELEPHONE (OUTPATIENT)
Age: 69
End: 2024-05-14

## 2024-05-14 NOTE — TELEPHONE ENCOUNTER
Left message for pt to call back. Provider is out of office today. Will send message to him for instructions.

## 2024-05-14 NOTE — TELEPHONE ENCOUNTER
Patients wife calling to see the status of message, relayed that provider was out of office today.  Please call patient or his wife Racquel when provider is back in office as he states the procedure is being done on Thursday

## 2024-05-14 NOTE — TELEPHONE ENCOUNTER
Pts. Dentist needs a Medical Clearance from Dr. Lambert. The pt. Is a new with them and he went off of his Plavax on Saturday and they want to do surgery tomorrow and they need to know if that is ok, or should he go back on it and they reschedule the surgery.  If it's ok for him to have it tomorrow can you please send the Medical Clearance today by FAX. [phone]   Ty

## 2024-05-14 NOTE — TELEPHONE ENCOUNTER
PT called again to inquirire about the status of the medical clearnce. He said it was already done, but decided to change dentists. He now wants to go to [facility], and they are requesting some kind of medical clearance. Their fax number is [phone]. He is scheduled with them on Thurs 5/16.    Please advise    Thank You

## 2024-05-15 NOTE — TELEPHONE ENCOUNTER
Have them fax the paperwork to our office [phone] and I will complete it for him and return for him on Wednesday

## 2024-05-15 NOTE — TELEPHONE ENCOUNTER
[facility]. No Form required.   Needs Letter stating that he is cleared for surgery, pt off plavix [id number].  Please fax [phone]

## 2024-05-15 NOTE — TELEPHONE ENCOUNTER
Pt's wife Miesha called in to f/u on the status of this paperwork. Miesha states the pt is having the surgery done tomorrow and wanted to make sure everything is completed and sent in.    Please advise and follow up with Miesha regarding the status. Miesha states calling the home phone number would be the best contact method.

## 2024-05-17 ENCOUNTER — APPOINTMENT (EMERGENCY)
Dept: RADIOLOGY | Facility: HOSPITAL | Age: 69
DRG: 093 | End: 2024-05-17
Payer: MEDICARE

## 2024-05-17 ENCOUNTER — HOSPITAL ENCOUNTER (INPATIENT)
Facility: HOSPITAL | Age: 69
LOS: 1 days | Discharge: HOME | DRG: 093 | End: 2024-05-18
Attending: EMERGENCY MEDICINE | Admitting: PSYCHIATRY & NEUROLOGY
Payer: MEDICARE

## 2024-05-17 DIAGNOSIS — I63.00 CEREBROVASCULAR ACCIDENT (CVA) DUE TO THROMBOSIS OF PRECEREBRAL ARTERY (HCC): Primary | ICD-10-CM

## 2024-05-17 DIAGNOSIS — G47.00 INSOMNIA, UNSPECIFIED TYPE: ICD-10-CM

## 2024-05-17 DIAGNOSIS — R29.90 STROKE-LIKE SYMPTOMS: ICD-10-CM

## 2024-05-17 LAB
2HR DELTA HS TROPONIN: 10 NG/L
4HR DELTA HS TROPONIN: 4 NG/L
ANION GAP SERPL CALCULATED.3IONS-SCNC: 11 MMOL/L (ref 4–13)
APTT PPP: 30 SECONDS (ref 23–37)
BACTERIA UR QL AUTO: NORMAL /HPF
BILIRUB UR QL STRIP: NEGATIVE
BUN SERPL-MCNC: 12 MG/DL (ref 5–25)
CALCIUM SERPL-MCNC: 9.5 MG/DL (ref 8.4–10.2)
CARDIAC TROPONIN I PNL SERPL HS: 32 NG/L
CARDIAC TROPONIN I PNL SERPL HS: 36 NG/L
CARDIAC TROPONIN I PNL SERPL HS: 42 NG/L
CHLORIDE SERPL-SCNC: 96 MMOL/L (ref 96–108)
CLARITY UR: CLEAR
CO2 SERPL-SCNC: 25 MMOL/L (ref 21–32)
COLOR UR: ABNORMAL
CREAT SERPL-MCNC: 1.32 MG/DL (ref 0.6–1.3)
ERYTHROCYTE [DISTWIDTH] IN BLOOD BY AUTOMATED COUNT: 12.6 % (ref 11.6–15.1)
FLUAV RNA RESP QL NAA+PROBE: NEGATIVE
FLUBV RNA RESP QL NAA+PROBE: NEGATIVE
GFR SERPL CREATININE-BSD FRML MDRD: 55 ML/MIN/1.73SQ M
GLUCOSE SERPL-MCNC: 119 MG/DL (ref 65–140)
GLUCOSE SERPL-MCNC: 132 MG/DL (ref 65–140)
GLUCOSE UR STRIP-MCNC: NEGATIVE MG/DL
HCT VFR BLD AUTO: 42.3 % (ref 36.5–49.3)
HGB BLD-MCNC: 14.9 G/DL (ref 12–17)
HGB UR QL STRIP.AUTO: ABNORMAL
INR PPP: 1.14 (ref 0.84–1.19)
KETONES UR STRIP-MCNC: NEGATIVE MG/DL
LEUKOCYTE ESTERASE UR QL STRIP: NEGATIVE
MCH RBC QN AUTO: 33.9 PG (ref 26.8–34.3)
MCHC RBC AUTO-ENTMCNC: 35.2 G/DL (ref 31.4–37.4)
MCV RBC AUTO: 96 FL (ref 82–98)
NITRITE UR QL STRIP: NEGATIVE
NON-SQ EPI CELLS URNS QL MICRO: NORMAL /HPF
PH UR STRIP.AUTO: 5 [PH]
PLATELET # BLD AUTO: 141 THOUSANDS/UL (ref 149–390)
PLATELET # BLD AUTO: 144 THOUSANDS/UL (ref 149–390)
PMV BLD AUTO: 9.3 FL (ref 8.9–12.7)
PMV BLD AUTO: 9.6 FL (ref 8.9–12.7)
POTASSIUM SERPL-SCNC: 4.6 MMOL/L (ref 3.5–5.3)
PROT UR STRIP-MCNC: NEGATIVE MG/DL
PROTHROMBIN TIME: 14.5 SECONDS (ref 11.6–14.5)
RBC # BLD AUTO: 4.39 MILLION/UL (ref 3.88–5.62)
RBC #/AREA URNS AUTO: NORMAL /HPF
RSV RNA RESP QL NAA+PROBE: NEGATIVE
SARS-COV-2 RNA RESP QL NAA+PROBE: NEGATIVE
SODIUM SERPL-SCNC: 132 MMOL/L (ref 135–147)
SP GR UR STRIP.AUTO: 1.01 (ref 1–1.03)
UROBILINOGEN UR STRIP-ACNC: <2 MG/DL
WBC # BLD AUTO: 5.06 THOUSAND/UL (ref 4.31–10.16)
WBC #/AREA URNS AUTO: NORMAL /HPF

## 2024-05-17 PROCEDURE — 83935 ASSAY OF URINE OSMOLALITY: CPT

## 2024-05-17 PROCEDURE — 85049 AUTOMATED PLATELET COUNT: CPT

## 2024-05-17 PROCEDURE — 80048 BASIC METABOLIC PNL TOTAL CA: CPT | Performed by: EMERGENCY MEDICINE

## 2024-05-17 PROCEDURE — 85027 COMPLETE CBC AUTOMATED: CPT | Performed by: EMERGENCY MEDICINE

## 2024-05-17 PROCEDURE — 84300 ASSAY OF URINE SODIUM: CPT

## 2024-05-17 PROCEDURE — 81001 URINALYSIS AUTO W/SCOPE: CPT

## 2024-05-17 PROCEDURE — 70498 CT ANGIOGRAPHY NECK: CPT

## 2024-05-17 PROCEDURE — 99285 EMERGENCY DEPT VISIT HI MDM: CPT

## 2024-05-17 PROCEDURE — 83930 ASSAY OF BLOOD OSMOLALITY: CPT

## 2024-05-17 PROCEDURE — 0241U HB NFCT DS VIR RESP RNA 4 TRGT: CPT | Performed by: EMERGENCY MEDICINE

## 2024-05-17 PROCEDURE — 36415 COLL VENOUS BLD VENIPUNCTURE: CPT | Performed by: EMERGENCY MEDICINE

## 2024-05-17 PROCEDURE — 93005 ELECTROCARDIOGRAM TRACING: CPT

## 2024-05-17 PROCEDURE — 85610 PROTHROMBIN TIME: CPT | Performed by: EMERGENCY MEDICINE

## 2024-05-17 PROCEDURE — 70496 CT ANGIOGRAPHY HEAD: CPT

## 2024-05-17 PROCEDURE — 82948 REAGENT STRIP/BLOOD GLUCOSE: CPT

## 2024-05-17 PROCEDURE — 84484 ASSAY OF TROPONIN QUANT: CPT | Performed by: EMERGENCY MEDICINE

## 2024-05-17 PROCEDURE — 85730 THROMBOPLASTIN TIME PARTIAL: CPT | Performed by: EMERGENCY MEDICINE

## 2024-05-17 RX ORDER — ATORVASTATIN CALCIUM 80 MG/1
80 TABLET, FILM COATED ORAL DAILY
Status: DISCONTINUED | OUTPATIENT
Start: 2024-05-18 | End: 2024-05-18 | Stop reason: HOSPADM

## 2024-05-17 RX ORDER — METOPROLOL SUCCINATE 100 MG/1
100 TABLET, EXTENDED RELEASE ORAL DAILY
Status: DISCONTINUED | OUTPATIENT
Start: 2024-05-18 | End: 2024-05-17

## 2024-05-17 RX ORDER — CLONAZEPAM 1 MG/1
1 TABLET ORAL
Status: DISCONTINUED | OUTPATIENT
Start: 2024-05-17 | End: 2024-05-18 | Stop reason: HOSPADM

## 2024-05-17 RX ORDER — CLOPIDOGREL BISULFATE 75 MG/1
75 TABLET ORAL DAILY
Status: DISCONTINUED | OUTPATIENT
Start: 2024-05-18 | End: 2024-05-18 | Stop reason: HOSPADM

## 2024-05-17 RX ORDER — LEVOTHYROXINE SODIUM 0.1 MG/1
100 TABLET ORAL DAILY
Status: DISCONTINUED | OUTPATIENT
Start: 2024-05-18 | End: 2024-05-18 | Stop reason: HOSPADM

## 2024-05-17 RX ORDER — HEPARIN SODIUM 5000 [USP'U]/ML
5000 INJECTION, SOLUTION INTRAVENOUS; SUBCUTANEOUS EVERY 8 HOURS SCHEDULED
Status: DISCONTINUED | OUTPATIENT
Start: 2024-05-17 | End: 2024-05-18

## 2024-05-17 RX ORDER — HEPARIN SODIUM 5000 [USP'U]/ML
5000 INJECTION, SOLUTION INTRAVENOUS; SUBCUTANEOUS EVERY 8 HOURS SCHEDULED
Status: CANCELLED | OUTPATIENT
Start: 2024-05-17

## 2024-05-17 RX ORDER — CLONAZEPAM 1 MG/1
2 TABLET ORAL
Status: DISCONTINUED | OUTPATIENT
Start: 2024-05-17 | End: 2024-05-17

## 2024-05-17 RX ORDER — ASPIRIN 81 MG/1
81 TABLET, CHEWABLE ORAL DAILY
Status: DISCONTINUED | OUTPATIENT
Start: 2024-05-18 | End: 2024-05-18 | Stop reason: HOSPADM

## 2024-05-17 RX ORDER — TIZANIDINE 2 MG/1
2 TABLET ORAL EVERY 8 HOURS PRN
Status: DISCONTINUED | OUTPATIENT
Start: 2024-05-17 | End: 2024-05-18 | Stop reason: HOSPADM

## 2024-05-17 RX ORDER — ZOLPIDEM TARTRATE 5 MG/1
10 TABLET ORAL
Status: DISCONTINUED | OUTPATIENT
Start: 2024-05-17 | End: 2024-05-17

## 2024-05-17 RX ORDER — LOSARTAN POTASSIUM 50 MG/1
100 TABLET ORAL DAILY
Status: DISCONTINUED | OUTPATIENT
Start: 2024-05-18 | End: 2024-05-17

## 2024-05-17 RX ADMIN — IOHEXOL 85 ML: 350 INJECTION, SOLUTION INTRAVENOUS at 18:20

## 2024-05-17 NOTE — H&P
Consultation - Stroke   Deshawn Tsai 68 y.o. male MRN: [id number]  Unit/Bed#: [facility] 724-01 Encounter: [id number]      Assessment & Plan     * Stroke-like symptoms  Assessment & Plan  Assessment:  Deshawn Tsai is a 68 y.o. male  who presented as stroke alert on 5/17/2024 and LKW 2200 on 5/16/24. Initial presenting deficits were generalized weakness worse on the right and lethargy. The patient has a pmhx significant for hypertension, hyperlipidemia, coronary artery disease status post CABG and stent in 2021, hypothyroidism, prior stroke with residual right-sided weakness, and migraines.    Workup:  In the ED, Presenting Blood Pressure: 153/75  CT Head showed: No acute intracranial abnormality. Stable anteroinferior bifrontal encephalomalacia, sequela of prior trauma.  Mild chronic microangiopathic ischemic changes.  CTA head and neck showed: Negative for large vessel intracranial occlusion. Atherosclerotic irregularity with areas of moderate stenosis along the left vertebral artery V4 segment. Mild to moderate stenosis along the inferior basilar artery segment. The right vertebral artery predominantly terminates as a PICA.    Labs: Hemoglobin A1c 5.6 (6/9/2022), Total Cholesterol 124 (3/21/2024), LDL 61 (3/21/2024)    Risk factors: hypertension, hyperlipidemia, coronary artery disease, peripheral vascular disease, known carotid disease, known cerebrovascular disease, and prior stroke    Pertinent Scores:  - NIHSS: 2    Impression: Recrudescence in the setting of generalized weakness due to overmedication versus new stroke.    Plan:  - Recommend admit to hospital on acute ischemic stroke pathway  - MRI brain without contrast  - Continue Neuro checks  - Permissive HTN for the first 24 hours up to   - Maintain glucose <180, SSI for coverage if indicated  - Repeat CTH if GCS drops 2pts in 1hr  - Fasting lipid panel & hemoglobin A1c  - PT/OT/ST/PM&R Evaluation  - Echocardiogram  - Atorvastatin 40 mg q.d.  -  Continue monitoring on telemetry    Hypothyroidism  Assessment & Plan  Continue home levothyroxine 100 mcg    Essential hypertension  Assessment & Plan  Due to the patient's acute strokelike symptoms and patient being on stroke pathway we will allow permissive hypertension for the 24-hour period.     Will reinitiate home medication metoprolol succinate 100 mg daily and valsartan 160 mg (formulary losartan 100 mg)        Thrombolytic Decision: Patient not a candidate. Unclear time of onset outside appropriate time window.      Deshawn Tsai will need follow up in in 6 weeks with neurovascular attending or advance practitioner. He will not require outpatient neurological testing.    History of Present Illness     Reason for Consult / Principal Problem: Stroke alert  Hx and PE limited by: nothing  Patient last known well: 2200 on 5/16/24  Stroke alert called: 1808  Neurology time of arrival: 1810  HPI: Deshawn Tsai is a 68 y.o. male who presents as a stroke alert. The patient states he went to bed the prior night and was at baseline. This morning when he woke up he was weak and tired. He reports not being able to get out of bed. He stayed in bed and when eventually he tried to get out he could not and he slid off the bed. The wife is at bedside and she reports that the patient's right sided was significantly weaker. The patient has a PMH of hypertension, hyperlipidemia, coronary artery disease status post CABG and stent in 2021, hypothyroidism, prior stroke with residual right-sided weakness, and migraines.  The patient has been on dual antiplatelet therapy with aspirin and Plavix for several years now.  The patient's Plavix was held for 5 days due to a dental procedure but the patient restarted taking Plavix this a.m.     The patient in the ED states that he feels tired and sleepy.  The patient reportedly takes 2 mg of Klonopin every night as well as Ambien every night.  The patient took his medications last  night.  The wife and the patient reports that he has never had any issues with these medications in the past. The patient reportedly was prescribed the Klonopin more than  a decade ago by his sleep medicine physician.  The patient's medications have been continued by the primary care physician.  Suspect that as the patient has aged the medications may be having an increased effect.  Will hold the Ambien and prescribed the Klonopin at half the dose that he was taking at home as needed.    Due to the patient's presentation and reported worsening right-sided weakness we will admit the patient on the stroke pathway with plan for further imaging.    This AM, patient admits to taking extra Ambien (additional 20mg) and extra Klonopin (additional 2mg) at 2am the night prior to arrival. He currently feels back to his baseline.       Historical Information   Past Medical History:   Diagnosis Date    Allergic     Anxiety     CAD (coronary artery disease)     Coronary artery disease     Depression     Disease of thyroid gland     Dizziness     Erectile dysfunction     Eye problems     Hypertension     Hypothyroidism     Migraines     Stroke (HCC) 11/17/2014    Tremors of nervous system      Past Surgical History:   Procedure Laterality Date    COLONOSCOPY  09/01/2017    COLONOSCOPY      CORONARY ARTERY BYPASS GRAFT  March 24,20201    CORONARY STENT PLACEMENT      DENTAL SURGERY      entire top teeth removal    HERNIA REPAIR      CA CORONARY ARTERY BYP W/VEIN & ARTERY GRAFT 4 VEIN N/A 3/24/2021    Procedure: CORONARY ARTERY BYPASS GRAFT (CABG) 3 VESSELS,  USING LEFT VANE-LAD AND LEFT GSV-RPL & RAMUS;  Surgeon: ISAIAH Jennings MD;  Location: [facility];  Service: Cardiac Surgery    CA ECHO TRANSESOPHAG MONTR CARDIAC PUMP FUNCTJ N/A 3/24/2021    Procedure: TRANSESOPHAGEAL ECHOCARDIOGRAM (MARLEN);  Surgeon: ISAIAH Jennings MD;  Location: [facility];  Service: Cardiac Surgery     Social History   Social History  "    Substance and Sexual Activity   Alcohol Use No     Social History     Substance and Sexual Activity   Drug Use No     E-Cigarette/Vaping    E-Cigarette Use Never User      E-Cigarette/Vaping Substances    Nicotine No     THC No     CBD No     Flavoring No     Other No     Unknown No      Social History     Tobacco Use   Smoking Status Never   Smokeless Tobacco Never     Family History: non-contributory    Review of previous medical records was completed.     Meds/Allergies   all current active meds have been reviewed    Allergies   Allergen Reactions    Medical Tape Other (See Comments) and Rash     Other reaction(s): BURNS USE PAPER TAPE       Objective   Vitals:Blood pressure 165/99, pulse 77, temperature 97.6 °F (36.4 °C), resp. rate 16, height 5' 9\" (1.753 m), weight 104 kg (228 lb 2.8 oz), SpO2 92%.,Body mass index is 33.7 kg/m².    Intake/Output Summary (Last 24 hours) at 5/18/2024 2237  Last data filed at 5/18/2024 1152  Gross per 24 hour   Intake 240 ml   Output 300 ml   Net -60 ml       Invasive Devices:   Invasive Devices       None                   Physical Exam   Vitals reviewed.   Constitutional:    Not in acute distress. Normal appearance. Not ill-appearing, toxic-appearing or diaphoretic.   HENT:   Normocephalic and atraumatic.  External ear normal b/l. Nose normal. No congestion or rhinorrhea. Mucous membranes are moist.  Oropharynx is clear. No oropharyngeal exudate or posterior oropharyngeal erythema.   Eyes:    No scleral icterus.  No discharge b/l.  Conjunctivae normal.   Cardiovascular: no clear edema  Pulmonary:  No respiratory distress.   Musculoskeletal: no gross deformities  Skin:    Skin is not pale.   Psychiatric:      Mood normal. Affect congruent    Neurologic Exam   MENTAL STATUS: AAOx3. Follows simple/complex commands. Affect normal w/ congruent mood.    LANGUAGE: Speech clear, spontaneous, and fluent. No aphasia -  naming, repetition, comprehension intact. No dysarthria - normal " volume and intonation.    CRANIAL NERVES:  CN II: Visual acuity grossly intact. No visual field deficit. PERRLA.   CN III, IV, VI: EOM's intact w/o nystagmus, gaze palsy, or preference.   CN V: Normal masseter bulk. V1-V3 sensation intact and symmetric bilaterally.   CN VII: R upper and lower facial weakness.   CN VIII: Hearing grossly intact bilaterally.   CN IX-X: No dysarthria. Palate elevates symmetrically. Uvula midline.   CN XI: Shoulder shrug symmetric.   CN XII: Tongue midline. No deviation, atrophy, or fasciculations.    MOTOR: Normal muscle bulk. Normal tone. No tremors or abnormal involuntary movements appreciated. Formal strength testing as follows:  Upper extremity:   Shoulder abduction Elbow flexion Elbow extension  strength   Right 4/5 4/5 4/5 4/5   Left 5/5 5/5 5/5 5/5     Lower extremity:   Hip flexion Knee flexion Knee extension Ankle dorsiflexion Ankle plantarflexion   Right 4/5 4/5 4/5 4/5 4/5   Left 5/5 5/5 5/5 5/5 5/5     SENSORY:  Light touch: Intact and symmetric throughout.    COORDINATION: L FNF intact.    GAIT: Deferred    NIHSS:  1a.Level of Consciousness: 0 = Alert   1b. LOC Questions: 0 = Answers both correctly   1c. LOC Commands: 0 = Obeys both correctly   2. Best Gaze: 0 = Normal   3. Visual: 0 = No visual field loss   4. Facial Palsy: 0=Normal symmetric movement   5a. Motor Right Arm: 0=No drift, limb holds 90 (or 45) degrees for full 10 seconds   5b. Motor Left Arm: 0=No drift, limb holds 90 (or 45) degrees for full 10 seconds   6a. Motor Right Le=Some effort against gravity, limb cannot get to or maintain (if cured) 90 (or 45) degrees, drifts down to bed, but has some effort against gravity   6b. Motor Left Le=No drift, limb holds 90 (or 45) degrees for full 10 seconds   7. Limb Ataxia:  0=Absent   8. Sensory: 0=Normal; no sensory loss   9. Best Language:  0=No aphasia, normal   10. Dysarthria: 0=Normal articulation   11. Extinction and Inattention (formerly Neglect):  0=No abnormality   Total Score: 2   Of note the patient was able to hold his right lower extremity against gravity for 5 seconds shortly after the night stroke scale was completed.  Time NIHSS was completed: 1824    Modified Quincy Score:  Unable to determine currently, will gather additional data    Lab Results: I have personally reviewed pertinent reports.    Imaging Studies: I have personally reviewed pertinent reports.    EKG, Pathology, and Other Studies: I have personally reviewed pertinent reports.    VTE Prophylaxis: Sequential compression device (Venodyne)     Code Status: Prior  Advance Directive and Living Will:      Power of :    POLST:

## 2024-05-18 ENCOUNTER — APPOINTMENT (OUTPATIENT)
Dept: RADIOLOGY | Facility: HOSPITAL | Age: 69
DRG: 093 | End: 2024-05-18
Payer: MEDICARE

## 2024-05-18 VITALS
OXYGEN SATURATION: 92 % | DIASTOLIC BLOOD PRESSURE: 99 MMHG | HEIGHT: 69 IN | RESPIRATION RATE: 16 BRPM | SYSTOLIC BLOOD PRESSURE: 165 MMHG | BODY MASS INDEX: 33.8 KG/M2 | HEART RATE: 77 BPM | WEIGHT: 228.18 LBS | TEMPERATURE: 97.6 F

## 2024-05-18 LAB
ALBUMIN SERPL BCP-MCNC: 4.1 G/DL (ref 3.5–5)
ALP SERPL-CCNC: 77 U/L (ref 34–104)
ALT SERPL W P-5'-P-CCNC: 15 U/L (ref 7–52)
ANION GAP SERPL CALCULATED.3IONS-SCNC: 13 MMOL/L (ref 4–13)
AST SERPL W P-5'-P-CCNC: 26 U/L (ref 13–39)
BILIRUB SERPL-MCNC: 1.6 MG/DL (ref 0.2–1)
BUN SERPL-MCNC: 12 MG/DL (ref 5–25)
CALCIUM SERPL-MCNC: 9.4 MG/DL (ref 8.4–10.2)
CHLORIDE SERPL-SCNC: 97 MMOL/L (ref 96–108)
CHOLEST SERPL-MCNC: 114 MG/DL
CO2 SERPL-SCNC: 24 MMOL/L (ref 21–32)
CREAT SERPL-MCNC: 1.2 MG/DL (ref 0.6–1.3)
EST. AVERAGE GLUCOSE BLD GHB EST-MCNC: 123 MG/DL
GFR SERPL CREATININE-BSD FRML MDRD: 62 ML/MIN/1.73SQ M
GLUCOSE SERPL-MCNC: 111 MG/DL (ref 65–140)
GLUCOSE SERPL-MCNC: 128 MG/DL (ref 65–140)
GLUCOSE SERPL-MCNC: 155 MG/DL (ref 65–140)
HBA1C MFR BLD: 5.9 %
HDLC SERPL-MCNC: 33 MG/DL
LDLC SERPL CALC-MCNC: 62 MG/DL (ref 0–100)
MAGNESIUM SERPL-MCNC: 1.9 MG/DL (ref 1.9–2.7)
OSMOLALITY UR/SERPL-RTO: 286 MMOL/KG (ref 282–298)
OSMOLALITY UR: 280 MMOL/KG
PHOSPHATE SERPL-MCNC: 3.1 MG/DL (ref 2.3–4.1)
POTASSIUM SERPL-SCNC: 3.5 MMOL/L (ref 3.5–5.3)
PROT SERPL-MCNC: 7.5 G/DL (ref 6.4–8.4)
SODIUM 24H UR-SCNC: 56 MOL/L
SODIUM SERPL-SCNC: 134 MMOL/L (ref 135–147)
T4 FREE SERPL-MCNC: 0.81 NG/DL (ref 0.61–1.12)
TRIGL SERPL-MCNC: 97 MG/DL
TSH SERPL DL<=0.05 MIU/L-ACNC: 0.36 UIU/ML (ref 0.45–4.5)

## 2024-05-18 PROCEDURE — 84439 ASSAY OF FREE THYROXINE: CPT

## 2024-05-18 PROCEDURE — 70553 MRI BRAIN STEM W/O & W/DYE: CPT

## 2024-05-18 PROCEDURE — 83735 ASSAY OF MAGNESIUM: CPT

## 2024-05-18 PROCEDURE — 92610 EVALUATE SWALLOWING FUNCTION: CPT

## 2024-05-18 PROCEDURE — 97163 PT EVAL HIGH COMPLEX 45 MIN: CPT

## 2024-05-18 PROCEDURE — A9585 GADOBUTROL INJECTION: HCPCS | Performed by: PSYCHIATRY & NEUROLOGY

## 2024-05-18 PROCEDURE — 80061 LIPID PANEL: CPT

## 2024-05-18 PROCEDURE — 82948 REAGENT STRIP/BLOOD GLUCOSE: CPT

## 2024-05-18 PROCEDURE — 99285 EMERGENCY DEPT VISIT HI MDM: CPT | Performed by: EMERGENCY MEDICINE

## 2024-05-18 PROCEDURE — 80053 COMPREHEN METABOLIC PANEL: CPT

## 2024-05-18 PROCEDURE — 84443 ASSAY THYROID STIM HORMONE: CPT

## 2024-05-18 PROCEDURE — 97166 OT EVAL MOD COMPLEX 45 MIN: CPT

## 2024-05-18 PROCEDURE — 84100 ASSAY OF PHOSPHORUS: CPT

## 2024-05-18 PROCEDURE — 83036 HEMOGLOBIN GLYCOSYLATED A1C: CPT

## 2024-05-18 RX ORDER — ZOLPIDEM TARTRATE 5 MG/1
20 TABLET ORAL
Status: CANCELLED | OUTPATIENT
Start: 2024-05-18

## 2024-05-18 RX ORDER — GADOBUTROL 604.72 MG/ML
10 INJECTION INTRAVENOUS
Status: COMPLETED | OUTPATIENT
Start: 2024-05-18 | End: 2024-05-18

## 2024-05-18 RX ORDER — ACETAMINOPHEN 325 MG/1
650 TABLET ORAL EVERY 6 HOURS PRN
Status: DISCONTINUED | OUTPATIENT
Start: 2024-05-18 | End: 2024-05-18 | Stop reason: HOSPADM

## 2024-05-18 RX ORDER — METOPROLOL SUCCINATE 100 MG/1
100 TABLET, EXTENDED RELEASE ORAL DAILY
Status: CANCELLED | OUTPATIENT
Start: 2024-05-18

## 2024-05-18 RX ORDER — CLONAZEPAM 1 MG/1
4 TABLET ORAL
Status: CANCELLED | OUTPATIENT
Start: 2024-05-18

## 2024-05-18 RX ORDER — ENOXAPARIN SODIUM 100 MG/ML
40 INJECTION SUBCUTANEOUS
Status: DISCONTINUED | OUTPATIENT
Start: 2024-05-18 | End: 2024-05-18 | Stop reason: HOSPADM

## 2024-05-18 RX ADMIN — LEVOTHYROXINE SODIUM 100 MCG: 100 TABLET ORAL at 06:40

## 2024-05-18 RX ADMIN — ATORVASTATIN CALCIUM 80 MG: 80 TABLET, FILM COATED ORAL at 08:36

## 2024-05-18 RX ADMIN — GADOBUTROL 10 ML: 604.72 INJECTION INTRAVENOUS at 03:48

## 2024-05-18 RX ADMIN — ASPIRIN 81 MG CHEWABLE TABLET 81 MG: 81 TABLET CHEWABLE at 08:36

## 2024-05-18 RX ADMIN — CLOPIDOGREL BISULFATE 75 MG: 75 TABLET ORAL at 08:36

## 2024-05-18 RX ADMIN — ACETAMINOPHEN 650 MG: 325 TABLET, FILM COATED ORAL at 03:56

## 2024-05-18 NOTE — PLAN OF CARE
Problem: Prexisting or High Potential for Compromised Skin Integrity  Goal: Skin integrity is maintained or improved  Description: INTERVENTIONS:  - Identify patients at risk for skin breakdown  - Assess and monitor skin integrity  - Assess and monitor nutrition and hydration status  - Monitor labs   - Assess for incontinence   - Turn and reposition patient  - Assist with mobility/ambulation  - Relieve pressure over bony prominences  - Avoid friction and shearing  - Provide appropriate hygiene as needed including keeping skin clean and dry  - Evaluate need for skin moisturizer/barrier cream  - Collaborate with interdisciplinary team   - Patient/family teaching  - Consider wound care consult   Outcome: Progressing     Problem: NEUROSENSORY - ADULT  Goal: Achieves stable or improved neurological status  Description: INTERVENTIONS  - Monitor and report changes in neurological status  - Monitor vital signs such as temperature, blood pressure, glucose, and any other labs ordered   - Initiate measures to prevent increased intracranial pressure  - Monitor for seizure activity and implement precautions if appropriate      Outcome: Progressing     Problem: CARDIOVASCULAR - ADULT  Goal: Maintains optimal cardiac output and hemodynamic stability  Description: INTERVENTIONS:  - Monitor I/O, vital signs and rhythm  - Monitor for S/S and trends of decreased cardiac output  - Administer and titrate ordered vasoactive medications to optimize hemodynamic stability  - Assess quality of pulses, skin color and temperature  - Assess for signs of decreased coronary artery perfusion  - Instruct patient to report change in severity of symptoms  Outcome: Progressing     Problem: Nutrition/Hydration-ADULT  Goal: Nutrient/Hydration intake appropriate for improving, restoring or maintaining nutritional needs  Description: Monitor and assess patient's nutrition/hydration status for malnutrition. Collaborate with interdisciplinary team and  initiate plan and interventions as ordered.  Monitor patient's weight and dietary intake as ordered or per policy. Utilize nutrition screening tool and intervene as necessary. Determine patient's food preferences and provide high-protein, high-caloric foods as appropriate.     INTERVENTIONS:  - Monitor oral intake, urinary output, labs, and treatment plans  - Assess nutrition and hydration status and recommend course of action  - Evaluate amount of meals eaten  - Assist patient with eating if necessary   - Allow adequate time for meals  - Recommend/ encourage appropriate diets, oral nutritional supplements, and vitamin/mineral supplements  - Order, calculate, and assess calorie counts as needed  - Recommend, monitor, and adjust tube feedings and TPN/PPN based on assessed needs  - Assess need for intravenous fluids  - Provide specific nutrition/hydration education as appropriate  - Include patient/family/caregiver in decisions related to nutrition  Outcome: Progressing     Problem: SAFETY ADULT  Goal: Patient will remain free of falls  Description: INTERVENTIONS:  - Educate patient/family on patient safety including physical limitations  - Instruct patient to call for assistance with activity   - Consult OT/PT to assist with strengthening/mobility   - Keep Call bell within reach  - Keep bed low and locked with side rails adjusted as appropriate  - Keep care items and personal belongings within reach  - Initiate and maintain comfort rounds  - Make Fall Risk Sign visible to staff  - Offer Toileting every 2 Hours, in advance of need  - Initiate/Maintain bed alarm  - Obtain necessary fall risk management equipment:    - Apply yellow socks and bracelet for high fall risk patients  - Consider moving patient to room near nurses station  Outcome: Progressing     Problem: DISCHARGE PLANNING  Goal: Discharge to home or other facility with appropriate resources  Description: INTERVENTIONS:  - Identify barriers to discharge  w/patient and caregiver  - Arrange for needed discharge resources and transportation as appropriate  - Identify discharge learning needs (meds, wound care, etc.)  - Arrange for interpretive services to assist at discharge as needed  - Refer to Case Management Department for coordinating discharge planning if the patient needs post-hospital services based on physician/advanced practitioner order or complex needs related to functional status, cognitive ability, or social support system  Outcome: Progressing     Problem: Knowledge Deficit  Goal: Patient/family/caregiver demonstrates understanding of disease process, treatment plan, medications, and discharge instructions  Description: Complete learning assessment and assess knowledge base.  Interventions:  - Provide teaching at level of understanding  - Provide teaching via preferred learning methods  Outcome: Progressing

## 2024-05-18 NOTE — ED PROVIDER NOTES
History  Chief Complaint   Patient presents with    Weakness - Generalized     Pt states he became weak when standing to get out of bed today and slide to floor, - headstrike, -LOC, on plavix, had procedure yesterday to get all bottom teeth out      Patient is a 68-year-old male with past medical history of large vessel occlusion with chronic right-sided weakness both upper and lower extremities, and right-sided facial droop is chronic deficits, presenting with 1 day of generalized weakness, lethargy, urinary incontinence.  Patient notes that he is able to walk with a cane or walker at baseline, now he is completely flaccid in his lower extremity, and weaker in his right upper extremity.  He notably held his aspirin and Plavix for the last couple of days secondary to a recent dental procedure.  Patient also notes new urinary incontinence.  Secondary to these new neurological findings, the patient was activated as a stroke alert.  Last known normal would be the previous evening when he went to bed at 10 PM.  Patient woke up this morning feeling weak and was unable to get out of bed.           Prior to Admission Medications   Prescriptions Last Dose Informant Patient Reported? Taking?   acetaminophen-codeine (TYLENOL with CODEINE #3) 300-30 MG per tablet 5/16/2024  No Yes   Sig: Take 1 tablet by mouth every 8 (eight) hours as needed for moderate pain   aspirin 81 MG tablet 5/17/2024 Self, Spouse/Significant Other Yes Yes   Sig: Take 81 mg by mouth   atorvastatin (LIPITOR) 40 mg tablet 5/17/2024  No Yes   Sig: TAKE 2 TABLETS EVERY DAY   clonazePAM (KlonoPIN) 2 mg tablet 5/16/2024  No Yes   Sig: Take 1 tablet (2 mg total) by mouth 2 (two) times a day as needed for seizures   clopidogrel (PLAVIX) 75 mg tablet 5/17/2024 Spouse/Significant Other, Self No Yes   Sig: TAKE 1 TABLET EVERY DAY   clotrimazole-betamethasone (LOTRISONE) 1-0.05 % cream Unknown Spouse/Significant Other, Self No No   Sig: Apply topically 2 (two)  times a day   levothyroxine 100 mcg tablet 2024 Spouse/Significant Other, Self No Yes   Sig: TAKE 1 TABLET EVERY DAY   metoprolol succinate (TOPROL-XL) 100 mg 24 hr tablet 2024 Spouse/Significant Other, Self No Yes   Sig: TAKE 1 TABLET EVERY DAY   oxyCODONE HCl 5 MG TABA Not Taking  No No   Sig: Take 5 mg by mouth 4 (four) times a day as needed (pain) Max Daily Amount: 20 mg   Patient not taking: Reported on 2024   sildenafil (VIAGRA) 100 mg tablet Past Month  No Yes   Sig: Take 1 tablet (100 mg total) by mouth daily as needed for erectile dysfunction   tiZANidine (ZANAFLEX) 2 mg tablet 2024  No Yes   Sig: TAKE 1 TABLET EVERY 8 HOURS AS NEEDED FOR MUSCLE SPASMS   valsartan (DIOVAN) 160 mg tablet 2024 Spouse/Significant Other, Self No Yes   Sig: TAKE 1 TABLET EVERY DAY   zolpidem (AMBIEN) 10 mg tablet 2024  No Yes   Si po  q hs      Facility-Administered Medications: None       Past Medical History:   Diagnosis Date    Allergic     Anxiety     CAD (coronary artery disease)     Coronary artery disease     Depression     Disease of thyroid gland     Dizziness     Erectile dysfunction     Eye problems     Hypertension     Hypothyroidism     Migraines     Stroke (HCC) 2014    Tremors of nervous system        Past Surgical History:   Procedure Laterality Date    COLONOSCOPY  2017    COLONOSCOPY      CORONARY ARTERY BYPASS GRAFT      CORONARY STENT PLACEMENT      DENTAL SURGERY      entire top teeth removal    HERNIA REPAIR      [facility] CORONARY ARTERY BYP W/VEIN & ARTERY GRAFT 4 VEIN N/A 3/24/2021    Procedure: CORONARY ARTERY BYPASS GRAFT (CABG) 3 VESSELS,  USING LEFT VANE-LAD AND LEFT GSV-RPL & RAMUS;  Surgeon: ISAIAH Jennings MD;  Location: [facility];  Service: Cardiac Surgery    [facility] ECHO TRANSESOPHAG MONTR CARDIAC PUMP FUNCTJ N/A 3/24/2021    Procedure: TRANSESOPHAGEAL ECHOCARDIOGRAM (MARLEN);  Surgeon: ISAIAH Jennings MD;  Location: [facility];   Service: Cardiac Surgery       Family History   Problem Relation Age of Onset    Cancer Mother     Hypertension Mother     Rheum arthritis Mother     Heart attack Father     Heart disease Father     Hypertension Father     Early death Father     Heart attack Sister     Hearing loss Sister     Stroke Sister     Psychiatric Illness Sister     Cancer Brother      I have reviewed and agree with the history as documented.    E-Cigarette/Vaping    E-Cigarette Use Never User      E-Cigarette/Vaping Substances    Nicotine No     THC No     CBD No     Flavoring No     Other No     Unknown No      Social History     Tobacco Use    Smoking status: Never    Smokeless tobacco: Never   Vaping Use    Vaping status: Never Used   Substance Use Topics    Alcohol use: No    Drug use: No        Review of Systems   All other systems reviewed and are negative.      Physical Exam  ED Triage Vitals [05/17/24 1721]   Temperature Pulse Respirations Blood Pressure SpO2   100 °F (37.8 °C) 80 18 153/75 96 %      Temp Source Heart Rate Source Patient Position - Orthostatic VS BP Location FiO2 (%)   Oral Monitor Lying Right arm --      Pain Score       --             Orthostatic Vital Signs  Vitals:    05/17/24 2156 05/17/24 2200 05/17/24 2311 05/18/24 0021   BP: 126/83 126/83 134/82 117/71   Pulse: 85 82 83 88   Patient Position - Orthostatic VS: Lying          Physical Exam  Vitals and nursing note reviewed.   Constitutional:       General: He is not in acute distress.     Appearance: He is well-developed.   HENT:      Head: Normocephalic and atraumatic.   Eyes:      Conjunctiva/sclera: Conjunctivae normal.   Cardiovascular:      Rate and Rhythm: Normal rate and regular rhythm.      Heart sounds: No murmur heard.  Pulmonary:      Effort: Pulmonary effort is normal. No respiratory distress.      Breath sounds: Normal breath sounds.   Abdominal:      Palpations: Abdomen is soft.      Tenderness: There is no abdominal tenderness.    Musculoskeletal:         General: No swelling.      Cervical back: Neck supple.   Skin:     General: Skin is warm and dry.      Capillary Refill: Capillary refill takes less than 2 seconds.   Neurological:      Mental Status: He is alert and oriented to person, place, and time.      GCS: GCS eye subscore is 4. GCS verbal subscore is 5. GCS motor subscore is 6.      Cranial Nerves: Cranial nerve deficit, dysarthria and facial asymmetry present.      Motor: Weakness present.      Comments: Right-sided weakness on the upper extremity, flaccid right lower extremity   Psychiatric:         Mood and Affect: Mood normal.         ED Medications  Medications   aspirin chewable tablet 81 mg (has no administration in time range)   atorvastatin (LIPITOR) tablet 80 mg (has no administration in time range)   clopidogrel (PLAVIX) tablet 75 mg (has no administration in time range)   levothyroxine tablet 100 mcg (has no administration in time range)   tiZANidine (ZANAFLEX) tablet 2 mg (has no administration in time range)   heparin (porcine) subcutaneous injection 5,000 Units (5,000 Units Subcutaneous Not Given 5/17/24 2312)   clonazePAM (KlonoPIN) tablet 1 mg (has no administration in time range)   iohexol (OMNIPAQUE) 350 MG/ML injection (SINGLE-DOSE) 100 mL (85 mL Intravenous Given 5/17/24 1820)       Diagnostic Studies  Results Reviewed       Procedure Component Value Units Date/Time    HS Troponin I 4hr [[id number]]  (Normal) Collected: 05/17/24 2315    Lab Status: Final result Specimen: Blood from Arm, Left Updated: 05/17/24 2350     hs TnI 4hr 36 ng/L      Delta 4hr hsTnI 4 ng/L     Platelet count [[id number]]  (Abnormal) Collected: 05/17/24 2315    Lab Status: Final result Specimen: Blood from Arm, Left Updated: 05/17/24 2333     Platelets 141 Thousands/uL      MPV 9.3 fL     HS Troponin I 2hr [[id number]]  (Normal) Collected: 05/17/24 2011    Lab Status: Final result Specimen: Blood from Arm, Left Updated: 05/17/24 2048     hs  TnI 2hr 42 ng/L      Delta 2hr hsTnI 10 ng/L     FLU/RSV/COVID - if FLU/RSV clinically relevant [[id number]]  (Normal) Collected: 05/17/24 1827    Lab Status: Final result Specimen: Nares from Nose Updated: 05/17/24 1953     SARS-CoV-2 Negative     INFLUENZA A PCR Negative     INFLUENZA B PCR Negative     RSV PCR Negative    Narrative:      FOR PEDIATRIC PATIENTS - copy/paste COVID Guidelines URL to browser: [email or web address]    SARS-CoV-2 assay is a Nucleic Acid Amplification assay intended for the  qualitative detection of nucleic acid from SARS-CoV-2 in nasopharyngeal  swabs. Results are for the presumptive identification of SARS-CoV-2 RNA.    Positive results are indicative of infection with SARS-CoV-2, the virus  causing COVID-19, but do not rule out bacterial infection or co-infection  with other viruses. Laboratories within the [place] and its  territories are required to report all positive results to the appropriate  public health authorities. Negative results do not preclude SARS-CoV-2  infection and should not be used as the sole basis for treatment or other  patient management decisions. Negative results must be combined with  clinical observations, patient history, and epidemiological information.  This test has not been FDA cleared or approved.    This test has been authorized by FDA under an Emergency Use Authorization  (EUA). This test is only authorized for the duration of time the  declaration that circumstances exist justifying the authorization of the  emergency use of an in vitro diagnostic tests for detection of SARS-CoV-2  virus and/or diagnosis of COVID-19 infection under section 564(b)(1) of  the Act, 21 U.S.C. 360bbb-3(b)(1), unless the authorization is terminated  or revoked sooner. The test has been validated but independent review by FDA  and CLIA is pending.    Test performed using [employer]: This RT-PCR assay targets  N2,  a region unique to SARS-CoV-2. A conserved region in the E-gene was chosen  for pan-Sarbecovirus detection which includes SARS-CoV-2.    According to CMS-2020-01-R, this platform meets the definition of high-throughput technology.    Protime-INR [[id number]]  (Normal) Collected: 05/17/24 1813    Lab Status: Final result Specimen: Blood from Arm, Right Updated: 05/17/24 1853     Protime 14.5 seconds      INR 1.14    APTT [[id number]]  (Normal) Collected: 05/17/24 1813    Lab Status: Final result Specimen: Blood from Arm, Right Updated: 05/17/24 1853     PTT 30 seconds     Basic metabolic panel [[id number]]  (Abnormal) Collected: 05/17/24 1813    Lab Status: Final result Specimen: Blood from Arm, Right Updated: 05/17/24 1852     Sodium 132 mmol/L      Potassium 4.6 mmol/L      Chloride 96 mmol/L      CO2 25 mmol/L      ANION GAP 11 mmol/L      BUN 12 mg/dL      Creatinine 1.32 mg/dL      Glucose 119 mg/dL      Calcium 9.5 mg/dL      eGFR 55 ml/min/1.73sq m     Narrative:      National Kidney Disease Foundation guidelines for Chronic Kidney Disease (CKD):     Stage 1 with normal or high GFR (GFR > 90 mL/min/1.73 square meters)    Stage 2 Mild CKD (GFR = 60-89 mL/min/1.73 square meters)    Stage 3A Moderate CKD (GFR = 45-59 mL/min/1.73 square meters)    Stage 3B Moderate CKD (GFR = 30-44 mL/min/1.73 square meters)    Stage 4 Severe CKD (GFR = 15-29 mL/min/1.73 square meters)    Stage 5 End Stage CKD (GFR <15 mL/min/1.73 square meters)  Note: GFR calculation is accurate only with a steady state creatinine    HS Troponin 0hr (reflex protocol) [[id number]]  (Normal) Collected: 05/17/24 1813    Lab Status: Final result Specimen: Blood from Arm, Right Updated: 05/17/24 1851     hs TnI 0hr 32 ng/L     Urine Microscopic [[id number]]  (Normal) Collected: 05/17/24 1826    Lab Status: Final result Specimen: Urine, Other Updated: 05/17/24 1839     RBC, UA 1-2 /hpf      WBC, UA None Seen /hpf      Epithelial Cells None Seen /hpf       Bacteria, UA Occasional /hpf     UA w Reflex to Microscopic w Reflex to Culture [[id number]]  (Abnormal) Collected: 05/17/24 1826    Lab Status: Final result Specimen: Urine, Other Updated: 05/17/24 1836     Color, UA Light Yellow     Clarity, UA Clear     Specific Gravity, UA 1.008     pH, UA 5.0     Leukocytes, UA Negative     Nitrite, UA Negative     Protein, UA Negative mg/dl      Glucose, UA Negative mg/dl      Ketones, UA Negative mg/dl      Urobilinogen, UA <2.0 mg/dl      Bilirubin, UA Negative     Occult Blood, UA Trace    CBC and Platelet [[id number]]  (Abnormal) Collected: 05/17/24 1813    Lab Status: Final result Specimen: Blood from Arm, Right Updated: 05/17/24 1823     WBC 5.06 Thousand/uL      RBC 4.39 Million/uL      Hemoglobin 14.9 g/dL      Hematocrit 42.3 %      MCV 96 fL      MCH 33.9 pg      MCHC 35.2 g/dL      RDW 12.6 %      Platelets 144 Thousands/uL      MPV 9.6 fL     Fingerstick Glucose (POCT) [[id number]]  (Normal) Collected: 05/17/24 1810    Lab Status: Final result Specimen: Blood Updated: 05/17/24 1811     POC Glucose 132 mg/dl                    CTA stroke alert (head/neck)   Final Result by Matias Jacques MD (05/17 1846)      1. CTA head: Negative for large vessel intracranial occlusion. Atherosclerotic irregularity with areas of moderate stenosis along the left vertebral artery V4 segment. Mild to moderate stenosis along the inferior basilar artery segment. The right    vertebral artery predominantly terminates as a PICA.      2. CTA neck:  No extracranial carotid stenosis.  The cervical vertebral arteries are patent, moderate left origin stenosis.         Findings were directly discussed with Darron Smart at 6:40 p.m.                           Workstation performed: [id number]         CT stroke alert brain   Final Result by Matias Jacques MD (05/17 1846)      No acute intracranial abnormality.      Stable anteroinferior bifrontal encephalomalacia, sequela of prior trauma.    Mild chronic microangiopathic ischemic changes.      Findings were directly discussed with Darron Smart at 6:40 p.m.         Workstation performed: [id number]         MRI Inpatient Order    (Results Pending)         Procedures  Procedures      ED Course                                       Medical Decision Making  See HPI    Amount and/or Complexity of Data Reviewed  Labs: ordered.  Radiology: ordered.    Risk  Prescription drug management.  Decision regarding hospitalization.          Disposition  Final diagnoses:   Cerebrovascular accident (CVA) due to thrombosis of precerebral artery (HCC)     Time reflects when diagnosis was documented in both MDM as applicable and the Disposition within this note       Time User Action Codes Description Comment    5/17/2024  6:10 PM Meka Khan Add [I63.00] Cerebrovascular accident (CVA) due to thrombosis of precerebral artery (HCC)     5/17/2024  8:18 PM Nicky Stafford Add [R29.90] Stroke-like symptoms           ED Disposition       ED Disposition   Admit    Condition   Stable    Date/Time   Fri May 17, 2024  6:53 PM    Comment   --             Follow-up Information    None         Current Discharge Medication List        CONTINUE these medications which have NOT CHANGED    Details   acetaminophen-codeine (TYLENOL with CODEINE #3) 300-30 MG per tablet Take 1 tablet by mouth every 8 (eight) hours as needed for moderate pain  Qty: 90 tablet, Refills: 0    Associated Diagnoses: Low back pain without sciatica, unspecified back pain laterality, unspecified chronicity      aspirin 81 MG tablet Take 81 mg by mouth      atorvastatin (LIPITOR) 40 mg tablet TAKE 2 TABLETS EVERY DAY  Qty: 180 tablet, Refills: 1    Associated Diagnoses: Coronary artery disease involving coronary bypass graft of native heart with angina pectoris (HCC); Mixed hyperlipidemia      clonazePAM (KlonoPIN) 2 mg tablet Take 1 tablet (2 mg total) by mouth 2 (two) times a day as needed for seizures  Qty: 120 tablet,  Refills: 0    Associated Diagnoses: Anxiety      clopidogrel (PLAVIX) 75 mg tablet TAKE 1 TABLET EVERY DAY  Qty: 90 tablet, Refills: 1    Associated Diagnoses: S/P CABG (coronary artery bypass graft)      levothyroxine 100 mcg tablet TAKE 1 TABLET EVERY DAY  Qty: 90 tablet, Refills: 1    Associated Diagnoses: Hypothyroidism, unspecified type      metoprolol succinate (TOPROL-XL) 100 mg 24 hr tablet TAKE 1 TABLET EVERY DAY  Qty: 90 tablet, Refills: 3    Associated Diagnoses: Essential hypertension      sildenafil (VIAGRA) 100 mg tablet Take 1 tablet (100 mg total) by mouth daily as needed for erectile dysfunction  Qty: 30 tablet, Refills: 0    Associated Diagnoses: Erectile dysfunction, unspecified erectile dysfunction type      tiZANidine (ZANAFLEX) 2 mg tablet TAKE 1 TABLET EVERY 8 HOURS AS NEEDED FOR MUSCLE SPASMS  Qty: 270 tablet, Refills: 3    Associated Diagnoses: Low back pain without sciatica, unspecified back pain laterality, unspecified chronicity      valsartan (DIOVAN) 160 mg tablet TAKE 1 TABLET EVERY DAY  Qty: 90 tablet, Refills: 3    Associated Diagnoses: Essential hypertension      zolpidem (AMBIEN) 10 mg tablet 2 po  q hs  Qty: 180 tablet, Refills: 0    Associated Diagnoses: Insomnia, unspecified type      clotrimazole-betamethasone (LOTRISONE) 1-0.05 % cream Apply topically 2 (two) times a day  Qty: 30 g, Refills: 0    Associated Diagnoses: Tinea cruris      oxyCODONE HCl 5 MG TABA Take 5 mg by mouth 4 (four) times a day as needed (pain) Max Daily Amount: 20 mg  Qty: 40 each, Refills: 0    Associated Diagnoses: Low back pain without sciatica, unspecified back pain laterality, unspecified chronicity           No discharge procedures on file.    PDMP Review         Value Time User    PDMP Reviewed  Yes 5/8/2024  1:03 PM Chadd Lambert MD             ED Provider  Attending physically available and evaluated Deshawn Tsai. I managed the patient along with the ED Attending.    Electronically Signed  by           Eric Garrison MD  05/19/24 0324

## 2024-05-18 NOTE — ED ATTENDING ATTESTATION
5/17/2024  I, Andrew Kohli MD, saw and evaluated the patient. I have discussed the patient with the resident/non-physician practitioner and agree with the resident's/non-physician practitioner's findings, Plan of Care, and MDM as documented in the resident's/non-physician practitioner's note, except where noted. All available labs and Radiology studies were reviewed.  I was present for key portions of any procedure(s) performed by the resident/non-physician practitioner and I was immediately available to provide assistance.       At this point I agree with the current assessment done in the Emergency Department.  I have conducted an independent evaluation of this patient a history and physical is as follows:    ED Course     Patient with past medical history significant for pontine stroke and chronic right-sided weakness presents with 1 day of generalized weakness and lethargy.  Patient states that the weakness is significantly worse on the right although he is weak on that side at baseline.  He states that he is normally able to ambulate with a walker but was unable to get out of bed this morning.  Patient denies any headache.  He has been compliant with aspirin and Plavix other than recently stopping for 5 days due to a dental procedure.  No additional complaints. A/P: Weakness.  Given onset this morning and history of stroke, stroke alert was called.  Case discussed with neurology.  CT and CTA reviewed which showed no signs of large vessel occlusion or intracerebral hemorrhage.  Plan to admit for stroke pathway.      Critical Care Time  Procedures

## 2024-05-18 NOTE — CONSULTS
encouraged po intake. Patient and wife denied need for additional diet education as familiar with same. Encouraged 3 meals.   Wife reported they are familiar with cardiac diet - denied need diet education

## 2024-05-18 NOTE — PLAN OF CARE
Problem: Prexisting or High Potential for Compromised Skin Integrity  Goal: Skin integrity is maintained or improved  Description: INTERVENTIONS:  - Identify patients at risk for skin breakdown  - Assess and monitor skin integrity  - Assess and monitor nutrition and hydration status  - Monitor labs   - Assess for incontinence   - Turn and reposition patient  - Assist with mobility/ambulation  - Relieve pressure over bony prominences  - Avoid friction and shearing  - Provide appropriate hygiene as needed including keeping skin clean and dry  - Evaluate need for skin moisturizer/barrier cream  - Collaborate with interdisciplinary team   - Patient/family teaching  - Consider wound care consult   Outcome: Progressing     Problem: NEUROSENSORY - ADULT  Goal: Achieves stable or improved neurological status  Description: INTERVENTIONS  - Monitor and report changes in neurological status  - Monitor vital signs such as temperature, blood pressure, glucose, and any other labs ordered   - Initiate measures to prevent increased intracranial pressure  - Monitor for seizure activity and implement precautions if appropriate      Outcome: Progressing     Problem: CARDIOVASCULAR - ADULT  Goal: Maintains optimal cardiac output and hemodynamic stability  Description: INTERVENTIONS:  - Monitor I/O, vital signs and rhythm  - Monitor for S/S and trends of decreased cardiac output  - Administer and titrate ordered vasoactive medications to optimize hemodynamic stability  - Assess quality of pulses, skin color and temperature  - Assess for signs of decreased coronary artery perfusion  - Instruct patient to report change in severity of symptoms  Outcome: Progressing     Problem: Nutrition/Hydration-ADULT  Goal: Nutrient/Hydration intake appropriate for improving, restoring or maintaining nutritional needs  Description: Monitor and assess patient's nutrition/hydration status for malnutrition. Collaborate with interdisciplinary team and  initiate plan and interventions as ordered.  Monitor patient's weight and dietary intake as ordered or per policy. Utilize nutrition screening tool and intervene as necessary. Determine patient's food preferences and provide high-protein, high-caloric foods as appropriate.     INTERVENTIONS:  - Monitor oral intake, urinary output, labs, and treatment plans  - Assess nutrition and hydration status and recommend course of action  - Evaluate amount of meals eaten  - Assist patient with eating if necessary   - Allow adequate time for meals  - Recommend/ encourage appropriate diets, oral nutritional supplements, and vitamin/mineral supplements  - Order, calculate, and assess calorie counts as needed  - Recommend, monitor, and adjust tube feedings and TPN/PPN based on assessed needs  - Assess need for intravenous fluids  - Provide specific nutrition/hydration education as appropriate  - Include patient/family/caregiver in decisions related to nutrition  Outcome: Progressing     Problem: SAFETY ADULT  Goal: Patient will remain free of falls  Description: INTERVENTIONS:  - Educate patient/family on patient safety including physical limitations  - Instruct patient to call for assistance with activity   - Consult OT/PT to assist with strengthening/mobility   - Keep Call bell within reach  - Keep bed low and locked with side rails adjusted as appropriate  - Keep care items and personal belongings within reach  - Initiate and maintain comfort rounds  - Make Fall Risk Sign visible to staff  - Initiate/Maintain fall alarm  - Apply yellow socks and bracelet for high fall risk patients  - Consider moving patient to room near nurses station  Outcome: Progressing     Problem: DISCHARGE PLANNING  Goal: Discharge to home or other facility with appropriate resources  Description: INTERVENTIONS:  - Identify barriers to discharge w/patient and caregiver  - Arrange for needed discharge resources and transportation as appropriate  -  Identify discharge learning needs (meds, wound care, etc.)  - Arrange for interpretive services to assist at discharge as needed  - Refer to Case Management Department for coordinating discharge planning if the patient needs post-hospital services based on physician/advanced practitioner order or complex needs related to functional status, cognitive ability, or social support system  Outcome: Progressing     Problem: Knowledge Deficit  Goal: Patient/family/caregiver demonstrates understanding of disease process, treatment plan, medications, and discharge instructions  Description: Complete learning assessment and assess knowledge base.  Interventions:  - Provide teaching at level of understanding  - Provide teaching via preferred learning methods  Outcome: Progressing

## 2024-05-18 NOTE — PHYSICAL THERAPY NOTE
PHYSICAL THERAPY EVALUATION  NAME:  Deshawn Tsai  DATE: 05/18/24    AGE:   68 y.o.  Mrn:   [id number]  ADMIT DX:  Weakness [R53.1]  Stroke-like symptoms [R29.90]  Cerebrovascular accident (CVA) due to thrombosis of precerebral artery (HCC) [I63.00]    Past Medical History:   Diagnosis Date    Allergic     Anxiety     CAD (coronary artery disease)     Coronary artery disease     Depression     Disease of thyroid gland     Dizziness     Erectile dysfunction     Eye problems     Hypertension     Hypothyroidism     Migraines     Stroke (HCC) 11/17/2014    Tremors of nervous system      Past Surgical History:   Procedure Laterality Date    COLONOSCOPY  09/01/2017    COLONOSCOPY      CORONARY ARTERY BYPASS GRAFT  March 24,20201    CORONARY STENT PLACEMENT      DENTAL SURGERY      entire top teeth removal    HERNIA REPAIR      [place] CORONARY ARTERY BYP W/VEIN & ARTERY GRAFT 4 VEIN N/A 3/24/2021    Procedure: CORONARY ARTERY BYPASS GRAFT (CABG) 3 VESSELS,  USING LEFT VANE-LAD AND LEFT GSV-RPL & RAMUS;  Surgeon: ISAIAH Jennings MD;  [facility];  Service: Cardiac Surgery    [place] ECHO TRANSESOPHAG MONTR CARDIAC PUMP FUNCTJ N/A 3/24/2021    Procedure: TRANSESOPHAGEAL ECHOCARDIOGRAM (MARLEN);  Surgeon: ISAIAH Jennings MD;  [facility];  Service: Cardiac Surgery       Length Of Stay: 1  PHYSICAL THERAPY EVALUATION :    05/18/24 1017   PT Last Visit   PT Visit Date 05/18/24   Note Type   Note type Evaluation   Pain Assessment   Pain Assessment Tool 0-10   Pain Score No Pain   Restrictions/Precautions   Weight Bearing Precautions Per Order No   Other Precautions Chair Alarm;Bed Alarm;Telemetry  (hx of prior CVA w/ R sided weakness)   Home Living   Type of Home House   Home Layout One level  (9 CATY + HR)   Bathroom Shower/Tub Walk-in shower   Bathroom Toilet Standard   Bathroom Equipment Grab bars in shower   Home Equipment Walker;Cane;Wheelchair-manual   Prior Function   Level of [place]  Independent with ADLs;Independent with functional mobility;Independent with IADLS   Lives With Spouse   Receives Help From Family   IADLs Independent with meal prep;Independent with medication management;Family/Friend/Other provides transportation   Falls in the last 6 months 1 to 4  (1- slide to floor just prior to admission)   Vocational Retired   Comments At baseline, pt reports being functionally indep w/ use of RW at baseline in his home (ranch w/ 9 CATY + HR); pt lives w/ spouse and is indep w/ ADL's and IADLs;   General   Family/Caregiver Present No   Cognition   Overall Cognitive Status WFL   Attention Attends with cues to redirect   Orientation Level Oriented X4   Memory Within functional limits   Following Commands Follows one step commands without difficulty   Comments cooperative and agreeable to mobilize- wants to go home   RUE Assessment   RUE Assessment   (Residual R UE weakness)   LUE Assessment   LUE Assessment WFL   RLE Assessment   RLE Assessment X  (functional strength of at least 3+.5 throughout-)   LLE Assessment   LLE Assessment WFL   Coordination   Movements are Fluid and Coordinated 0  (mild ataxia; R knee hyperext/ dyscoordination from hx of prior CVA- pt reports at baseline)   Sensation WFL   Light Touch   RLE Light Touch Grossly intact   LLE Light Touch Grossly intact   Sharp/Dull   RLE Sharp/Dull Grossly intact   LLE Sharp/Dull Grossly intact   Proprioception   RLE Proprioception Grossly intact   LLE Proprioception Grossly Intact   Bed Mobility   Supine to Sit 6  Modified independent   Additional items Assist x 1;Increased time required   Additional Comments sits EOB 3-4 mins prior to ambualtion w/ S   Transfers   Sit to Stand 5  Supervision   Stand to Sit 5  Supervision   Stand pivot 5  Supervision   Additional Comments w/ use of RW   Ambulation/Elevation   Gait Assistance 5  Supervision   Assistive Device Rolling walker   Distance 120' (stairs)+ 40'   Stair Management Assistance 5   Supervision   Stair Management Technique One rail R;Alternating pattern;Step to pattern;Foreward  ((up reciprocal pattern; down step to pattern))   Number of Stairs 10   Balance   Static Sitting Good   Dynamic Sitting Fair +   Static Standing Fair   Dynamic Standing Fair   Ambulatory Fair   Endurance Deficit   Endurance Deficit Yes   Endurance Deficit Description fatiuge w/ ambualtion   Activity Tolerance   Activity Tolerance Patient tolerated treatment well   Medical Staff Made Aware OT Katina + RN   Nurse Made Aware RN cleared for session and was updated following   Assessment  Pt is 68 y.o. male with past medical history significant for pontine stroke and chronic right-sided weakness presents with 1 day of generalized weakness and lethargy. Patient states that the weakness is significantly worse on the right although he is weak on that side at baseline. (+) stroke alert called and PT consulted for mobility and d/c planning recommendations.  MRI and CTB were both negative for acute findings.     Pt  has a past medical history of Allergic, Anxiety, CAD (coronary artery disease), Coronary artery disease, Depression, Disease of thyroid gland, Dizziness, Erectile dysfunction, Eye problems, Hypertension, Hypothyroidism, Migraines, Stroke (HCC), and Tremors of nervous system.   Due to ongoing medical issues, ongoing medical workup/ management  for primary dx; fall risk, weakness  increased reliance on more restrictive AD compared to baseline;  decreased activity tolerance compared to baseline, increased assistance needed from caregiver at current time, continuous monitoring, trending labs;  multiple lines, decline in overall functional mobility status; health management issues; note unstable clinical picture (high complexity).      At baseline, pt reports being functionally indep w/ use of RW at baseline in his home (ranch w/ 9 CATY + HR); pt lives w/ spouse and is indep w/ ADL's and IADLs;      Currently,  pt reports  "he is \"feeling back to normal\" and is able to demonstrate bed skills MI; S transfers and S gait w/ RW; slow jennifer; 0 dizziness or LOB; S stair negotiation 10 steps w/ R HR. Pt appears to be functioning near to his baseline- pt confirming and is eager to d/c home.    Pt presents currently w/ overall mobility deficits 2* to: decreased LE strength/AROM (R>L from hx of prior CVA ); limited flexibility;   decreased endurance; decreased activity tolerance; decreased coordination; impaired balance; gait deviations; multiple lines; (Please find additional objective findings from PT assessment regarding body systems outlined above.)     Pt at baseline functional mobility and performing all w/o needs for any physical assist. PT signing off. No further skilled inpt PT needs identified. Pt eager to d/c home- discussed OPPT for balance/ endurance- pt will consider- Level 3 resources recommended for d/c. Pt cleared for home when medically stable.    Prognosis Excellent   Problem List Decreased strength;Decreased range of motion;Decreased endurance;Impaired balance;Decreased mobility;Decreased coordination;Impaired tone   Barriers to Discharge None   Barriers to Discharge Comments none   Goals   Patient Goals go home;   Discharge Recommendation   Rehab Resource Intensity Level, PT III (Minimum Resource Intensity)   Equipment Recommended   (has all for home)   AM-PAC Basic Mobility Inpatient   Turning in Flat Bed Without Bedrails 4   Lying on Back to Sitting on Edge of Flat Bed Without Bedrails 4   Moving Bed to Chair 4   Standing Up From Chair Using Arms 4   Walk in Room 4   Climb 3-5 Stairs With Railing 3   Basic Mobility Inpatient Raw Score 23   Basic Mobility Standardized Score 50.88   [facility] Highest Level Of Mobility   -HLM Goal 7: Walk 25 feet or more   -HLM Achieved 7: Walk 25 feet or more   End of Consult   Patient Position at End of Consult Bedside chair;Bed/Chair alarm activated;All needs within reach     The " patient's AM-PAC Basic Mobility Inpatient Short Form Raw Score is 23. A Raw score of greater than 16 suggests the patient may benefit from discharge to home. Please also refer to the recommendation of the Physical Therapist for safe discharge planning.

## 2024-05-18 NOTE — QUICK NOTE
Consultation - Stroke   Deshawn Tsai 68 y.o. male MRN: [id number]  Unit/Bed#: [facility] 724-01 Encounter: [id number]      Assessment & Plan     Stroke-like symptoms  Assessment & Plan  Assessment:  Deshawn Tsai is a 68 y.o. male  who presented as stroke alert on 5/17/2024 and LKW 2200 on 5/16/24. Initial presenting deficits were generalized weakness worse on the right and lethargy. The patient has a pmhx significant for hypertension, hyperlipidemia, coronary artery disease status post CABG and stent in 2021, hypothyroidism, prior stroke with residual right-sided weakness, and migraines.    Workup:  In the ED, Presenting Blood Pressure: 153/75  CT Head showed: No acute intracranial abnormality. Stable anteroinferior bifrontal encephalomalacia, sequela of prior trauma.  Mild chronic microangiopathic ischemic changes.  CTA head and neck showed: Negative for large vessel intracranial occlusion. Atherosclerotic irregularity with areas of moderate stenosis along the left vertebral artery V4 segment. Mild to moderate stenosis along the inferior basilar artery segment. The right vertebral artery predominantly terminates as a PICA.    Labs: Hemoglobin A1c 5.6 (6/9/2022), Total Cholesterol 124 (3/21/2024), LDL 61 (3/21/2024)    Risk factors: hypertension, hyperlipidemia, coronary artery disease, peripheral vascular disease, known carotid disease, known cerebrovascular disease, and prior stroke    Pertinent Scores:  - NIHSS: 2    Impression: Recrudescence in the setting of generalized weakness due to overmedication versus new stroke.    Plan:  - Recommend admit to hospital on acute ischemic stroke pathway  - MRI brain without contrast  - Continue Neuro checks  - Permissive HTN for the first 24 hours up to   - Maintain glucose <180, SSI for coverage if indicated  - Repeat CTH if GCS drops 2pts in 1hr  - Fasting lipid panel & hemoglobin A1c  - PT/OT/ST/PM&R Evaluation  - Echocardiogram  - Atorvastatin 40 mg q.d.  -  Continue monitoring on telemetry    Hypothyroidism  Assessment & Plan  Continue home levothyroxine 100 mcg    Essential hypertension  Assessment & Plan  Due to the patient's acute strokelike symptoms and patient being on stroke pathway we will allow permissive hypertension for the 24-hour period.     Will reinitiate home medication metoprolol succinate 100 mg daily and valsartan 160 mg (formulary losartan 100 mg)        Thrombolytic Decision: Patient not a candidate. Unclear time of onset outside appropriate time window.      Deshawn Tsai will need follow up in in 6 weeks with neurovascular attending or advance practitioner. He will not require outpatient neurological testing.    History of Present Illness     Reason for Consult / Principal Problem: Stroke alert  Hx and PE limited by: nothing  Patient last known well: 2200 on 5/16/24  Stroke alert called: 1808  Neurology time of arrival: 1810  HPI: Deshawn Tsai is a 68 y.o. male who presents as a stroke alert. The patient states he went to bed the prior night and was at baseline. This morning when he woke up he was weak and tired. He reports not being able to get out of bed. He stayed in bed and when eventually he tried to get out he could not and he slid off the bed. The wife is at bedside and she reports that the patient's right sided was significantly weaker. The patient has a PMH of hypertension, hyperlipidemia, coronary artery disease status post CABG and stent in 2021, hypothyroidism, prior stroke with residual right-sided weakness, and migraines.  The patient has been on dual antiplatelet therapy with aspirin and Plavix for several years now.  The patient's Plavix was held for 5 days due to a dental procedure but the patient restarted taking Plavix this a.m.     The patient in the ED states that he feels tired and sleepy.  The patient reportedly takes 2 mg of Klonopin every night as well as Ambien every night.  The patient took his medications last  night.  The wife and the patient reports that he has never had any issues with these medications in the past. The patient reportedly was prescribed the Klonopin more than  a decade ago by his sleep medicine physician.  The patient's medications have been continued by the primary care physician.  Suspect that as the patient has aged the medications may be having an increased effect.  Will hold the Ambien and prescribed the Klonopin at half the dose that he was taking at home as needed.    Due to the patient's presentation and reported worsening right-sided weakness we will admit the patient on the stroke pathway with plan for further imaging.      Historical Information   Past Medical History:   Diagnosis Date    Allergic     Anxiety     CAD (coronary artery disease)     Coronary artery disease     Depression     Disease of thyroid gland     Dizziness     Erectile dysfunction     Eye problems     Hypertension     Hypothyroidism     Migraines     Stroke (HCC) 11/17/2014    Tremors of nervous system      Past Surgical History:   Procedure Laterality Date    COLONOSCOPY  09/01/2017    COLONOSCOPY      CORONARY ARTERY BYPASS GRAFT  March 24,20201    CORONARY STENT PLACEMENT      DENTAL SURGERY      entire top teeth removal    HERNIA REPAIR      [place] CORONARY ARTERY BYP W/VEIN & ARTERY GRAFT 4 VEIN N/A 3/24/2021    Procedure: CORONARY ARTERY BYPASS GRAFT (CABG) 3 VESSELS,  USING LEFT VANE-LAD AND LEFT GSV-RPL & RAMUS;  Surgeon: ISAIAH Jennings MD;  [facility];  Service: Cardiac Surgery    [place] ECHO TRANSESOPHAG MONTR CARDIAC PUMP FUNCTJ N/A 3/24/2021    Procedure: TRANSESOPHAGEAL ECHOCARDIOGRAM (MARLEN);  Surgeon: ISAIAH Jennings MD;  [facility];  Service: Cardiac Surgery     Social History   Social History     Substance and Sexual Activity   Alcohol Use No     Social History     Substance and Sexual Activity   Drug Use No     E-Cigarette/Vaping    E-Cigarette Use Never User       E-Cigarette/Vaping Substances    Nicotine No     THC No     CBD No     Flavoring No     Other No     Unknown No      Social History     Tobacco Use   Smoking Status Never   Smokeless Tobacco Never     Family History: non-contributory    Review of previous medical records was completed.     Meds/Allergies   all current active meds have been reviewed    Allergies   Allergen Reactions    Medical Tape Other (See Comments) and Rash     Other reaction(s): BURNS USE PAPER TAPE       Objective   Vitals:Blood pressure 126/83, pulse 85, temperature 98.2 °F (36.8 °C), resp. rate 16, SpO2 93%.,There is no height or weight on file to calculate BMI.    Intake/Output Summary (Last 24 hours) at 2024  Last data filed at 2024  Gross per 24 hour   Intake --   Output 600 ml   Net -600 ml       Invasive Devices:   Invasive Devices       Peripheral Intravenous Line  Duration             Peripheral IV 24 Left Antecubital <1 day    Peripheral IV 24 Right Antecubital <1 day                    Physical Exam  Neurologic Exam    NIHSS:  1a.Level of Consciousness: 0 = Alert   1b. LOC Questions: 0 = Answers both correctly   1c. LOC Commands: 0 = Obeys both correctly   2. Best Gaze: 0 = Normal   3. Visual: 0 = No visual field loss   4. Facial Palsy: 0=Normal symmetric movement   5a. Motor Right Arm: 0=No drift, limb holds 90 (or 45) degrees for full 10 seconds   5b. Motor Left Arm: 0=No drift, limb holds 90 (or 45) degrees for full 10 seconds   6a. Motor Right Le=Some effort against gravity, limb cannot get to or maintain (if cured) 90 (or 45) degrees, drifts down to bed, but has some effort against gravity   6b. Motor Left Le=No drift, limb holds 90 (or 45) degrees for full 10 seconds   7. Limb Ataxia:  0=Absent   8. Sensory: 0=Normal; no sensory loss   9. Best Language:  0=No aphasia, normal   10. Dysarthria: 0=Normal articulation   11. Extinction and Inattention (formerly Neglect): 0=No abnormality    Total Score: 2   Of note the patient was able to hold his right lower extremity against gravity for 5 seconds shortly after the night stroke scale was completed.  Time NIHSS was completed: 1824    Modified [place] Score:  Unable to determine currently, will gather additional data    Lab Results: I have personally reviewed pertinent reports.    Imaging Studies: I have personally reviewed pertinent reports.    EKG, Pathology, and Other Studies: I have personally reviewed pertinent reports.    VTE Prophylaxis: Sequential compression device (Venodyne)     Code Status: Prior  Advance Directive and Living Will:

## 2024-05-18 NOTE — ASSESSMENT & PLAN NOTE
Due to the patient's acute strokelike symptoms and patient being on stroke pathway we will allow permissive hypertension for the 24-hour period.     Will reinitiate home medication metoprolol succinate 100 mg daily and valsartan 160 mg (formulary losartan 100 mg)

## 2024-05-18 NOTE — DISCHARGE INSTR - AVS FIRST PAGE
Dear Deshawn Tsai,     It was our pleasure to care for you here at [facility].  It is our hope that we were always able to exceed the expected standards for your care during your stay.  You were hospitalized due to stroke-like symptoms.  You were cared for on the 7th floor by Sena Frank DO under the service of Chi Simon MD with the [facility] who covers for your primary care physician (PCP), Chadd Lambert MD, while you were hospitalized.  If you have any questions or concerns related to this hospitalization, you may contact us at .  For follow up as well as any medication refills, we recommend that you follow up with your primary care physician.  A registered nurse will reach out to you by phone within a few days after your discharge to answer any additional questions that you may have after going home.  However, at this time we provide for you here, the most important instructions / recommendations at discharge:     Notable Medication Adjustments -   None  Testing Required after Discharge -   None  Important follow up information -   Follow up with your sleep medicine doctor.  Other Instructions -   Do not overdose on medications.  Please review this entire after visit summary as additional general instructions including medication list, appointments, activity, diet, any pertinent wound care, and other additional recommendations from your care team that may be provided for you.      Sincerely,     Sena Frank DO

## 2024-05-18 NOTE — SPEECH THERAPY NOTE
Speech Language/Pathology  Speech-Language Pathology Bedside Swallow Evaluation        Patient Name: Deshawn Tsai    Today's Date: 5/18/2024     Problem List  Active Problems:    Essential hypertension    Hypothyroidism         Past Medical History  Past Medical History:   Diagnosis Date    Allergic     Anxiety     CAD (coronary artery disease)     Coronary artery disease     Depression     Disease of thyroid gland     Dizziness     Erectile dysfunction     Eye problems     Hypertension     Hypothyroidism     Migraines     Stroke (HCC) 11/17/2014    Tremors of nervous system        Past Surgical History  Past Surgical History:   Procedure Laterality Date    COLONOSCOPY  09/01/2017    COLONOSCOPY      CORONARY ARTERY BYPASS GRAFT  March 24,20201    CORONARY STENT PLACEMENT      DENTAL SURGERY      entire top teeth removal    HERNIA REPAIR      [place] CORONARY ARTERY BYP W/VEIN & ARTERY GRAFT 4 VEIN N/A 3/24/2021    Procedure: CORONARY ARTERY BYPASS GRAFT (CABG) 3 VESSELS,  USING LEFT VANE-LAD AND LEFT GSV-RPL & RAMUS;  Surgeon: ISAIAH Jennings MD;  [facility];  Service: Cardiac Surgery    [place] ECHO TRANSESOPHAG MONTR CARDIAC PUMP FUNCTJ N/A 3/24/2021    Procedure: TRANSESOPHAGEAL ECHOCARDIOGRAM (MARLEN);  Surgeon: ISAIAH Jennings MD;  [facility];  Service: Cardiac Surgery       Summary    Assessment was somewhat limited, as pt was distractible and difficult to redirect. Pt took a few bites and sips for the assessment. With consistencies and amounts assessed (pureed, soft solids and thin liquids, whole pills with water), oropharyngeal swallow appears WFL. Mastication is mildly prolonged but functional. There were no overt s/s of aspiration. Pt does have a hx of stroke with R side weakness, which has increased. Pt does not have his dentures here, but does not always wear them at home when eating. Pt appears appropriate to continue a regular diet, while choosing softer foods. ST to follow up,  likely briefly, for dysphagia tx, to assess with a larger amount and variety of food.      Recommendations:   Diet: regular diet and thin liquids, choose softer foods  Meds: whole with liquid   Frequent Oral care  Independent for feeding  Aspiration precautions and compensatory swallowing strategies: upright posture, slow rate of feeding, and small bites/sips  Other Recommendations/ considerations: ST to follow up briefly 1-2 sessions.        Current Medical Status  Copied from admission/physician notes:  Deshawn Tsai is a 68 y.o. male who presents as a stroke alert. The patient states he went to bed the prior night and was at baseline. This morning when he woke up he was weak and tired. He reports not being able to get out of bed. He stayed in bed and when eventually he tried to get out he could not and he slid off the bed. The wife is at bedside and she reports that the patient's right sided was significantly weaker. The patient has a PMH of hypertension, hyperlipidemia, coronary artery disease status post CABG and stent in 2021, hypothyroidism, prior stroke with residual right-sided weakness, and migraines.  The patient has been on dual antiplatelet therapy with aspirin and Plavix for several years now.  The patient's Plavix was held for 5 days due to a dental procedure but the patient restarted taking Plavix this a.m.      The patient in the ED states that he feels tired and sleepy.  The patient reportedly takes 2 mg of Klonopin every night as well as Ambien every night.  The patient took his medications last night.  The wife and the patient reports that he has never had any issues with these medications in the past. The patient reportedly was prescribed the Klonopin more than  a decade ago by his sleep medicine physician.  The patient's medications have been continued by the primary care physician.  Suspect that as the patient has aged the medications may be having an increased effect.  Will hold the Ambien  and prescribed the Klonopin at half the dose that he was taking at home as needed.     Due to the patient's presentation and reported worsening right-sided weakness we will admit the patient on the stroke pathway with plan for further imaging.     Order received and chart reviewed. Discussed with RN, who reports pt taking meds without difficulty. Pt denies any swallowing difficulty. He feels his speech impairment is due to not having his dentures in. He has a hx of a CVA 10 years ago. He was seen by ST in June 2022 for dysphagia eval; at that time a regular diet and thin liquids were recommended.    Past medical history:   Please see H&P for details    Special Studies:  1. CTA head: Negative for large vessel intracranial occlusion. Atherosclerotic irregularity with areas of moderate stenosis along the left vertebral artery V4 segment. Mild to moderate stenosis along the inferior basilar artery segment. The right   vertebral artery predominantly terminates as a PICA.     2. CTA neck:  No extracranial carotid stenosis.  The cervical vertebral arteries are patent, moderate left origin stenosis.      CT brain-  No acute intracranial abnormality.  Stable anteroinferior bifrontal encephalomalacia, sequela of prior trauma.  Mild chronic microangiopathic ischemic changes.    MRI-pending      Social/Education/Vocational Hx:  Pt lives with family    Swallow Information   Current Risks for Dysphagia & Aspiration:  hx of CVA, dysarthria  Current Symptoms/Concerns:  change in increased R side weakness  Current Diet: regular diet, thin liquids, and cardiovascular    Baseline Diet: regular diet and thin liquids    Baseline Assessment   Behavior/Cognition: alert, distractible and difficult to redirect  Speech/Language Status: able to participate in conversation and able to follow commands  Patient Positioning: upright in bed     Swallow Mechanism Exam   Facial: right facial droop  Labial: decreased ROM right side  Lingual:  decreased ROM, bilateral decreased strength, and decreased coordination  Velum: unable to visualize  Mandible:  decreased ROM  Dentition: edentulous  Vocal quality:clear/adequate and somewhat nasal sounding    Volitional Cough: strong/productive   Respiratory: Room air    Consistencies Assessed and Performance   Consistencies Administered: thin liquids, puree, and soft solids, pills whole with water    Oral Stage: Adequate bolus retrieval and draw from straw. Mildly prolonged but functional mastication, bolus formation and transfer. No pocketing or residue. Adequate lip seal.     Pharyngeal Stage: Hyolaryngeal elevation observed and palpated. Swallows appeared prompt. There were no overt s/s of aspiration.       Esophageal Concerns: none reported      Results Reviewed with: patient and RN   Dysphagia Goals:  1.Pt will tolerate soft items chosen from a regular diet with prompt oropharyngeal swallows and no overt s/s of aspiration. 2. Pt will tolerate LRD without s/s of aspiration.   Discharge recommendation: likely no follow up needed for swallowing    Speech Therapy Prognosis   Prognosis: good    Prognosis Considerations: medical status, cognitive status, and therapeutic potential

## 2024-05-18 NOTE — OCCUPATIONAL THERAPY NOTE
Occupational Therapy Evaluation     Patient Name: Deshawn Tsai  Today's Date: 5/18/2024  Problem List  Active Problems:    Essential hypertension    Hypothyroidism    Past Medical History  Past Medical History:   Diagnosis Date    Allergic     Anxiety     CAD (coronary artery disease)     Coronary artery disease     Depression     Disease of thyroid gland     Dizziness     Erectile dysfunction     Eye problems     Hypertension     Hypothyroidism     Migraines     Stroke (HCC) 11/17/2014    Tremors of nervous system      Past Surgical History  Past Surgical History:   Procedure Laterality Date    COLONOSCOPY  09/01/2017    COLONOSCOPY      CORONARY ARTERY BYPASS GRAFT  March 24,20201    CORONARY STENT PLACEMENT      DENTAL SURGERY      entire top teeth removal    HERNIA REPAIR      [place] CORONARY ARTERY BYP W/VEIN & ARTERY GRAFT 4 VEIN N/A 3/24/2021    Procedure: CORONARY ARTERY BYPASS GRAFT (CABG) 3 VESSELS,  USING LEFT VANE-LAD AND LEFT GSV-RPL & RAMUS;  Surgeon: ISAIAH Jennings MD;  [facility];  Service: Cardiac Surgery    [place] ECHO TRANSESOPHAG MONTR CARDIAC PUMP FUNCTJ N/A 3/24/2021    Procedure: TRANSESOPHAGEAL ECHOCARDIOGRAM (MARLEN);  Surgeon: ISAIAH Jennings MD;  [facility];  Service: Cardiac Surgery         05/18/24 1016   OT Last Visit   OT Visit Date 05/18/24   Note Type   Note type Evaluation   Pain Assessment   Pain Assessment Tool 0-10   Pain Score No Pain   Restrictions/Precautions   Weight Bearing Precautions Per Order No   Other Precautions Chair Alarm;Bed Alarm;Fall Risk;Telemetry  (Hx stroke, R sided residual weakness)   Home Living   Type of Home House   Home Layout One level;Stairs to enter with rails  (9 CATY)   Bathroom Shower/Tub Walk-in shower   Bathroom Toilet Standard   Bathroom Equipment Grab bars in shower;Shower chair   Home Equipment Walker;Cane;Wheelchair-manual  (uses rw as needed at baseline)   Prior Function   Level of [place] Independent with  ADLs;Independent with functional mobility;Independent with IADLS   Lives With Spouse   Receives Help From Family   IADLs Independent with meal prep;Independent with medication management;Family/Friend/Other provides transportation   Falls in the last 6 months 0   Vocational Retired   Lifestyle   Autonomy Pt reports (I) with ADLs, IADLs and functional mobility using rw as needed. Pt -  and retired   Reciprocal Relationships Spouse   Service to Others Retired   ADL   Where Assessed Edge of bed   Eating Assistance 6  Modified independent   Grooming Assistance 6  Modified Independent   UB Bathing Assistance 6  Modified Independent   LB Bathing Assistance 5  Supervision/Setup   UB Dressing Assistance 6  Modified independent   LB Dressing Assistance 5  Supervision/Setup   Toileting Assistance  5  Supervision/Setup   Bed Mobility   Supine to Sit 5  Supervision   Additional items Increased time required;HOB elevated;Verbal cues   Transfers   Sit to Stand 5  Supervision   Additional items Increased time required;Verbal cues   Stand to Sit 5  Supervision   Additional items Increased time required;Verbal cues   Additional Comments with rw   Functional Mobility   Functional Mobility 5  Supervision   Additional Comments Pt requires supervision to ambulate household distance functional mobility with rw   Additional items Rolling walker   Balance   Static Sitting Fair +   Dynamic Sitting Fair   Static Standing Fair   Dynamic Standing Fair -   Ambulatory Fair -   Activity Tolerance   Activity Tolerance Patient tolerated treatment well   Medical Staff Made Aware PT   Nurse Made Aware RN Cleared   RUE Assessment   RUE Assessment   (Residual R UE weakness)   LUE Assessment   LUE Assessment WFL   Hand Function   Gross Motor Coordination Functional   Fine Motor Coordination Impaired   Hand Function Comments R UE weakness   Cognition   Overall Cognitive Status WFL   Arousal/Participation Alert;Responsive;Cooperative   Attention  Attends with cues to redirect   Orientation Level Oriented X4   Memory Within functional limits   Following Commands Follows one step commands without difficulty   Comments Pt agreeable to therapy   Assessment   Prognosis Good   Assessment Pt is a 67 y/o male that was admitted to [facility]/17/2024 with stroke like symptoms. Pt  has a past medical history of Allergic, Anxiety, CAD (coronary artery disease), Coronary artery disease, Depression, Disease of thyroid gland, Dizziness, Erectile dysfunction, Eye problems, Hypertension, Hypothyroidism, Migraines, Stroke (HCC), and Tremors of nervous system. Pt lives with spouse in a one level house with 9 CATY. Pt reports having a standard toilet walk in shower with grab bars and a shower seat. Pt reports using rw for mobility as needed. Prior to admission pt (I) ADLs and functional mobility, requires assistance for IADLs. Pt currently supervision to MOD IND for all ADLs, functional transfers, and functional mobility with rw. Pt supine in bed at begning of session, pt seated in bedside chair at end of session with alarm set and items within reach. The patient's raw score on the AM-PAC Daily Activity Inpatient Short Form is 21. A raw score of greater than or equal to 19 suggests the patient may benefit from discharge to home. Please refer to the recommendation of the Occupational Therapist for safe discharge planning. Recommend Level III minimum intensity OT services  at d/c to maximize pt function. Further IP OT services not indicated at this time, will d/c IP OT. Please re-consult if needs arise.   Goals   Patient Goals To go home   Discharge Recommendation   Rehab Resource Intensity Level, OT III (Minimum Resource Intensity)   AM-PAC Daily Activity Inpatient   Lower Body Dressing 3   Bathing 3   Toileting 3   Upper Body Dressing 4   Grooming 4   Eating 4   Daily Activity Raw Score 21   Daily Activity Standardized Score (Calc for Raw Score >=11) 44.27   AM-PAC  Applied Cognition Inpatient   Following a Speech/Presentation 3   Understanding Ordinary Conversation 4   Taking Medications 3   Remembering Where Things Are Placed or Put Away 3   Remembering List of 4-5 Errands 3   Taking Care of Complicated Tasks 3   Applied Cognition Raw Score 19   Applied Cognition Standardized Score 39.77   End of Consult   Education Provided Yes   Patient Position at End of Consult Bedside chair;Bed/Chair alarm activated;All needs within reach   Nurse Communication Nurse aware of consult     LILLY Monroy, OTR/L

## 2024-05-18 NOTE — ASSESSMENT & PLAN NOTE
Assessment:  Deshawn Tsai is a 68 y.o. male  who presented as stroke alert on 5/17/2024 and LKW 2200 on 5/16/24. Initial presenting deficits were generalized weakness worse on the right and lethargy. The patient has a pmhx significant for hypertension, hyperlipidemia, coronary artery disease status post CABG and stent in 2021, hypothyroidism, prior stroke with residual right-sided weakness, and migraines.    Workup:  In the ED, Presenting Blood Pressure: 153/75  CT Head showed: No acute intracranial abnormality. Stable anteroinferior bifrontal encephalomalacia, sequela of prior trauma.  Mild chronic microangiopathic ischemic changes.  CTA head and neck showed: Negative for large vessel intracranial occlusion. Atherosclerotic irregularity with areas of moderate stenosis along the left vertebral artery V4 segment. Mild to moderate stenosis along the inferior basilar artery segment. The right vertebral artery predominantly terminates as a PICA.    Labs: Hemoglobin A1c 5.6 (6/9/2022), Total Cholesterol 124 (3/21/2024), LDL 61 (3/21/2024)    Risk factors: hypertension, hyperlipidemia, coronary artery disease, peripheral vascular disease, known carotid disease, known cerebrovascular disease, and prior stroke    Pertinent Scores:  - NIHSS: 2    Impression: Recrudescence in the setting of generalized weakness due to overmedication versus new stroke.    Plan:  - Recommend admit to hospital on acute ischemic stroke pathway  - MRI brain without contrast  - Continue Neuro checks  - Permissive HTN for the first 24 hours up to   - Maintain glucose <180, SSI for coverage if indicated  - Repeat CTH if GCS drops 2pts in 1hr  - Fasting lipid panel & hemoglobin A1c  - PT/OT/ST/PM&R Evaluation  - Echocardiogram  - Atorvastatin 40 mg q.d.  - Continue monitoring on telemetry

## 2024-05-19 LAB
ATRIAL RATE: 90 BPM
P AXIS: 75 DEGREES
PR INTERVAL: 170 MS
QRS AXIS: 11 DEGREES
QRSD INTERVAL: 122 MS
QT INTERVAL: 346 MS
QTC INTERVAL: 423 MS
T WAVE AXIS: 61 DEGREES
VENTRICULAR RATE: 90 BPM

## 2024-05-19 PROCEDURE — 93010 ELECTROCARDIOGRAM REPORT: CPT | Performed by: INTERNAL MEDICINE

## 2024-05-19 NOTE — DISCHARGE SUMMARY
NEUROLOGY RESIDENCY - DISCHARGE SUMMARY     Name: Deshawn sTai   Age & Sex: 68 y.o. male   MRN: [id number]  Unit/Bed#: [facility] 724-01   Encounter: [id number]    Discharging Resident Physician: Sena Frank DO  Attending: No att. providers found  PCP: Chadd Lambert MD  Admission Date: 5/17/2024  Discharge Date: 05/18/24    {Neurology Follow Up:09643} *** This should be completed prior to removing patient from list or discharge     ASSESSMENT & PLAN     * Stroke-like symptoms  Assessment & Plan  Assessment:  Deshawn Tsai is a 68 y.o. male  who presented as stroke alert on 5/17/2024 and LKW 2200 on 5/16/24. Initial presenting deficits were generalized weakness worse on the right and lethargy. The patient has a pmhx significant for hypertension, hyperlipidemia, coronary artery disease status post CABG and stent in 2021, hypothyroidism, prior stroke with residual right-sided weakness, and migraines.    Workup:  In the ED, Presenting Blood Pressure: 153/75  CT Head showed: No acute intracranial abnormality. Stable anteroinferior bifrontal encephalomalacia, sequela of prior trauma.  Mild chronic microangiopathic ischemic changes.  CTA head and neck showed: Negative for large vessel intracranial occlusion. Atherosclerotic irregularity with areas of moderate stenosis along the left vertebral artery V4 segment. Mild to moderate stenosis along the inferior basilar artery segment. The right vertebral artery predominantly terminates as a PICA.    Labs: Hemoglobin A1c 5.6 (6/9/2022), Total Cholesterol 124 (3/21/2024), LDL 61 (3/21/2024)    Risk factors: hypertension, hyperlipidemia, coronary artery disease, peripheral vascular disease, known carotid disease, known cerebrovascular disease, and prior stroke    Pertinent Scores:  - NIHSS: 2    Impression: Recrudescence in the setting of generalized weakness due to overmedication versus new stroke.    Plan:  - Recommend admit to hospital on acute ischemic stroke pathway  - MRI  brain without contrast  - Continue Neuro checks  - Permissive HTN for the first 24 hours up to   - Maintain glucose <180, SSI for coverage if indicated  - Repeat CTH if GCS drops 2pts in 1hr  - Fasting lipid panel & hemoglobin A1c  - PT/OT/ST/PM&R Evaluation  - Echocardiogram  - Atorvastatin 40 mg q.d.  - Continue monitoring on telemetry    Hypothyroidism  Assessment & Plan  Continue home levothyroxine 100 mcg    Essential hypertension  Assessment & Plan  Due to the patient's acute strokelike symptoms and patient being on stroke pathway we will allow permissive hypertension for the 24-hour period.     Will reinitiate home medication metoprolol succinate 100 mg daily and valsartan 160 mg (formulary losartan 100 mg)             Disposition:     {Disposition Home Discharge:92305}    Reason for Admission: ***    Consultations During Hospital Stay:  IP CONSULT TO NUTRITION SERVICES    Procedures Performed:   ***    Significant Findings / Test Results:   Labs: Hemoglobin A1c 5.9 (5/18/2024), LDL 62 (5/18/2024)    MRI brain w wo contrast    Result Date: 5/18/2024  Impression: No acute intracranial pathology. Stable chronic microangiopathy. Stable bifrontal encephalomalacia likely sequela of remote trauma. Workstation performed: [id number]     CT stroke alert brain    Result Date: 5/17/2024  Impression: No acute intracranial abnormality. Stable anteroinferior bifrontal encephalomalacia, sequela of prior trauma. Mild chronic microangiopathic ischemic changes. Findings were directly discussed with Darron Smart at 6:40 p.m. Workstation performed: [id number]     CTA stroke alert (head/neck)    Result Date: 5/17/2024  Impression: 1. CTA head: Negative for large vessel intracranial occlusion. Atherosclerotic irregularity with areas of moderate stenosis along the left vertebral artery V4 segment. Mild to moderate stenosis along the inferior basilar artery segment. The right vertebral artery predominantly terminates as a PICA.  "2. CTA neck:  No extracranial carotid stenosis.  The cervical vertebral arteries are patent, moderate left origin stenosis. Findings were directly discussed with Darron Smart at 6:40 p.m. Workstation performed: [id number]       Incidental Findings:   ***     Test Results Pending at Discharge (will require follow up):   ***     Outpatient Tests Requested:  ***    Complications:  ***    Hospital Course:     Deshawn Tsai is a 68 y.o. male patient who originally presented to the hospital on 5/17/2024 due to ***    Condition at Discharge: {condition:51104}     Discharge Day Visit / Exam:     Subjective:  ***    Vitals: Blood Pressure: 165/99 (05/18/24 1457)  Pulse: 77 (05/18/24 1457)  Temperature: 97.6 °F (36.4 °C) (05/18/24 1457)  Temp Source: Oral (05/18/24 1500)  Respirations: 16 (05/18/24 1500)  Height: 5' 9\" (175.3 cm) (05/18/24 0027)  Weight - Scale: 104 kg (228 lb 2.8 oz) (05/18/24 0027)  SpO2: 92 % (05/18/24 1457)    Exam:     Physical Exam    Neurologic Exam    ( *** Be Sure to Include Physical Exam: Delete this entire line when you have entered your exam)  Discussion with Family: ***    Discharge instructions/Information to patient and family:   See after visit summary for information provided to patient and family.      Provisions for Follow-Up Care:  See after visit summary for information related to follow-up care and any pertinent home health orders.      Planned Readmission: ***    Discharge Statement:  I spent *** minutes discharging the patient. This time was spent on the day of discharge. I had direct contact with the patient on the day of discharge. Greater than 50% of the total time was spent examining patient, answering all patient questions, arranging and discussing plan of care with patient as well as directly providing post-discharge instructions.  Additional time then spent on discharge activities.      Discharge Medications:  See after visit summary for reconciled discharge medications provided " to patient and family.      ** Please Note: This note has been constructed using a voice recognition system **      ======    I have discussed the patient's history, physical exam findings, assessment, and plan in detail with attending,  ***    Thank you for allowing me to participate in the care of your patient, Deshawn Tsai.    Sena Frank, DO  [facility] Neurology Residency, PGY-***

## 2024-05-20 ENCOUNTER — TRANSITIONAL CARE MANAGEMENT (OUTPATIENT)
Dept: FAMILY MEDICINE CLINIC | Facility: CLINIC | Age: 69
End: 2024-05-20

## 2024-05-20 ENCOUNTER — TELEPHONE (OUTPATIENT)
Age: 69
End: 2024-05-20

## 2024-05-20 NOTE — TELEPHONE ENCOUNTER
Patient called in returning call to schedule a TCM appointment. Patient was warm transferred to [place] for further assistance.

## 2024-05-27 DIAGNOSIS — N52.9 ERECTILE DYSFUNCTION, UNSPECIFIED ERECTILE DYSFUNCTION TYPE: ICD-10-CM

## 2024-05-28 RX ORDER — SILDENAFIL 100 MG/1
TABLET, FILM COATED ORAL
Qty: 30 TABLET | Refills: 5 | Status: SHIPPED | OUTPATIENT
Start: 2024-05-28

## 2024-05-28 NOTE — PROGRESS NOTES
Ambulatory Visit  Name: Deshawn Tsai      : 1955      MRN: [id number]  Encounter Provider: Chadd Lambert MD  Encounter Date: 2024   Encounter department: [facility]    Assessment & Plan   1. Coronary artery disease involving coronary bypass graft of native heart with angina pectoris (HCC)  Assessment & Plan:  Patient to continue  with current cardiac meds to decrease risk of re stenosis. Patient to follow up with cardiology for scheduled appointments to decrease risk for further cardiac problems with appropriate diagnostic testing to reassess cardiac status. Patient had alll questions about this problem answered today.   2. Essential hypertension  Assessment & Plan:  Patient is stable with current anti-hypertensive medicine and continue to follow a low sodium diet and take current medication. All questions about this condition were answered today.   3. Hypothyroidism due to acquired atrophy of thyroid  Assessment & Plan:  Patient to continue current dose of thyroid medicine and recheck TSH in 6 months   4. Insomnia, unspecified type  Assessment & Plan:  Discussed with patient use of sedative  medications and good  sleep hygiene to maximize treatment for this problem. Pt  to use sedatives only prior to sleep and cautioned them about usage at any other time. All patient questions about this problem answered today.   5. Mixed hyperlipidemia  Assessment & Plan:  Patient  is stable with current medication and we discussed a low fat low cholesterol diet. Weight loss also discussed for this will help lower cholesterol also. Recheck lipids in 6 months.   6. Cerebrovascular accident (CVA) due to thrombosis of precerebral artery (HCC)  Assessment & Plan:  Patient is stable  and will continue present plan of care and reassess at next routine visit. All questions about this problem from patient were answered today.   7. Neuropathy  -     XR spine cervical complete 4 or 5 vw non  injury; Future; Expected date: 05/29/2024  -     Ambulatory Referral to Physical Therapy; Future  8. Claudication (HCC)      Falls Plan of Care: balance, strength, and gait training instructions were provided. Recommended assistive device to help with gait and balance. Home safety education provided.       History of Present Illness     68-year-old male here today for TCM visit status post a hospital stay for accidental overdose of Ambien and Klonopin.  Patient states that he was in the hospital for what they thought might have been a possible stroke but it turns out that the patient had taken his p.m. medicines in the a.m. by accident.  The patient had problems with not be able to get up and being sedated but it was due to his take his Ambien in the morning.  Patient had weakness on the 1 side and history of stroke he had gone through the stroke protocol which was negative.  His weakness had resolved after the medicine had worn off and he was discharged.  Patient also states he has numbness in his left arm that is suggestive of neuropathy.  Patient would like to be an evaluation for neuropathy in his left arm will see about getting an x-ray of his cervical spine and start some physical therapy.      Review of Systems   Constitutional:  Negative for activity change, appetite change, chills, fatigue, fever and unexpected weight change.   HENT:  Negative for congestion, ear pain, hearing loss, mouth sores, postnasal drip, sinus pressure, sinus pain, sneezing and sore throat.    Respiratory:  Negative for apnea, cough, shortness of breath and wheezing.    Cardiovascular:  Negative for chest pain, palpitations and leg swelling.   Gastrointestinal:  Negative for abdominal pain, constipation, diarrhea, nausea and vomiting.   Endocrine: Negative for cold intolerance and heat intolerance.   Genitourinary:  Negative for dysuria, frequency and hematuria.   Musculoskeletal:  Negative for arthralgias, back pain, gait problem,  joint swelling and neck pain.   Skin:  Negative for rash.   Neurological:  Positive for weakness and numbness. Negative for dizziness.   Hematological:  Does not bruise/bleed easily.   Psychiatric/Behavioral:  Negative for agitation, behavioral problems, confusion, hallucinations and sleep disturbance. The patient is not nervous/anxious.      Past Medical History:   Diagnosis Date   • Allergic    • Anxiety    • CAD (coronary artery disease)    • Coronary artery disease    • Depression    • Disease of thyroid gland    • Dizziness    • Erectile dysfunction    • Eye problems    • Hypertension    • Hypothyroidism    • Migraines    • Stroke (HCC) 11/17/2014   • Tremors of nervous system      Past Surgical History:   Procedure Laterality Date   • COLONOSCOPY  09/01/2017   • COLONOSCOPY     • CORONARY ARTERY BYPASS GRAFT  March 24,20201   • CORONARY STENT PLACEMENT     • DENTAL SURGERY      entire top teeth removal   • HERNIA REPAIR     • NH CORONARY ARTERY BYP W/VEIN & ARTERY GRAFT 4 VEIN N/A 3/24/2021    Procedure: CORONARY ARTERY BYPASS GRAFT (CABG) 3 VESSELS,  USING LEFT VANE-LAD AND LEFT GSV-RPL & RAMUS;  Surgeon: ISAIAH Jennings MD;  Location: [facility];  Service: Cardiac Surgery   • NH ECHO TRANSESOPHAG MONTR CARDIAC PUMP FUNCTJ N/A 3/24/2021    Procedure: TRANSESOPHAGEAL ECHOCARDIOGRAM (MARLEN);  Surgeon: ISAIAH Jennings MD;  Location: [facility];  Service: Cardiac Surgery     Family History   Problem Relation Age of Onset   • Cancer Mother    • Hypertension Mother    • Rheum arthritis Mother    • Heart attack Father    • Heart disease Father    • Hypertension Father    • Early death Father    • Heart attack Sister    • Hearing loss Sister    • Stroke Sister    • Psychiatric Illness Sister    • Cancer Brother      Social History     Tobacco Use   • Smoking status: Never   • Smokeless tobacco: Never   Vaping Use   • Vaping status: Never Used   Substance and Sexual Activity   • Alcohol use: No   • Drug  use: No   • Sexual activity: Not Currently     Partners: Female     Birth control/protection: None     Current Outpatient Medications on File Prior to Visit   Medication Sig   • acetaminophen-codeine (TYLENOL with CODEINE #3) 300-30 MG per tablet Take 1 tablet by mouth every 8 (eight) hours as needed for moderate pain   • aspirin 81 MG tablet Take 81 mg by mouth   • atorvastatin (LIPITOR) 40 mg tablet TAKE 2 TABLETS EVERY DAY   • chlorhexidine (PERIDEX) 0.12 % solution RINSE WITH 15ML FOR 30 SECONDS AND SPIT, USE TWICE DAILY AFTER BRUSHING AND FLOSSING FOR TWO WEEKS.   • clonazePAM (KlonoPIN) 2 mg tablet Take 1 tablet (2 mg total) by mouth 2 (two) times a day as needed for seizures   • clopidogrel (PLAVIX) 75 mg tablet TAKE 1 TABLET EVERY DAY   • clotrimazole-betamethasone (LOTRISONE) 1-0.05 % cream Apply topically 2 (two) times a day   • levothyroxine 100 mcg tablet TAKE 1 TABLET EVERY DAY   • metoprolol succinate (TOPROL-XL) 100 mg 24 hr tablet TAKE 1 TABLET EVERY DAY   • sildenafil (VIAGRA) 100 mg tablet TAKE 1 TABLET BY MOUTH EVERY DAY AS NEEDED FOR ERECTILE DYSFUNCTION   • tiZANidine (ZANAFLEX) 2 mg tablet TAKE 1 TABLET EVERY 8 HOURS AS NEEDED FOR MUSCLE SPASMS   • valsartan (DIOVAN) 160 mg tablet TAKE 1 TABLET EVERY DAY   • zolpidem (AMBIEN) 10 mg tablet 2 po  q hs   • oxyCODONE HCl 5 MG TABA Take 5 mg by mouth 4 (four) times a day as needed (pain) Max Daily Amount: 20 mg (Patient not taking: Reported on 5/17/2024)     Allergies   Allergen Reactions   • Medical Tape Other (See Comments) and Rash     Other reaction(s): BURNS USE PAPER TAPE     Immunization History   Administered Date(s) Administered   • COVID-19 PFIZER VACCINE 0.3 ML IM 04/29/2021, 05/20/2021, 11/27/2021   • INFLUENZA 12/20/2017, 11/28/2018, 11/03/2020, 10/18/2021, 09/16/2022, 09/27/2023   • Influenza Injectable, MDCK, Preservative Free, Quadrivalent, 0.5 mL 11/20/2019, 11/03/2020   • Influenza, high dose seasonal 0.7 mL 09/16/2022, 09/27/2023  "  • Tdap 05/08/2014, 07/23/2021   • Tuberculin Skin Test-PPD Intradermal 01/04/2001     Objective     /64 (BP Location: Left arm, Patient Position: Sitting, Cuff Size: Large)   Pulse 63   Temp (!) 97.4 °F (36.3 °C) (Skin)   Ht 5' 9\" (1.753 m)   Wt 99.8 kg (220 lb)   SpO2 98%   BMI 32.49 kg/m²     Physical Exam  Constitutional:       Appearance: He is well-developed.   HENT:      Head: Normocephalic and atraumatic.      Right Ear: External ear normal.      Left Ear: External ear normal.      Nose: Nose normal.      Mouth/Throat:      Pharynx: Oropharynx is clear. No oropharyngeal exudate.   Eyes:      General: No scleral icterus.     Conjunctiva/sclera: Conjunctivae normal.      Pupils: Pupils are equal, round, and reactive to light.   Cardiovascular:      Rate and Rhythm: Normal rate and regular rhythm.      Heart sounds: Normal heart sounds.   Pulmonary:      Effort: Pulmonary effort is normal.      Breath sounds: Normal breath sounds. No wheezing or rales.   Abdominal:      General: Bowel sounds are normal.      Palpations: Abdomen is soft.      Tenderness: There is no abdominal tenderness. There is no guarding.   Musculoskeletal:      Cervical back: Normal range of motion and neck supple.   Skin:     General: Skin is warm and dry.      Findings: No erythema or rash.   Neurological:      Mental Status: He is alert and oriented to person, place, and time. Mental status is at baseline.      Sensory: Sensory deficit present.   Psychiatric:         Mood and Affect: Mood normal.         Behavior: Behavior normal.         Thought Content: Thought content normal.         Judgment: Judgment normal.       TCM Call     Date and time call was made  5/20/2024  1:36 PM    Hospital care reviewed  Records reviewed    Patient was hospitialized at  [facility]    Date of Admission  05/17/24    Date of discharge  05/18/24    Diagnosis  Stroke-like symptoms    Disposition  Home    Were the patients medications " reviewed and updated  No    Current Symptoms  None      TCM Call     Post hospital issues  None    Should patient be enrolled in anticoag monitoring?  No    Scheduled for follow up?  Yes    Did you obtain your prescribed medications  Yes    Do you need help managing your prescriptions or medications  No    Is transportation to your appointment needed  No    I have advised the patient to call PCP with any new or worsening symptoms  Araseli Forde MA    Living Arrangements  Spouse or Significiant other    Support System  Spouse    The type of support provided  Emotional; Physical    Do you have social support  Yes, as much as I need    Are you recieving any outpatient services  No    Are you recieving home care services  No    Types of home care services  Home health aid    Are you using any community resources  No    Current waiver services  No    Have you fallen in the last 12 months  No    Interperter language line needed  No    Counseling  Family    Counseling topics  Activities of daily living         Administrative Statements

## 2024-05-29 ENCOUNTER — OFFICE VISIT (OUTPATIENT)
Dept: FAMILY MEDICINE CLINIC | Facility: CLINIC | Age: 69
End: 2024-05-29
Payer: MEDICARE

## 2024-05-29 ENCOUNTER — TELEPHONE (OUTPATIENT)
Age: 69
End: 2024-05-29

## 2024-05-29 VITALS
SYSTOLIC BLOOD PRESSURE: 112 MMHG | HEIGHT: 69 IN | OXYGEN SATURATION: 98 % | BODY MASS INDEX: 32.58 KG/M2 | HEART RATE: 63 BPM | DIASTOLIC BLOOD PRESSURE: 64 MMHG | TEMPERATURE: 97.4 F | WEIGHT: 220 LBS

## 2024-05-29 DIAGNOSIS — I73.9 CLAUDICATION (HCC): ICD-10-CM

## 2024-05-29 DIAGNOSIS — I63.00 CEREBROVASCULAR ACCIDENT (CVA) DUE TO THROMBOSIS OF PRECEREBRAL ARTERY (HCC): ICD-10-CM

## 2024-05-29 DIAGNOSIS — I10 ESSENTIAL HYPERTENSION: ICD-10-CM

## 2024-05-29 DIAGNOSIS — G62.9 NEUROPATHY: ICD-10-CM

## 2024-05-29 DIAGNOSIS — G47.00 INSOMNIA, UNSPECIFIED TYPE: ICD-10-CM

## 2024-05-29 DIAGNOSIS — E03.4 HYPOTHYROIDISM DUE TO ACQUIRED ATROPHY OF THYROID: ICD-10-CM

## 2024-05-29 DIAGNOSIS — I25.709 CORONARY ARTERY DISEASE INVOLVING CORONARY BYPASS GRAFT OF NATIVE HEART WITH ANGINA PECTORIS (HCC): Primary | ICD-10-CM

## 2024-05-29 DIAGNOSIS — E78.2 MIXED HYPERLIPIDEMIA: ICD-10-CM

## 2024-05-29 PROCEDURE — 99495 TRANSJ CARE MGMT MOD F2F 14D: CPT | Performed by: FAMILY MEDICINE

## 2024-05-29 RX ORDER — CHLORHEXIDINE GLUCONATE ORAL RINSE 1.2 MG/ML
SOLUTION DENTAL
COMMUNITY
Start: 2024-05-16

## 2024-05-29 NOTE — TELEPHONE ENCOUNTER
Pt called to see what type of imaging test was ordered and adv it was an x ray and he doesn't think it will show a pinched nerve but adv it is a walk in type appt and he will most likely go to the [facility] lab to complete. PT was not sure dr put in a referral for PT and adv that was completed as well.

## 2024-05-31 ENCOUNTER — APPOINTMENT (OUTPATIENT)
Dept: RADIOLOGY | Age: 69
End: 2024-05-31
Payer: MEDICARE

## 2024-05-31 DIAGNOSIS — G62.9 NEUROPATHY: ICD-10-CM

## 2024-05-31 PROCEDURE — 72050 X-RAY EXAM NECK SPINE 4/5VWS: CPT

## 2024-06-05 ENCOUNTER — TELEPHONE (OUTPATIENT)
Age: 69
End: 2024-06-05

## 2024-06-05 ENCOUNTER — EVALUATION (OUTPATIENT)
Dept: PHYSICAL THERAPY | Age: 69
End: 2024-06-05
Payer: MEDICARE

## 2024-06-05 DIAGNOSIS — M54.12 CERVICAL RADICULOPATHY: Primary | ICD-10-CM

## 2024-06-05 DIAGNOSIS — G62.9 NEUROPATHY: ICD-10-CM

## 2024-06-05 PROCEDURE — 97162 PT EVAL MOD COMPLEX 30 MIN: CPT

## 2024-06-05 PROCEDURE — 97110 THERAPEUTIC EXERCISES: CPT

## 2024-06-05 NOTE — TELEPHONE ENCOUNTER
Pt. Called again because he hadn't heard anythng and I explained the Doctor was seeing patients.  He has an apt. Again tomorrow and he wants them to work on his T spine not his neck.  He wants to make sure the order is corrected.  Ty

## 2024-06-05 NOTE — TELEPHONE ENCOUNTER
The patient went for physical therapy today   they worked on his neck   the patient told them it was below the neck the T spine   they continued to work on his neck  the patient has therapy tomorrow and would like the orders corrected so they work on his T spine   please call the patient with any questions  thank you

## 2024-06-05 NOTE — PROGRESS NOTES
PT Evaluation     Today's date: 2024  Patient name: Deshawn Tsai  : 1955  MRN: [id number]  Referring provider: Chadd Lambert MD  Dx:   Encounter Diagnosis     ICD-10-CM    1. Cervical radiculopathy  M54.12       2. Neuropathy  G62.9 Ambulatory Referral to Physical Therapy          Start Time: 0800  Stop Time: 0845  Total time in clinic (min): 45 minutes    Assessment  Impairments: abnormal or restricted ROM, impaired physical strength, lacks appropriate home exercise program, pain with function, poor posture  and poor body mechanics    Assessment details: Pt is a 67 y/o male who presents with neck, arm pain. No further referral is necessary at this time. Symptoms in arm appear correlated to facet dysfunction on L side of spine at lower cervical level.     Pt symptoms correlated with spondylosis with referred pain in L arm. Pt is experiencing pain, decreased strength, and decreased ROM. Pt has a positive prognosis. Pt would benefit from PT to address these impairments leading to increased functional capacity and improved quality of life.    Understanding of Dx/Px/POC: good     Prognosis: good    GOALS:  IN 2-4 WEEKS:  Pt will improve pain on VAPS by 1-2 points  Pt will demonstrate understanding, independence with HEP    In 6-8 WEEKS  Pt will demonstrate improved ROM in spine to WFL  Pt will improve pain by >50% overall indicating improved tolerance to activities  Pt will improve sitting tolerance >30 mins  Pt will improve FOTO score at or above prognostic value      Plan  Patient would benefit from: skilled physical therapy  Planned modality interventions: cryotherapy and thermotherapy: hydrocollator packs    Planned therapy interventions: neuromuscular re-education, patient education, stretching, strengthening, therapeutic activities, therapeutic exercise, therapeutic training, home exercise program and graded activity    Frequency: Twice a week for 8 weeks.  Treatment plan discussed with:  "patient        Subjective Evaluation    History of Present Illness  Mechanism of injury:   PT IE: Pt reports history of neck pain.   hospital visit for accidental medication overdose.  Pt appears to walk with unsteady gait. Per chart review, his symptoms improved with medication adjustment and monitoring at hospital.   Reports a L sided stroke in mikhail with R sided weakness x 10 years ago.  Notes tingling in L arm that has been occurring chronically in L forearm.   Notes spine is \"crumbling\"-notes he was told he has \"spondylosis\"      Hx of CABG placement  Cervical X ray impression:   \"Moderate facet disease and moderate spondylosis in the lumbar spine worse at C5-6 and C6-7.  Mild multilevel neuroforaminal narrowing bilaterally worse on the left and worse at C6-7.\"    \"68-year-old male here today for TCM visit status post a hospital stay for accidental overdose of Ambien and Klonopin.  Patient states that he was in the hospital for what they thought might have been a possible stroke but it turns out that the patient had taken his p.m. medicines in the a.m. by accident.  The patient had problems with not be able to get up and being sedated but it was due to his take his Ambien in the morning.  Patient had weakness on the 1 side and history of stroke he had gone through the stroke protocol which was negative.  His weakness had resolved after the medicine had worn off and he was discharged.  Patient also states he has numbness in his left arm that is suggestive of neuropathy.  Patient would like to be an evaluation for neuropathy in his left arm will see about getting an x-ray of his cervical spine and start some physical therapy.\"    Quality of life: good    Patient Goals  Patient goals for therapy: decreased pain, independence with ADLs/IADLs, return to sport/leisure activities, increased strength and increased motion    Pain  Current pain ratin  At best pain ratin  At worst pain ratin  Quality: " radiating  Aggravating factors: lifting  Progression: no change    Hand dominance: right          Objective     Concurrent Complaints  Negative for night pain, disturbed sleep, dizziness, faints, headaches, nausea/motion sickness, tinnitus, trouble swallowing, difficulty breathing, shortness of breath, respiratory pain, visual change, cardiac problem, kidney problem, gallbladder problem, stomach problem, ulcer, appendix problem, spleen problem, pancreas problem, history of cancer, history of trauma and infection    Neurological Testing     Sensation   Cervical/Thoracic   Left   Intact: light touch    Right   Intact: light touch    Reflexes   Left   Biceps (C5/C6): absent (0)  Brachioradialis (C6): absent (0)  Schultz's reflex: negative    Right   Biceps (C5/C6): brisk (3+)  Brachioradialis (C6): brisk (3+)  Schultz's reflex: negative    Active Range of Motion   Cervical/Thoracic Spine       Cervical  Subcranial protraction:   Restriction level: moderate  Subcranial retraction:   Restriction level: moderate  Flexion:  Restriction level: moderate  Extension:  Restriction level: moderate  Left lateral flexion: 15 degrees     Restriction level: moderate  Right lateral flexion: 15 degrees     Restriction level moderate  Left rotation: 55 degrees Restriction level: moderate  Right rotation: 70 degrees    Restriction level: moderate    Strength/Myotome Testing   Cervical Spine     Left   Normal strength    General Comments:    Upper quarter screen   Shoulder: unremarkable  Neuro Exam:     Headaches   Patient reports headaches: No.       Flowsheet Rows      Flowsheet Row Most Recent Value   PT/OT G-Codes    Current Score 47   Projected Score 62               Precautions: CVA x 10 yrs, hx CABG, see chart for more      Manuals 6/5            SOR             Distraction             Facilitated rotation                          Neuro Re-Ed                                                                                                         Ther Ex             SNAG-ext             SNAG-rot             Scapular squeeze                                                                              Ther Activity                                       Gait Training                                       Modalities                                            Access Code: [email or web address]  URL: [email or web address]  Date: 06/05/2024  Prepared by: David Lam    Exercises  - Seated Assisted Cervical Rotation with Towel  - 2 x daily - 7 x weekly - 2 sets - 10 reps - 10 hold  - Cervical Extension AROM with Strap  - 2 x daily - 7 x weekly - 2 sets - 10 reps - 10 hold  - Standing Isometric Chin Tuck with Manual Resistance at Wall  - 2 x daily - 7 x weekly - 10 reps - 10 hold  - Standing Isometric Cervical Rotation  - 2 x daily - 7 x weekly - 10 reps - 10 hold  - Seated Isometric Cervical Extension  - 2 x daily - 7 x weekly - 10 reps - 10 hold

## 2024-06-06 ENCOUNTER — OFFICE VISIT (OUTPATIENT)
Dept: PHYSICAL THERAPY | Age: 69
End: 2024-06-06
Payer: MEDICARE

## 2024-06-06 DIAGNOSIS — M54.12 CERVICAL RADICULOPATHY: ICD-10-CM

## 2024-06-06 DIAGNOSIS — G62.9 NEUROPATHY: Primary | ICD-10-CM

## 2024-06-06 PROCEDURE — 97110 THERAPEUTIC EXERCISES: CPT

## 2024-06-06 PROCEDURE — 97140 MANUAL THERAPY 1/> REGIONS: CPT

## 2024-06-06 NOTE — PROGRESS NOTES
"Daily Note     Today's date: 2024  Patient name: Deshawn Tsai  : 1955  MRN: [id number]  Referring provider: Chadd Lambert MD  Dx:   Encounter Diagnosis     ICD-10-CM    1. Neuropathy  G62.9       2. Cervical radiculopathy  M54.12                      Subjective: No change since IE. Pain is still at the \"1T\" area. He has been suing a TENS unit manage his symptoms.    He is having tingling at the lateral aspect of the L       Objective: See treatment diary below      Assessment: Tolerated treatment fair. His cervical mobility improved with self SNAGS, and with manual interventions. He reports intermittent relief of radicular symptoms with radial nerve glides. Continued PT would be beneficial to improve function.        Plan: Continue per plan of care.       Precautions: CVA x 10 yrs, hx CABG, see chart for more      Manuals            SOR  MB           Distraction  MB           Facilitated rotation  MB                        Neuro Re-Ed             Radial glides  3x10 sitting low range +HEP                                                                                         Ther Ex             SNAG-ext  2x10           SNAG-rot  10x3 sec hold           Scapular squeeze  2x10x5\"                                                                            Ther Activity                                       Gait Training                                       Modalities                                            "

## 2024-06-06 NOTE — TELEPHONE ENCOUNTER
The order was for physical therapy for neuropathy in his arm. There was no specified region of the spine ordered for either the cervical or thoracic region. This is left to the decision of the therapist. If there is a problem with neuropathy in the arms they would not do anything with the thoracic spine for that is downstream from where the problem is. All the muscles that compress those nerve fibers are located in the neck and not the thorax.

## 2024-06-10 ENCOUNTER — OFFICE VISIT (OUTPATIENT)
Dept: PHYSICAL THERAPY | Age: 69
End: 2024-06-10
Payer: MEDICARE

## 2024-06-10 VITALS — SYSTOLIC BLOOD PRESSURE: 120 MMHG | DIASTOLIC BLOOD PRESSURE: 70 MMHG | OXYGEN SATURATION: 98 % | HEART RATE: 70 BPM

## 2024-06-10 DIAGNOSIS — G62.9 NEUROPATHY: Primary | ICD-10-CM

## 2024-06-10 DIAGNOSIS — M54.12 CERVICAL RADICULOPATHY: ICD-10-CM

## 2024-06-10 PROCEDURE — 97112 NEUROMUSCULAR REEDUCATION: CPT

## 2024-06-10 PROCEDURE — 97110 THERAPEUTIC EXERCISES: CPT

## 2024-06-10 NOTE — PROGRESS NOTES
"Daily Note     Today's date: 6/10/2024  Patient name: Deshawn Tsai  : 1955  MRN: [id number]  Referring provider: Chadd Lambert MD  Dx:   Encounter Diagnosis     ICD-10-CM    1. Neuropathy  G62.9       2. Cervical radiculopathy  M54.12                        Subjective: Pt reports exercises made his pain worse last time.  He notes difficulty turning his head and looking side to side.       Objective: See treatment diary below      Assessment: Tolerated treatment fair.  Pt exercises challenged his control and were modified to attempt to down regulate dystonia in neck.  Continued PT would be beneficial to improve function.        Plan: Continue per plan of care.       Precautions: CVA x 10 yrs, hx CABG, see chart for more      Manuals 6/5 6/6 6/10          SOR  MB           Distraction  MB           Facilitated rotation  MB                        Neuro Re-Ed             Radial glides  3x10 sitting low range +HEP 3x10          Median glides   3x10          Head still/trunk turn on stool   2x20                                                                           Ther Ex             SNAG-ext  2x10 30x          SNAG-rot  10x3 sec hold           Scapular squeeze  2x10x5\" 2x10x5\"                                                                           Ther Activity                                       Gait Training                                       Modalities                                            "

## 2024-06-13 ENCOUNTER — OFFICE VISIT (OUTPATIENT)
Dept: PHYSICAL THERAPY | Age: 69
End: 2024-06-13
Payer: MEDICARE

## 2024-06-13 VITALS — HEART RATE: 66 BPM | OXYGEN SATURATION: 96 % | SYSTOLIC BLOOD PRESSURE: 120 MMHG | DIASTOLIC BLOOD PRESSURE: 70 MMHG

## 2024-06-13 DIAGNOSIS — M54.12 CERVICAL RADICULOPATHY: ICD-10-CM

## 2024-06-13 DIAGNOSIS — G62.9 NEUROPATHY: Primary | ICD-10-CM

## 2024-06-13 PROCEDURE — 97110 THERAPEUTIC EXERCISES: CPT

## 2024-06-13 PROCEDURE — 97140 MANUAL THERAPY 1/> REGIONS: CPT

## 2024-06-13 NOTE — PROGRESS NOTES
"Daily Note     Today's date: 2024  Patient name: Deshawn Tsai  : 1955  MRN: [id number]  Referring provider: Chadd Lambert MD  Dx:   Encounter Diagnosis     ICD-10-CM    1. Neuropathy  G62.9       2. Cervical radiculopathy  M54.12               Start Time: 1100  Stop Time: 1144  Total time in clinic (min): 44 minutes    Subjective: Pt reports continued soreness.    During session, he asked to use leg press.  I explained that this exercise was not exactly relevant to his diagnosis and that he may even irritate his neck muscles in seated position if he is straining.     Objective: See treatment diary below      Assessment: Tolerated treatment fair.  Pt exercises challenged his control and were modified to attempt to down regulate dystonia in neck.  Continued PT would be beneficial to improve function.        Plan: Continue per plan of care.       Precautions: CVA x 10 yrs, hx CABG, see chart for more      Manuals 6/5 6/6 6/10 6/13         SOR  MB  KD         Distraction  MB  KD         Facilitated rotation  MB  KD                      Neuro Re-Ed             Radial glides  3x10 sitting low range +HEP 3x10 nv         Median glides   3x10 nv         Head still/trunk turn on stool   2x20 nv                                                                          Ther Ex             SNAG-ext  2x10 30x 30x         SNAG-rot  10x3 sec hold  30x         Kickball rotation    30x         Scapular squeeze  2x10x5\" 2x10x5\" 2 x 10 x5\"         Neck AROM  -ext    30x                                                                          Ther Activity                                       Gait Training                                       Modalities                                            "

## 2024-06-17 ENCOUNTER — OFFICE VISIT (OUTPATIENT)
Dept: PHYSICAL THERAPY | Age: 69
End: 2024-06-17
Payer: MEDICARE

## 2024-06-17 DIAGNOSIS — M54.12 CERVICAL RADICULOPATHY: ICD-10-CM

## 2024-06-17 DIAGNOSIS — G62.9 NEUROPATHY: Primary | ICD-10-CM

## 2024-06-17 PROCEDURE — 97112 NEUROMUSCULAR REEDUCATION: CPT

## 2024-06-17 PROCEDURE — 97110 THERAPEUTIC EXERCISES: CPT

## 2024-06-17 PROCEDURE — 97140 MANUAL THERAPY 1/> REGIONS: CPT

## 2024-06-17 NOTE — PROGRESS NOTES
"Daily Note     Today's date: 2024  Patient name: Deshawn Tsai  : 1955  MRN: [id number]  Referring provider: Chadd Lambert MD  Dx:   Encounter Diagnosis     ICD-10-CM    1. Neuropathy  G62.9       2. Cervical radiculopathy  M54.12                 Start Time: 09  Stop Time: 1015  Total time in clinic (min): 45 minutes    Subjective: Pt reports continued soreness.  Reports no change in his L arm tingling.     Objective: See treatment diary below    Assessment: Tolerated treatment fair.  Pt reports little change in his symptoms. Maximal cues for performance of exercises.  Suspect noncompliance with HEP as he was not familiar with exercises.  Reports he wants trigger point injections and there is some logistical issue in doing so. May try traction to reduce nerve symptoms in arm.  Continued PT would be beneficial to improve function.        Plan: Continue per plan of care.       Precautions: CVA x 10 yrs, hx CABG, see chart for more      Manuals 6/5 6/6 6/10 6/13 6/17        SOR  MB  KD KD        Distraction  MB  KD KD        Facilitated rotation  MB  KD KD                     Neuro Re-Ed             Radial glides  3x10 sitting low range +HEP 3x10 nv         Median glides   3x10 nv 30x        Head still/trunk turn on stool   2x20 nv nv        Chin tuck/head lift    2 x 15 2 x 15                                                            Ther Ex             SNAG-ext  2x10 30x 30x nv        SNAG-rot  10x3 sec hold  30x nv        Kickball rotation    30x 30x        Thoracic extension     30x                     Scapular squeeze  2x10x5\" 2x10x5\" 2 x 10 x5\" 2 x 10 x 5\"        Neck AROM  -ext    30x 30x                                                                         Ther Activity                                       Gait Training                                       Modalities             Cervical traction     nv                          "

## 2024-06-20 ENCOUNTER — OFFICE VISIT (OUTPATIENT)
Dept: PHYSICAL THERAPY | Age: 69
End: 2024-06-20
Payer: MEDICARE

## 2024-06-20 VITALS — DIASTOLIC BLOOD PRESSURE: 80 MMHG | SYSTOLIC BLOOD PRESSURE: 116 MMHG

## 2024-06-20 DIAGNOSIS — M54.12 CERVICAL RADICULOPATHY: ICD-10-CM

## 2024-06-20 DIAGNOSIS — G62.9 NEUROPATHY: Primary | ICD-10-CM

## 2024-06-20 PROCEDURE — 97012 MECHANICAL TRACTION THERAPY: CPT

## 2024-06-20 PROCEDURE — 97140 MANUAL THERAPY 1/> REGIONS: CPT

## 2024-06-20 PROCEDURE — 97110 THERAPEUTIC EXERCISES: CPT

## 2024-06-20 NOTE — PROGRESS NOTES
"Daily Note     Today's date: 2024  Patient name: Deshawn Tsai  : 1955  MRN: [id number]  Referring provider: Chadd Lambert MD  Dx:   Encounter Diagnosis     ICD-10-CM    1. Neuropathy  G62.9       2. Cervical radiculopathy  M54.12                 Start Time: 1145  Stop Time: 1230  Total time in clinic (min): 45 minutes    Subjective: Pt reports continued soreness.  Reports no change in his L arm tingling.     Objective: See treatment diary below    Assessment: Tolerated treatment fair.  Pt reports little change in his symptoms. Maximal cues for performance of exercises.  Traction reportedly relieved some of arm symptoms.  Will continue to assess.       Plan: Continue per plan of care.       Precautions: CVA x 10 yrs, hx CABG, see chart for more      Manuals 6/5 6/6 6/10 6/13 6/17 6/20       SOR  MB  KD KD KD       Distraction  MB  KD KD KD       Facilitated rotation  MB  KD KD KD                    Neuro Re-Ed             Radial glides  3x10 sitting low range +HEP 3x10 nv         Median glides   3x10 nv 30x 30x        Head still/trunk turn on stool   2x20 nv nv        Chin tuck/head lift    2 x 15 2 x 15 2 x 15                                                           Ther Ex             SNAG-ext  2x10 30x 30x nv        SNAG-rot  10x3 sec hold  30x nv        Kickball rotation    30x 30x 30x       Thoracic extension     30x nv                    Scapular squeeze  2x10x5\" 2x10x5\" 2 x 10 x5\" 2 x 10 x 5\" nv       Neck AROM  -ext    30x 30x        UBE      3/3  L4                                                           Ther Activity                                       Gait Training                                       Modalities             Cervical traction     nv 15'                         "

## 2024-06-24 ENCOUNTER — OFFICE VISIT (OUTPATIENT)
Dept: PHYSICAL THERAPY | Age: 69
End: 2024-06-24
Payer: MEDICARE

## 2024-06-24 DIAGNOSIS — G62.9 NEUROPATHY: ICD-10-CM

## 2024-06-24 DIAGNOSIS — M54.12 CERVICAL RADICULOPATHY: Primary | ICD-10-CM

## 2024-06-24 PROCEDURE — 97012 MECHANICAL TRACTION THERAPY: CPT

## 2024-06-24 PROCEDURE — 97140 MANUAL THERAPY 1/> REGIONS: CPT

## 2024-06-24 PROCEDURE — 97110 THERAPEUTIC EXERCISES: CPT

## 2024-06-24 PROCEDURE — 97112 NEUROMUSCULAR REEDUCATION: CPT

## 2024-06-24 NOTE — PROGRESS NOTES
"Daily Note     Today's date: 2024  Patient name: Deshawn Tsai  : 1955  MRN: [id number]  Referring provider: Chadd Lambert MD  Dx:   Encounter Diagnosis     ICD-10-CM    1. Cervical radiculopathy  M54.12       2. Neuropathy  G62.9                 Start Time: 0930  Stop Time: 1025  Total time in clinic (min): 55 minutes    Subjective: Pt reports continued soreness.  Reports no change in his L arm tingling.     Objective: See treatment diary below    Assessment: Tolerated treatment fair.  Pt reports little change in his symptoms. Maximal cues for performance of exercises.  Increased time needed due to pt functional mobility.  Pt advised to follow up with provider regarding his symptoms. Traction reportedly relieved some of arm symptoms.  Will continue to assess.       Plan: Continue per plan of care.       Precautions: CVA x 10 yrs, hx CABG, see chart for more      Manuals 6/5 6/6 6/10 6/13 6/17 6/20 6/24      SOR  MB  KD KD KD KD      Distraction  MB  KD KD KD KD      Facilitated rotation  MB  KD KD KD KD                   Neuro Re-Ed             Radial glides  3x10 sitting low range +HEP 3x10 nv         Median glides   3x10 nv 30x 30x  nv      Head still/trunk turn on stool   2x20 nv nv        Chin tuck/head lift    2 x 15 2 x 15 2 x 15 3 x 15      Wall slide/UT shrug       3 x 10  Rest breaks                                             Ther Ex             SNAG-ext  2x10 30x 30x nv        SNAG-rot  10x3 sec hold  30x nv        Kickball rotation    30x 30x 30x 30x      Thoracic extension     30x nv                    Scapular squeeze  2x10x5\" 2x10x5\" 2 x 10 x5\" 2 x 10 x 5\" nv       Neck AROM  -ext    30x 30x        UBE      3/3  L4 L4  3/3                                                          Ther Activity                                       Gait Training                                       Modalities             Cervical traction     nv 15' 15'                        "

## 2024-06-27 ENCOUNTER — OFFICE VISIT (OUTPATIENT)
Dept: PHYSICAL THERAPY | Age: 69
End: 2024-06-27
Payer: MEDICARE

## 2024-06-27 DIAGNOSIS — G62.9 NEUROPATHY: ICD-10-CM

## 2024-06-27 DIAGNOSIS — M54.12 CERVICAL RADICULOPATHY: Primary | ICD-10-CM

## 2024-06-27 PROCEDURE — 97110 THERAPEUTIC EXERCISES: CPT

## 2024-06-27 PROCEDURE — 97012 MECHANICAL TRACTION THERAPY: CPT

## 2024-06-27 PROCEDURE — 97112 NEUROMUSCULAR REEDUCATION: CPT

## 2024-06-27 PROCEDURE — 97140 MANUAL THERAPY 1/> REGIONS: CPT

## 2024-06-27 NOTE — PROGRESS NOTES
"Daily Note     Today's date: 2024  Patient name: Deshawn Tsai  : 1955  MRN: [id number]  Referring provider: Chadd Lambert MD  Dx:   Encounter Diagnosis     ICD-10-CM    1. Cervical radiculopathy  M54.12       2. Neuropathy  G62.9                      Subjective: pt reports relief with manual cervical traction which he will monitor carryover and report to us next visit    Objective: See treatment diary below      Assessment: cueing throughout session for posture and proper ex technique, relief noted with manual traction      Plan: Continue per plan of care.  Progress treatment as tolerated.       Precautions: CVA x 10 yrs, hx CABG, see chart for more      Manuals 6/5 6/6 6/10 6/13 6/17 6/20 6/24 6/27     SOR  MB  KD KD KD KD VK     Distraction  MB  KD KD KD KD VK     Facilitated rotation  MB  KD KD KD KD VK                  Neuro Re-Ed             Radial glides  3x10 sitting low range +HEP 3x10 nv         Median glides   3x10 nv 30x 30x  nv VK     Head still/trunk turn on stool   2x20 nv nv        Chin tuck/head lift    2 x 15 2 x 15 2 x 15 3 x 15 3x15x5\"     Wall slide/UT shrug       3 x 10  Rest breaks nv                                            Ther Ex             SNAG-ext  2x10 30x 30x nv        SNAG-rot  10x3 sec hold  30x nv        Kickball rotation    30x 30x 30x 30x 30x ea     Thoracic extension     30x nv  3x10x5\"                  Scapular squeeze  2x10x5\" 2x10x5\" 2 x 10 x5\" 2 x 10 x 5\" nv  20x5\"     Neck AROM  -ext    30x 30x        UBE      3/3  L4 L4  3/3 L4 3'/3'                                                         Ther Activity                                       Gait Training                                       Modalities             Cervical traction     nv 15' 15' 15'  30#/15# int 2                         "

## 2024-07-02 ENCOUNTER — OFFICE VISIT (OUTPATIENT)
Dept: PHYSICAL THERAPY | Age: 69
End: 2024-07-02
Payer: MEDICARE

## 2024-07-02 DIAGNOSIS — G62.9 NEUROPATHY: ICD-10-CM

## 2024-07-02 DIAGNOSIS — M54.12 CERVICAL RADICULOPATHY: Primary | ICD-10-CM

## 2024-07-02 PROCEDURE — 97112 NEUROMUSCULAR REEDUCATION: CPT

## 2024-07-02 PROCEDURE — 97140 MANUAL THERAPY 1/> REGIONS: CPT
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
## 2024-07-05 ENCOUNTER — OFFICE VISIT (OUTPATIENT)
Dept: PHYSICAL THERAPY | Age: 69
End: 2024-07-05
Payer: MEDICARE

## 2024-07-05 DIAGNOSIS — G62.9 NEUROPATHY: ICD-10-CM

## 2024-07-05 DIAGNOSIS — M54.12 CERVICAL RADICULOPATHY: Primary | ICD-10-CM

## 2024-07-05 PROCEDURE — 97140 MANUAL THERAPY 1/> REGIONS: CPT

## 2024-07-05 PROCEDURE — 97012 MECHANICAL TRACTION THERAPY: CPT

## 2024-07-05 PROCEDURE — 97112 NEUROMUSCULAR REEDUCATION: CPT

## 2024-07-05 NOTE — PROGRESS NOTES
"Daily Note     Today's date: 2024  Patient name: Deshawn Tsai  : 1955  MRN: [id number]  Referring provider: Chadd Lambert MD  Dx:   Encounter Diagnosis     ICD-10-CM    1. Cervical radiculopathy  M54.12       2. Neuropathy  G62.9           Start Time: 1059  Stop Time: 1139  Total time in clinic (min): 40 minutes    Subjective: Pt states that he is still tired and slightly sore from performing yard work the past several days. He reports that he still feels occasional numbness and tingling in his right arm, but is unsure what is causing it.       Objective: See treatment diary below      Assessment: Pt reported discomfort during SOR, with symptom relief afterwards. Pt required verbal and tactile cueing for technique with chin tuck/head lifts, and was able to correct approx. 70% of repetitions. Finished session with mechanical cervical traction, with reports of symptom relief following. No significant increase in pain or discomfort reported throughout session. Tolerated treatment fair. Patient would benefit from continued PT      Plan: Continue per plan of care.      Precautions: CVA x 10 yrs, hx CABG, see chart for more      Manuals 6/5 6/6 6/10 6/13 6/17 6/20 6/24 6/27 7/2 7/5   SOR  MB  KD KD KD KD VK ES ES   Distraction  MB  KD KD KD KD VK ES ES   Facilitated rotation  MB  KD KD KD KD VK ES ES                Neuro Re-Ed           Radial glides  3x10 sitting low range +HEP 3x10 nv         Median glides   3x10 nv 30x 30x  nv VK ES    Head still/trunk turn on stool   2x20 nv nv        Chin tuck/head lift    2 x 15 2 x 15 2 x 15 3 x 15 3x15x5\" 3x15x5\" 3x15x5\"   Wall slide/UT shrug       3 x 10  Rest breaks nv 3x10 3x10                                          Ther Ex           SNAG-ext  2x10 30x 30x nv        SNAG-rot  10x3 sec hold  30x nv        Kickball rotation    30x 30x 30x 30x 30x ea 30x ea    Thoracic extension     30x nv  3x10x5\" 3x10x5\"                 Scapular squeeze  " "2x10x5\" 2x10x5\" 2 x 10 x5\" 2 x 10 x 5\" nv  20x5\"     Neck AROM  -ext    30x 30x        UBE      3/3  L4 L4  3/3 L4 3'/3'                                                         Ther Activity                                       Gait Training                                       Modalities        6/27  7/5   Cervical traction     nv 15' 15' 15'  30#/15# int 2  15'  30#/15# int 2                           "

## 2024-07-08 DIAGNOSIS — F41.9 ANXIETY: ICD-10-CM

## 2024-07-08 RX ORDER — CLONAZEPAM 2 MG/1
2 TABLET ORAL 2 TIMES DAILY PRN
Qty: 120 TABLET | Refills: 0 | Status: SHIPPED | OUTPATIENT
Start: 2024-07-08

## 2024-07-10 ENCOUNTER — OFFICE VISIT (OUTPATIENT)
Dept: FAMILY MEDICINE CLINIC | Facility: CLINIC | Age: 69
End: 2024-07-10
Payer: MEDICARE

## 2024-07-10 ENCOUNTER — TELEPHONE (OUTPATIENT)
Age: 69
End: 2024-07-10

## 2024-07-10 ENCOUNTER — TELEPHONE (OUTPATIENT)
Dept: PAIN MEDICINE | Facility: CLINIC | Age: 69
End: 2024-07-10

## 2024-07-10 VITALS
HEIGHT: 69 IN | HEART RATE: 62 BPM | OXYGEN SATURATION: 97 % | SYSTOLIC BLOOD PRESSURE: 138 MMHG | BODY MASS INDEX: 32.73 KG/M2 | WEIGHT: 221 LBS | DIASTOLIC BLOOD PRESSURE: 88 MMHG | TEMPERATURE: 97.2 F

## 2024-07-10 DIAGNOSIS — M54.9 OTHER CHRONIC BACK PAIN: Primary | ICD-10-CM

## 2024-07-10 DIAGNOSIS — F41.9 ANXIETY: Primary | ICD-10-CM

## 2024-07-10 DIAGNOSIS — E03.4 HYPOTHYROIDISM DUE TO ACQUIRED ATROPHY OF THYROID: ICD-10-CM

## 2024-07-10 DIAGNOSIS — I25.709 CORONARY ARTERY DISEASE INVOLVING CORONARY BYPASS GRAFT OF NATIVE HEART WITH ANGINA PECTORIS (HCC): ICD-10-CM

## 2024-07-10 DIAGNOSIS — G89.29 OTHER CHRONIC BACK PAIN: Primary | ICD-10-CM

## 2024-07-10 DIAGNOSIS — G89.29 CHRONIC PAIN OF LEFT KNEE: ICD-10-CM

## 2024-07-10 DIAGNOSIS — M25.562 CHRONIC PAIN OF LEFT KNEE: ICD-10-CM

## 2024-07-10 DIAGNOSIS — Z86.73 HISTORY OF CVA (CEREBROVASCULAR ACCIDENT): ICD-10-CM

## 2024-07-10 DIAGNOSIS — F11.20 CONTINUOUS OPIOID DEPENDENCE (HCC): ICD-10-CM

## 2024-07-10 DIAGNOSIS — I10 ESSENTIAL HYPERTENSION: ICD-10-CM

## 2024-07-10 DIAGNOSIS — I69.351 HEMIPLEGIA AND HEMIPARESIS FOLLOWING CEREBRAL INFARCTION AFFECTING RIGHT DOMINANT SIDE (HCC): ICD-10-CM

## 2024-07-10 DIAGNOSIS — E78.2 MIXED HYPERLIPIDEMIA: ICD-10-CM

## 2024-07-10 PROCEDURE — 99214 OFFICE O/P EST MOD 30 MIN: CPT | Performed by: FAMILY MEDICINE

## 2024-07-10 PROCEDURE — G2211 COMPLEX E/M VISIT ADD ON: HCPCS | Performed by: FAMILY MEDICINE

## 2024-07-10 NOTE — TELEPHONE ENCOUNTER
S/w pt who is upset that he cannot just have an injection  since pt's PT notes and xrays are in the chart. Pt advised physical exam and review of chart needed to make appropriate decision for care going forward.   Pt has [facility] 8/6 and feels he will wait too long for care.        Pt advised RN will request he be placed on cancellation list for  consult with [facility].  Please contact pt if sooner appt available

## 2024-07-10 NOTE — TELEPHONE ENCOUNTER
Caller: Deshawn    Doctor:      Reason for call: patients has referral placed for Consult from Dr. Lambert he called the office he spoke with Araseli who told patient she will resubmit order for injections not for consult. I do not see that order please advise.    Dr. Lambert [phone]  He wants a Cortisone shot not a consult     Call back#: [phone]

## 2024-07-10 NOTE — TELEPHONE ENCOUNTER
"Pt called Pain Mgnt to set up consultation and the office gave him an appt in August. Pt wants a closer appt and to be given shots.     Pt called requesting that the doctor's order for \"consult Pain Mgnt\"  to be changed to state \"trigger shots for back pain\".     Pt would like a return call to further explain the situation.     Please advise.  "

## 2024-07-10 NOTE — TELEPHONE ENCOUNTER
Pt called and stated that Pain Management will not give injections with out a consult first. I explained to pt that this is correct, that they would have to evaluate him first before any injections will be given.

## 2024-07-10 NOTE — PROGRESS NOTES
Ambulatory Visit  Name: Deshawn Tsai      : 1955      MRN: [id number]  Encounter Provider: Chadd Lambert MD  Encounter Date: 7/10/2024   Encounter department: [facility]    Assessment & Plan   1. Anxiety  Assessment & Plan:  Patient to continue utilizing medical therapy as well as counseling sources as applicable to condition. If suicidal thought or fear of suicide attempt, to call 911 and seek help immediately. Medications and therapy reviewed today and all patient  questions answered today.   2. Coronary artery disease involving coronary bypass graft of native heart with angina pectoris (HCC)  Assessment & Plan:  Patient to continue  with current cardiac meds to decrease risk of re stenosis. Patient to follow up with cardiology for scheduled appointments to decrease risk for further cardiac problems with appropriate diagnostic testing to reassess cardiac status. Patient had alll questions about this problem answered today.   3. Continuous opioid dependence (HCC)  Assessment & Plan:  Patient is stable  and will continue present plan of care and reassess at next routine visit. All questions about this problem from patient were answered today.   4. Essential hypertension  Assessment & Plan:  Patient is stable with current anti-hypertensive medicine and continue to follow a low sodium diet and take current medication. All questions about this condition were answered today.   5. Hemiplegia and hemiparesis following cerebral infarction affecting right dominant side (HCC)  Assessment & Plan:  Patient is stable  and will continue present plan of care and reassess at next routine visit. All questions about this problem from patient were answered today.   6. History of CVA (cerebrovascular accident)  Assessment & Plan:  Patient is stable  and will continue present plan of care and reassess at next routine visit. All questions about this problem from patient were answered today.    7. Hypothyroidism due to acquired atrophy of thyroid  Assessment & Plan:  Patient to continue current dose of thyroid medicine and recheck TSH in 6 months   8. Mixed hyperlipidemia  Assessment & Plan:  Patient  is stable with current medication and we discussed a low fat low cholesterol diet. Weight loss also discussed for this will help lower cholesterol also. Recheck lipids in 6 months.   9. Chronic pain of left knee  -     Ambulatory referral to Spine & Pain Management; Future      Falls Plan of Care: balance, strength, and gait training instructions were provided. Recommended assistive device to help with gait and balance. Home safety education provided.       History of Present Illness     HPI  Review of Systems   Constitutional:  Negative for activity change, appetite change, chills, fatigue, fever and unexpected weight change.   HENT:  Negative for congestion, ear pain, hearing loss, mouth sores, postnasal drip, sinus pressure, sinus pain, sneezing and sore throat.    Respiratory:  Negative for apnea, cough, shortness of breath and wheezing.    Cardiovascular:  Negative for chest pain, palpitations and leg swelling.   Gastrointestinal:  Negative for abdominal pain, constipation, diarrhea, nausea and vomiting.   Endocrine: Negative for cold intolerance and heat intolerance.   Genitourinary:  Negative for dysuria, frequency and hematuria.   Musculoskeletal:  Negative for arthralgias, back pain, gait problem, joint swelling and neck pain.   Skin:  Negative for rash.   Neurological:  Negative for dizziness, weakness and numbness.   Hematological:  Does not bruise/bleed easily.   Psychiatric/Behavioral:  Negative for agitation, behavioral problems, confusion, hallucinations and sleep disturbance. The patient is not nervous/anxious.      Past Medical History:   Diagnosis Date   • Allergic    • Anxiety    • CAD (coronary artery disease)    • Coronary artery disease    • Depression    • Disease of thyroid gland     • Dizziness    • Erectile dysfunction    • Eye problems    • Hypertension    • Hypothyroidism    • Migraines    • Stroke (HCC) 11/17/2014   • Tremors of nervous system      Past Surgical History:   Procedure Laterality Date   • COLONOSCOPY  09/01/2017   • COLONOSCOPY     • CORONARY ARTERY BYPASS GRAFT  March 24,20201   • CORONARY STENT PLACEMENT     • DENTAL SURGERY      entire top teeth removal   • HERNIA REPAIR     • IA CORONARY ARTERY BYP W/VEIN & ARTERY GRAFT 4 VEIN N/A 3/24/2021    Procedure: CORONARY ARTERY BYPASS GRAFT (CABG) 3 VESSELS,  USING LEFT VANE-LAD AND LEFT GSV-RPL & RAMUS;  Surgeon: ISAIAH Jennings MD;  Location: [facility];  Service: Cardiac Surgery   • IA ECHO TRANSESOPHAG MONTR CARDIAC PUMP FUNCTJ N/A 3/24/2021    Procedure: TRANSESOPHAGEAL ECHOCARDIOGRAM (MARLEN);  Surgeon: ISAIAH Jennings MD;  Location: [facility];  Service: Cardiac Surgery     Family History   Problem Relation Age of Onset   • Cancer Mother    • Hypertension Mother    • Rheum arthritis Mother    • Heart attack Father    • Heart disease Father    • Hypertension Father    • Early death Father    • Heart attack Sister    • Hearing loss Sister    • Stroke Sister    • Psychiatric Illness Sister    • Cancer Brother      Social History     Tobacco Use   • Smoking status: Never   • Smokeless tobacco: Never   Vaping Use   • Vaping status: Never Used   Substance and Sexual Activity   • Alcohol use: No   • Drug use: No   • Sexual activity: Not Currently     Partners: Female     Birth control/protection: None     Current Outpatient Medications on File Prior to Visit   Medication Sig   • acetaminophen-codeine (TYLENOL with CODEINE #3) 300-30 MG per tablet Take 1 tablet by mouth every 8 (eight) hours as needed for moderate pain   • aspirin 81 MG tablet Take 81 mg by mouth   • atorvastatin (LIPITOR) 40 mg tablet TAKE 2 TABLETS EVERY DAY   • clonazePAM (KlonoPIN) 2 mg tablet Take 1 tablet (2 mg total) by mouth 2 (two) times a day  "as needed for seizures   • clopidogrel (PLAVIX) 75 mg tablet TAKE 1 TABLET EVERY DAY   • clotrimazole-betamethasone (LOTRISONE) 1-0.05 % cream Apply topically 2 (two) times a day   • levothyroxine 100 mcg tablet TAKE 1 TABLET EVERY DAY   • metoprolol succinate (TOPROL-XL) 100 mg 24 hr tablet TAKE 1 TABLET EVERY DAY   • sildenafil (VIAGRA) 100 mg tablet TAKE 1 TABLET BY MOUTH EVERY DAY AS NEEDED FOR ERECTILE DYSFUNCTION   • tiZANidine (ZANAFLEX) 2 mg tablet TAKE 1 TABLET EVERY 8 HOURS AS NEEDED FOR MUSCLE SPASMS   • valsartan (DIOVAN) 160 mg tablet TAKE 1 TABLET EVERY DAY   • zolpidem (AMBIEN) 10 mg tablet 2 po  q hs   • oxyCODONE HCl 5 MG TABA Take 5 mg by mouth 4 (four) times a day as needed (pain) Max Daily Amount: 20 mg (Patient not taking: Reported on 5/17/2024)   • [DISCONTINUED] chlorhexidine (PERIDEX) 0.12 % solution RINSE WITH 15ML FOR 30 SECONDS AND SPIT, USE TWICE DAILY AFTER BRUSHING AND FLOSSING FOR TWO WEEKS.     Allergies   Allergen Reactions   • Medical Tape Other (See Comments) and Rash     Other reaction(s): BURNS USE PAPER TAPE     Immunization History   Administered Date(s) Administered   • COVID-19 PFIZER VACCINE 0.3 ML IM 04/29/2021, 05/20/2021, 11/27/2021   • INFLUENZA 12/20/2017, 11/28/2018, 11/03/2020, 10/18/2021, 09/16/2022, 09/27/2023   • Influenza Injectable, MDCK, Preservative Free, Quadrivalent, 0.5 mL 11/20/2019, 11/03/2020   • Influenza, high dose seasonal 0.7 mL 09/16/2022, 09/27/2023   • Tdap 05/08/2014, 07/23/2021   • Tuberculin Skin Test-PPD Intradermal 01/04/2001     Objective     /88 (BP Location: Left arm, Patient Position: Sitting, Cuff Size: Standard)   Pulse 62   Temp (!) 97.2 °F (36.2 °C) (Skin)   Ht 5' 9\" (1.753 m)   Wt 100 kg (221 lb)   SpO2 97%   BMI 32.64 kg/m²     Physical Exam  Constitutional:       Appearance: He is well-developed.   HENT:      Head: Normocephalic and atraumatic.      Right Ear: External ear normal.      Left Ear: External ear normal.      " Nose: Nose normal.      Mouth/Throat:      Pharynx: Oropharynx is clear. No oropharyngeal exudate.   Eyes:      General: No scleral icterus.     Conjunctiva/sclera: Conjunctivae normal.      Pupils: Pupils are equal, round, and reactive to light.   Cardiovascular:      Rate and Rhythm: Normal rate and regular rhythm.      Heart sounds: Normal heart sounds.   Pulmonary:      Effort: Pulmonary effort is normal.      Breath sounds: Normal breath sounds. No wheezing or rales.   Abdominal:      General: Bowel sounds are normal.      Palpations: Abdomen is soft.      Tenderness: There is no abdominal tenderness. There is no guarding.   Musculoskeletal:         General: Normal range of motion.      Cervical back: Normal range of motion and neck supple.   Skin:     General: Skin is warm and dry.      Findings: No erythema or rash.   Neurological:      Mental Status: He is alert and oriented to person, place, and time. Mental status is at baseline.   Psychiatric:         Mood and Affect: Mood normal.         Behavior: Behavior normal.         Thought Content: Thought content normal.         Judgment: Judgment normal.

## 2024-07-11 ENCOUNTER — TELEPHONE (OUTPATIENT)
Age: 69
End: 2024-07-11

## 2024-08-06 DIAGNOSIS — F41.9 ANXIETY: ICD-10-CM

## 2024-08-06 DIAGNOSIS — G47.00 INSOMNIA, UNSPECIFIED TYPE: ICD-10-CM

## 2024-08-06 RX ORDER — CLONAZEPAM 2 MG/1
2 TABLET ORAL 2 TIMES DAILY PRN
Qty: 120 TABLET | Refills: 0 | Status: SHIPPED | OUTPATIENT
Start: 2024-08-06

## 2024-08-06 RX ORDER — ZOLPIDEM TARTRATE 10 MG/1
TABLET ORAL
Qty: 180 TABLET | Refills: 0 | Status: SHIPPED | OUTPATIENT
Start: 2024-08-06

## 2024-09-13 DIAGNOSIS — E03.9 HYPOTHYROIDISM, UNSPECIFIED TYPE: ICD-10-CM

## 2024-09-13 DIAGNOSIS — Z95.1 S/P CABG (CORONARY ARTERY BYPASS GRAFT): ICD-10-CM

## 2024-09-13 RX ORDER — CLOPIDOGREL BISULFATE 75 MG/1
TABLET ORAL
Qty: 90 TABLET | Refills: 0 | Status: SHIPPED | OUTPATIENT
Start: 2024-09-13

## 2024-09-13 RX ORDER — LEVOTHYROXINE SODIUM 100 UG/1
TABLET ORAL
Qty: 90 TABLET | Refills: 1 | Status: SHIPPED | OUTPATIENT
Start: 2024-09-13

## 2024-10-20 NOTE — PROGRESS NOTES
Ambulatory Visit  Name: Deshawn Tsai      : 1955      MRN: [id number]  Encounter Provider: Chadd Lambert MD  Encounter Date: 10/21/2024   Encounter department: [facility]    Assessment & Plan  Anxiety  Patient to continue utilizing medical therapy as well as counseling sources as applicable to condition. If suicidal thought or fear of suicide attempt, to call 911 and seek help immediately. Medications and therapy reviewed today and all patient  questions answered today.          BMI 33.0-33.9,adult  Patient to increase exercise and partake of a diet with less calories to promote  weight loss          Coronary artery disease involving coronary bypass graft of native heart with angina pectoris (HCC)  Patient to continue  with current cardiac meds to decrease risk of re stenosis. Patient to follow up with cardiology for scheduled appointments to decrease risk for further cardiac problems with appropriate diagnostic testing to reassess cardiac status. Patient had alll questions about this problem answered today.          Essential hypertension  Patient is stable with current anti-hypertensive medicine and continue to follow a low sodium diet and take current medication. All questions about this condition were answered today.          Hemiplegia and hemiparesis following cerebral infarction affecting right dominant side (HCC)  Patient is stable  and will continue present plan of care and reassess at next routine visit. All questions about this problem from patient were answered today.          Hypothyroidism due to acquired atrophy of thyroid  Patient to continue current dose of thyroid medicine and recheck TSH in 6 months          Insomnia, unspecified type  Discussed with patient use of sedative  medications and good  sleep hygiene to maximize treatment for this problem. Pt  to use sedatives only prior to sleep and cautioned them about usage at any other time. All patient  questions about this problem answered today.          Mixed hyperlipidemia  Patient  is stable with current medication and we discussed a low fat low cholesterol diet. Weight loss also discussed for this will help lower cholesterol also. Recheck lipids in 6 months.          Encounter for immunization           Falls Plan of Care: balance, strength, and gait training instructions were provided. Home safety education provided.   History of Present Illness     69-year-old male here today for checkup on multimedical  patient with coronary disease hypertension hyperlipidemia history of CVA with some gait dysfunction and is looking for a flu shot today.  Patient is on any refills and is due for his lab work at his next visit which will be in 3 months.          Review of Systems   Constitutional:  Negative for activity change, appetite change, chills, fatigue, fever and unexpected weight change.   HENT:  Negative for congestion, ear pain, hearing loss, mouth sores, postnasal drip, sinus pressure, sinus pain, sneezing and sore throat.    Respiratory:  Negative for apnea, cough, shortness of breath and wheezing.    Cardiovascular:  Negative for chest pain, palpitations and leg swelling.   Gastrointestinal:  Negative for abdominal pain, constipation, diarrhea, nausea and vomiting.   Endocrine: Negative for cold intolerance and heat intolerance.   Genitourinary:  Negative for dysuria, frequency and hematuria.   Musculoskeletal:  Positive for gait problem. Negative for arthralgias, back pain, joint swelling and neck pain.   Skin:  Negative for rash.   Neurological:  Positive for weakness. Negative for dizziness and numbness.   Hematological:  Does not bruise/bleed easily.   Psychiatric/Behavioral:  Negative for agitation, behavioral problems, confusion, hallucinations and sleep disturbance. The patient is not nervous/anxious.            Objective     There were no vitals taken for this visit.    Physical Exam  Constitutional:        Appearance: He is well-developed.   HENT:      Head: Normocephalic and atraumatic.      Right Ear: External ear normal.      Left Ear: External ear normal.      Nose: Nose normal.      Mouth/Throat:      Pharynx: Oropharynx is clear. No oropharyngeal exudate.   Eyes:      General: No scleral icterus.     Conjunctiva/sclera: Conjunctivae normal.      Pupils: Pupils are equal, round, and reactive to light.   Cardiovascular:      Rate and Rhythm: Normal rate and regular rhythm.      Heart sounds: Normal heart sounds.   Pulmonary:      Effort: Pulmonary effort is normal.      Breath sounds: Normal breath sounds. No wheezing or rales.   Abdominal:      General: Bowel sounds are normal.      Palpations: Abdomen is soft.      Tenderness: There is no abdominal tenderness. There is no guarding.   Musculoskeletal:         General: Normal range of motion.      Cervical back: Normal range of motion and neck supple.   Skin:     General: Skin is warm and dry.      Findings: No erythema or rash.   Neurological:      Mental Status: He is alert and oriented to person, place, and time. Mental status is at baseline.   Psychiatric:         Mood and Affect: Mood normal.         Behavior: Behavior normal.         Thought Content: Thought content normal.         Judgment: Judgment normal.

## 2024-10-21 ENCOUNTER — OFFICE VISIT (OUTPATIENT)
Dept: FAMILY MEDICINE CLINIC | Facility: CLINIC | Age: 69
End: 2024-10-21
Payer: MEDICARE

## 2024-10-21 VITALS
DIASTOLIC BLOOD PRESSURE: 80 MMHG | WEIGHT: 209 LBS | TEMPERATURE: 98 F | OXYGEN SATURATION: 96 % | SYSTOLIC BLOOD PRESSURE: 122 MMHG | HEART RATE: 74 BPM | BODY MASS INDEX: 30.96 KG/M2 | HEIGHT: 69 IN

## 2024-10-21 DIAGNOSIS — I69.351 HEMIPLEGIA AND HEMIPARESIS FOLLOWING CEREBRAL INFARCTION AFFECTING RIGHT DOMINANT SIDE (HCC): ICD-10-CM

## 2024-10-21 DIAGNOSIS — I25.709 CORONARY ARTERY DISEASE INVOLVING CORONARY BYPASS GRAFT OF NATIVE HEART WITH ANGINA PECTORIS (HCC): ICD-10-CM

## 2024-10-21 DIAGNOSIS — I10 ESSENTIAL HYPERTENSION: ICD-10-CM

## 2024-10-21 DIAGNOSIS — E03.4 HYPOTHYROIDISM DUE TO ACQUIRED ATROPHY OF THYROID: ICD-10-CM

## 2024-10-21 DIAGNOSIS — E78.2 MIXED HYPERLIPIDEMIA: ICD-10-CM

## 2024-10-21 DIAGNOSIS — F41.9 ANXIETY: Primary | ICD-10-CM

## 2024-10-21 DIAGNOSIS — G47.00 INSOMNIA, UNSPECIFIED TYPE: ICD-10-CM

## 2024-10-21 DIAGNOSIS — Z23 ENCOUNTER FOR IMMUNIZATION: ICD-10-CM

## 2024-10-21 PROCEDURE — G0008 ADMIN INFLUENZA VIRUS VAC: HCPCS

## 2024-10-21 PROCEDURE — 90662 IIV NO PRSV INCREASED AG IM: CPT

## 2024-10-21 PROCEDURE — 99214 OFFICE O/P EST MOD 30 MIN: CPT

## 2024-11-04 DIAGNOSIS — N52.9 ERECTILE DYSFUNCTION, UNSPECIFIED ERECTILE DYSFUNCTION TYPE: ICD-10-CM

## 2024-11-05 RX ORDER — SILDENAFIL 100 MG/1
TABLET, FILM COATED ORAL
Qty: 30 TABLET | Refills: 5 | Status: SHIPPED | OUTPATIENT
Start: 2024-11-05

## 2024-11-07 DIAGNOSIS — G47.00 INSOMNIA, UNSPECIFIED TYPE: ICD-10-CM

## 2024-11-07 DIAGNOSIS — F41.9 ANXIETY: ICD-10-CM

## 2024-11-07 RX ORDER — CLONAZEPAM 2 MG/1
2 TABLET ORAL 2 TIMES DAILY PRN
Qty: 120 TABLET | Refills: 0 | Status: SHIPPED | OUTPATIENT
Start: 2024-11-07

## 2024-11-07 RX ORDER — ZOLPIDEM TARTRATE 10 MG/1
TABLET ORAL
Qty: 180 TABLET | Refills: 0 | Status: SHIPPED | OUTPATIENT
Start: 2024-11-07

## 2024-11-09 DIAGNOSIS — I10 ESSENTIAL HYPERTENSION: ICD-10-CM

## 2024-11-11 RX ORDER — VALSARTAN 160 MG/1
TABLET ORAL
Qty: 90 TABLET | Refills: 0 | Status: SHIPPED | OUTPATIENT
Start: 2024-11-11

## 2024-11-25 DIAGNOSIS — Z95.1 S/P CABG (CORONARY ARTERY BYPASS GRAFT): ICD-10-CM

## 2024-11-25 DIAGNOSIS — E78.2 MIXED HYPERLIPIDEMIA: ICD-10-CM

## 2024-11-25 DIAGNOSIS — I25.709 CORONARY ARTERY DISEASE INVOLVING CORONARY BYPASS GRAFT OF NATIVE HEART WITH ANGINA PECTORIS (HCC): ICD-10-CM

## 2024-11-26 RX ORDER — ATORVASTATIN CALCIUM 40 MG/1
80 TABLET, FILM COATED ORAL DAILY
Qty: 180 TABLET | Refills: 1 | Status: SHIPPED | OUTPATIENT
Start: 2024-11-26

## 2024-11-26 RX ORDER — CLOPIDOGREL BISULFATE 75 MG/1
75 TABLET ORAL DAILY
Qty: 90 TABLET | Refills: 1 | Status: SHIPPED | OUTPATIENT
Start: 2024-11-26

## 2024-12-04 ENCOUNTER — OFFICE VISIT (OUTPATIENT)
Age: 69
End: 2024-12-04
Payer: MEDICARE

## 2024-12-04 VITALS
DIASTOLIC BLOOD PRESSURE: 82 MMHG | OXYGEN SATURATION: 97 % | WEIGHT: 210 LBS | BODY MASS INDEX: 31.1 KG/M2 | SYSTOLIC BLOOD PRESSURE: 140 MMHG | HEART RATE: 52 BPM | HEIGHT: 69 IN

## 2024-12-04 DIAGNOSIS — Z86.73 HISTORY OF CVA (CEREBROVASCULAR ACCIDENT): ICD-10-CM

## 2024-12-04 DIAGNOSIS — I25.10 ATHEROSCLEROSIS OF NATIVE CORONARY ARTERY OF NATIVE HEART WITHOUT ANGINA PECTORIS: ICD-10-CM

## 2024-12-04 DIAGNOSIS — Z98.61 HISTORY OF PERCUTANEOUS CORONARY INTERVENTION: ICD-10-CM

## 2024-12-04 DIAGNOSIS — E78.2 MIXED HYPERLIPIDEMIA: ICD-10-CM

## 2024-12-04 DIAGNOSIS — R60.0 BILATERAL LOWER EXTREMITY EDEMA: ICD-10-CM

## 2024-12-04 DIAGNOSIS — Z95.1 S/P CABG (CORONARY ARTERY BYPASS GRAFT): Primary | ICD-10-CM

## 2024-12-04 PROBLEM — I25.709 CORONARY ARTERY DISEASE INVOLVING CORONARY BYPASS GRAFT OF NATIVE HEART WITH ANGINA PECTORIS (HCC): Status: RESOLVED | Noted: 2024-01-03 | Resolved: 2024-12-04

## 2024-12-04 PROCEDURE — 99213 OFFICE O/P EST LOW 20 MIN: CPT | Performed by: INTERNAL MEDICINE

## 2024-12-04 NOTE — PROGRESS NOTES
Cardiology Follow Up Visit    Deshawn Tsai  1955  [id number]  [facility] [place]  [address]  [address]  [facility] [id number] [phone]            Discussion/Summary:    1. 70 yo with CAD and noncardiac chest pain, multiple prior stent procedures. 11 in total ([place], [facility]) ultimately March 2021 CABG x3 has had decades of noncardiac chest pain which he has attributed to his heart as everytime he had a heart catheterization he got a stent.    2. History of stroke with ambulatory dysfunction    3. Essential hypertension, blood pressure elevated    4. Hyperlipidemia, LDL goal less than 70 on atorvastatin 40, 66 September 2021    5. H/o volume overload, diast chf, ckd 3,  euvolemic    Plan: Clinically, patient has noncardiac chest pain.  Occurs as he sitting there it is has no specific aggravating or relieving factors.  He states for decades it was relieved with aspirin.  His test this year in 2024 was normal.  Last stress test in 2022 post CABG x 3 was normal.  Echo and nuclear stress test ordered as a result.  Was convinced that all of his cardiac catheterizations and stents have prevented heart attacks.  I told him that in stable ischemic heart disease stents do not prevent heart attacks they are they are used to treat symptoms.   Instructed prophylactic coronary stents  do not prevent heart attacks or make you live longer in stable ishcemic heart disease.  Additionally, he thinks that a stent helped his lower extremity edema in years past.  Stated that has no correlation.  We also reviewed cardiac cath and revascularization role in an acute coronary syndrome which he has not had in close to 30 years.  Would continue patient's antiplatelet therapy.  His blood pressure is well-controlled on beta-blocker.  He remains on atorvastatin as well.    Interval History:    69 year-old male coronary artery disease  multiple stent procedures [place] 16 years prior transition care to [facility].  He had 11 stents in total starting in the early 1990s.  This was at [facility].    Ultimately, cardiac catheterization here March 2021 with CABG x3    Has done well since    Ambulatory dysfunction post stroke which he states was approximately 8 years ago    No symptoms of coronary artery disease.  Notices some discomfort at his sternotomy scar on occasion.    Hyperlipidemia with LDL goal less than 70.     Patient has aged intermittent left arm pain and shoulder pain while sitting.  He takes an aspirin and it goes away.  These symptoms have been intermittent off and on since the early 1990s.  He has felt that this is the reason he needed 11 stents and subsequently 3 bypass grafts.  He also states stress test have not shown any evidence of ischemia or problems in the past and why he has wanted cardiac catheterizations for the symptoms in the past.    Patient Active Problem List   Diagnosis    CAD (coronary artery disease)    Chest pain    Claudication ([facility])    Coronary atherosclerosis    Essential hypertension    Mixed hyperlipidemia    Hypothyroidism    Myofascial pain syndrome    Radiculopathy, lumbar region    Bilateral lower extremity edema    Palpitation    History of CVA (cerebrovascular accident)    History of percutaneous coronary intervention    Acute on chronic diastolic heart failure ([facility])    Encounter for screening colonoscopy    S/P CABG March 2021 LIMA-LAD;SVG to right PLV; SVG to RI    Thrombocytopenia ([facility])    Hyperchloremia    Hypocalcemia    Stroke ([facility])    Insomnia    BMI 33.0-33.9,adult    Anxiety    Continuous opioid dependence ([facility])    Lower GI bleed    Stroke-like symptoms    NSVT (nonsustained ventricular tachycardia) ([facility])    Hemiplegia and hemiparesis following cerebral infarction affecting right dominant side ([facility])    Skin lesion     Past Medical History:   Diagnosis Date    Allergic     Anxiety     CAD  (coronary artery disease)     Coronary artery disease     Depression     Disease of thyroid gland     Dizziness     Erectile dysfunction     Eye problems     Hypertension     Hypothyroidism     Migraines     Stroke (HCC) 2014    Tremors of nervous system      Social History     Socioeconomic History    Marital status: /Civil Union     Spouse name: Not on file    Number of children: Not on file    Years of education: Not on file    Highest education level: Not on file   Occupational History    Not on file   Tobacco Use    Smoking status: Never    Smokeless tobacco: Never   Vaping Use    Vaping status: Never Used   Substance and Sexual Activity    Alcohol use: No    Drug use: No    Sexual activity: Not Currently     Partners: Female     Birth control/protection: None   Other Topics Concern    Not on file   Social History Narrative    Most recent tobacco use screenin2019      Do you currently or have you served in the "[employer]:   No      Were you activated, into active duty, as a member of the [employer] or as a Reservist:   No      Occupation:   retired      Education:   12      Marital status:         Sexual orientation:   Heterosexual      Exercise level:   Occasional      Diet:   Regular      General stress level:   Medium      Alcohol intake:   Occasional      Caffeine intake:   Occasional      Chewing tobacco:   none      Illicit drugs:   none      Guns present in home:   No      Seat belts used routinely:   Yes      Smoke alarm in home:   Yes      Advance directive:   No      Live alone or with others:   with others      Are there stairs in your home:   Yes  only in front of house     Pets:   No      Social Drivers of Health     Financial Resource Strain: Low Risk  (3/24/2023)    Overall Financial Resource Strain (CARDIA)     Difficulty of Paying Living Expenses: Not hard at all   Food Insecurity: No Food Insecurity (2024)    Nursing - Inadequate Food Risk  Classification     Worried About Running Out of Food in the Last Year: Never true     Ran Out of Food in the Last Year: Never true     Ran Out of Food in the Last Year: Not on file   Transportation Needs: No Transportation Needs (5/18/2024)    PRAPARE - Transportation     Lack of Transportation (Medical): No     Lack of Transportation (Non-Medical): No   Physical Activity: Not on file   Stress: Not on file   Social Connections: Not on file   Intimate Partner Violence: Not on file   Housing Stability: Low Risk  (5/18/2024)    Housing Stability Vital Sign     Unable to Pay for Housing in the Last Year: No     Number of Times Moved in the Last Year: 0     Homeless in the Last Year: No      Family History   Problem Relation Age of Onset    Cancer Mother     Hypertension Mother     Rheum arthritis Mother     Heart attack Father     Heart disease Father     Hypertension Father     Early death Father     Heart attack Sister     Hearing loss Sister     Stroke Sister     Psychiatric Illness Sister     Cancer Brother      Past Surgical History:   Procedure Laterality Date    COLONOSCOPY  09/01/2017    COLONOSCOPY      CORONARY ARTERY BYPASS GRAFT  March 24,20201    CORONARY STENT PLACEMENT      DENTAL SURGERY      entire top teeth removal    HERNIA REPAIR      [place] CORONARY ARTERY BYP W/VEIN & ARTERY GRAFT 4 VEIN N/A 3/24/2021    Procedure: CORONARY ARTERY BYPASS GRAFT (CABG) 3 VESSELS,  USING LEFT VANE-LAD AND LEFT GSV-RPL & RAMUS;  Surgeon: ISAIAH Jennings MD;  Location: [facility];  Service: Cardiac Surgery    [place] ECHO TRANSESOPHAG MONTR CARDIAC PUMP FUNCTJ N/A 3/24/2021    Procedure: TRANSESOPHAGEAL ECHOCARDIOGRAM (MARLEN);  Surgeon: ISAIAH Jennings MD;  Location: [facility];  Service: Cardiac Surgery       Current Outpatient Medications:     acetaminophen-codeine (TYLENOL with CODEINE #3) 300-30 MG per tablet, Take 1 tablet by mouth every 8 (eight) hours as needed for moderate pain, Disp: 90 tablet, Rfl: 0     aspirin 81 MG tablet, Take 81 mg by mouth, Disp: , Rfl:     atorvastatin (LIPITOR) 40 mg tablet, TAKE 2 TABLETS EVERY DAY, Disp: 180 tablet, Rfl: 1    clonazePAM (KlonoPIN) 2 mg tablet, Take 1 tablet (2 mg total) by mouth 2 (two) times a day as needed for seizures, Disp: 120 tablet, Rfl: 0    clopidogrel (PLAVIX) 75 mg tablet, TAKE 1 TABLET EVERY DAY, Disp: 90 tablet, Rfl: 1    clotrimazole-betamethasone (LOTRISONE) 1-0.05 % cream, Apply topically 2 (two) times a day, Disp: 30 g, Rfl: 0    levothyroxine 100 mcg tablet, TAKE 1 TABLET EVERY DAY, Disp: 90 tablet, Rfl: 1    metoprolol succinate (TOPROL-XL) 100 mg 24 hr tablet, TAKE 1 TABLET EVERY DAY, Disp: 90 tablet, Rfl: 3    sildenafil (VIAGRA) 100 mg tablet, TAKE 1 TABLET BY MOUTH EVERY DAY AS NEEDED FOR ERECTILE DYSFUNCTION, Disp: 30 tablet, Rfl: 5    tiZANidine (ZANAFLEX) 2 mg tablet, TAKE 1 TABLET EVERY 8 HOURS AS NEEDED FOR MUSCLE SPASMS, Disp: 270 tablet, Rfl: 3    valsartan (DIOVAN) 160 mg tablet, TAKE 1 TABLET EVERY DAY, Disp: 90 tablet, Rfl: 0    zolpidem (AMBIEN) 10 mg tablet, 2 po  q hs, Disp: 180 tablet, Rfl: 0    oxyCODONE HCl 5 MG TABA, Take 5 mg by mouth 4 (four) times a day as needed (pain) Max Daily Amount: 20 mg (Patient not taking: Reported on 5/17/2024), Disp: 40 each, Rfl: 0  Allergies   Allergen Reactions    Medical Tape Other (See Comments) and Rash     Other reaction(s): BURNS USE PAPER TAPE         Social, Family, Medication history reviewed and updated as necessary      Labs:     Lab Results   Component Value Date    K 3.5 05/18/2024    CL 97 05/18/2024    CO2 24 05/18/2024    BUN 12 05/18/2024    CREATININE 1.20 05/18/2024    CREATININE 1.32 (H) 05/17/2024    GLUCOSE 105 09/24/2021    CALCIUM 9.4 05/18/2024       Lab Results   Component Value Date    WBC 5.06 05/17/2024    HGB 14.9 05/17/2024    HGB 14.6 03/21/2024    HCT 42.3 05/17/2024    HCT 43.4 03/21/2024     (L) 05/17/2024     (L) 05/17/2024       No results found for:  "\"CHOL\"  Lab Results   Component Value Date    HDL 33 (L) 05/18/2024    HDL 35 (L) 03/21/2024     Lab Results   Component Value Date    LDLCALC 62 05/18/2024    LDLCALC 61 03/21/2024     No results found for: \"LDLDIRECT\"          Lab Results   Component Value Date    TRIG 97 05/18/2024    TRIG 138 03/21/2024       Lab Results   Component Value Date    ALT 15 05/18/2024    ALT 12 03/21/2024    AST 26 05/18/2024    AST 18 03/21/2024    ALKPHOS 77 05/18/2024    ALKPHOS 74 03/21/2024       Lab Results   Component Value Date    INR 1.14 05/17/2024    INR 1.00 12/22/2021       No results found for: \"NTBNP\"    Lab Results   Component Value Date    HGBA1C 5.9 (H) 05/18/2024    HGBA1C 5.6 06/09/2022    HGBA1C 5.7 (H) 03/19/2021           Imaging: Reviewed in epic        Review of Systems:  14 systems reviewed and negative with exception of the above        PHYSICAL EXAM:      Vitals:    12/04/24 1323   BP: 140/82   Pulse: (!) 52   SpO2: 97%     Body mass index is 31.01 kg/m².   Weight (last 2 days)       Date/Time Weight    12/04/24 1323 95.3 (210)     Weight: Verbally at 12/04/24 1323              Gen: No acute distress  HEENT: anicteric, mucous membranes moist  Neck: supple, no jugular venous distention, or carotid bruit  Heart: regular, normal s1 and s2, no murmur/rub or gallop  Lungs :clear to auscultation bilaterally, no rales/rhonchi or wheeze  Abdomen: soft nontender, normoactive bowel sounds, no organomegaly  Ext: warm and perfused, normal femoral pulses, trace edema, or clubbing  Skin: warm, no rashes  Neuro: AAO x 3  Psychiatric: normal affect  Musculoskeletal: no obvious joint deformities.        This note was completed in part utilizing [employer] direct voice recognition software.   Grammatical errors, random word insertion, spelling mistakes, and incomplete sentences may be an occasional consequence of the system secondary to software limitations, ambient noise and hardware issues. At the time of dictation, " efforts were made to edit, clarify and /or correct errors.  Please read the chart carefully and recognize, using context, where substitutions have occurred.  If you have any questions or concerns about the context, text or information contained within the body of this dictation, please contact myself, the provider, for further clarification.

## 2024-12-20 NOTE — PROGRESS NOTES
Cardiac Rehabilitation Plan of Care   60 Day Reassessment          Today's date: 2021   # of Exercise Sessions Completed: 23  Patient name: Marylee Aly      : 1955  Age: 63 y o  MRN: [id number]  Referring Physician: Rodolfo Arango,*  Cardiologist: Corrina Pino MD  Provider: Camilo Alexandra  Clinician: Jessie Myers, MS, CEP, CCRP    Dx:   Encounter Diagnosis   Name Primary?  S/P CABG (coronary artery bypass graft)      Date of onset: 3/24/21      SUMMARY OF PROGRESS:  Hema Yeung is compliant attending cardiac rehab exercise sessions 3x/wk  He tolerates 40 mins at 2 4 - 3 9 METs plus wt training  Hema Yeung has been resisting professional advisement from the staff during his exercise sessions despite multiple discussions educating him on the need for  slow progression maintaining  safe and moderate exercise intensities  Hema Yeung changes his workloads without advisement on a regular basis (causing high exercise HR response)  He is interested in strengthening his core and reports to be disappointed with the lack of strength since his surgery and admits he does not feel he is benefiting from cardiac rehab  He requested a recommendation to PT  He was instructed to discuss this matter with his PCP  Resting BP has been creeping up 124/82 - 146/82  with a rare high BP (152/82) with appropriate response to exercise reaching 152/80 - 186/96  NSR on tele with occ PVCs and non-conducted beats  RHR   ExHR 120 - 153  He is not progressing toward wt loss goals remaining at 224  He continues to eat red/processed meats, high fat muffins, pre packaged snacks and is not avoiding added sugars  The patient is a non-smoker  Depression screening using the PHQ-9 was reassessed  The patient's score was 10-14 = Moderate Depression showing an DECLINE  (score went from 9 to 13) DUANE-7 was reassessed    The patient's score was 5-9 = Mild anxiety showing an DECLINE  (score went from 4 to 6) Hema Yeung continues to TALKED TO PT AND PATIENT WAS OKAY WITH MOVING APPOINT FROM 01/07/2025 AT 11:30AM TO 01/09/2025 AT 11:30AM   feel depressed  He reports that he cannot find a therapist because he wants someone who has a similar medical experience  He was given the number for [facility] at his initial evaluation  He reports good social/emotional support from his wife  Patient refuses to  attend group educational classes on cardiac risk factor modification  He has  added home exercise using his leg ergometer up to 30 mins 2-3 x/wk  His exercise program will be progressed as tolerated to maintain RPE 4-6  The patient has the following personal goals he hopes to achieved by discharge: improved mental health, increased strength and stamina, return to regular exercise, wt loss of 20lbs  Pt will continue to be educated on lifestyle modifications and encouraged to supplement with a home exercise program to reach the following goals: add home exercise on his recumbent ergometer, reduce intake of red meat and added sugars, make contact with a therapist in the next 30 days         Medication compliance: Yes   Comments: Pt reports to be compliant with medications  Fall Risk: High   Comments: Patient uses walking assist device (walker/cane/rollator) - post CVA    EKG Interpretation: NSR, rare PVC      EXERCISE ASSESSMENT and PLAN    Current Exercise Program in Rehab:       Frequency: 3 days/week Supplement with home exercise 2+ days/wk as tolerated       Minutes: 40         METS: 2 0-3 9          HR: RHR+30  (up to 160 when he increases w/l without advisement from staff)   RPE: 4-5         Modalities: UBE, Lifecycle and NuStep      Exercise Progression 30 Day Goals :    Frequency: 3 days/week of cardiac rehab     Supplement with home exercise 2+ days/wk as tolerated    Minutes: 40                            >150 mins/wk of moderate intensity exercise   METS: 2 4-4 0   HR: RHR+30    RPE: 4-5   Modalities: UBE, Lifecycle and NuStep    Strength trainin-3 days / week  12-15 repetitions  1-2 sets per modality    Modalities: Leg Press, Chest Press, Lateral Raise, Arm Curl and Seated Row    Home Exercise: none    Goals: 10% improvement in functional capacity - based on max METs achieved in fitness assessment, improved DASI score by 10%, Increase in exercise capacity by 40% - based on peak METs tolerated in cardiac rehab exercise session, Exercise 5 days/wk, >150mins/wk of moderate intensity exercise, Resume ADLs with increased strength, Attend Rehab regularly, Decrease sitting time and start a home exercise program    Progression Toward Goals:  Pt is progressing and showing improvement  toward the following goals:  10% improvement in functional capacity - based on max METs achieved in fitness assessment, improved DASI score by 10%, Increase in exercise capacity by 40% - based on peak METs tolerated in cardiac rehab exercise session, Exercise 5 days/wk, >150mins/wk of moderate intensity exercise, Resume ADLs with increased strength, Attend Rehab regularly, Decrease sitting time and start a home exercise program   , Patient will add home exercise as tolerated 30 mins in the next 30 days, Will continue to educate and progress as tolerated  Education: benefit of exercise for CAD risk factors, AHA guidelines to achieve >150 mins/wk of moderate exercise and RPE scale   Plan:education on home exercise guidelines, home exercise 30+ mins 2 days opposite CR and Education class: Risk Factors for Heart Disease  Readiness to change: Preparation:  (Getting ready to change)       NUTRITION ASSESSMENT AND PLAN    Weight control:    Starting weight: 224 4   Current weight:   224  Waist circumference:    Startin   Current:      Diabetes: N/A  A1c: 5 7    last measured: 3/19/21    Lipid management: Discussed diet and lipid management and Last lipid profile 21  Chol 138  TRG 93  HDL 42  LDL 77    Goals:reduced BMI to < 25, reduced waist circumference <40 inches (M), Improved Rate Your Plate score  >17, Wt  loss 1-2 ppw,  goal of 20 lbs  , choose lean meat (93-95%), eliminate processed meats, reduce portion sizes of meat to 3oz or less, use low fat dairy, reduce cheese intake or use reduced-fat, Eat 4-5 cups of fruits and vegetables daily, eliminate butter, Increase intake of nuts and seeds, eliminate or choose low-fat sweets and daily saturated fat intake <7%/13g    Progression Toward Goals: Reviewed Pt goals and determined plan of care, Pt has not made progress toward the following goals: reduced BMI to < 25, reduced waist circumference <40 inches (M), Improved Rate Your Plate score  >19, Wt  loss 1-2 ppw,  goal of 20 lbs  , choose lean meat (93-95%), eliminate processed meats, reduce portion sizes of meat to 3oz or less, use low fat dairy, reduce cheese intake or use reduced-fat, Eat 4-5 cups of fruits and vegetables daily, eliminate butter, Increase intake of nuts and seeds, eliminate or choose low-fat sweets and daily saturated fat intake <7%/13g  , Patient will reduce intake of red/processed meats, eliminate chocolate milk and reduce added sugars in the next 30 days, Will continue to educate and progress as tolerated      Education: heart healthy eating  low sodium diet  nutrition for  lipid management  wt  loss   portion control  Plan: Education class: Reading Food Labels, Education Class: Heart Healthy Eating, switch to low fat cheeses, replace butter with soft spreads made with olive oil, canola or yogurt, replace unhealthy snacks with fruits & vegs, reduce red meat 1x/wk, switch to skim or 1% milk, eat fewer desserts and sweets, avoid processed foods, will replace sugar sweetened cereals with whole grain or oatmeal, drink more water, replace sugar with stevia or truvia and keep added daily sugar <25g/day  Readiness to change: Preparation:  (Getting ready to change)       PSYCHOSOCIAL ASSESSMENT AND PLAN    Emotional:  Depression assessment:  PHQ-9 = 10-14 = Moderate Depression  (13)            Anxiety measure:  DUANE-7 = 5-9 = Mild anxiety (6)  Self-reported stress level:  1  Social support: Very Good    Goals:  improved [place] QOL < 27, PHQ-9 - reduced severity by one level, contact behavioral health , Feelings in [place] Score < 3, Physical Fitness in [place] Score < 3, Social Support in [place] Score < 3, Daily Activity in [place] Score < 3, Social Activities in [place] Score < 3, Pain in [place] Score < 3, Overall Health in [place] Score < 3, Quality of Life in [place] Score < 3 , Increased interest in doing things, improved sleep, Improved appetite, improved concentration, improved positive thoughts of well being, increased energy, stop worrying, take time to relax, Feel less anxious and Handle anger/frustration better    Progression Toward Goals: Pt is progressing and showing improvement  toward the following goals:  improved [place] QOL < 27, PHQ-9 - reduced severity by one level, contact behavioral health , Feelings in [place] Score < 3, Physical Fitness in [place] Score < 3, Social Support in [place] Score < 3, Daily Activity in [place] Score < 3, Social Activities in [place] Score < 3, Pain in [place] Score < 3, Overall Health in [place] Score < 3, Quality of Life in [place] Score < 3 , Increased interest in doing things, improved sleep, Improved appetite, improved concentration, improved positive thoughts of well being, increased energy, stop worrying, take time to relax, Feel less anxious and Handle anger/frustration better  , Patient will make contact with a therapist to begin treatment in the next 30 days, Will continue to educate and progress as tolerated      Education: signs/sxs of depression, benefits of a positive support system, stress management techniques, depression and CAD and benefits of mental health counseling  Plan: Class: Stress and Your Health, Class: Relaxation, Refer to behavioral health/counseling, PHQ-9 >5 will refer to MD, Refer to Sana Kolb, Practice relaxation techniques, Enjoy a hobby, Join a support group , Return to previous social activity and Repeat PHQ-9 every 30 days if score >5  Readiness to change: Preparation:  (Getting ready to change)       OTHER CORE COMPONENTS     Tobacco:   Social History     Tobacco Use   Smoking Status Never Smoker   Smokeless Tobacco Never Used       Tobacco Use Intervention:   N/A:  Patient is a non-smoker     Anginal Symptoms:  JOE and excessive fatigue   NTG use: No prescription    Blood pressure:    Restin/82 - 146/82   Exercise: 152/80 - 186/96    Goals: consistent BP < 130/80, reduced dietary sodium <2300mg, moderate intensity exercise >150 mins/wk and medication compliance    Progression Toward Goals: Pt is progressing and showing improvement  toward the following goals:  consistent BP < 130/80, reduced dietary sodium <2300mg   , Pt has not made progress toward the following goals:  moderate intensity exercise >150 mins/wk  , Patient will begin using home recumbent ergometer in the next 30 days, Will continue to educate and progress as tolerated      Education:  low sodium diet and HTN  Plan: Class: Understanding Heart Disease, Class: Common Heart Medications, Avoid Processed foods, engage in regular exercise, check labels for sodium content and monitor home BP  Readiness to change: Preparation:  (Getting ready to change)

## 2025-01-23 DIAGNOSIS — I10 ESSENTIAL HYPERTENSION: ICD-10-CM

## 2025-01-23 RX ORDER — VALSARTAN 160 MG/1
160 TABLET ORAL DAILY
Qty: 90 TABLET | Refills: 3 | Status: SHIPPED | OUTPATIENT
Start: 2025-01-23

## 2025-01-29 ENCOUNTER — APPOINTMENT (OUTPATIENT)
Dept: LAB | Facility: CLINIC | Age: 70
End: 2025-01-29
Payer: MEDICARE

## 2025-01-29 DIAGNOSIS — E78.2 MIXED HYPERLIPIDEMIA: ICD-10-CM

## 2025-01-29 DIAGNOSIS — M54.50 LOW BACK PAIN WITHOUT SCIATICA, UNSPECIFIED BACK PAIN LATERALITY, UNSPECIFIED CHRONICITY: ICD-10-CM

## 2025-01-29 DIAGNOSIS — E03.4 HYPOTHYROIDISM DUE TO ACQUIRED ATROPHY OF THYROID: ICD-10-CM

## 2025-01-29 LAB
CHOLEST SERPL-MCNC: 122 MG/DL (ref ?–200)
HDLC SERPL-MCNC: 38 MG/DL
LDLC SERPL CALC-MCNC: 60 MG/DL (ref 0–100)
TRIGL SERPL-MCNC: 122 MG/DL (ref ?–150)
TSH SERPL DL<=0.05 MIU/L-ACNC: 1.02 UIU/ML (ref 0.45–4.5)

## 2025-01-29 PROCEDURE — 80061 LIPID PANEL: CPT

## 2025-01-29 PROCEDURE — 84443 ASSAY THYROID STIM HORMONE: CPT

## 2025-01-29 PROCEDURE — 36415 COLL VENOUS BLD VENIPUNCTURE: CPT

## 2025-01-29 RX ORDER — TIZANIDINE 2 MG/1
2 TABLET ORAL EVERY 8 HOURS PRN
Qty: 270 TABLET | Refills: 3 | Status: SHIPPED | OUTPATIENT
Start: 2025-01-29

## 2025-02-07 ENCOUNTER — TELEPHONE (OUTPATIENT)
Dept: CARDIAC SURGERY | Facility: CLINIC | Age: 70
End: 2025-02-07

## 2025-02-07 DIAGNOSIS — I10 ESSENTIAL HYPERTENSION: ICD-10-CM

## 2025-02-07 DIAGNOSIS — E03.9 HYPOTHYROIDISM, UNSPECIFIED TYPE: ICD-10-CM

## 2025-02-07 RX ORDER — LEVOTHYROXINE SODIUM 100 UG/1
100 TABLET ORAL DAILY
Qty: 90 TABLET | Refills: 1 | Status: SHIPPED | OUTPATIENT
Start: 2025-02-07

## 2025-02-07 RX ORDER — METOPROLOL SUCCINATE 100 MG/1
100 TABLET, EXTENDED RELEASE ORAL DAILY
Qty: 90 TABLET | Refills: 3 | Status: SHIPPED | OUTPATIENT
Start: 2025-02-07

## 2025-02-07 NOTE — TELEPHONE ENCOUNTER
Called to inform patient that his Medication refill request has been submitted and should arrive by mail order, since  is on vacation another provider had refilled his med so he will se a different name, spoke w/ spouse Miesha as well. Deshawn informed me he had just changed to a new cardiologist.

## 2025-02-08 PROBLEM — I50.84 END STAGE HEART FAILURE (HCC): Status: ACTIVE | Noted: 2025-02-08

## 2025-02-08 PROBLEM — I48.3 TYPICAL ATRIAL FLUTTER (HCC): Status: ACTIVE | Noted: 2025-02-08

## 2025-02-09 NOTE — PROGRESS NOTES
Name: Deshawn Tsai      : 1955      MRN: [id number]  Encounter Provider: Chadd Lambert MD  Encounter Date: 2/10/2025   Encounter department: [facility]  :  Assessment & Plan  Coronary artery disease involving coronary bypass graft of native heart with angina pectoris (HCC)  Patient to continue  with current cardiac meds to decrease risk of re stenosis. Patient to follow up with cardiology for scheduled appointments to decrease risk for further cardiac problems with appropriate diagnostic testing to reassess cardiac status. Patient had alll questions about this problem answered today.        Essential hypertension  Patient is stable with current anti-hypertensive medicine and continue to follow a low sodium diet and take current medication. All questions about this condition were answered today.        Mixed hyperlipidemia  Patient  is stable with current medication and we discussed a low fat low cholesterol diet. Weight loss also discussed for this will help lower cholesterol also. Recheck lipids in 6 months.        Hypothyroidism due to acquired atrophy of thyroid  Patient to continue current dose of thyroid medicine and recheck TSH in 6 months        Cerebrovascular accident (CVA) due to thrombosis of precerebral artery (HCC)  Patient is stable  and will continue present plan of care and reassess at next routine visit. All questions about this problem from patient were answered today.        Insomnia, unspecified type  Discussed with patient use of sedative  medications and good  sleep hygiene to maximize treatment for this problem. Pt  to use sedatives only prior to sleep and cautioned them about usage at any other time. All patient questions about this problem answered today.   Orders:  •  zolpidem (AMBIEN) 10 mg tablet; 2 po  q hs    Typical atrial flutter (HCC)  Continue with anticogulation and rate control of heart rate. Concerns about condition were addressed today.         Anxiety    Orders:  •  clonazePAM (KlonoPIN) 2 mg tablet; Take 1 tablet (2 mg total) by mouth 2 (two) times a day as needed for seizures    Simple chronic bronchitis (HCC)    End stage heart failure (HCC)    Continuous opioid dependence (HCC)    Hemiplegia and hemiparesis following cerebral infarction affecting right dominant side (HCC)    Acute on chronic diastolic heart failure (HCC)    Claudication (HCC)    Coagulation defect, unspecified (HCC)    Opioid dependence, uncomplicated (HCC)    Stage 3 chronic kidney disease, unspecified whether stage 3a or 3b CKD (HCC)          Falls Plan of Care: balance, strength, and gait training instructions were provided. Home safety education provided.     History of Present Illness   69-year-old male here today for checkup on multimedical problems patient with hypertension coronary artery disease history of insomnia anxiety hypothyroidism hyperlipidemia history of CVA gait dysfunction and is stable.  Patient has refills on his Ambien and as well as his Klonopin today which was done for him.  Patient had his lab work reviewed today he is doing well with his cholesterol as well as with his thyroid.      Review of Systems   Constitutional:  Negative for activity change, appetite change, chills, fatigue, fever and unexpected weight change.   HENT:  Negative for congestion, ear pain, hearing loss, mouth sores, postnasal drip, sinus pressure, sinus pain, sneezing and sore throat.    Respiratory:  Negative for apnea, cough, shortness of breath and wheezing.    Cardiovascular:  Negative for chest pain, palpitations and leg swelling.   Gastrointestinal:  Negative for abdominal pain, constipation, diarrhea, nausea and vomiting.   Endocrine: Negative for cold intolerance and heat intolerance.   Genitourinary:  Negative for dysuria, frequency and hematuria.   Musculoskeletal:  Positive for gait problem. Negative for arthralgias, back pain, joint swelling and neck pain.   Skin:   "Negative for rash.   Neurological:  Positive for weakness. Negative for dizziness and numbness.   Hematological:  Does not bruise/bleed easily.   Psychiatric/Behavioral:  Negative for agitation, behavioral problems, confusion, hallucinations and sleep disturbance. The patient is not nervous/anxious.        Objective   /78 (BP Location: Left arm, Patient Position: Sitting, Cuff Size: Large)   Pulse 57   Temp (!) 97.4 °F (36.3 °C) (Temporal)   Resp 16   Ht 5' 9\" (1.753 m)   Wt 96.2 kg (212 lb)   SpO2 99%   BMI 31.31 kg/m²      Physical Exam  Constitutional:       Appearance: He is well-developed.   HENT:      Head: Normocephalic and atraumatic.      Right Ear: External ear normal.      Left Ear: External ear normal.      Nose: Nose normal.      Mouth/Throat:      Pharynx: Oropharynx is clear. No oropharyngeal exudate.   Eyes:      General: No scleral icterus.     Conjunctiva/sclera: Conjunctivae normal.      Pupils: Pupils are equal, round, and reactive to light.   Cardiovascular:      Rate and Rhythm: Normal rate and regular rhythm.      Heart sounds: Normal heart sounds.   Pulmonary:      Effort: Pulmonary effort is normal.      Breath sounds: Normal breath sounds. No wheezing or rales.   Abdominal:      General: Bowel sounds are normal.      Palpations: Abdomen is soft.      Tenderness: There is no abdominal tenderness. There is no guarding.   Musculoskeletal:         General: Normal range of motion.      Cervical back: Normal range of motion and neck supple.   Skin:     General: Skin is warm and dry.      Findings: No erythema or rash.   Neurological:      Mental Status: He is alert and oriented to person, place, and time. Mental status is at baseline.      Motor: Weakness present.      Gait: Gait abnormal.   Psychiatric:         Mood and Affect: Mood normal.         Behavior: Behavior normal.         Thought Content: Thought content normal.         Judgment: Judgment normal.         "

## 2025-02-09 NOTE — ASSESSMENT & PLAN NOTE
Discussed with patient use of sedative  medications and good  sleep hygiene to maximize treatment for this problem. Pt  to use sedatives only prior to sleep and cautioned them about usage at any other time. All patient questions about this problem answered today.   Orders:  •  zolpidem (AMBIEN) 10 mg tablet; 2 po  q hs

## 2025-02-10 ENCOUNTER — OFFICE VISIT (OUTPATIENT)
Dept: FAMILY MEDICINE CLINIC | Facility: CLINIC | Age: 70
End: 2025-02-10
Payer: MEDICARE

## 2025-02-10 VITALS
WEIGHT: 212 LBS | HEART RATE: 57 BPM | DIASTOLIC BLOOD PRESSURE: 78 MMHG | BODY MASS INDEX: 31.4 KG/M2 | TEMPERATURE: 97.4 F | SYSTOLIC BLOOD PRESSURE: 126 MMHG | HEIGHT: 69 IN | OXYGEN SATURATION: 99 % | RESPIRATION RATE: 16 BRPM

## 2025-02-10 DIAGNOSIS — E78.2 MIXED HYPERLIPIDEMIA: ICD-10-CM

## 2025-02-10 DIAGNOSIS — F11.20 CONTINUOUS OPIOID DEPENDENCE (HCC): ICD-10-CM

## 2025-02-10 DIAGNOSIS — E03.4 HYPOTHYROIDISM DUE TO ACQUIRED ATROPHY OF THYROID: ICD-10-CM

## 2025-02-10 DIAGNOSIS — I48.3 TYPICAL ATRIAL FLUTTER (HCC): ICD-10-CM

## 2025-02-10 DIAGNOSIS — F41.9 ANXIETY: ICD-10-CM

## 2025-02-10 DIAGNOSIS — I10 ESSENTIAL HYPERTENSION: ICD-10-CM

## 2025-02-10 DIAGNOSIS — I73.9 CLAUDICATION (HCC): ICD-10-CM

## 2025-02-10 DIAGNOSIS — D68.9 COAGULATION DEFECT, UNSPECIFIED (HCC): ICD-10-CM

## 2025-02-10 DIAGNOSIS — N18.30 STAGE 3 CHRONIC KIDNEY DISEASE, UNSPECIFIED WHETHER STAGE 3A OR 3B CKD (HCC): ICD-10-CM

## 2025-02-10 DIAGNOSIS — I63.00 CEREBROVASCULAR ACCIDENT (CVA) DUE TO THROMBOSIS OF PRECEREBRAL ARTERY (HCC): ICD-10-CM

## 2025-02-10 DIAGNOSIS — J41.0 SIMPLE CHRONIC BRONCHITIS (HCC): ICD-10-CM

## 2025-02-10 DIAGNOSIS — I25.709 CORONARY ARTERY DISEASE INVOLVING CORONARY BYPASS GRAFT OF NATIVE HEART WITH ANGINA PECTORIS (HCC): Primary | ICD-10-CM

## 2025-02-10 DIAGNOSIS — I50.84 END STAGE HEART FAILURE (HCC): ICD-10-CM

## 2025-02-10 DIAGNOSIS — F11.20 OPIOID DEPENDENCE, UNCOMPLICATED (HCC): ICD-10-CM

## 2025-02-10 DIAGNOSIS — G47.00 INSOMNIA, UNSPECIFIED TYPE: ICD-10-CM

## 2025-02-10 DIAGNOSIS — I69.351 HEMIPLEGIA AND HEMIPARESIS FOLLOWING CEREBRAL INFARCTION AFFECTING RIGHT DOMINANT SIDE (HCC): ICD-10-CM

## 2025-02-10 DIAGNOSIS — I50.33 ACUTE ON CHRONIC DIASTOLIC HEART FAILURE (HCC): ICD-10-CM

## 2025-02-10 PROCEDURE — G2211 COMPLEX E/M VISIT ADD ON: HCPCS | Performed by: FAMILY MEDICINE

## 2025-02-10 PROCEDURE — 99214 OFFICE O/P EST MOD 30 MIN: CPT | Performed by: FAMILY MEDICINE

## 2025-02-10 RX ORDER — ZOLPIDEM TARTRATE 10 MG/1
TABLET ORAL
Qty: 180 TABLET | Refills: 1 | Status: SHIPPED | OUTPATIENT
Start: 2025-02-10

## 2025-02-10 RX ORDER — CLONAZEPAM 2 MG/1
2 TABLET ORAL 2 TIMES DAILY PRN
Qty: 120 TABLET | Refills: 1 | Status: SHIPPED | OUTPATIENT
Start: 2025-02-10

## 2025-02-10 NOTE — ASSESSMENT & PLAN NOTE
Orders:  •  clonazePAM (KlonoPIN) 2 mg tablet; Take 1 tablet (2 mg total) by mouth 2 (two) times a day as needed for seizures

## 2025-02-26 DIAGNOSIS — M54.50 LOW BACK PAIN WITHOUT SCIATICA, UNSPECIFIED BACK PAIN LATERALITY, UNSPECIFIED CHRONICITY: ICD-10-CM

## 2025-02-27 RX ORDER — ACETAMINOPHEN AND CODEINE PHOSPHATE 300; 30 MG/1; MG/1
1 TABLET ORAL EVERY 8 HOURS PRN
Qty: 90 TABLET | Refills: 0 | Status: SHIPPED | OUTPATIENT
Start: 2025-02-27

## 2025-03-10 ENCOUNTER — TELEPHONE (OUTPATIENT)
Age: 70
End: 2025-03-10

## 2025-03-10 NOTE — TELEPHONE ENCOUNTER
Patient called that a prior authorization is needed for acetaminophen-codeine (TYLENOL with CODEINE #3) 300-30 MG per tablet. The last PA was approved until 12/31/24. He only has enough medication until the end of this week.    [employer] pharmacist phone# [phone]

## 2025-03-10 NOTE — TELEPHONE ENCOUNTER
PA team has not received any fax, there us also no fax in media. Please verify if we are submitted a PA for both medications since [employer] considers this a drug interaction.

## 2025-03-10 NOTE — TELEPHONE ENCOUNTER
Patient and wife called together.[facility] will be faxing over a form for patient's clonazePAM (KlonoPIN) 2 mg tablet to be filled out. [facility] is saying they won't pay for this if he is also prescribed the acetaminophen-codeine (TYLENOL with CODEINE #3) 300-30 MG per tablet.    The clonazePAM is prescribed for sleep walking and they stated the Tylenol 3 is used infrequently and only when he overdoes it with yard work. [employer] is going to expedite sending the form. Please fill out and approve refill for clonazePAM once received.    Please be on the lookout for the fax.

## 2025-03-11 NOTE — TELEPHONE ENCOUNTER
Patient just called with [employer] on call as well.  [employer] will refax form to us to be completed by p/a team.  Case # is [id number].

## 2025-03-11 NOTE — TELEPHONE ENCOUNTER
Patient states he must have a fax from [employer] because it will cost him $50 for Clonazepam as opposed to the $4 it usually costs.  I asked him to please have someone fax us what our office needs to complete in order to accomplish this for the patient.

## 2025-04-24 DIAGNOSIS — E78.2 MIXED HYPERLIPIDEMIA: ICD-10-CM

## 2025-04-24 DIAGNOSIS — I25.709 CORONARY ARTERY DISEASE INVOLVING CORONARY BYPASS GRAFT OF NATIVE HEART WITH ANGINA PECTORIS (HCC): ICD-10-CM

## 2025-04-24 DIAGNOSIS — N52.9 ERECTILE DYSFUNCTION, UNSPECIFIED ERECTILE DYSFUNCTION TYPE: ICD-10-CM

## 2025-04-24 RX ORDER — SILDENAFIL 100 MG/1
TABLET, FILM COATED ORAL
Qty: 30 TABLET | Refills: 5 | Status: SHIPPED | OUTPATIENT
Start: 2025-04-24

## 2025-04-24 RX ORDER — ATORVASTATIN CALCIUM 40 MG/1
80 TABLET, FILM COATED ORAL DAILY
Qty: 180 TABLET | Refills: 1 | Status: SHIPPED | OUTPATIENT
Start: 2025-04-24

## 2025-05-04 NOTE — PROGRESS NOTES
Name: Deshawn Tsai      : 1955      MRN: [id number]  Encounter Provider: Chadd Lambert MD  Encounter Date: 2025   Encounter department: [facility]  :  Assessment & Plan  Medicare annual wellness visit, subsequent         Anxiety  Patient to continue utilizing medical therapy as well as counseling sources as applicable to condition. If suicidal thought or fear of suicide attempt, to call 911 and seek help immediately. Medications and therapy reviewed today and all patient  questions answered today.        Coronary artery disease involving coronary bypass graft of native heart with angina pectoris (HCC)  Patient to continue  with current cardiac meds to decrease risk of re stenosis. Patient to follow up with cardiology for scheduled appointments to decrease risk for further cardiac problems with appropriate diagnostic testing to reassess cardiac status. Patient had alll questions about this problem answered today.        Continuous opioid dependence (HCC)  Patient is stable  and will continue present plan of care and reassess at next routine visit. All questions about this problem from patient were answered today.        Essential hypertension  Patient is stable with current anti-hypertensive medicine and continue to follow a low sodium diet and take current medication. All questions about this condition were answered today.        Hemiplegia and hemiparesis following cerebral infarction affecting right dominant side (HCC)  Patient is stable  and will continue present plan of care and reassess at next routine visit. All questions about this problem from patient were answered today.        Hypothyroidism due to acquired atrophy of thyroid  Patient to continue current dose of thyroid medicine and recheck TSH in 6 months        Insomnia, unspecified type  Discussed with patient use of sedative  medications and good  sleep hygiene to maximize treatment for this problem. Pt   to use sedatives only prior to sleep and cautioned them about usage at any other time. All patient questions about this problem answered today.        Mixed hyperlipidemia  Patient  is stable with current medication and we discussed a low fat low cholesterol diet. Weight loss also discussed for this will help lower cholesterol also. Recheck lipids in 6 months.        Cerebrovascular accident (CVA) due to thrombosis of precerebral artery (HCC)  Patient is stable  and will continue present plan of care and reassess at next routine visit. All questions about this problem from patient were answered today.              Depression Screening and Follow-up Plan: Patient's depression screening was positive with a PHQ-2 score of 3.   Clincally patient does not have depression. No treatment is required.     Falls Plan of Care: balance, strength, and gait training instructions were provided. Home safety education provided.     History of Present Illness   89-year-old male here today for checkup for Medicare wellness is checkup on multimedical problems patient with coronary disease hypertension hyperlipidemia history of CVA with significant resolution of his stroke symptoms.  Patient is doing well with his medications patient is not needing refills on his medicines.  Patient is doing well with his medications and is doing well with his lab work he recently had his thyroid as well as cholesterol done in January and is due for that after June.  I will order it for his next routine checkup which will be in 3 months.      Review of Systems   Constitutional:  Negative for activity change, appetite change, chills, fatigue, fever and unexpected weight change.   HENT:  Negative for congestion, ear pain, hearing loss, mouth sores, postnasal drip, sinus pressure, sinus pain, sneezing and sore throat.    Respiratory:  Negative for apnea, cough, shortness of breath and wheezing.    Cardiovascular:  Negative for chest pain, palpitations and  "leg swelling.   Gastrointestinal:  Negative for abdominal pain, constipation, diarrhea, nausea and vomiting.   Endocrine: Negative for cold intolerance and heat intolerance.   Genitourinary:  Negative for dysuria, frequency and hematuria.   Musculoskeletal:  Negative for arthralgias, back pain, gait problem, joint swelling and neck pain.   Skin:  Negative for rash.   Neurological:  Negative for dizziness, weakness and numbness.   Hematological:  Does not bruise/bleed easily.   Psychiatric/Behavioral:  Negative for agitation, behavioral problems, confusion, hallucinations and sleep disturbance. The patient is not nervous/anxious.        Objective   /80 (BP Location: Left arm, Patient Position: Sitting, Cuff Size: Large)   Pulse 56   Temp (!) 97.4 °F (36.3 °C) (Skin)   Ht 5' 9\" (1.753 m)   Wt 96.6 kg (213 lb)   SpO2 97%   BMI 31.45 kg/m²      Physical Exam  Constitutional:       Appearance: He is well-developed.   HENT:      Head: Normocephalic and atraumatic.      Right Ear: External ear normal.      Left Ear: External ear normal.      Nose: Nose normal.      Mouth/Throat:      Pharynx: Oropharynx is clear. No oropharyngeal exudate.   Eyes:      General: No scleral icterus.     Conjunctiva/sclera: Conjunctivae normal.      Pupils: Pupils are equal, round, and reactive to light.   Cardiovascular:      Rate and Rhythm: Normal rate and regular rhythm.      Heart sounds: Normal heart sounds.   Pulmonary:      Effort: Pulmonary effort is normal.      Breath sounds: Normal breath sounds. No wheezing or rales.   Abdominal:      General: Bowel sounds are normal.      Palpations: Abdomen is soft.      Tenderness: There is no abdominal tenderness. There is no guarding.   Musculoskeletal:         General: Normal range of motion.      Cervical back: Normal range of motion and neck supple.   Skin:     General: Skin is warm and dry.      Findings: No erythema or rash.   Neurological:      Mental Status: He is alert and " oriented to person, place, and time. Mental status is at baseline.   Psychiatric:         Mood and Affect: Mood normal.         Behavior: Behavior normal.         Thought Content: Thought content normal.         Judgment: Judgment normal.

## 2025-05-05 ENCOUNTER — OFFICE VISIT (OUTPATIENT)
Dept: FAMILY MEDICINE CLINIC | Facility: CLINIC | Age: 70
End: 2025-05-05
Payer: MEDICARE

## 2025-05-05 VITALS
OXYGEN SATURATION: 97 % | SYSTOLIC BLOOD PRESSURE: 134 MMHG | WEIGHT: 213 LBS | BODY MASS INDEX: 31.55 KG/M2 | DIASTOLIC BLOOD PRESSURE: 80 MMHG | HEART RATE: 56 BPM | HEIGHT: 69 IN | TEMPERATURE: 97.4 F

## 2025-05-05 DIAGNOSIS — I10 ESSENTIAL HYPERTENSION: ICD-10-CM

## 2025-05-05 DIAGNOSIS — E03.4 HYPOTHYROIDISM DUE TO ACQUIRED ATROPHY OF THYROID: ICD-10-CM

## 2025-05-05 DIAGNOSIS — I25.709 CORONARY ARTERY DISEASE INVOLVING CORONARY BYPASS GRAFT OF NATIVE HEART WITH ANGINA PECTORIS (HCC): ICD-10-CM

## 2025-05-05 DIAGNOSIS — Z00.00 MEDICARE ANNUAL WELLNESS VISIT, SUBSEQUENT: Primary | ICD-10-CM

## 2025-05-05 DIAGNOSIS — F11.20 CONTINUOUS OPIOID DEPENDENCE (HCC): ICD-10-CM

## 2025-05-05 DIAGNOSIS — G47.00 INSOMNIA, UNSPECIFIED TYPE: ICD-10-CM

## 2025-05-05 DIAGNOSIS — F41.9 ANXIETY: ICD-10-CM

## 2025-05-05 DIAGNOSIS — I69.351 HEMIPLEGIA AND HEMIPARESIS FOLLOWING CEREBRAL INFARCTION AFFECTING RIGHT DOMINANT SIDE (HCC): ICD-10-CM

## 2025-05-05 DIAGNOSIS — E78.2 MIXED HYPERLIPIDEMIA: ICD-10-CM

## 2025-05-05 DIAGNOSIS — I63.00 CEREBROVASCULAR ACCIDENT (CVA) DUE TO THROMBOSIS OF PRECEREBRAL ARTERY (HCC): ICD-10-CM

## 2025-05-05 PROCEDURE — 99214 OFFICE O/P EST MOD 30 MIN: CPT | Performed by: FAMILY MEDICINE

## 2025-05-05 PROCEDURE — G0439 PPPS, SUBSEQ VISIT: HCPCS | Performed by: FAMILY MEDICINE

## 2025-05-05 PROCEDURE — G2211 COMPLEX E/M VISIT ADD ON: HCPCS | Performed by: FAMILY MEDICINE

## 2025-05-05 NOTE — PROGRESS NOTES
Name: Deshawn Tsai      : 1955      MRN: [id number]  Encounter Provider: Chadd Lambert MD  Encounter Date: 2025   Encounter department: [facility]  :  Assessment & Plan  Medicare annual wellness visit, subsequent         Anxiety         Coronary artery disease involving coronary bypass graft of native heart with angina pectoris (HCC)         Continuous opioid dependence (HCC)         Essential hypertension         Hemiplegia and hemiparesis following cerebral infarction affecting right dominant side (HCC)         Hypothyroidism due to acquired atrophy of thyroid         Insomnia, unspecified type         Mixed hyperlipidemia         Cerebrovascular accident (CVA) due to thrombosis of precerebral artery (HCC)            Preventive health issues were discussed with patient, and age appropriate screening tests were ordered as noted in patient's After Visit Summary. Personalized health advice and appropriate referrals for health education or preventive services given if needed, as noted in patient's After Visit Summary.    History of Present Illness     HPI   Patient Care Team:  Chadd Lambert MD as PCP - General (Family Medicine)  Chadd Lambert MD (Family Medicine)    Review of Systems  Medical History Reviewed by provider this encounter:  Tobacco  Allergies  Meds  Problems  Med Hx  Surg Hx  Fam Hx       Annual Wellness Visit Questionnaire   Deshawn is here for his Subsequent Wellness visit.     Health Risk Assessment:   Patient rates overall health as fair. Patient feels that their physical health rating is slightly worse. Patient is dissatisfied with their life. Eyesight was rated as same. Hearing was rated as same. Patient feels that their emotional and mental health rating is same. Patients states they are never, rarely angry. Patient states they are often unusually tired/fatigued. Pain experienced in the last 7 days has been some. Patient's pain rating has  been 9/10. Patient states that he has experienced weight loss or gain in last 6 months.     Depression Screening:   PHQ-2 Score: 3      Fall Risk Screening:   In the past year, patient has experienced: no history of falling in past year      Home Safety:  Patient has trouble with stairs inside or outside of their home. Patient has working smoke alarms and has working carbon monoxide detector. Home safety hazards include: none.     Medications:   Patient is currently taking over-the-counter supplements. OTC medications include: see medication list. Patient is able to manage medications.     Activities of Daily Living (ADLs)/Instrumental Activities of Daily Living (IADLs):   Walk and transfer into and out of bed and chair?: Yes  Dress and groom yourself?: Yes    Bathe or shower yourself?: Yes    Feed yourself? Yes  Do your laundry/housekeeping?: Yes  Manage your money, pay your bills and track your expenses?: Yes  Make your own meals?: Yes    Do your own shopping?: Yes    Previous Hospitalizations:   Any hospitalizations or ED visits within the last 12 months?: Yes    How many hospitalizations have you had in the last year?: 1-2    Preventive Screenings      Cardiovascular Screening:    General: Screening Not Indicated and History Lipid Disorder      Diabetes Screening:     General: Screening Current      Colorectal Cancer Screening:     General: Screening Current      Abdominal Aortic Aneurysm (AAA) Screening:    Risk factors include: age between 65-74 yo        Lung Cancer Screening:     General: Screening Not Indicated      Hepatitis C Screening:    General: Screening Current    Immunizations:  - Immunizations due: Prevnar 20 and Zoster (Shingrix)    Screening, Brief Intervention, and Referral to Treatment (SBIRT)     Screening  Typical number of drinks in a day: 0  Typical number of drinks in a week: 0  Interpretation: Low risk drinking behavior.    AUDIT-C Screenin) How often did you have a drink containing  "alcohol in the past year? never  2) How many drinks did you have on a typical day when you were drinking in the past year? 0  3) How often did you have 6 or more drinks on one occasion in the past year? never    AUDIT-C Score: 0  Interpretation: Score 0-3 (male): Negative screen for alcohol misuse    Single Item Drug Screening:  How often have you used an illegal drug (including marijuana) or a prescription medication for non-medical reasons in the past year? never    Single Item Drug Screen Score: 0  Interpretation: Negative screen for possible drug use disorder    Social Drivers of Health     Financial Resource Strain: Low Risk  (3/24/2023)    Overall Financial Resource Strain (CARDIA)    • Difficulty of Paying Living Expenses: Not hard at all   Food Insecurity: No Food Insecurity (5/5/2025)    Hunger Vital Sign    • Worried About Running Out of Food in the Last Year: Never true    • Ran Out of Food in the Last Year: Never true   Transportation Needs: No Transportation Needs (5/5/2025)    PRAPARE - Transportation    • Lack of Transportation (Medical): No    • Lack of Transportation (Non-Medical): No   Housing Stability: Low Risk  (5/5/2025)    Housing Stability Vital Sign    • Unable to Pay for Housing in the Last Year: No    • Number of Times Moved in the Last Year: 1    • Homeless in the Last Year: No   Utilities: Not At Risk (5/5/2025)    [facility] Utilities    • Threatened with loss of utilities: No     No results found.    Objective   /80 (BP Location: Left arm, Patient Position: Sitting, Cuff Size: Large)   Pulse 56   Temp (!) 97.4 °F (36.3 °C) (Skin)   Ht 5' 9\" (1.753 m)   Wt 96.6 kg (213 lb)   SpO2 97%   BMI 31.45 kg/m²     Physical Exam    "

## 2025-05-10 DIAGNOSIS — G47.00 INSOMNIA, UNSPECIFIED TYPE: ICD-10-CM

## 2025-05-12 RX ORDER — ZOLPIDEM TARTRATE 10 MG/1
TABLET ORAL
Qty: 180 TABLET | Refills: 0 | Status: SHIPPED | OUTPATIENT
Start: 2025-05-12

## 2025-07-17 DIAGNOSIS — M54.50 LOW BACK PAIN WITHOUT SCIATICA, UNSPECIFIED BACK PAIN LATERALITY, UNSPECIFIED CHRONICITY: ICD-10-CM

## 2025-07-18 DIAGNOSIS — F41.9 ANXIETY: ICD-10-CM

## 2025-07-18 RX ORDER — ACETAMINOPHEN AND CODEINE PHOSPHATE 300; 30 MG/1; MG/1
1 TABLET ORAL EVERY 8 HOURS PRN
Qty: 90 TABLET | Refills: 0 | Status: SHIPPED | OUTPATIENT
Start: 2025-07-18 | End: 2025-07-21 | Stop reason: SDUPTHER

## 2025-07-18 NOTE — TELEPHONE ENCOUNTER
PT called to inquire about the status of the tylenol with codeine refill request. He said he only has 6 pills left. He stated that Dr Lambert knows he does not abuse the med. and knows what he needs it for.  I informed PT that refills may take anywhere from 48-72 hrs to process. PT is concerned that its going to be the weekend and  may not be refilled by Monday, and Dr Lambert is not in on Tuesday.    Please advise    ThankYou

## 2025-07-21 DIAGNOSIS — M54.50 LOW BACK PAIN WITHOUT SCIATICA, UNSPECIFIED BACK PAIN LATERALITY, UNSPECIFIED CHRONICITY: ICD-10-CM

## 2025-07-21 RX ORDER — CLONAZEPAM 2 MG/1
TABLET ORAL
Qty: 120 TABLET | Refills: 0 | Status: SHIPPED | OUTPATIENT
Start: 2025-07-21

## 2025-07-21 RX ORDER — CLONAZEPAM 2 MG/1
2 TABLET ORAL 2 TIMES DAILY PRN
Qty: 120 TABLET | Refills: 0 | OUTPATIENT
Start: 2025-07-21

## 2025-07-22 ENCOUNTER — TELEPHONE (OUTPATIENT)
Age: 70
End: 2025-07-22

## 2025-07-22 RX ORDER — ACETAMINOPHEN AND CODEINE PHOSPHATE 300; 30 MG/1; MG/1
1 TABLET ORAL EVERY 8 HOURS PRN
Qty: 90 TABLET | Refills: 0 | Status: SHIPPED | OUTPATIENT
Start: 2025-07-22

## 2025-07-22 NOTE — TELEPHONE ENCOUNTER
Called and spoke to Raymond at [facility] pharmacy. The patient picked up both of these medications and they processed through insurance with a paid claim for both. No prior authorization is required at this time. Thank you.

## 2025-07-22 NOTE — TELEPHONE ENCOUNTER
Message sent via [employer].     Need authorizations on   Clonazepam 2MG TABLET  Acetaminophen-COD #3 TABLET  Before insurance will pay.  ASAP  Thank you,  Miesha Tsai

## 2025-07-31 ENCOUNTER — TELEPHONE (OUTPATIENT)
Age: 70
End: 2025-07-31

## 2025-08-11 ENCOUNTER — APPOINTMENT (INPATIENT)
Dept: RADIOLOGY | Facility: HOSPITAL | Age: 70
DRG: 604 | End: 2025-08-11
Payer: MEDICARE

## 2025-08-11 ENCOUNTER — APPOINTMENT (EMERGENCY)
Dept: RADIOLOGY | Facility: HOSPITAL | Age: 70
DRG: 604 | End: 2025-08-11
Payer: MEDICARE

## 2025-08-11 ENCOUNTER — APPOINTMENT (OUTPATIENT)
Dept: RADIOLOGY | Facility: HOSPITAL | Age: 70
DRG: 604 | End: 2025-08-11
Payer: MEDICARE

## 2025-08-11 ENCOUNTER — APPOINTMENT (EMERGENCY)
Dept: CT IMAGING | Facility: HOSPITAL | Age: 70
DRG: 604 | End: 2025-08-11
Payer: MEDICARE

## 2025-08-11 ENCOUNTER — HOSPITAL ENCOUNTER (INPATIENT)
Facility: HOSPITAL | Age: 70
LOS: 2 days | DRG: 604 | End: 2025-08-13
Attending: SURGERY | Admitting: SURGERY
Payer: MEDICARE

## 2025-08-11 PROBLEM — S52.502A CLOSED FRACTURE OF LEFT DISTAL RADIUS: Status: ACTIVE | Noted: 2025-08-11

## 2025-08-11 PROBLEM — S70.10XA QUADRICEPS CONTUSION: Status: ACTIVE | Noted: 2025-08-11
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
## 2025-08-11 PROBLEM — I25.10 CAD (CORONARY ARTERY DISEASE): Status: ACTIVE | Noted: 2025-08-11

## 2025-08-11 PROBLEM — I60.9 SAH (SUBARACHNOID HEMORRHAGE) (HCC): Status: ACTIVE | Noted: 2025-08-11

## 2025-08-12 ENCOUNTER — TELEPHONE (OUTPATIENT)
Dept: NEUROSURGERY | Facility: CLINIC | Age: 70
End: 2025-08-12

## 2025-08-12 ENCOUNTER — APPOINTMENT (INPATIENT)
Dept: CT IMAGING | Facility: HOSPITAL | Age: 70
DRG: 604 | End: 2025-08-12
Payer: MEDICARE

## 2025-08-12 PROBLEM — Z86.73 HISTORY OF CVA (CEREBROVASCULAR ACCIDENT): Status: ACTIVE | Noted: 2025-08-12

## 2025-08-12 PROBLEM — R26.2 AMBULATORY DYSFUNCTION: Status: ACTIVE | Noted: 2025-08-12

## 2025-08-12 PROBLEM — Z91.89 AT RISK FOR DELIRIUM: Status: ACTIVE | Noted: 2025-08-12

## 2025-08-12 PROBLEM — G47.00 INSOMNIA: Status: ACTIVE | Noted: 2025-08-12

## 2025-08-12 PROBLEM — E03.9 HYPOTHYROIDISM: Status: ACTIVE | Noted: 2025-08-12

## 2025-08-13 PROBLEM — Z91.89 AT RISK FOR CONSTIPATION: Status: ACTIVE | Noted: 2025-08-13

## 2025-08-13 PROBLEM — F41.9 ANXIETY: Status: ACTIVE | Noted: 2025-08-13

## 2025-08-13 PROBLEM — G89.29 CHRONIC BACK PAIN: Status: ACTIVE | Noted: 2025-08-13

## 2025-08-13 PROBLEM — M54.9 CHRONIC BACK PAIN: Status: ACTIVE | Noted: 2025-08-13

## 2025-08-18 ENCOUNTER — TELEPHONE (OUTPATIENT)
Dept: FAMILY MEDICINE CLINIC | Facility: CLINIC | Age: 70
End: 2025-08-18

## (undated) DEVICE — SUT ETHIBOND 2-0 SH-1/SH-1 30 IN X763H

## (undated) DEVICE — SUT SILK 2 60 IN SA8H

## (undated) DEVICE — BLANKET HYPOTHERMIA ADULT GAYMAR

## (undated) DEVICE — GLOVE INDICATOR PI UNDERGLOVE SZ 8 BLUE

## (undated) DEVICE — SILVER-COATED ANTIBACTERIAL BARRIER DRESSING: Brand: ACTICOAT SURGIC 10X12CM 5PK US

## (undated) DEVICE — PACK CABG PBDS

## (undated) DEVICE — 40601 PROLONGED POSITIONING SYSTEM: Brand: 40601 PROLONGED POSITIONING SYSTEM

## (undated) DEVICE — SUT PROLENE 7-0 BV175-8/BV175-8 24 IN EPM8747

## (undated) DEVICE — 3000CC GUARDIAN II: Brand: GUARDIAN

## (undated) DEVICE — DRESSING ALLEVYN LIFE HEEL 25 X 25.2CM

## (undated) DEVICE — SUT SILK 2-0 SH CR/8 18 IN C012D

## (undated) DEVICE — ACE WRAP 6 IN UNSTERILE

## (undated) DEVICE — HEMOCLIP CARTRIDGE LRG

## (undated) DEVICE — TUBING INSUFFLATION SET ISO CONNECTOR

## (undated) DEVICE — INTENDED FOR TISSUE SEPARATION, AND OTHER PROCEDURES THAT REQUIRE A SHARP SURGICAL BLADE TO PUNCTURE OR CUT.: Brand: BARD-PARKER ® CARBON RIB-BACK BLADES

## (undated) DEVICE — PENCIL ELECTROSURG E-Z CLEAN -0035H

## (undated) DEVICE — PLUMEPEN PRO 10FT

## (undated) DEVICE — SUT PROLENE 4-0 BB 36 IN 8581H

## (undated) DEVICE — SUT MONOCRYL 4-0 PS-2 18 IN Y496G

## (undated) DEVICE — BONE WAX WHITE: Brand: BONE WAX WHITE

## (undated) DEVICE — Device: Brand: RETRACT-O-TAPE 18G X 30.5CM BLUNT NEEDLE

## (undated) DEVICE — RECIP.STERNUM SAW BLADE 34/7.5/0.7MM: Brand: AESCULAP

## (undated) DEVICE — SUT SILK 0 CT-1 30 IN 424H

## (undated) DEVICE — EVERGRIP INSERT SET 86MM: Brand: FOGARTY EVERGRIP

## (undated) DEVICE — SUT PROLENE 7-0 BV-1/BV-1 24 IN 8304H

## (undated) DEVICE — SUT PROLENE 5-0 C-1/C-1 36 IN 8321H

## (undated) DEVICE — SUT MONOCRYL PLUS 3-0 PS-2 27 IN MCP427H

## (undated) DEVICE — INTENDED FOR TISSUE SEPARATION, AND OTHER PROCEDURES THAT REQUIRE A SHARP SURGICAL BLADE TO PUNCTURE OR CUT.: Brand: BARD-PARKER SAFETY BLADES SIZE 15, STERILE

## (undated) DEVICE — OASIS DRAIN, DUAL, IN-LINE, ATS COMPATIBLE: Brand: OASIS

## (undated) DEVICE — BLADE BEAVER MINI SZ 69

## (undated) DEVICE — ANTIBACTERIAL UNDYED BRAIDED (POLYGLACTIN 910), SYNTHETIC ABSORBABLE SUTURE: Brand: COATED VICRYL

## (undated) DEVICE — SUCTION CATH 18 FR

## (undated) DEVICE — PLEDGET CARDIO PTFE 9.5 X 4.8 SOFT LF (6EA/PK)

## (undated) DEVICE — AORTIC PUNCH 5.2 MM DISP

## (undated) DEVICE — SUT ETHIBOND 2-0 SH/SH 36 IN X523H

## (undated) DEVICE — THERMOFLECT BLANKET, L, 25EA                               TS THERMOFLECT BLANKET, 48" X 84", SILVER, 5/BG, 5 BG/CS NW: Brand: THERMOFLECT

## (undated) DEVICE — CATH STRAIGHT RED RUBBER 20 FR

## (undated) DEVICE — 32 FR STRAIGHT – SOFT PVC CATHETER: Brand: PVC THORACIC CATHETERS

## (undated) DEVICE — ADHESIVE SKIN HIGH VISCOSITY EXOFIN 1ML

## (undated) DEVICE — ELECTRODE BLADE E-Z CLEAN 4IN -0014A

## (undated) DEVICE — SYRINGE 50ML LL

## (undated) DEVICE — STERNAL WIRE

## (undated) DEVICE — LIGHT HANDLE COVER SLEEVE DISP BLUE STELLAR

## (undated) DEVICE — FILTER SMOKE EVAC VIROSAFE

## (undated) DEVICE — 32 FR RIGHT ANGLE – SOFT PVC CATHETER: Brand: PVC THORACIC CATHETERS

## (undated) DEVICE — GLOVE SRG BIOGEL ECLIPSE 8

## (undated) DEVICE — SUT PDS PLUS 1 CTB 36 IN PDPB359T

## (undated) DEVICE — SURGICEL NU-KNIT 3 X 4

## (undated) DEVICE — VASOVIEW HEMOPRO 2: Brand: VASOVIEW HEMOPRO 2

## (undated) DEVICE — TRAY FOLEY 16FR SURESTEP TEMP SENS URIMETER STAT LOK

## (undated) DEVICE — SILVER-COATED ANTIBACTERIAL BARRIER DRESSING: Brand: ACTICOAT SURGIC 10X25CM 5PK US

## (undated) DEVICE — SUT VICRYL PLUS 1 CTB-1 36 IN VCPB947H

## (undated) DEVICE — ALCON OPHTHALMIC KNIFE 15 °: Brand: ALCON

## (undated) DEVICE — GAUZE SPONGES,16 PLY: Brand: CURITY